# Patient Record
Sex: MALE | Race: ASIAN | NOT HISPANIC OR LATINO | URBAN - METROPOLITAN AREA
[De-identification: names, ages, dates, MRNs, and addresses within clinical notes are randomized per-mention and may not be internally consistent; named-entity substitution may affect disease eponyms.]

---

## 2021-04-22 ENCOUNTER — INPATIENT (INPATIENT)
Facility: HOSPITAL | Age: 61
LOS: 12 days | Discharge: HOME HEALTH | End: 2021-05-05
Attending: INTERNAL MEDICINE | Admitting: INTERNAL MEDICINE
Payer: COMMERCIAL

## 2021-04-22 VITALS
RESPIRATION RATE: 30 BRPM | TEMPERATURE: 98 F | OXYGEN SATURATION: 98 % | SYSTOLIC BLOOD PRESSURE: 180 MMHG | DIASTOLIC BLOOD PRESSURE: 74 MMHG | HEART RATE: 84 BPM

## 2021-04-22 DIAGNOSIS — R06.02 SHORTNESS OF BREATH: ICD-10-CM

## 2021-04-22 LAB
ALBUMIN SERPL ELPH-MCNC: 3.7 G/DL — SIGNIFICANT CHANGE UP (ref 3.3–5)
ALP SERPL-CCNC: 231 U/L — HIGH (ref 40–120)
ALT FLD-CCNC: 38 U/L — SIGNIFICANT CHANGE UP (ref 4–41)
ANION GAP SERPL CALC-SCNC: 14 MMOL/L — SIGNIFICANT CHANGE UP (ref 7–14)
APTT BLD: 29.8 SEC — SIGNIFICANT CHANGE UP (ref 27–36.3)
AST SERPL-CCNC: 39 U/L — SIGNIFICANT CHANGE UP (ref 4–40)
BASOPHILS # BLD AUTO: 0.11 K/UL — SIGNIFICANT CHANGE UP (ref 0–0.2)
BASOPHILS NFR BLD AUTO: 0.9 % — SIGNIFICANT CHANGE UP (ref 0–2)
BILIRUB SERPL-MCNC: 0.6 MG/DL — SIGNIFICANT CHANGE UP (ref 0.2–1.2)
BLOOD GAS VENOUS COMPREHENSIVE RESULT: SIGNIFICANT CHANGE UP
BUN SERPL-MCNC: 39 MG/DL — HIGH (ref 7–23)
CALCIUM SERPL-MCNC: 9.2 MG/DL — SIGNIFICANT CHANGE UP (ref 8.4–10.5)
CHLORIDE SERPL-SCNC: 100 MMOL/L — SIGNIFICANT CHANGE UP (ref 98–107)
CO2 SERPL-SCNC: 18 MMOL/L — LOW (ref 22–31)
CREAT SERPL-MCNC: 2.15 MG/DL — HIGH (ref 0.5–1.3)
EOSINOPHIL # BLD AUTO: 1.51 K/UL — HIGH (ref 0–0.5)
EOSINOPHIL NFR BLD AUTO: 13 % — HIGH (ref 0–6)
GLUCOSE SERPL-MCNC: 82 MG/DL — SIGNIFICANT CHANGE UP (ref 70–99)
HCT VFR BLD CALC: 31.5 % — LOW (ref 39–50)
HGB BLD-MCNC: 10.7 G/DL — LOW (ref 13–17)
IANC: 7.55 K/UL — SIGNIFICANT CHANGE UP (ref 1.5–8.5)
IMM GRANULOCYTES NFR BLD AUTO: 0.4 % — SIGNIFICANT CHANGE UP (ref 0–1.5)
INR BLD: 1.2 RATIO — HIGH (ref 0.88–1.16)
LYMPHOCYTES # BLD AUTO: 0.88 K/UL — LOW (ref 1–3.3)
LYMPHOCYTES # BLD AUTO: 7.6 % — LOW (ref 13–44)
MCHC RBC-ENTMCNC: 28.8 PG — SIGNIFICANT CHANGE UP (ref 27–34)
MCHC RBC-ENTMCNC: 34 GM/DL — SIGNIFICANT CHANGE UP (ref 32–36)
MCV RBC AUTO: 84.9 FL — SIGNIFICANT CHANGE UP (ref 80–100)
MONOCYTES # BLD AUTO: 1.48 K/UL — HIGH (ref 0–0.9)
MONOCYTES NFR BLD AUTO: 12.8 % — SIGNIFICANT CHANGE UP (ref 2–14)
NEUTROPHILS # BLD AUTO: 7.55 K/UL — HIGH (ref 1.8–7.4)
NEUTROPHILS NFR BLD AUTO: 65.3 % — SIGNIFICANT CHANGE UP (ref 43–77)
NRBC # BLD: 0 /100 WBCS — SIGNIFICANT CHANGE UP
NRBC # FLD: 0 K/UL — SIGNIFICANT CHANGE UP
NT-PROBNP SERPL-SCNC: 6360 PG/ML — HIGH
PLATELET # BLD AUTO: 330 K/UL — SIGNIFICANT CHANGE UP (ref 150–400)
POTASSIUM SERPL-MCNC: 3.8 MMOL/L — SIGNIFICANT CHANGE UP (ref 3.5–5.3)
POTASSIUM SERPL-SCNC: 3.8 MMOL/L — SIGNIFICANT CHANGE UP (ref 3.5–5.3)
PROT SERPL-MCNC: 7.5 G/DL — SIGNIFICANT CHANGE UP (ref 6–8.3)
PROTHROM AB SERPL-ACNC: 13.6 SEC — SIGNIFICANT CHANGE UP (ref 10.6–13.6)
RBC # BLD: 3.71 M/UL — LOW (ref 4.2–5.8)
RBC # FLD: 12.1 % — SIGNIFICANT CHANGE UP (ref 10.3–14.5)
SODIUM SERPL-SCNC: 132 MMOL/L — LOW (ref 135–145)
TROPONIN T, HIGH SENSITIVITY RESULT: 727 NG/L — CRITICAL HIGH
TROPONIN T, HIGH SENSITIVITY RESULT: 784 NG/L — CRITICAL HIGH
WBC # BLD: 11.58 K/UL — HIGH (ref 3.8–10.5)
WBC # FLD AUTO: 11.58 K/UL — HIGH (ref 3.8–10.5)

## 2021-04-22 PROCEDURE — 99285 EMERGENCY DEPT VISIT HI MDM: CPT | Mod: 25

## 2021-04-22 PROCEDURE — 71046 X-RAY EXAM CHEST 2 VIEWS: CPT | Mod: 26

## 2021-04-22 PROCEDURE — 93010 ELECTROCARDIOGRAM REPORT: CPT

## 2021-04-22 RX ORDER — HEPARIN SODIUM 5000 [USP'U]/ML
7000 INJECTION INTRAVENOUS; SUBCUTANEOUS ONCE
Refills: 0 | Status: COMPLETED | OUTPATIENT
Start: 2021-04-22 | End: 2021-04-22

## 2021-04-22 RX ORDER — HEPARIN SODIUM 5000 [USP'U]/ML
7000 INJECTION INTRAVENOUS; SUBCUTANEOUS EVERY 6 HOURS
Refills: 0 | Status: DISCONTINUED | OUTPATIENT
Start: 2021-04-22 | End: 2021-04-23

## 2021-04-22 RX ORDER — ALBUTEROL 90 UG/1
4 AEROSOL, METERED ORAL EVERY 6 HOURS
Refills: 0 | Status: DISCONTINUED | OUTPATIENT
Start: 2021-04-22 | End: 2021-04-22

## 2021-04-22 RX ORDER — FUROSEMIDE 40 MG
40 TABLET ORAL DAILY
Refills: 0 | Status: DISCONTINUED | OUTPATIENT
Start: 2021-04-22 | End: 2021-04-22

## 2021-04-22 RX ORDER — HEPARIN SODIUM 5000 [USP'U]/ML
3500 INJECTION INTRAVENOUS; SUBCUTANEOUS EVERY 6 HOURS
Refills: 0 | Status: DISCONTINUED | OUTPATIENT
Start: 2021-04-22 | End: 2021-04-23

## 2021-04-22 RX ORDER — HEPARIN SODIUM 5000 [USP'U]/ML
INJECTION INTRAVENOUS; SUBCUTANEOUS
Qty: 25000 | Refills: 0 | Status: DISCONTINUED | OUTPATIENT
Start: 2021-04-22 | End: 2021-04-23

## 2021-04-22 RX ORDER — FUROSEMIDE 40 MG
40 TABLET ORAL ONCE
Refills: 0 | Status: COMPLETED | OUTPATIENT
Start: 2021-04-22 | End: 2021-04-22

## 2021-04-22 RX ORDER — ASPIRIN/CALCIUM CARB/MAGNESIUM 324 MG
324 TABLET ORAL ONCE
Refills: 0 | Status: COMPLETED | OUTPATIENT
Start: 2021-04-22 | End: 2021-04-22

## 2021-04-22 RX ADMIN — HEPARIN SODIUM 7000 UNIT(S): 5000 INJECTION INTRAVENOUS; SUBCUTANEOUS at 21:58

## 2021-04-22 RX ADMIN — HEPARIN SODIUM 1600 UNIT(S)/HR: 5000 INJECTION INTRAVENOUS; SUBCUTANEOUS at 21:57

## 2021-04-22 RX ADMIN — Medication 324 MILLIGRAM(S): at 23:40

## 2021-04-22 RX ADMIN — ALBUTEROL 4 PUFF(S): 90 AEROSOL, METERED ORAL at 19:56

## 2021-04-22 RX ADMIN — Medication 40 MILLIGRAM(S): at 21:58

## 2021-04-22 NOTE — ED ADULT TRIAGE NOTE - CHIEF COMPLAINT QUOTE
c/o shortness of breath since last night. denies any chest pain. released from [facility] yesterday after right second toe amputation. appears tachypneic and using abdominal muscles. hx DM type 2

## 2021-04-22 NOTE — ED PROVIDER NOTE - NS ED ROS FT
CONSTITUTIONAL: No fevers, no chills  Eyes: No vision changes  Cardiovascular: No Chest pain  Respiratory: + SOB  Gastrointestinal: No n/v/d, no abd pain  Genitourinary: no dysuria, no hematuria  SKIN: no rashes.  NEURO: no headache, no weakness or numbness  PSYCHIATRIC: no known mental health issues.  Endocrine: No unexplained weight gain

## 2021-04-22 NOTE — ED ADULT NURSE NOTE - OBJECTIVE STATEMENT
59y/o male A&ox4, ambulatory received in rm2. pt c/o sob for past few days. released yesterday from [facility] for R 2nd toe amputation. denies cough, fevers at home, n/v/d, chest pain. mild increased wob noted. mild use of abdominal muscles noted, airway patent. pt able to complete full sentences. pt denies recent sick contact. 20g iv placed in LAC, labs drawn and sent. covid sent. pt placed on 3L O2 NC - satting 100%. nsr on monitor. md at bedside for eval. bed in lowest position, side rails up, call bell in hand, safety maintained. awaiting further orders. will continue to monitor.

## 2021-04-22 NOTE — CONSULT NOTE ADULT - SUBJECTIVE AND OBJECTIVE BOX
HPI:  60 year old male w/ HTN, IDDM2 and recent hospitalization for toe amputation and PAD s/p peripheral angiogram w/ angioplasty presents with acute onset of SOB x 1 day without any associated symptoms.  States SOB started Wednesday night and has progressively worsened/constant since.  Recent admission at [facility] about 2 weeks ago for toe numbness and necrotic right toe?, underwent peripheral angiogram w/ angioplasty and toe amputation came home Tuesday feeling well and wensday night acute onset of dyspnea. denies any chest pain, cough, fevers, dizziness, syncope, abd pain, back pain, N/V/D, recent sick contact, recent travel. In ED significant for b/l expiratory wheeze, chest x-ray w/ congestion, pBNP 6360 s/p Bipap and 40 ivp lasix. HsT elevated cardiology called for nstemi.  EKG NSR w/o ischemic changes  Labs: remarkable for mild leukocytosis 11.58, elevated Creatinine 2.15, hyponatremia 132, pBNP 6360, HsT  727->784  Chest x-ray w/ pulm congestion  No Known Allergies    MEDICATIONS:  heparin   Injectable 7000 Unit(s) IV Push every 6 hours PRN  heparin   Injectable 3500 Unit(s) IV Push every 6 hours PRN  heparin  Infusion.  Unit(s)/Hr IV Continuous <Continuous>    PAST MEDICAL & SURGICAL HISTORY: HTN, IDDM, PAD, toe necrosis s/p toe amputation and peripheral angiogram w/ angioplasty    SOCIAL HISTORY  Marital Status:   Occupation: un employed, airport worker in past  Lives with: family with and kids    SUBSTANCE USE  Tobacco Usage:  denies  Alcohol Usage: denies  Recreational drugs: denies    REVIEW OF SYSTEMS:  CONSTITUTIONAL: No fevers, No chills, No fatigue, No weight gain  RESPIRATORY: + shortness of breath, No cough, + wheezing, No hemoptysis  CARDIOVASCULAR: No chest pain. No palpitations, No pleuritic pain  GASTROINTESTINAL: No abdominal pain, No nausea, No vomiting, No hematemesis, No diarrhea No constipation. No melena  GENITOURINARY: No dysuria, No frequency, No incontinence, No hematuria  NEUROLOGICAL: No dizziness, No lightheadedness, No syncope, No LOC, No headache, No numbness or weakness  EXTREMITIES: No Edema, No joint pain, No joint swelling.  SKIN: No diaphoresis. No itching, No rashes, No pressure ulcers  All other review of systems is negative unless indicated above.    T(C): 36.6 (04-22-21 @ 16:58), Max: 36.6 (04-22-21 @ 16:58)  HR: 75 (04-22-21 @ 21:01) (72 - 84)  BP: 165/79 (04-22-21 @ 19:40) (165/79 - 180/74)  RR: 24 (04-22-21 @ 19:40) (24 - 30)  SpO2: 100% (04-22-21 @ 21:01) (98% - 100%)  Wt(kg): --  I&O's Summary      Physical Exam:  General: NAD  Cardiovascular: Normal S1 S2, No JVD, No murmurs, No edema  Respiratory: Lungs expiratory wheeze to auscultation	  Gastrointestinal:  Soft, Non-tender, + BS	  Skin: warm and dry, No rashes, No ecchymoses, No cyanosis	  Extremities:  No clubbing, cyanosis or edema, right toe amputation  Vascular: Peripheral pulses palpable 2+ bilaterally    CBC Full  -  ( 22 Apr 2021 19:41 )  WBC Count : 11.58 K/uL  Hemoglobin : 10.7 g/dL  Hematocrit : 31.5 %  Platelet Count - Automated : 330 K/uL  Mean Cell Volume : 84.9 fL  Mean Cell Hemoglobin : 28.8 pg  Mean Cell Hemoglobin Concentration : 34.0 gm/dL  Auto Neutrophil # : 7.55 K/uL  Auto Lymphocyte # : 0.88 K/uL  Auto Monocyte # : 1.48 K/uL  Auto Eosinophil # : 1.51 K/uL  Auto Basophil # : 0.11 K/uL  Auto Neutrophil % : 65.3 %  Auto Lymphocyte % : 7.6 %  Auto Monocyte % : 12.8 %  Auto Eosinophil % : 13.0 %  Auto Basophil % : 0.9 %    04-22    132<L>  |  100  |  39<H>  ----------------------------<  82  3.8   |  18<L>  |  2.15<H>    Ca    9.2      22 Apr 2021 19:41    TPro  7.5  /  Alb  3.7  /  TBili  0.6  /  DBili  x   /  AST  39  /  ALT  38  /  AlkPhos  231<H>  04-22    proBNP: Serum Pro-Brain Natriuretic Peptide: 6360 pg/mL (04-22 @ 19:41)

## 2021-04-22 NOTE — ED PROVIDER NOTE - PHYSICAL EXAMINATION
General: Tachypnnic   HEENT: pupils equal and reactive, normal external ears bilaterally   Cardiac: RRR, no MRG appreciated  Resp: lungs clear to auscultation bilaterally, symmetric chest wall rise  Abd: soft, nontender, nondistended,   : no CVA tenderness  Neuro: Moving all extremities  Skin:  normal color for race General: Tachypnnic   HEENT: pupils equal and reactive, normal external ears bilaterally   Cardiac: RRR, no MRG appreciated  Resp: lungs clear to auscultation bilaterally, symmetric chest wall rise  Abd: soft, nontender, nondistended,   : no CVA tenderness  Neuro: Moving all extremities  Skin:  normal color for race  lower ext: 1+ edema to BL LE

## 2021-04-22 NOTE — CONSULT NOTE ADULT - ASSESSMENT
60 year old male w/ HTN, IDDM2 and recent hospitalization for toe amputation and PAD s/p peripheral angiogram w/ angioplasty presents with acute onset of SOB x 1 day without any associated symptoms.  Exam w/ expiratory wheeze s/p albuterol, lasix and Bipap, denies chest pain.  EKG NSR w/o ischemic changes  Labs: remarkable for mild leukocytosis 11.58, elevated Creatinine 2.15, hyponatremia 132, pBNP 6360, HsT  727->784  Chest x-ray w/ pulm congestion    #SOB  Most likely from CHF, DDx PE  Elevated troponin most likely demand in setting of elevated creatinine and CHF  Continuous cardiac monitoring to monitor for arrhythmias  Continue heparin gtt for empiric coverage of nstemi and possible PE  Would r/o PE w/ V/Q scan, CTA on hold due to ACOSTA   S/p lasix 40mg ivp x 1. Assess for further diuresis, most likely will need. Trend BMP 2/2 diuresis and replete as needed. Re-check pro BNP in 48 hrs   Continue home meds, Aspirin 81 PO daily, Clopidogrel 75 PO daily, Lipitor 80 mg PO daily  CBC, CMP, coags, HbA1C, TSH, lipids for comorbidities, Trend cardiac enzymes until flat/downtrending  Low fat DASH diet  ECHO: TTE in am  Daily weight monitoring, Strict I/O, Low sodium DASH diet  Pt will likely be quickly titrated off BiPAP with sufficient dose of diuretics    Thank you, if any questions or clinical situation changes please call [employer] #[id number].  Attending Attestation to follow   60 year old male w/ HTN, IDDM2 and recent hospitalization for toe amputation and PAD s/p peripheral angiogram w/ angioplasty presents with acute onset of SOB x 1 day without any associated symptoms.  Exam w/ expiratory wheeze s/p albuterol, lasix and Bipap, denies chest pain.  EKG NSR w/o ischemic changes  Labs: remarkable for mild leukocytosis 11.58, elevated Creatinine 2.15, hyponatremia 132, pBNP 6360, HsT  727->784  Chest x-ray w/ pulm congestion    #SOB  Most likely from CHF, DDx PE  Elevated troponin most likely demand in setting of elevated creatinine and CHF  Continuous cardiac monitoring to monitor for arrhythmias  Continue heparin gtt for empiric coverage of nstemi and possible PE  Would r/o PE w/ V/Q scan, CTA on hold due to ACOSTA   S/p lasix 40mg ivp x 1. Assess for further diuresis, most likely will need. Trend BMP 2/2 diuresis and replete as needed. Re-check pro BNP in 48 hrs   Continue home meds  CBC, CMP, coags, HbA1C, TSH, lipids for comorbidities, Trend cardiac enzymes until flat/downtrending  Low fat DASH diet  ECHO: TTE in am  Daily weight monitoring, Strict I/O, Low sodium DASH diet  Pt will likely be quickly titrated off BiPAP with sufficient dose of diuretics    Thank you, if any questions or clinical situation changes please call [employer] #[id number].  Attending Attestation to follow   60 year old male w/ HTN, IDDM2 and recent hospitalization for toe amputation and PAD s/p peripheral angiogram w/ angioplasty presents with acute onset of SOB x 1 day without any associated symptoms.  Exam w/ expiratory wheeze s/p albuterol, lasix and Bipap, denies chest pain.  EKG NSR w/o ischemic changes  Labs: remarkable for mild leukocytosis 11.58, elevated Creatinine 2.15, hyponatremia 132, pBNP 6360, HsT  727->784  Chest x-ray w/ pulm congestion    #SOB  Most likely from CHF, DDx PE, COVID  Elevated troponin most likely demand in setting of elevated creatinine and CHF  Continuous cardiac monitoring to monitor for arrhythmias  Continue heparin gtt for empiric coverage of nstemi and possible PE  R/o COVID, pcr pending  Would r/o PE w/ V/Q scan, CTA on hold due to ACOSTA   S/p lasix 40mg ivp x 1. Assess for further diuresis, most likely will need. Trend BMP 2/2 diuresis and replete as needed. Re-check pro BNP in 48 hrs   Continue home meds  CBC, CMP, coags, HbA1C, TSH, lipids for comorbidities, Trend cardiac enzymes until flat/downtrending  Low fat DASH diet  ECHO: TTE in am  Daily weight monitoring, Strict I/O, Low sodium DASH diet  Pt will likely be quickly titrated off BiPAP with sufficient dose of diuretics    Thank you, if any questions or clinical situation changes please call "[employer]" #[phone].  Attending Attestation to follow   60 year old male w/ HTN, IDDM2 and recent hospitalization for toe amputation and PAD s/p peripheral angiogram w/ angioplasty presents with acute onset of SOB x 1 day without any associated symptoms.  Exam w/ expiratory wheeze and tachypneic in 30's s/p albuterol, lasix and currently on Bipap, denies chest pain.  EKG NSR w/o ischemic changes  Labs: remarkable for mild leukocytosis 11.58, elevated Creatinine 2.15, hyponatremia 132, pBNP 6360, HsT  727->784  Chest x-ray w/ pulm congestion    #SOB  Most likely from CHF, DDx PE, COVID  Elevated troponin most likely demand in setting of elevated creatinine and CHF  Continuous cardiac monitoring to monitor for arrhythmias  Continue heparin gtt for empiric coverage of nstemi and possible PE  R/o COVID, pcr pending  Would r/o PE w/ V/Q scan, CTA on hold due to ACOSTA   S/p lasix 40mg ivp x 1. Assess for further diuresis, most likely will need. Trend BMP 2/2 diuresis and replete as needed. Re-check pro BNP in 48 hrs   Continue home meds  CBC, CMP, coags, HbA1C, TSH, lipids for comorbidities, Trend cardiac enzymes until downtrending  Low fat DASH diet  ECHO: TTE in am  Ischemic eval  Daily weight monitoring, Strict I/O, Low sodium DASH diet  Pt will likely be quickly titrated off BiPAP with sufficient dose of diuretics    Interval addendum: Patient seen around 4am, SOB has improved able to talk in full sentences without being sob, respiratory rate 20 remains on Bipap sleeping comfortably would attempt to wean bipap in morning to nasal cannula, HsT 727->784->814, CKMB downtrending. ASA and plavix load continue heparin gtt. Currently asymptomatic w/o chest pain     Thank you, if any questions or clinical situation changes please call [employer] #[phone].  Attending Attestation to follow

## 2021-04-22 NOTE — ED PROVIDER NOTE - PROGRESS NOTE DETAILS
Vinnie PGY-3:  Trop and BNP elevated in context of ACOSTA, concern for PE vs cardiac etiology, pt with no CP at this time well appearing, no stemi on ekg, will start heparin drip and c/w plan to obtain CTA Vinnie PGY-3:  Cardiology states likely demand ischemia in context of Acute Kidney Injury, ok to admit to Tele on heparin and to give lasix, will give lasix. Hospitalist aleena states to give Aspirin, will give. Pt well appearing on bipap.

## 2021-04-22 NOTE — ED PROVIDER NOTE - OBJECTIVE STATEMENT
59yo M hx IDDM, HTN here w/ cc of SOB    States had amputation of toe 10 days ago, discharged yesterday. Unsure why he remained in hospital for so long after operation. States since last night has become increasingly SOB prompting him to come back to ED today. No cardiac hx. No hx of heart failure, no hx of clotting.

## 2021-04-22 NOTE — ED PROVIDER NOTE - CARE PLAN
Principal Discharge DX:	Shortness of breath   Principal Discharge DX:	Shortness of breath  Secondary Diagnosis:	Acute pulmonary edema

## 2021-04-22 NOTE — CONSULT NOTE ADULT - ATTENDING COMMENTS
hypoxic respiratory failure in the setting of CHF volume overload and CHF exacerbation.  Pt off Bilevel, SpO2 100% on RA without significant orthopnea  JVP significantly elevated; lungs mostly clear off bilevel with basilar crackles  recent neurological symptoms concerning for TIA/CVA, also with PVD and recent hospitalization with concern for PE  elevated SCr  elevated hstroponin in setting of elevated SCr, unclear if this is true ACS or demand-perfusion mismatch, however pt with significant cardiac risk factors. opt for medical optimization    -cont IV diuresis  -cont DAPT as he also has PVD  -start metoprolol tartrate 12.5mg BID  -cont hep for at least 48 hours, would pursue V/Q scan to eval for PE as this will aid with duration of AC

## 2021-04-22 NOTE — ED ADULT NURSE NOTE - CHIEF COMPLAINT QUOTE
c/o shortness of breath since last night. denies any chest pain. released from [facility] yesterday after right second toe amputation. appears tachypneic and using abdominal muscles. hx DM type 2

## 2021-04-22 NOTE — ED PROVIDER NOTE - ATTENDING CONTRIBUTION TO CARE
DR. BLOCH, ATTENDING MD-  I performed a face to face bedside interview with patient regarding history of present illness, review of symptoms and past medical history. I completed an independent physical exam.  I have discussed patient's plan of care with the resident.  patient examined, tachypneic, some wheezing noted, alert and oriented, heart sounds nml, lungs clear, abd soft nontender, pos edema,

## 2021-04-23 ENCOUNTER — TRANSCRIPTION ENCOUNTER (OUTPATIENT)
Age: 61
End: 2021-04-23

## 2021-04-23 DIAGNOSIS — Z29.9 ENCOUNTER FOR PROPHYLACTIC MEASURES, UNSPECIFIED: ICD-10-CM

## 2021-04-23 DIAGNOSIS — I73.9 PERIPHERAL VASCULAR DISEASE, UNSPECIFIED: ICD-10-CM

## 2021-04-23 DIAGNOSIS — R47.81 SLURRED SPEECH: ICD-10-CM

## 2021-04-23 DIAGNOSIS — I10 ESSENTIAL (PRIMARY) HYPERTENSION: ICD-10-CM

## 2021-04-23 DIAGNOSIS — Z79.899 OTHER LONG TERM (CURRENT) DRUG THERAPY: ICD-10-CM

## 2021-04-23 DIAGNOSIS — Z89.429 ACQUIRED ABSENCE OF OTHER TOE(S), UNSPECIFIED SIDE: Chronic | ICD-10-CM

## 2021-04-23 DIAGNOSIS — E11.9 TYPE 2 DIABETES MELLITUS WITHOUT COMPLICATIONS: ICD-10-CM

## 2021-04-23 DIAGNOSIS — Z98.62 PERIPHERAL VASCULAR ANGIOPLASTY STATUS: Chronic | ICD-10-CM

## 2021-04-23 DIAGNOSIS — I21.4 NON-ST ELEVATION (NSTEMI) MYOCARDIAL INFARCTION: ICD-10-CM

## 2021-04-23 DIAGNOSIS — D64.9 ANEMIA, UNSPECIFIED: ICD-10-CM

## 2021-04-23 DIAGNOSIS — N17.9 ACUTE KIDNEY FAILURE, UNSPECIFIED: ICD-10-CM

## 2021-04-23 DIAGNOSIS — I50.9 HEART FAILURE, UNSPECIFIED: ICD-10-CM

## 2021-04-23 LAB
A1C WITH ESTIMATED AVERAGE GLUCOSE RESULT: 8.3 % — HIGH (ref 4–5.6)
ALBUMIN SERPL ELPH-MCNC: 3.3 G/DL — SIGNIFICANT CHANGE UP (ref 3.3–5)
ALBUMIN SERPL ELPH-MCNC: 3.3 G/DL — SIGNIFICANT CHANGE UP (ref 3.3–5)
ALP SERPL-CCNC: 202 U/L — HIGH (ref 40–120)
ALP SERPL-CCNC: 218 U/L — HIGH (ref 40–120)
ALT FLD-CCNC: 35 U/L — SIGNIFICANT CHANGE UP (ref 4–41)
ALT FLD-CCNC: 38 U/L — SIGNIFICANT CHANGE UP (ref 4–41)
ANION GAP SERPL CALC-SCNC: 14 MMOL/L — SIGNIFICANT CHANGE UP (ref 7–14)
ANION GAP SERPL CALC-SCNC: 15 MMOL/L — HIGH (ref 7–14)
APPEARANCE UR: CLEAR — SIGNIFICANT CHANGE UP
APTT BLD: 88.7 SEC — HIGH (ref 27–36.3)
APTT BLD: 90.8 SEC — HIGH (ref 27–36.3)
APTT BLD: 92 SEC — HIGH (ref 27–36.3)
APTT BLD: 92.8 SEC — HIGH (ref 27–36.3)
AST SERPL-CCNC: 30 U/L — SIGNIFICANT CHANGE UP (ref 4–40)
AST SERPL-CCNC: 34 U/L — SIGNIFICANT CHANGE UP (ref 4–40)
BASE EXCESS BLDV CALC-SCNC: -4.4 MMOL/L — LOW (ref -3–2)
BILIRUB SERPL-MCNC: 0.4 MG/DL — SIGNIFICANT CHANGE UP (ref 0.2–1.2)
BILIRUB SERPL-MCNC: 0.4 MG/DL — SIGNIFICANT CHANGE UP (ref 0.2–1.2)
BILIRUB UR-MCNC: NEGATIVE — SIGNIFICANT CHANGE UP
BLOOD GAS VENOUS COMPREHENSIVE RESULT: SIGNIFICANT CHANGE UP
BUN SERPL-MCNC: 43 MG/DL — HIGH (ref 7–23)
BUN SERPL-MCNC: 45 MG/DL — HIGH (ref 7–23)
CALCIUM SERPL-MCNC: 8.7 MG/DL — SIGNIFICANT CHANGE UP (ref 8.4–10.5)
CALCIUM SERPL-MCNC: 9 MG/DL — SIGNIFICANT CHANGE UP (ref 8.4–10.5)
CHLORIDE SERPL-SCNC: 100 MMOL/L — SIGNIFICANT CHANGE UP (ref 98–107)
CHLORIDE SERPL-SCNC: 102 MMOL/L — SIGNIFICANT CHANGE UP (ref 98–107)
CHOLEST SERPL-MCNC: 186 MG/DL — SIGNIFICANT CHANGE UP
CK MB BLD-MCNC: 4 % — HIGH (ref 0–2.5)
CK MB BLD-MCNC: 4.6 % — HIGH (ref 0–2.5)
CK MB BLD-MCNC: 4.7 % — HIGH (ref 0–2.5)
CK MB CFR SERPL CALC: 5.8 NG/ML — SIGNIFICANT CHANGE UP
CK MB CFR SERPL CALC: 7.8 NG/ML — HIGH
CK MB CFR SERPL CALC: 7.9 NG/ML — HIGH
CK SERPL-CCNC: 123 U/L — SIGNIFICANT CHANGE UP (ref 30–200)
CK SERPL-CCNC: 173 U/L — SIGNIFICANT CHANGE UP (ref 30–200)
CK SERPL-CCNC: 196 U/L — SIGNIFICANT CHANGE UP (ref 30–200)
CO2 SERPL-SCNC: 17 MMOL/L — LOW (ref 22–31)
CO2 SERPL-SCNC: 18 MMOL/L — LOW (ref 22–31)
COLOR SPEC: SIGNIFICANT CHANGE UP
CREAT ?TM UR-MCNC: 54 MG/DL — SIGNIFICANT CHANGE UP
CREAT SERPL-MCNC: 2.42 MG/DL — HIGH (ref 0.5–1.3)
CREAT SERPL-MCNC: 2.66 MG/DL — HIGH (ref 0.5–1.3)
CRP SERPL-MCNC: 77.9 MG/L — HIGH
D DIMER BLD IA.RAPID-MCNC: 532 NG/ML DDU — HIGH
DIFF PNL FLD: ABNORMAL
ESTIMATED AVERAGE GLUCOSE: 192 MG/DL — HIGH (ref 68–114)
GLUCOSE BLDC GLUCOMTR-MCNC: 101 MG/DL — HIGH (ref 70–99)
GLUCOSE BLDC GLUCOMTR-MCNC: 127 MG/DL — HIGH (ref 70–99)
GLUCOSE BLDC GLUCOMTR-MCNC: 148 MG/DL — HIGH (ref 70–99)
GLUCOSE BLDC GLUCOMTR-MCNC: 175 MG/DL — HIGH (ref 70–99)
GLUCOSE BLDC GLUCOMTR-MCNC: 67 MG/DL — LOW (ref 70–99)
GLUCOSE BLDC GLUCOMTR-MCNC: 67 MG/DL — LOW (ref 70–99)
GLUCOSE BLDC GLUCOMTR-MCNC: 69 MG/DL — LOW (ref 70–99)
GLUCOSE BLDC GLUCOMTR-MCNC: 99 MG/DL — SIGNIFICANT CHANGE UP (ref 70–99)
GLUCOSE SERPL-MCNC: 182 MG/DL — HIGH (ref 70–99)
GLUCOSE SERPL-MCNC: 67 MG/DL — LOW (ref 70–99)
GLUCOSE UR QL: NEGATIVE — SIGNIFICANT CHANGE UP
HCO3 BLDV-SCNC: 20 MMOL/L — SIGNIFICANT CHANGE UP (ref 20–27)
HCT VFR BLD CALC: 30.1 % — LOW (ref 39–50)
HCT VFR BLD CALC: 30.4 % — LOW (ref 39–50)
HCT VFR BLD CALC: 30.6 % — LOW (ref 39–50)
HDLC SERPL-MCNC: 28 MG/DL — LOW
HGB BLD-MCNC: 10.2 G/DL — LOW (ref 13–17)
INR BLD: 1.25 RATIO — HIGH (ref 0.88–1.16)
KETONES UR-MCNC: NEGATIVE — SIGNIFICANT CHANGE UP
LEUKOCYTE ESTERASE UR-ACNC: NEGATIVE — SIGNIFICANT CHANGE UP
LIPID PNL WITH DIRECT LDL SERPL: 136 MG/DL — HIGH
MAGNESIUM SERPL-MCNC: 2.2 MG/DL — SIGNIFICANT CHANGE UP (ref 1.6–2.6)
MAGNESIUM SERPL-MCNC: 2.3 MG/DL — SIGNIFICANT CHANGE UP (ref 1.6–2.6)
MCHC RBC-ENTMCNC: 28.6 PG — SIGNIFICANT CHANGE UP (ref 27–34)
MCHC RBC-ENTMCNC: 28.8 PG — SIGNIFICANT CHANGE UP (ref 27–34)
MCHC RBC-ENTMCNC: 29.2 PG — SIGNIFICANT CHANGE UP (ref 27–34)
MCHC RBC-ENTMCNC: 33.3 GM/DL — SIGNIFICANT CHANGE UP (ref 32–36)
MCHC RBC-ENTMCNC: 33.6 GM/DL — SIGNIFICANT CHANGE UP (ref 32–36)
MCHC RBC-ENTMCNC: 33.9 GM/DL — SIGNIFICANT CHANGE UP (ref 32–36)
MCV RBC AUTO: 85 FL — SIGNIFICANT CHANGE UP (ref 80–100)
MCV RBC AUTO: 85.2 FL — SIGNIFICANT CHANGE UP (ref 80–100)
MCV RBC AUTO: 87.7 FL — SIGNIFICANT CHANGE UP (ref 80–100)
NITRITE UR-MCNC: NEGATIVE — SIGNIFICANT CHANGE UP
NON HDL CHOLESTEROL: 158 MG/DL — HIGH
NRBC # BLD: 0 /100 WBCS — SIGNIFICANT CHANGE UP
NRBC # FLD: 0 K/UL — SIGNIFICANT CHANGE UP
NT-PROBNP SERPL-SCNC: 5709 PG/ML — HIGH
PCO2 BLDV: 36 MMHG — LOW (ref 42–55)
PH BLDV: 7.36 — SIGNIFICANT CHANGE UP (ref 7.32–7.43)
PH UR: 6 — SIGNIFICANT CHANGE UP (ref 5–8)
PHOSPHATE SERPL-MCNC: 4.6 MG/DL — HIGH (ref 2.5–4.5)
PLATELET # BLD AUTO: 299 K/UL — SIGNIFICANT CHANGE UP (ref 150–400)
PLATELET # BLD AUTO: 312 K/UL — SIGNIFICANT CHANGE UP (ref 150–400)
PLATELET # BLD AUTO: 330 K/UL — SIGNIFICANT CHANGE UP (ref 150–400)
PO2 BLDV: 28 MMHG — LOW (ref 35–40)
POTASSIUM SERPL-MCNC: 3.5 MMOL/L — SIGNIFICANT CHANGE UP (ref 3.5–5.3)
POTASSIUM SERPL-MCNC: 4.5 MMOL/L — SIGNIFICANT CHANGE UP (ref 3.5–5.3)
POTASSIUM SERPL-SCNC: 3.5 MMOL/L — SIGNIFICANT CHANGE UP (ref 3.5–5.3)
POTASSIUM SERPL-SCNC: 4.5 MMOL/L — SIGNIFICANT CHANGE UP (ref 3.5–5.3)
PROT SERPL-MCNC: 6.7 G/DL — SIGNIFICANT CHANGE UP (ref 6–8.3)
PROT SERPL-MCNC: 6.8 G/DL — SIGNIFICANT CHANGE UP (ref 6–8.3)
PROT UR-MCNC: ABNORMAL
PROTHROM AB SERPL-ACNC: 14.2 SEC — HIGH (ref 10.6–13.6)
RBC # BLD: 3.49 M/UL — LOW (ref 4.2–5.8)
RBC # BLD: 3.54 M/UL — LOW (ref 4.2–5.8)
RBC # BLD: 3.57 M/UL — LOW (ref 4.2–5.8)
RBC # FLD: 12.1 % — SIGNIFICANT CHANGE UP (ref 10.3–14.5)
RBC # FLD: 12.2 % — SIGNIFICANT CHANGE UP (ref 10.3–14.5)
RBC # FLD: 12.3 % — SIGNIFICANT CHANGE UP (ref 10.3–14.5)
SAO2 % BLDV: 47.6 % — LOW (ref 60–85)
SARS-COV-2 RNA SPEC QL NAA+PROBE: SIGNIFICANT CHANGE UP
SODIUM SERPL-SCNC: 132 MMOL/L — LOW (ref 135–145)
SODIUM SERPL-SCNC: 134 MMOL/L — LOW (ref 135–145)
SODIUM UR-SCNC: 51 MMOL/L — SIGNIFICANT CHANGE UP
SP GR SPEC: 1.01 — SIGNIFICANT CHANGE UP (ref 1.01–1.02)
TRIGL SERPL-MCNC: 109 MG/DL — SIGNIFICANT CHANGE UP
TROPONIN T, HIGH SENSITIVITY RESULT: 1101 NG/L — CRITICAL HIGH
TROPONIN T, HIGH SENSITIVITY RESULT: 814 NG/L — CRITICAL HIGH
TROPONIN T, HIGH SENSITIVITY RESULT: 975 NG/L — CRITICAL HIGH
TSH SERPL-MCNC: 1.58 UIU/ML — SIGNIFICANT CHANGE UP (ref 0.27–4.2)
UROBILINOGEN FLD QL: SIGNIFICANT CHANGE UP
UUN UR-MCNC: 349.5 MG/DL — SIGNIFICANT CHANGE UP
WBC # BLD: 9.15 K/UL — SIGNIFICANT CHANGE UP (ref 3.8–10.5)
WBC # BLD: 9.83 K/UL — SIGNIFICANT CHANGE UP (ref 3.8–10.5)
WBC # BLD: 9.86 K/UL — SIGNIFICANT CHANGE UP (ref 3.8–10.5)
WBC # FLD AUTO: 9.15 K/UL — SIGNIFICANT CHANGE UP (ref 3.8–10.5)
WBC # FLD AUTO: 9.83 K/UL — SIGNIFICANT CHANGE UP (ref 3.8–10.5)
WBC # FLD AUTO: 9.86 K/UL — SIGNIFICANT CHANGE UP (ref 3.8–10.5)

## 2021-04-23 PROCEDURE — 12345: CPT | Mod: NC

## 2021-04-23 PROCEDURE — 78580 LUNG PERFUSION IMAGING: CPT | Mod: 26

## 2021-04-23 PROCEDURE — 99223 1ST HOSP IP/OBS HIGH 75: CPT

## 2021-04-23 PROCEDURE — 99255 IP/OBS CONSLTJ NEW/EST HI 80: CPT

## 2021-04-23 RX ORDER — DEXTROSE 50 % IN WATER 50 %
25 SYRINGE (ML) INTRAVENOUS ONCE
Refills: 0 | Status: DISCONTINUED | OUTPATIENT
Start: 2021-04-23 | End: 2021-04-30

## 2021-04-23 RX ORDER — GLUCAGON INJECTION, SOLUTION 0.5 MG/.1ML
1 INJECTION, SOLUTION SUBCUTANEOUS ONCE
Refills: 0 | Status: DISCONTINUED | OUTPATIENT
Start: 2021-04-23 | End: 2021-04-30

## 2021-04-23 RX ORDER — LOSARTAN POTASSIUM 100 MG/1
1 TABLET, FILM COATED ORAL
Qty: 0 | Refills: 0 | DISCHARGE

## 2021-04-23 RX ORDER — HEPARIN SODIUM 5000 [USP'U]/ML
3500 INJECTION INTRAVENOUS; SUBCUTANEOUS EVERY 6 HOURS
Refills: 0 | Status: DISCONTINUED | OUTPATIENT
Start: 2021-04-23 | End: 2021-04-26

## 2021-04-23 RX ORDER — INSULIN GLARGINE 100 [IU]/ML
50 INJECTION, SOLUTION SUBCUTANEOUS
Qty: 0 | Refills: 0 | DISCHARGE

## 2021-04-23 RX ORDER — CEFEPIME 1 G/1
2000 INJECTION, POWDER, FOR SOLUTION INTRAMUSCULAR; INTRAVENOUS ONCE
Refills: 0 | Status: COMPLETED | OUTPATIENT
Start: 2021-04-23 | End: 2021-04-23

## 2021-04-23 RX ORDER — HEPARIN SODIUM 5000 [USP'U]/ML
INJECTION INTRAVENOUS; SUBCUTANEOUS
Qty: 25000 | Refills: 0 | Status: DISCONTINUED | OUTPATIENT
Start: 2021-04-23 | End: 2021-04-26

## 2021-04-23 RX ORDER — INSULIN LISPRO 100/ML
VIAL (ML) SUBCUTANEOUS
Refills: 0 | Status: DISCONTINUED | OUTPATIENT
Start: 2021-04-23 | End: 2021-04-29

## 2021-04-23 RX ORDER — DEXTROSE 50 % IN WATER 50 %
15 SYRINGE (ML) INTRAVENOUS ONCE
Refills: 0 | Status: COMPLETED | OUTPATIENT
Start: 2021-04-23 | End: 2021-04-23

## 2021-04-23 RX ORDER — ATORVASTATIN CALCIUM 80 MG/1
40 TABLET, FILM COATED ORAL AT BEDTIME
Refills: 0 | Status: DISCONTINUED | OUTPATIENT
Start: 2021-04-23 | End: 2021-05-05

## 2021-04-23 RX ORDER — SODIUM CHLORIDE 9 MG/ML
1000 INJECTION, SOLUTION INTRAVENOUS
Refills: 0 | Status: DISCONTINUED | OUTPATIENT
Start: 2021-04-23 | End: 2021-04-30

## 2021-04-23 RX ORDER — CLOPIDOGREL BISULFATE 75 MG/1
300 TABLET, FILM COATED ORAL ONCE
Refills: 0 | Status: COMPLETED | OUTPATIENT
Start: 2021-04-23 | End: 2021-04-23

## 2021-04-23 RX ORDER — INSULIN LISPRO 100/ML
VIAL (ML) SUBCUTANEOUS AT BEDTIME
Refills: 0 | Status: DISCONTINUED | OUTPATIENT
Start: 2021-04-23 | End: 2021-04-29

## 2021-04-23 RX ORDER — INSULIN LISPRO 100/ML
VIAL (ML) SUBCUTANEOUS EVERY 6 HOURS
Refills: 0 | Status: DISCONTINUED | OUTPATIENT
Start: 2021-04-23 | End: 2021-04-23

## 2021-04-23 RX ORDER — DEXTROSE 50 % IN WATER 50 %
12.5 SYRINGE (ML) INTRAVENOUS ONCE
Refills: 0 | Status: DISCONTINUED | OUTPATIENT
Start: 2021-04-23 | End: 2021-04-30

## 2021-04-23 RX ORDER — HEPARIN SODIUM 5000 [USP'U]/ML
7000 INJECTION INTRAVENOUS; SUBCUTANEOUS EVERY 6 HOURS
Refills: 0 | Status: DISCONTINUED | OUTPATIENT
Start: 2021-04-23 | End: 2021-04-26

## 2021-04-23 RX ORDER — DEXTROSE 50 % IN WATER 50 %
15 SYRINGE (ML) INTRAVENOUS ONCE
Refills: 0 | Status: DISCONTINUED | OUTPATIENT
Start: 2021-04-23 | End: 2021-04-30

## 2021-04-23 RX ORDER — CEFEPIME 1 G/1
INJECTION, POWDER, FOR SOLUTION INTRAMUSCULAR; INTRAVENOUS
Refills: 0 | Status: DISCONTINUED | OUTPATIENT
Start: 2021-04-23 | End: 2021-04-23

## 2021-04-23 RX ORDER — CEFEPIME 1 G/1
1000 INJECTION, POWDER, FOR SOLUTION INTRAMUSCULAR; INTRAVENOUS EVERY 12 HOURS
Refills: 0 | Status: DISCONTINUED | OUTPATIENT
Start: 2021-04-24 | End: 2021-04-24

## 2021-04-23 RX ORDER — ASPIRIN/CALCIUM CARB/MAGNESIUM 324 MG
81 TABLET ORAL DAILY
Refills: 0 | Status: DISCONTINUED | OUTPATIENT
Start: 2021-04-23 | End: 2021-05-05

## 2021-04-23 RX ADMIN — HEPARIN SODIUM 1600 UNIT(S)/HR: 5000 INJECTION INTRAVENOUS; SUBCUTANEOUS at 18:31

## 2021-04-23 RX ADMIN — HEPARIN SODIUM 1600 UNIT(S)/HR: 5000 INJECTION INTRAVENOUS; SUBCUTANEOUS at 11:52

## 2021-04-23 RX ADMIN — CLOPIDOGREL BISULFATE 300 MILLIGRAM(S): 75 TABLET, FILM COATED ORAL at 07:57

## 2021-04-23 RX ADMIN — Medication 15 GRAM(S): at 06:45

## 2021-04-23 RX ADMIN — CEFEPIME 100 MILLIGRAM(S): 1 INJECTION, POWDER, FOR SOLUTION INTRAMUSCULAR; INTRAVENOUS at 13:27

## 2021-04-23 RX ADMIN — Medication 81 MILLIGRAM(S): at 11:53

## 2021-04-23 RX ADMIN — Medication 1: at 17:40

## 2021-04-23 NOTE — DISCHARGE NOTE PROVIDER - HOSPITAL COURSE
60-year-old male with HTN, IDDM2, PAD s/p RLE angioplasty, recent RLE 2nd digit distal amputation at [facility] [place], presenting with sudden onset slurred speech that started on Wednesday night, persisted until presentation. Patient denies any history of stroke, recent hypoglycemic events or similar symptoms in the past. Patient denies to me any LH/dizziness, syncope, HA/vision changes, URI symptoms, chest pain/palpitations, orthopnea, LE edema, coughing, wheezing, shortness of breath, nausea/vomiting/diarrhea, abdominal pain, no urinary symptoms. He denies any history of heart disease or HF. No recent sick contacts. Patient reports was due to f/u with podiatric sx today. On exam, patient noted to have RUE midline, reportedly taking antibiotics (unknown which) at home, Rx for 6 weeks.    Acute heart failure.    -stable off bipap, satting well on RA  -appreciate cardiology recs , cont asa/plavix, no plan for renal biopsy at this time  - now off heparin gtt   - TTE 4/26 reviewed LVEF 52% , Mild segmental LV systolic dysfunction.  Hypokinesis of the basal to mid inferior wall.   monitor on tele, trend CE  strict I/Os, daily weights  - off bumex   -c/w coreg 6.125 mg bid, hydralazine 50 mg tid for uncontrolled HTN, c/w isordil 20 mg tid.     NSTEMI (non-ST elevated myocardial infarction).   - s/p asa and plavix load  appreciate cards recs, thought to be secondary to demand in setting of acute HF  - as per PCP pt does not have stents  -c/w asa/plavix/statin/beta blocker.     ACOSTA (acute kidney injury).    Cr improving   appreciate renal recs - empiric steroids for AIN   started on prednisone 60 mg, now tapered to 50mg, taper 10 mg every 3 days per renal  gallium scan normal, no evidence of AIN, case d/w renal fellow, scan was done after prednisone started on 4/28 and renal fxn improving on steroid so can't exclude AIN, will taper prednisone as above  - s/p bumex   - changed cefepime to daptomycin   - baseline Cr last month was 1.7  -outpt f/u nephrology Dr. Zambrano on May 10 at 12 PM.     Slurred speech.    resolved  CT head negative.     Type 2 diabetes mellitus, with long-term current use of insulin.   -Monitoring FS - likely elevated due to steroids   -A1c 8.3%  - endocrine consult appreciated, insulin adjusted, increased lantus to 52u hs and admelog to 20u ac tid, will adjust as steroid is being tapered - will clarify discharge regimen   monitor FS.     Essential hypertension  Losartan on hold in light of ACOSTA  Recent Rx for Nifedipine, unclear pt is taking as he has been non complaint with other meds and medical treatments like CPAP, plavix  Increased hydralazine to 50 mg tid, procardia 60 mg qd, monitor BP.    PAD (peripheral artery disease).   c/w ASA, statin  Right second toe partial amputation 4/12 at [facility] [place], grew GBS alone  surgical site does not look infected but patient says he was on cefepime via PICC line for it.   ID f/u, changed to daptomycin, c/w dapto through 5/24 per ID.     Patient is medically cleared to be discharged on 5/5/2021 per hospitalist Dr. Yoo.     60-year-old male with HTN, IDDM2, PAD s/p RLE angioplasty, recent RLE 2nd digit distal amputation at [facility], presenting with sudden onset slurred speech that started on Wednesday night, persisted until presentation. Patient denies any history of stroke, recent hypoglycemic events or similar symptoms in the past. Patient denies to me any LH/dizziness, syncope, HA/vision changes, URI symptoms, chest pain/palpitations, orthopnea, LE edema, coughing, wheezing, shortness of breath, nausea/vomiting/diarrhea, abdominal pain, no urinary symptoms. He denies any history of heart disease or HF. No recent sick contacts. Patient reports was due to f/u with podiatric sx today. On exam, patient noted to have RUE midline, reportedly taking antibiotics (unknown which) at home, Rx for 6 weeks.    Acute heart failure.    -stable off bipap, satting well on RA  -appreciate cardiology recs , cont asa/plavix, no plan for renal biopsy at this time  - now off heparin gtt   - TTE 4/26 reviewed LVEF 52% , Mild segmental LV systolic dysfunction.  Hypokinesis of the basal to mid inferior wall.   monitor on tele, trend CE  strict I/Os, daily weights  - off bumex   -c/w coreg 6.125 mg bid, hydralazine 50 mg tid for uncontrolled HTN, c/w isordil 20 mg tid.     NSTEMI (non-ST elevated myocardial infarction).   - s/p asa and plavix load  appreciate cards recs, thought to be secondary to demand in setting of acute HF  - as per PCP pt does not have stents  -c/w asa/plavix/statin/beta blocker.     ACOSTA (acute kidney injury)  Cr improving   appreciate renal recs - empiric steroids for AIN   started on prednisone 60 mg, now tapered to 50mg, taper 10 mg every 3 days per renal  gallium scan normal, no evidence of AIN, case d/w renal fellow, scan was done after prednisone started on 4/28 and renal fxn improving on steroid so can't exclude AIN, will taper prednisone as above  - s/p bumex   - changed cefepime to daptomycin   - baseline Cr last month was 1.7  -outpt f/u nephrology Dr. Zambrano on May 10 at 12 PM.     Slurred speech.    resolved  CT head negative.     Type 2 diabetes mellitus, with long-term current use of insulin.   -Monitoring FS - likely elevated due to steroids   -A1c 8.3%  - endocrine consult appreciated, insulin adjusted, increased lantus to 52u hs and admelog to 20u ac tid, will adjust as steroid is being tapered - will clarify discharge regimen   monitor FS.     Essential hypertension  Losartan on hold in light of ACOSTA  Recent Rx for Nifedipine, unclear pt is taking as he has been non complaint with other meds and medical treatments like CPAP, plavix  Increased hydralazine to 50 mg tid, procardia 60 mg qd, monitor BP.    PAD (peripheral artery disease).   c/w ASA, statin  Right second toe partial amputation 4/12 at [facility], grew GBS alone  surgical site does not look infected but patient says he was on cefepime via PICC line for it.   ID f/u, changed to daptomycin, c/w dapto through 5/24 per ID.     Patient is medically cleared to be discharged on 5/5/2021 per hospitalist Dr. Yoo.

## 2021-04-23 NOTE — CONSULT NOTE ADULT - ASSESSMENT
60M with diabetes, admitted 4/22/21 for acute slurred speech, found to have NSTEMI with CHF.   On IV Cefepime via right arm PICC through 5/24 following right second toe partial amputation at [facility] earlier this month.   Cefepime is a commonly used antibiotic in this setting but I can't comment on its appropriateness for him without more data.   He's not sick and the foot doesn't look acutely infected.     Suggest  -reasonable to continue antibiotic plan from his prior provider - would dose Cefepime at 1GM IV q12h based on his estimated CrCl (no height recorded)   -resume care with his ID doctor from [facility] upon discharge     Will sign off, please call back if needed   Spoke with medicine     Manjit Drake MD   Infectious Disease   Pager [phone]   After 5PM and on weekends please page fellow on call or call [phone]

## 2021-04-23 NOTE — ED ADULT NURSE REASSESSMENT NOTE - NS ED NURSE REASSESS COMMENT FT1
Assumed care of pt at 0830.  Pt sleeping easily arousable in NAD, pt on BiPAP 10/5/40%, tolerating well.  FS rechecked 127, pt had episode of hypoglycemia earlier this morning.  VSS, awaiting ESSU 1 bed.

## 2021-04-23 NOTE — PROGRESS NOTE ADULT - PROBLEM SELECTOR PLAN 7
c/w ASA, statin    #recent amputation - consult podiatry in AM for post-op eval; confirm with Mimi/DRAKE in AM abx to continue c/w ASA, statin  recent amputation at [facility]; surgical site does not look infected but patient says he was on cefepime via PICC line for it.   ID consult appreciated. Will continue with cefipeme until 5/24  - but need records from [facility] to confirm abx.

## 2021-04-23 NOTE — H&P ADULT - HISTORY OF PRESENT ILLNESS
60-year-old male with HTN, IDDM2, PAD s/p RLE angioplasty, recent RLE 2nd digit distal amputation at [facility], presenting with sudden onset slurred speech that started on Wednesday night, persisted until presentation. Patient denies any history of stroke, recent hypoglycemic events or similar symptoms in the past. Patient denies to me any LH/dizziness, syncope, HA/vision changes, URI symptoms, chest pain/palpitations, orthopnea, LE edema, coughing, wheezing, shortness of breath, nausea/vomiting/diarrhea, abdominal pain, no urinary symptoms. He denies any history of heart disease or HF. No recent sick contacts. Patient reports was due to f/u with podiatric sx today. On exam, patient noted to have RUE midline, reportedly taking antibiotics (unknown which) at home, Rx for 6 weeks.    In the ED VS: 97.8  72-84  165-180/74-79  24-30  %RA, received aspirin 324mg PO x1, albuterol 90mcg 4puffs x1, furosemide 40mg IV x1, and was started on a heparin gtt

## 2021-04-23 NOTE — DISCHARGE NOTE PROVIDER - NSDCFUADDINST_GEN_ALL_CORE_FT
Podiatry Discharge Instructions:  Follow up: Please follow up with own podiatrist or with Dr. Duong within 1 week of discharge from the hospital, please call [phone] for appointment and discuss that you recently were seen in the hospital.  Wound Care: Please apply betadine and 4x4 gauze to right foot wound daily  Weight bearing: Please weight bear as tolerated in a surgical shoe.  Antibiotics: Please continue as instructed.

## 2021-04-23 NOTE — H&P ADULT - PROBLEM SELECTOR PLAN 1
appreciate cardiology recs  will check d-dimer and order VQ scan to r/o PE once more stable/off BiPAP  requiring BiPAP for tachypnea, SpO2 100%, new BiPAP, MICU attending aware, repeat VBG pending   check TSH, A1c, Lipid panel  c/w ASA, heparin gtt  TTE  monitor on tele, trend CE  strict I/Os, daily weights  lungs clear on exam, euvolemic, re-eval need for diuresis in AM appreciate cardiology recs  will check d-dimer and order VQ scan to r/o PE once more stable/off BiPAP  requiring BiPAP for tachypnea, SpO2 100%, new BiPAP, repeat VBG pending, spoke with MICU attending, can call pulm consult in AM if still requiring BiPAP  check TSH, A1c, Lipid panel  c/w ASA, heparin gtt  TTE  monitor on tele, trend CE  strict I/Os, daily weights  lungs clear on exam, euvolemic, re-eval need for diuresis in AM

## 2021-04-23 NOTE — H&P ADULT - NSHPREVIEWOFSYSTEMS_GEN_ALL_CORE
REVIEW OF SYSTEMS:    CONSTITUTIONAL: No weakness, fevers or chills; No increase in salt or water intake  EYES/ENT: No visual changes;  No dysphagia; No sore throat; No rhinorrhea; No sinus pain/pressure  NECK: No pain or stiffness  RESPIRATORY: No cough, wheezing, hemoptysis; No shortness of breath  CARDIOVASCULAR: No chest pain or palpitations; No lower extremity edema; No orthopnea   GASTROINTESTINAL: No abdominal or epigastric pain. No nausea, vomiting, or hematemesis; No diarrhea or constipation. No melena or hematochezia.  GENITOURINARY: No dysuria, frequency or hematuria  NEUROLOGICAL: No numbness or weakness  MSK: no pain in foot s/p amputation, has been ambulating with R foot boot and cane since the surgery, no falls  SKIN: No itching, burning, rashes, or lesions   All other review of systems is negative unless indicated above.

## 2021-04-23 NOTE — DISCHARGE NOTE PROVIDER - CARE PROVIDERS DIRECT ADDRESSES
[email or web address],DirectAddress_Unknown,DirectAddress_Unknown

## 2021-04-23 NOTE — PROGRESS NOTE ADULT - PROBLEM SELECTOR PLAN 2
s/p asa load in ED, on hep gtt  appreciate cards recs, thought to be secondary to demand in setting of acute HF  spoke with cards, clopidogrel Rx'ed per OMR 2/2021 90 day supply however patient reportedly not taking, load now per cards s/p asa and plavix load, on hep gtt  appreciate cards recs, thought to be secondary to demand in setting of acute HF  F/U cardiology recs

## 2021-04-23 NOTE — CHART NOTE - NSCHARTNOTEFT_GEN_A_CORE
Spoke to Christianne Friend RN @ [facility], patient was receiving home care services for antibiotic infusion through 5/24 Spoke to Christianne Friend, RN @ [facility], patient was receiving home care services for antibiotic infusion through 5/24    Attempted to contact infectious disease office, unable to speak w/anyone, message left for clinical coordinator

## 2021-04-23 NOTE — H&P ADULT - NSICDXPASTMEDICALHX_GEN_ALL_CORE_FT
PAST MEDICAL HISTORY:  Essential hypertension     PAD (peripheral artery disease)     Type 2 diabetes mellitus, with long-term current use of insulin

## 2021-04-23 NOTE — DISCHARGE NOTE PROVIDER - CARE PROVIDER_API CALL
Mg Duong (DPM)  Podiatric Medicine and Surgery  [address] [place] [address]  Phone: [phone]  Fax: [phone]  Follow Up Time: 1 week    Zachariah Zambrano)  Internal Medicine; Nephrology  [address] [place] [id number]  Phone: [phone]  Fax: [phone]  Scheduled Appointment: 05/10/2021 12:00 PM    Endocrine outpatient clinic,   Phone: [phone]  Fax: (   )    -  Follow Up Time:

## 2021-04-23 NOTE — PROGRESS NOTE ADULT - PROBLEM SELECTOR PLAN 1
appreciate cardiology recs  will check d-dimer and order VQ scan to r/o PE once more stable/off BiPAP  requiring BiPAP for tachypnea, SpO2 100%, new BiPAP, repeat VBG pending, spoke with MICU attending, can call pulm consult in AM if still requiring BiPAP  check TSH, A1c, Lipid panel  c/w ASA, heparin gtt  TTE  monitor on tele, trend CE  strict I/Os, daily weights  lungs clear on exam, euvolemic, re-eval need for diuresis in AM -stable off bipap  -appreciate cardiology recs  -check VQ scan to r/o PE  -c/w ASA, heparin gtt  -check TTE  monitor on tele, trend CE  strict I/Os, daily weights  lungs clear on exam, euvolemic; will hold off on lasix for now and reassess in a.m.

## 2021-04-23 NOTE — PROGRESS NOTE ADULT - PROBLEM SELECTOR PLAN 8
unknown baseline  confirm baseline with PMD in AM/prior w/u  monitor/trend on hep gtt, no further need for DVT ppx

## 2021-04-23 NOTE — H&P ADULT - NSHPLABSRESULTS_GEN_ALL_CORE
10.7   11.58 )-----------( 330      ( 2021 19:41 )             31.5         132<L>  |  100  |  39<H>  ----------------------------<  82  3.8   |  18<L>  |  2.15<H>    Ca    9.2      2021 19:41    TPro  7.5  /  Alb  3.7  /  TBili  0.6  /  DBili  x   /  AST  39  /  ALT  38  /  AlkPhos  231<H>      PT/INR - ( 2021 22:24 )   PT: 13.6 sec;   INR: 1.20 ratio    PTT - ( 2021 22:24 )  PTT:29.8 sec    19:41 - VBG - pH: 7.39  | pCO2: 31    | pO2: 46    | Lactate: 1.4      Troponin T, High Sensitivity Result: 814 ng/L (21 @ 00:25)  Troponin T, High Sensitivity Result: 784 ng/L (21 @ 21:22)  Troponin T, High Sensitivity Result: 727 ng/L (21 @ 19:41)    Creatine Kinase, Serum: 196 U/L (21 @ 00:25)  CKMB Mass Assay (21 @ 00:25)    CKMB Units: 7.8 ng/mL    CPK Mass Assay %: 4.0 %    CKMB Units: 8.7 ng/mL (21 @ 21:26)    Creatine Kinase, Serum: 246 U/L (21 @ 19:42)  CKMB Mass Assay (21 @ 19:42)    CKMB Units: 8.3 ng/mL    CPK Mass Assay %: 3.4 %    Serum Pro-Brain Natriuretic Peptide: 6360 pg/mL (21 @ 19:41)    Urinalysis Basic - ( 2021 00:12 )  Color: Light Yellow / Appearance: Clear / S.011 / pH: x  Gluc: x / Ketone: Negative  / Bili: Negative / Urobili: <2 mg/dL   Blood: x / Protein: 30 mg/dL / Nitrite: Negative   Leuk Esterase: Negative / RBC: 3 /HPF / WBC 1 /HPF   Sq Epi: x / Non Sq Epi: x / Bacteria: x    COVID-19 PCR: NotDetec (21 @ 19:43)    CXR personally reviewed - mild pulm vasc congestion, no focal consolidations    EKG personally reviewed - 77bpm NSR, QTc 445ms, unchanged on repeat

## 2021-04-23 NOTE — PROGRESS NOTE ADULT - PROBLEM SELECTOR PLAN 6
Losartan on hold in light of ACOSTA  confirm home meds in AM  Recent Rx for Nifedipine, confirm if still taking Losartan on hold in light of ACOSTA  Recent Rx for Nifedipine, confirm if still taking

## 2021-04-23 NOTE — DISCHARGE NOTE PROVIDER - NSDCCPCAREPLAN_GEN_ALL_CORE_FT
PRINCIPAL DISCHARGE DIAGNOSIS  Diagnosis: Shortness of breath  Assessment and Plan of Treatment: Your symptoms are secondary to heart failure. You are stable off of oxygen. You were seen by cardiology team, continue aspirin and plavix. You had an echocardiogram showing EF 52%. You were treated with IV lasix, IV bumex, oral bumex. You are continue coreg 6.125mg BID, hydralazine 50mg TID, isordil 20mg TID. Low salt diet, fluid restriction to 1500 ml daily, monitor your fluid intake and weight daily, exercise as tolerated 30 minutes daily, and follow up with your physician within 1 to 2 weeks.      SECONDARY DISCHARGE DIAGNOSES  Diagnosis: ACOSTA (acute kidney injury)  Assessment and Plan of Treatment: Your kidney function has improved. You were started on oral prednisone and will continue taper prednisone. You are to follow up with nephrology Dr. Zambrano on 5/10/2021 at 12pm.    Diagnosis: Essential hypertension  Assessment and Plan of Treatment: Low sodium and fat diet, continue anti-hypertensive medications, and follow up with primary care physician.    Diagnosis: PAD (peripheral artery disease)  Assessment and Plan of Treatment: You are to continue course of IV antibiotics through 5/24/2021 per infectious disease physician.    Diagnosis: Slurred speech  Assessment and Plan of Treatment: Your CT Head was normal. Your symptoms have improved. Continue monitoring upon discharge. If you experience any slurred speech, facial droop, etc, please return to the emergency room immediately.    Diagnosis: Type 2 diabetes mellitus, with long-term current use of insulin  Assessment and Plan of Treatment: Continue medications as prescribed.    Diagnosis: NSTEMI (non-ST elevated myocardial infarction)  Assessment and Plan of Treatment: Continue medications as prescribed.     PRINCIPAL DISCHARGE DIAGNOSIS  Diagnosis: Shortness of breath  Assessment and Plan of Treatment: Your symptoms are secondary to heart failure. You are stable off of oxygen. You were seen by cardiology team, continue aspirin and plavix. You had an echocardiogram showing EF 52%. You were treated with IV lasix, IV bumex, oral bumex. You are continue coreg 6.125mg BID, hydralazine 50mg TID, isordil 20mg TID. Low salt diet, fluid restriction to 1500 ml daily, monitor your fluid intake and weight daily, exercise as tolerated 30 minutes daily, and follow up with your physician within 1 to 2 weeks.      SECONDARY DISCHARGE DIAGNOSES  Diagnosis: ACOSTA (acute kidney injury)  Assessment and Plan of Treatment: Your kidney function has improved. You were started on oral prednisone and will continue taper prednisone. You are to follow up with nephrology Dr. Zambrano on 5/10/2021 at 12pm.    Diagnosis: Essential hypertension  Assessment and Plan of Treatment: Low sodium and fat diet, continue anti-hypertensive medications, and follow up with primary care physician.    Diagnosis: PAD (peripheral artery disease)  Assessment and Plan of Treatment: You are to continue course of IV antibiotics through 5/24/2021 per infectious disease physician.    Diagnosis: Slurred speech  Assessment and Plan of Treatment: Your CT Head was normal. Your symptoms have improved. Continue monitoring upon discharge. If you experience any slurred speech, facial droop, etc, please return to the emergency room immediately.    Diagnosis: Type 2 diabetes mellitus, with long-term current use of insulin  Assessment and Plan of Treatment: For Prednisone 40 mg (5/5, 5/6, 5/7): Recommend Lantus 55 units qHS, Humalog 26 units TID  For Prednisone 30 mg (5/8, 5/9, 5/10): Recommend Lantus 55 units qHS, Humalog 24 units TID  For Prednisone 20 mg (5/11, 5/12, 5/13): Recommend Lantus 50 units qHS, Humalog 20 units TID  For Prednisone 10 mg (5/14, 5/15, 5/16): Recommend Lantus 45 units qHS, Humalog 16 units TID  For OFF PREDNISONE (starting 5/17): Recommend Lantus 40 units qHS, Humalog 14 units TID    Diagnosis: NSTEMI (non-ST elevated myocardial infarction)  Assessment and Plan of Treatment: Continue medications as prescribed.

## 2021-04-23 NOTE — H&P ADULT - PROBLEM SELECTOR PLAN 5
NPO while on BiPAP, reports missing occasional basal insulin dosing, denies any history of hypoglycemic events. Glucose on BMP 82, await FS, will need to decrease basal insulin dosing while NPO, for now FS Q6H w/ SSI  CC/DASH diet once diet reinstated

## 2021-04-23 NOTE — H&P ADULT - NSHPPHYSICALEXAM_GEN_ALL_CORE
PHYSICAL EXAM:  GENERAL: NAD, well-developed, well-nourished  HEAD:  Atraumatic, Normocephalic  EYES: EOMI, PERRL, conjunctiva and sclera clear  NECK: Supple, No JVD  CHEST/LUNG: Clear to auscultation bilaterally; No wheezes, rales or rhonchi  HEART: Regular rate and rhythm; No murmurs, rubs, or gallops, (+)S1, S2  ABDOMEN: Soft, Nontender, Nondistended; Normal Bowel sounds  EXTREMITIES:  1+ PD Peripheral Pulses; 2+ radial pulses b/l, No clubbing, cyanosis, or edema; RUE midline C/D/I.  PSYCH: normal mood and affect  NEUROLOGY: AAOx3, non-focal, 5/5 strength x4 extremities; sensation grossly intact; FTN intact b/l; CN II-XII intact; no focal deficits   SKIN: No rashes; R 2nd toe distal amputation, sutures in place over black eschar, no surrounding erythema or drainage

## 2021-04-23 NOTE — CONSULT NOTE ADULT - ASSESSMENT
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

## 2021-04-23 NOTE — H&P ADULT - PROBLEM SELECTOR PLAN 3
unknown baseline Cr  monitor strict I/Os, urine Cr/Urea/Na  UA with 30mg/dL protein  bladder scan for PVR to r/o retention  renally dose meds, avoid nephrotoxins  trend Cr

## 2021-04-23 NOTE — CHART NOTE - NSCHARTNOTEFT_GEN_A_CORE
Troponin uptrending. EKG repeated showing NSR. Patient without any current complaints of chest pain, palpitations, HA, lightheaded, SOB, wheezing, or arm/neck/back pain.     Vital Signs Last 24 Hrs  T(C): 36.8 (24 Apr 2021 04:58), Max: 37.1 (23 Apr 2021 11:26)  T(F): 98.2 (24 Apr 2021 04:58), Max: 98.7 (23 Apr 2021 11:26)  HR: 83 (24 Apr 2021 05:42) (65 - 83)  BP: 166/66 (24 Apr 2021 04:58) (155/66 - 166/66)  BP(mean): --  RR: 17 (24 Apr 2021 04:58) (16 - 18)  SpO2: 96% (24 Apr 2021 05:42) (96% - 100%)

## 2021-04-23 NOTE — PROGRESS NOTE ADULT - SUBJECTIVE AND OBJECTIVE BOX
Patient is a 60y old  Male who presents with a chief complaint of slurred speech (2021 01:27)      SUBJECTIVE / OVERNIGHT EVENTS:    MEDICATIONS  (STANDING):  aspirin enteric coated 81 milliGRAM(s) Oral daily  atorvastatin 40 milliGRAM(s) Oral at bedtime  dextrose 40% Gel 15 Gram(s) Oral once  dextrose 5%. 1000 milliLiter(s) (50 mL/Hr) IV Continuous <Continuous>  dextrose 5%. 1000 milliLiter(s) (100 mL/Hr) IV Continuous <Continuous>  dextrose 50% Injectable 25 Gram(s) IV Push once  dextrose 50% Injectable 12.5 Gram(s) IV Push once  dextrose 50% Injectable 25 Gram(s) IV Push once  glucagon  Injectable 1 milliGRAM(s) IntraMuscular once  heparin  Infusion.  Unit(s)/Hr (16 mL/Hr) IV Continuous <Continuous>  insulin lispro (ADMELOG) corrective regimen sliding scale   SubCutaneous every 6 hours    MEDICATIONS  (PRN):  heparin   Injectable 7000 Unit(s) IV Push every 6 hours PRN For aPTT less than 40  heparin   Injectable 3500 Unit(s) IV Push every 6 hours PRN For aPTT between 40 - 57      Vital Signs Last 24 Hrs  T(C): 37.1 (2021 11:26), Max: 37.1 (2021 11:26)  T(F): 98.7 (2021 11:26), Max: 98.7 (2021 11:26)  HR: 81 (2021 11:26) (65 - 92)  BP: 157/75 (2021 11:26) (150/67 - 180/74)  BP(mean): --  RR: 18 (2021 11:26) (18 - 30)  SpO2: 99% (2021 11:26) (98% - 100%)  CAPILLARY BLOOD GLUCOSE      POCT Blood Glucose.: 127 mg/dL (2021 08:36)  POCT Blood Glucose.: 101 mg/dL (2021 08:09)  POCT Blood Glucose.: 67 mg/dL (2021 07:04)  POCT Blood Glucose.: 67 mg/dL (2021 06:18)  POCT Blood Glucose.: 69 mg/dL (2021 06:15)  POCT Blood Glucose.: 93 mg/dL (2021 17:05)    I&O's Summary      PHYSICAL EXAM:  GENERAL: NAD, well-developed  HEAD:  Atraumatic, Normocephalic  EYES: EOMI, PERRLA, conjunctiva and sclera clear  NECK: Supple, No JVD  CHEST/LUNG: Clear to auscultation bilaterally; No wheeze  HEART: Regular rate and rhythm; No murmurs, rubs, or gallops  ABDOMEN: Soft, Nontender, Nondistended; Bowel sounds present  EXTREMITIES:  2+ Peripheral Pulses, No clubbing, cyanosis, or edema  PSYCH: AAOx3  NEUROLOGY: non-focal  SKIN: No rashes or lesions    LABS:                        10.2   9.86  )-----------( 312      ( 2021 04:36 )             30.1     04-23    134<L>  |  102  |  43<H>  ----------------------------<  67<L>  3.5   |  18<L>  |  2.42<H>    Ca    9.0      2021 04:36  Phos  4.6     04-23  Mg     2.3     04-23    TPro  6.7  /  Alb  3.3  /  TBili  0.4  /  DBili  x   /  AST  30  /  ALT  35  /  AlkPhos  202<H>  04-23    PT/INR - ( 2021 04:36 )   PT: 14.2 sec;   INR: 1.25 ratio         PTT - ( 2021 10:36 )  PTT:90.8 sec  CARDIAC MARKERS ( 2021 04:36 )  x     / x     / 173 U/L / x     / 7.9 ng/mL  CARDIAC MARKERS ( 2021 00:25 )  x     / x     / 196 U/L / x     / 7.8 ng/mL  CARDIAC MARKERS ( 2021 21:26 )  x     / x     / x     / x     / 8.7 ng/mL  CARDIAC MARKERS ( 2021 19:42 )  x     / x     / 246 U/L / x     / 8.3 ng/mL      Urinalysis Basic - ( 2021 00:12 )    Color: Light Yellow / Appearance: Clear / S.011 / pH: x  Gluc: x / Ketone: Negative  / Bili: Negative / Urobili: <2 mg/dL   Blood: x / Protein: 30 mg/dL / Nitrite: Negative   Leuk Esterase: Negative / RBC: 3 /HPF / WBC 1 /HPF   Sq Epi: x / Non Sq Epi: x / Bacteria: x        RADIOLOGY & ADDITIONAL TESTS:    Imaging Personally Reviewed:    Consultant(s) Notes Reviewed:      Care Discussed with Consultants/Other Providers:   Patient is a 60y old  Male who presents with a chief complaint of slurred speech (2021 01:27)      SUBJECTIVE / OVERNIGHT EVENTS:  Patient has no new complaints. Denies cp, SOB, abdominal pain, N/V/D     MEDICATIONS  (STANDING):  aspirin enteric coated 81 milliGRAM(s) Oral daily  atorvastatin 40 milliGRAM(s) Oral at bedtime  dextrose 40% Gel 15 Gram(s) Oral once  dextrose 5%. 1000 milliLiter(s) (50 mL/Hr) IV Continuous <Continuous>  dextrose 5%. 1000 milliLiter(s) (100 mL/Hr) IV Continuous <Continuous>  dextrose 50% Injectable 25 Gram(s) IV Push once  dextrose 50% Injectable 12.5 Gram(s) IV Push once  dextrose 50% Injectable 25 Gram(s) IV Push once  glucagon  Injectable 1 milliGRAM(s) IntraMuscular once  heparin  Infusion.  Unit(s)/Hr (16 mL/Hr) IV Continuous <Continuous>  insulin lispro (ADMELOG) corrective regimen sliding scale   SubCutaneous every 6 hours    MEDICATIONS  (PRN):  heparin   Injectable 7000 Unit(s) IV Push every 6 hours PRN For aPTT less than 40  heparin   Injectable 3500 Unit(s) IV Push every 6 hours PRN For aPTT between 40 - 57      Vital Signs Last 24 Hrs  T(C): 37.1 (2021 11:26), Max: 37.1 (2021 11:26)  T(F): 98.7 (2021 11:26), Max: 98.7 (2021 11:26)  HR: 81 (2021 11:26) (65 - 92)  BP: 157/75 (2021 11:26) (150/67 - 180/74)  BP(mean): --  RR: 18 (2021 11:26) (18 - 30)  SpO2: 99% (2021 11:26) (98% - 100%)  CAPILLARY BLOOD GLUCOSE      POCT Blood Glucose.: 127 mg/dL (2021 08:36)  POCT Blood Glucose.: 101 mg/dL (2021 08:09)  POCT Blood Glucose.: 67 mg/dL (2021 07:04)  POCT Blood Glucose.: 67 mg/dL (2021 06:18)  POCT Blood Glucose.: 69 mg/dL (2021 06:15)  POCT Blood Glucose.: 93 mg/dL (2021 17:05)    I&O's Summary      PHYSICAL EXAM:   GENERAL: NAD, well-developed  HEAD:  Atraumatic, Normocephalic  EYES: EOMI, PERRLA, conjunctiva and sclera clear  NECK: Supple, No JVD  CHEST/LUNG: Clear to auscultation bilaterally; No wheeze  HEART: Regular rate and rhythm; No murmurs, rubs, or gallops  ABDOMEN: Soft, Nontender, Nondistended; Bowel sounds present  EXTREMITIES: 1+ PD Peripheral Pulses; 2+ radial pulses b/l, No clubbing, cyanosis, or edema; RUE midline C/D/I.  PSYCH: AAOx3  NEUROLOGY: non-focal  SKIN: No rashes; R 2nd toe distal amputation, sutures in place over black eschar, no surrounding erythema or drainage    LABS:                        10.2   9.86  )-----------( 312      ( 2021 04:36 )             30.1     04-23    134<L>  |  102  |  43<H>  ----------------------------<  67<L>  3.5   |  18<L>  |  2.42<H>    Ca    9.0      2021 04:36  Phos  4.6     04-23  Mg     2.3     04-23    TPro  6.7  /  Alb  3.3  /  TBili  0.4  /  DBili  x   /  AST  30  /  ALT  35  /  AlkPhos  202<H>  04-23    PT/INR - ( 2021 04:36 )   PT: 14.2 sec;   INR: 1.25 ratio         PTT - ( 2021 10:36 )  PTT:90.8 sec  CARDIAC MARKERS ( 2021 04:36 )  x     / x     / 173 U/L / x     / 7.9 ng/mL  CARDIAC MARKERS ( 2021 00:25 )  x     / x     / 196 U/L / x     / 7.8 ng/mL  CARDIAC MARKERS ( 2021 21:26 )  x     / x     / x     / x     / 8.7 ng/mL  CARDIAC MARKERS ( 2021 19:42 )  x     / x     / 246 U/L / x     / 8.3 ng/mL      Urinalysis Basic - ( 2021 00:12 )    Color: Light Yellow / Appearance: Clear / S.011 / pH: x  Gluc: x / Ketone: Negative  / Bili: Negative / Urobili: <2 mg/dL   Blood: x / Protein: 30 mg/dL / Nitrite: Negative   Leuk Esterase: Negative / RBC: 3 /HPF / WBC 1 /HPF   Sq Epi: x / Non Sq Epi: x / Bacteria: x        RADIOLOGY & ADDITIONAL TESTS:    Imaging Personally Reviewed:    Consultant(s) Notes Reviewed:      Care Discussed with Consultants/Other Providers:

## 2021-04-23 NOTE — PROGRESS NOTE ADULT - PROBLEM SELECTOR PLAN 5
NPO while on BiPAP, reports missing occasional basal insulin dosing, denies any history of hypoglycemic events. Glucose on BMP 82, await FS, will need to decrease basal insulin dosing while NPO, for now FS Q6H w/ SSI  CC/DASH diet once diet reinstated patient was hypoglycemic so give insulin sliding scale for now  Monitoring FS  Hold home lantus 50 Units today.

## 2021-04-23 NOTE — PROGRESS NOTE ADULT - PROBLEM SELECTOR PLAN 4
resolved   check [facility]  dysphagia screen prior to diet  aspiration precautions, S&S eval resolved ; may have been due to hypoglycemia  check [facility]  dysphagia screen prior to diet  aspiration precautions, S&S eval

## 2021-04-23 NOTE — ED ADULT NURSE REASSESSMENT NOTE - NS ED NURSE REASSESS COMMENT FT1
Break RN: Pt in bed, NAD> Denies CP, SOB. headache, dizziness. On BIPAP, sp02 100%. Bed in lowest position, call bell within reach, will report to primary RN.

## 2021-04-23 NOTE — CONSULT NOTE ADULT - SUBJECTIVE AND OBJECTIVE BOX
Podiatry pager #: [phone] ([place])/ [phone] ([facility])    Patient is a 60y old  Male who presents with a chief complaint of slurred speech (23 Apr 2021 11:29)      HPI:  60-year-old male with HTN, IDDM2, PAD s/p RLE angioplasty, recent RLE 2nd digit distal amputation at [facility], presenting with sudden onset slurred speech that started on Wednesday night, persisted until presentation. Patient denies any history of stroke, recent hypoglycemic events or similar symptoms in the past. Patient denies to me any LH/dizziness, syncope, HA/vision changes, URI symptoms, chest pain/palpitations, orthopnea, LE edema, coughing, wheezing, shortness of breath, nausea/vomiting/diarrhea, abdominal pain, no urinary symptoms. He denies any history of heart disease or HF. No recent sick contacts. Patient reports was due to f/u with podiatric sx today. On exam, patient noted to have RUE midline, reportedly taking antibiotics (unknown which) at home, Rx for 6 weeks.    In the ED VS: 97.8  72-84  165-180/74-79  24-30  %RA, received aspirin 324mg PO x1, albuterol 90mcg 4puffs x1, furosemide 40mg IV x1, and was started on a heparin gtt (23 Apr 2021 01:27)      PAST MEDICAL & SURGICAL HISTORY:  PAD (peripheral artery disease)    Essential hypertension    Type 2 diabetes mellitus, with long-term current use of insulin    History of amputation of toe    S/P angioplasty  RLE        MEDICATIONS  (STANDING):  aspirin enteric coated 81 milliGRAM(s) Oral daily  atorvastatin 40 milliGRAM(s) Oral at bedtime  cefepime   IVPB      dextrose 40% Gel 15 Gram(s) Oral once  dextrose 5%. 1000 milliLiter(s) (50 mL/Hr) IV Continuous <Continuous>  dextrose 5%. 1000 milliLiter(s) (100 mL/Hr) IV Continuous <Continuous>  dextrose 50% Injectable 25 Gram(s) IV Push once  dextrose 50% Injectable 12.5 Gram(s) IV Push once  dextrose 50% Injectable 25 Gram(s) IV Push once  glucagon  Injectable 1 milliGRAM(s) IntraMuscular once  heparin  Infusion.  Unit(s)/Hr (16 mL/Hr) IV Continuous <Continuous>  insulin lispro (ADMELOG) corrective regimen sliding scale   SubCutaneous every 6 hours    MEDICATIONS  (PRN):  heparin   Injectable 7000 Unit(s) IV Push every 6 hours PRN For aPTT less than 40  heparin   Injectable 3500 Unit(s) IV Push every 6 hours PRN For aPTT between 40 - 57      Allergies    No Known Allergies    Intolerances        VITALS:    Vital Signs Last 24 Hrs  T(C): 37.1 (23 Apr 2021 11:26), Max: 37.1 (23 Apr 2021 11:26)  T(F): 98.7 (23 Apr 2021 11:26), Max: 98.7 (23 Apr 2021 11:26)  HR: 81 (23 Apr 2021 11:26) (65 - 92)  BP: 157/75 (23 Apr 2021 11:26) (150/67 - 180/74)  BP(mean): --  RR: 18 (23 Apr 2021 11:26) (18 - 30)  SpO2: 99% (23 Apr 2021 11:26) (98% - 100%)    LABS:                          10.2   9.86  )-----------( 312      ( 23 Apr 2021 04:36 )             30.1       04-23    134<L>  |  102  |  43<H>  ----------------------------<  67<L>  3.5   |  18<L>  |  2.42<H>    Ca    9.0      23 Apr 2021 04:36  Phos  4.6     04-23  Mg     2.3     04-23    TPro  6.7  /  Alb  3.3  /  TBili  0.4  /  DBili  x   /  AST  30  /  ALT  35  /  AlkPhos  202<H>  04-23      CAPILLARY BLOOD GLUCOSE      POCT Blood Glucose.: 127 mg/dL (23 Apr 2021 08:36)  POCT Blood Glucose.: 101 mg/dL (23 Apr 2021 08:09)  POCT Blood Glucose.: 67 mg/dL (23 Apr 2021 07:04)  POCT Blood Glucose.: 67 mg/dL (23 Apr 2021 06:18)  POCT Blood Glucose.: 69 mg/dL (23 Apr 2021 06:15)  POCT Blood Glucose.: 93 mg/dL (22 Apr 2021 17:05)      PT/INR - ( 23 Apr 2021 04:36 )   PT: 14.2 sec;   INR: 1.25 ratio         PTT - ( 23 Apr 2021 10:36 )  PTT:90.8 sec    LOWER EXTREMITY PHYSICAL EXAM:    Vascular: DP/PT 1/4 B/L, CFT <3 seconds B/L, Temperature gradient warm to cool B/L  Neuro: Epicritic sensation diminshed to the level of ankle B/L  Musculoskeletal/Ortho: right foot partial 2nd toe amputation  Skin: right foot 2nd toe surgical site well coapted, with distal dry eschar, no dehiscence, no fluctuation, no drainage, no erythema, no signs of infection         RADIOLOGY & ADDITIONAL STUDIES:

## 2021-04-23 NOTE — H&P ADULT - PROBLEM SELECTOR PLAN 6
Losartan on hold in light of ACOSTA  confirm home meds in AM  Recent Rx for Nifedipine, confirm if still taking

## 2021-04-23 NOTE — H&P ADULT - PROBLEM SELECTOR PLAN 2
s/p asa load in ED, on hep gtt  appreciate cards recs, thought to be secondary to demand in setting of acute HF s/p asa load in ED, on hep gtt  appreciate cards recs, thought to be secondary to demand in setting of acute HF  spoke with cards, clopidogrel Rx'ed per OMR 2/2021 90 day supply however patient reportedly not taking, load now per cards

## 2021-04-23 NOTE — H&P ADULT - PROBLEM SELECTOR PLAN 7
c/w ASA, statin    #recent amputation - consult podiatry in AM for post-op eval; confirm with Mimi/DRAKE in AM abx to continue

## 2021-04-23 NOTE — DISCHARGE NOTE PROVIDER - NSDCMRMEDTOKEN_GEN_ALL_CORE_FT
Aspirin Enteric Coated 81 mg oral delayed release tablet: 1 tab(s) orally once a day  Cosopt 2.23%-0.68% ophthalmic solution: 1 drop(s) to each affected eye 2 times a day  insulin glargine 100 units/mL subcutaneous solution: 55 unit(s) subcutaneous once a day (at bedtime)  latanoprost 0.005% ophthalmic solution: 1 drop(s) to each eye once a day (in the evening)  Lipitor 40 mg oral tablet: 1 tab(s) orally once a day (at bedtime)  losartan 100 mg oral tablet: 1 tab(s) orally once a day  Melatonin 3 mg oral tablet: 1 tab(s) orally once a day (at bedtime)   Aspirin Enteric Coated 81 mg oral delayed release tablet: 1 tab(s) orally once a day  Cosopt 2.23%-0.68% ophthalmic solution: 1 drop(s) to each affected eye 2 times a day  Daptomycin: 350 milligram(s) intravenous via PICC every 48 hours   End date: 05/24/21  ICD-10: I96  HumaLOG KwikPen 100 units/mL injectable solution: 20 unit(s) injectable 3 times a day (before meals)   Please check insurance coverage and page [place] [phone], thanks  insulin glargine 100 units/mL subcutaneous solution: 55 unit(s) subcutaneous once a day (at bedtime)  latanoprost 0.005% ophthalmic solution: 1 drop(s) to each eye once a day (in the evening)  Lipitor 40 mg oral tablet: 1 tab(s) orally once a day (at bedtime)  losartan 100 mg oral tablet: 1 tab(s) orally once a day  Melatonin 3 mg oral tablet: 1 tab(s) orally once a day (at bedtime)  Weekly CPK: Results to be faxed to ID Dr. Drake at [phone]  End date: 05/24/21   Aspirin Enteric Coated 81 mg oral delayed release tablet: 1 tab(s) orally once a day  clopidogrel 75 mg oral tablet: 1 tab(s) orally once a day  Cosopt 2.23%-0.68% ophthalmic solution: 1 drop(s) to each affected eye 2 times a day  Daptomycin: 350 milligram(s) intravenous via PICC every 48 hours   End date: 05/24/21  ICD-10: I96  HumaLOG KwikPen 100 units/mL injectable solution: 20 unit(s) injectable 3 times a day (before meals)     hydrALAZINE 50 mg oral tablet: 1 tab(s) orally 3 times a day  insulin glargine 100 units/mL subcutaneous solution: 55 unit(s) subcutaneous once a day (at bedtime)  isosorbide dinitrate 20 mg oral tablet: 1 tab(s) orally 3 times a day  latanoprost 0.005% ophthalmic solution: 1 drop(s) to each eye once a day (in the evening)  Lipitor 40 mg oral tablet: 1 tab(s) orally once a day (at bedtime)  Melatonin 3 mg oral tablet: 1 tab(s) orally once a day (at bedtime)  metoprolol tartrate 25 mg oral tablet: 1 tab(s) orally 2 times a day  NIFEdipine 60 mg oral tablet, extended release: 1 tab(s) orally once a day  predniSONE 10 mg oral tablet: 1 tab(s) orally once a day   Please take on 5/14-5/16  predniSONE 10 mg oral tablet: 3 tab(s) orally once a day   Please take on 5/8-5/10  predniSONE 20 mg oral tablet: 1 tab(s) orally once a day   Please take on 5/11-5/13  predniSONE 20 mg oral tablet: 2 tab(s) orally once a day   Please take on 5/6 and 5/7  Weekly CPK: Results to be faxed to RADHA Drake at [phone]  End date: 05/24/21   Aspirin Enteric Coated 81 mg oral delayed release tablet: 1 tab(s) orally once a day  clopidogrel 75 mg oral tablet: 1 tab(s) orally once a day  Cosopt 2.23%-0.68% ophthalmic solution: 1 drop(s) to each affected eye 2 times a day  Daptomycin: 350 milligram(s) intravenous via PICC every 48 hours   End date: 05/24/21  ICD-10: I96  HumaLOG KwikPen 100 units/mL injectable solution: 20 unit(s) injectable 3 times a day (before meals)     hydrALAZINE 50 mg oral tablet: 1 tab(s) orally 3 times a day  insulin glargine 100 units/mL subcutaneous solution: 55 unit(s) subcutaneous once a day (at bedtime)  isosorbide dinitrate 20 mg oral tablet: 1 tab(s) orally 3 times a day  latanoprost 0.005% ophthalmic solution: 1 drop(s) to each eye once a day (in the evening)  Lipitor 40 mg oral tablet: 1 tab(s) orally once a day (at bedtime)  Melatonin 3 mg oral tablet: 1 tab(s) orally once a day (at bedtime)  metoprolol tartrate 25 mg oral tablet: 1 tab(s) orally 2 times a day  NIFEdipine 60 mg oral tablet, extended release: 1 tab(s) orally once a day  predniSONE 10 mg oral tablet: 1 tab(s) orally once a day   Weekly CPK: Results to be faxed to ID Dr. Drake at [phone]  End date: 05/24/21   Aspirin Enteric Coated 81 mg oral delayed release tablet: 1 tab(s) orally once a day  clopidogrel 75 mg oral tablet: 1 tab(s) orally once a day  Cosopt 2.23%-0.68% ophthalmic solution: 1 drop(s) to each affected eye 2 times a day  Daptomycin: 350 milligram(s) intravenous via PICC every 48 hours   End date: 05/24/21  ICD-10: I96  HumaLOG KwikPen 100 units/mL injectable solution: 20 unit(s) injectable 3 times a day (before meals)     hydrALAZINE 50 mg oral tablet: 1 tab(s) orally 3 times a day  insulin glargine 100 units/mL subcutaneous solution: 55 unit(s) subcutaneous once a day (at bedtime)  Insulin Pen Needles, 4mm: 1 application subcutaneously 4 times a day. ** Use with insulin pen **   isosorbide dinitrate 20 mg oral tablet: 1 tab(s) orally 3 times a day  latanoprost 0.005% ophthalmic solution: 1 drop(s) to each eye once a day (in the evening)  Lipitor 40 mg oral tablet: 1 tab(s) orally once a day (at bedtime)  Melatonin 3 mg oral tablet: 1 tab(s) orally once a day (at bedtime)  metoprolol tartrate 25 mg oral tablet: 1 tab(s) orally 2 times a day  NIFEdipine 60 mg oral tablet, extended release: 1 tab(s) orally once a day  predniSONE 10 mg oral tablet: 1 tab(s) orally once a day   Weekly CPK: Results to be faxed to RADHA Drake at [phone]  End date: 05/24/21

## 2021-04-23 NOTE — H&P ADULT - ASSESSMENT
60-year-old male with HTN, IDDM2, PAD s/p RLE angioplasty, recent RLE 2nd digit distal amputation at [facility], presenting with sudden onset slurred speech that started on Wednesday night, persisted until presentation, found to be in acute HF, with NSTEMI, concern for possible PE.

## 2021-04-23 NOTE — CONSULT NOTE ADULT - SUBJECTIVE AND OBJECTIVE BOX
HPI:  60M with diabetes underwent right angioplasty and second toe partial amputation at [facility] earlier this month, admitted 21 for acute slurred speech.   Found to have NSTEMI with CHF.   He was discharged with IV Cefepime to complete 6 weeks via right arm PICC line. He doesn't remember his infection doctor's name.   He feels fine. No pain to the foot. No fevers or chills. No diarrhea.   Eating lunch.     PAST MEDICAL & SURGICAL HISTORY:  PAD (peripheral artery disease)  Essential hypertension  Type 2 diabetes mellitus, with long-term current use of insulin  History of amputation of toe  S/P angioplasty RLE        Allergies    No Known Allergies    Intolerances        ANTIMICROBIALS:  cefepime   IVPB        OTHER MEDS:  aspirin enteric coated 81 milliGRAM(s) Oral daily  atorvastatin 40 milliGRAM(s) Oral at bedtime  dextrose 40% Gel 15 Gram(s) Oral once  dextrose 5%. 1000 milliLiter(s) IV Continuous <Continuous>  dextrose 5%. 1000 milliLiter(s) IV Continuous <Continuous>  dextrose 50% Injectable 25 Gram(s) IV Push once  dextrose 50% Injectable 12.5 Gram(s) IV Push once  dextrose 50% Injectable 25 Gram(s) IV Push once  glucagon  Injectable 1 milliGRAM(s) IntraMuscular once  heparin   Injectable 7000 Unit(s) IV Push every 6 hours PRN  heparin   Injectable 3500 Unit(s) IV Push every 6 hours PRN  heparin  Infusion.  Unit(s)/Hr IV Continuous <Continuous>  insulin lispro (ADMELOG) corrective regimen sliding scale   SubCutaneous every 6 hours      SOCIAL HISTORY: Never smoker     FAMILY HISTORY:  No pertinent family history in first degree relatives    ROS:  All other systems negative   Constitutional: no fever, no chills  Eye: no vision changes  ENT: no sore throat, no rhinorrhea  Cardiovascular: no chest pain, no palpitation  Respiratory: no SOB, no cough  GI:  no abd pain, no vomiting, no diarrhea  urinary: no dysuria, no hematuria, no flank pain  musculoskeletal: no joint pain, no joint swelling  skin: no rash  neurology: per HPI   psych: no anxiety    Physical Exam:  General: awake, alert, non toxic  Head: atraumatic, normocephalic  Eyes: normal sclera and conjunctiva  ENT: neck supple  Cardio: regular rate   Respiratory: nonlabored on room air  abd: nondistended   : no nixon  Musculoskeletal: s/p right second toe distal phalanx amputation, site with dry blackened eschar, nontender, no purulence   Skin: no rash  vascular: right arm PICC, no phlebitis  Neurologic: no focal deficits  psych: normal affect       Drug Dosing Weight    Weight (kg): 86.4 (2021 21:10)    Vital Signs Last 24 Hrs  T(F): 98.7 (21 @ 11:26), Max: 98.7 (21 @ 11:26)    Vital Signs Last 24 Hrs  HR: 81 (21 @ 11:26) (65 - 92)  BP: 157/75 (21 @ 11:26) (150/67 - 180/74)  RR: 18 (21 @ 11:26)  SpO2: 99% (21 @ 11:26) (98% - 100%)  Wt(kg): --                          10.2   9.86  )-----------( 312      ( 2021 04:36 )             30.1           134<L>  |  102  |  43<H>  ----------------------------<  67<L>  3.5   |  18<L>  |  2.42<H>    Ca    9.0      2021 04:36  Phos  4.6       Mg     2.3         TPro  6.7  /  Alb  3.3  /  TBili  0.4  /  DBili  x   /  AST  30  /  ALT  35  /  AlkPhos  202<H>        Urinalysis Basic - ( 2021 00:12 )    Color: Light Yellow / Appearance: Clear / S.011 / pH: x  Gluc: x / Ketone: Negative  / Bili: Negative / Urobili: <2 mg/dL   Blood: x / Protein: 30 mg/dL / Nitrite: Negative   Leuk Esterase: Negative / RBC: 3 /HPF / WBC 1 /HPF   Sq Epi: x / Non Sq Epi: x / Bacteria: x        MICROBIOLOGY:      RADIOLOGY:  Images below reviewed personally  Xray Chest 2 Views PA/Lat (21 @ 20:07)   Right upper extremity PICC tip is in the distal SVC.  The lungs are clear and there are no effusions.  The heart size is normal.  Degenerative changes of the spine.

## 2021-04-23 NOTE — DISCHARGE NOTE PROVIDER - PROVIDER TOKENS
PROVIDER:[TOKEN:[[id number]],FOLLOWUP:[1 week]],PROVIDER:[TOKEN:[[id number]],SCHEDULEDAPPT:[05/10/2021],SCHEDULEDAPPTTIME:[12:00 PM]],FREE:[LAST:[Endocrine outpatient clinic],PHONE:[phone]],FAX:[(   )    -]]

## 2021-04-23 NOTE — CHART NOTE - NSCHARTNOTEFT_GEN_A_CORE
Patient non-compliant w/CPAP @ bedtime states he thinks he has a deviated septum and had his nare packed at one time to assist w/tolerating CPAP.  Spoke to ENT nothing to be done inpatient to make CPAP more comfortable, recommend outpatient follow up.

## 2021-04-23 NOTE — PROGRESS NOTE ADULT - ASSESSMENT
60-year-old male with HTN, IDDM2, PAD s/p RLE angioplasty, recent RLE 2nd digit distal amputation at [facility], presenting with sudden onset slurred speech that started on Wednesday night, persisted until presentation, found to be in acute HF, with NSTEMI, concern for possible PE.

## 2021-04-24 LAB
ANION GAP SERPL CALC-SCNC: 16 MMOL/L — HIGH (ref 7–14)
APTT BLD: 90.9 SEC — HIGH (ref 27–36.3)
BUN SERPL-MCNC: 53 MG/DL — HIGH (ref 7–23)
CALCIUM SERPL-MCNC: 8.6 MG/DL — SIGNIFICANT CHANGE UP (ref 8.4–10.5)
CHLORIDE SERPL-SCNC: 100 MMOL/L — SIGNIFICANT CHANGE UP (ref 98–107)
CO2 SERPL-SCNC: 16 MMOL/L — LOW (ref 22–31)
COVID-19 SPIKE DOMAIN AB INTERP: POSITIVE
COVID-19 SPIKE DOMAIN ANTIBODY RESULT: 2.29 U/ML — HIGH
CREAT SERPL-MCNC: 2.98 MG/DL — HIGH (ref 0.5–1.3)
GLUCOSE BLDC GLUCOMTR-MCNC: 158 MG/DL — HIGH (ref 70–99)
GLUCOSE BLDC GLUCOMTR-MCNC: 165 MG/DL — HIGH (ref 70–99)
GLUCOSE BLDC GLUCOMTR-MCNC: 192 MG/DL — HIGH (ref 70–99)
GLUCOSE BLDC GLUCOMTR-MCNC: 213 MG/DL — HIGH (ref 70–99)
GLUCOSE SERPL-MCNC: 158 MG/DL — HIGH (ref 70–99)
HCT VFR BLD CALC: 28.8 % — LOW (ref 39–50)
HGB BLD-MCNC: 9.6 G/DL — LOW (ref 13–17)
MAGNESIUM SERPL-MCNC: 2.4 MG/DL — SIGNIFICANT CHANGE UP (ref 1.6–2.6)
MCHC RBC-ENTMCNC: 28.5 PG — SIGNIFICANT CHANGE UP (ref 27–34)
MCHC RBC-ENTMCNC: 33.3 GM/DL — SIGNIFICANT CHANGE UP (ref 32–36)
MCV RBC AUTO: 85.5 FL — SIGNIFICANT CHANGE UP (ref 80–100)
NRBC # BLD: 0 /100 WBCS — SIGNIFICANT CHANGE UP
NRBC # FLD: 0 K/UL — SIGNIFICANT CHANGE UP
NT-PROBNP SERPL-SCNC: 5714 PG/ML — HIGH
PHOSPHATE SERPL-MCNC: 4.5 MG/DL — SIGNIFICANT CHANGE UP (ref 2.5–4.5)
PLATELET # BLD AUTO: 341 K/UL — SIGNIFICANT CHANGE UP (ref 150–400)
POTASSIUM SERPL-MCNC: 4.2 MMOL/L — SIGNIFICANT CHANGE UP (ref 3.5–5.3)
POTASSIUM SERPL-SCNC: 4.2 MMOL/L — SIGNIFICANT CHANGE UP (ref 3.5–5.3)
RBC # BLD: 3.37 M/UL — LOW (ref 4.2–5.8)
RBC # FLD: 12.3 % — SIGNIFICANT CHANGE UP (ref 10.3–14.5)
SARS-COV-2 IGG+IGM SERPL QL IA: 2.29 U/ML — HIGH
SARS-COV-2 IGG+IGM SERPL QL IA: POSITIVE
SODIUM SERPL-SCNC: 132 MMOL/L — LOW (ref 135–145)
WBC # BLD: 9.58 K/UL — SIGNIFICANT CHANGE UP (ref 3.8–10.5)
WBC # FLD AUTO: 9.58 K/UL — SIGNIFICANT CHANGE UP (ref 3.8–10.5)

## 2021-04-24 PROCEDURE — 99233 SBSQ HOSP IP/OBS HIGH 50: CPT

## 2021-04-24 PROCEDURE — 70450 CT HEAD/BRAIN W/O DYE: CPT | Mod: 26

## 2021-04-24 RX ORDER — CEFEPIME 1 G/1
1000 INJECTION, POWDER, FOR SOLUTION INTRAMUSCULAR; INTRAVENOUS EVERY 12 HOURS
Refills: 0 | Status: DISCONTINUED | OUTPATIENT
Start: 2021-04-24 | End: 2021-04-25

## 2021-04-24 RX ADMIN — Medication 2: at 13:17

## 2021-04-24 RX ADMIN — HEPARIN SODIUM 1600 UNIT(S)/HR: 5000 INJECTION INTRAVENOUS; SUBCUTANEOUS at 01:14

## 2021-04-24 RX ADMIN — Medication 1: at 08:40

## 2021-04-24 RX ADMIN — ATORVASTATIN CALCIUM 40 MILLIGRAM(S): 80 TABLET, FILM COATED ORAL at 00:25

## 2021-04-24 RX ADMIN — CEFEPIME 100 MILLIGRAM(S): 1 INJECTION, POWDER, FOR SOLUTION INTRAMUSCULAR; INTRAVENOUS at 11:51

## 2021-04-24 RX ADMIN — ATORVASTATIN CALCIUM 40 MILLIGRAM(S): 80 TABLET, FILM COATED ORAL at 21:39

## 2021-04-24 RX ADMIN — Medication 1: at 17:29

## 2021-04-24 RX ADMIN — Medication 81 MILLIGRAM(S): at 11:49

## 2021-04-24 RX ADMIN — CEFEPIME 100 MILLIGRAM(S): 1 INJECTION, POWDER, FOR SOLUTION INTRAMUSCULAR; INTRAVENOUS at 00:25

## 2021-04-24 RX ADMIN — CEFEPIME 100 MILLIGRAM(S): 1 INJECTION, POWDER, FOR SOLUTION INTRAMUSCULAR; INTRAVENOUS at 17:28

## 2021-04-24 NOTE — PROGRESS NOTE ADULT - ASSESSMENT
60 year old male w/ HTN, IDDM2 and recent hospitalization for toe amputation and PAD s/p peripheral angiogram w/ angioplasty presents with acute onset of SOB x 1 day without any associated symptoms.    - clinically stable with resolved SOB and no chest pain  - s/p DAPT load, currently on aspirin 81mg and heparin gtt x 48 hours  - VQ scan with low probability for PE  - pending TTE, will follow-up once study performed  - uncontrolled BP in the setting of recent surgery, pain control per primary  - continue current medications, will continue to follow     Mao Jackson  Cardiology Fellow

## 2021-04-24 NOTE — PROGRESS NOTE ADULT - ASSESSMENT
60-year-old male with HTN, IDDM2, PAD s/p RLE angioplasty, recent RLE 2nd digit distal amputation at [facility], presenting with sudden onset slurred speech that started on Wednesday night, persisted until presentation, found to be in acute HF, with NSTEMI, concern for possible PE.

## 2021-04-24 NOTE — PROGRESS NOTE ADULT - PROBLEM SELECTOR PLAN 7
c/w ASA, statin  recent amputation at [facility]; surgical site does not look infected but patient says he was on cefepime via PICC line for it.   ID consult appreciated. Will continue with cefipeme until 5/24  - but need records from [facility] to confirm abx.

## 2021-04-24 NOTE — PROGRESS NOTE ADULT - PROBLEM SELECTOR PLAN 6
Losartan on hold in light of ACOSTA  Recent Rx for Nifedipine, unclear pt is taking as he has been non complaint with other meds and medical treatments like CPAP, plavix

## 2021-04-24 NOTE — PROGRESS NOTE ADULT - ASSESSMENT
61 yo m s/p right foot partial 2nd toe amp (DOS 4/10 at [facility])  -pt seen and evaluated  -Aferbile, no leukocytosis  -s/p right foot partial 2nd toe amputation, right foot 2nd toe surgical site well coapted, with distal dry eschar, no dehiscence, no fluctuation, no drainage, no erythema, no signs of infection   -applied betadine and DSD to right foot  -keep dressing clean, dry and intact   -pod stable for discharge, to follow up with own podiatrist after discharge  -seen with attending

## 2021-04-24 NOTE — PROGRESS NOTE ADULT - SUBJECTIVE AND OBJECTIVE BOX
Podiatry pager #: [phone] ([place])/ [phone] ([facility])    Patient is a 60y old  Male who presents with a chief complaint of slurred speech (2021 15:01)       INTERVAL HPI/OVERNIGHT EVENTS:  Patient seen and evaluated at bedside.  Pt is resting comfortable in NAD. Denies N/V/F/C.     Allergies    No Known Allergies    Intolerances        Vital Signs Last 24 Hrs  T(C): 36.8 (2021 04:58), Max: 37.1 (2021 11:26)  T(F): 98.2 (2021 04:58), Max: 98.7 (2021 11:26)  HR: 66 (2021 07:29) (65 - 83)  BP: 166/66 (2021 04:58) (155/66 - 166/66)  BP(mean): --  RR: 17 (2021 04:58) (16 - 18)  SpO2: 97% (2021 07:29) (96% - 100%)    LABS:                        9.6    9.58  )-----------( 341      ( 2021 07:49 )             28.8     04-23    132<L>  |  100  |  45<H>  ----------------------------<  182<H>  4.5   |  17<L>  |  2.66<H>    Ca    8.7      2021 17:58  Phos  4.6     04-23  Mg     2.2     04-23    TPro  6.8  /  Alb  3.3  /  TBili  0.4  /  DBili  x   /  AST  34  /  ALT  38  /  AlkPhos  218<H>  04-23    PT/INR - ( 2021 04:36 )   PT: 14.2 sec;   INR: 1.25 ratio         PTT - ( 2021 00:36 )  PTT:90.9 sec  Urinalysis Basic - ( 2021 00:12 )    Color: Light Yellow / Appearance: Clear / S.011 / pH: x  Gluc: x / Ketone: Negative  / Bili: Negative / Urobili: <2 mg/dL   Blood: x / Protein: 30 mg/dL / Nitrite: Negative   Leuk Esterase: Negative / RBC: 3 /HPF / WBC 1 /HPF   Sq Epi: x / Non Sq Epi: x / Bacteria: x      CAPILLARY BLOOD GLUCOSE      POCT Blood Glucose.: 148 mg/dL (2021 21:35)  POCT Blood Glucose.: 175 mg/dL (2021 17:25)  POCT Blood Glucose.: 99 mg/dL (2021 13:07)  POCT Blood Glucose.: 127 mg/dL (2021 08:36)      Lower Extremity Physical Exam:    Vascular: DP/PT 1/4 B/L, CFT <3 seconds B/L, Temperature gradient warm to cool B/L  Neuro: Epicritic sensation diminshed to the level of ankle B/L  Musculoskeletal/Ortho: right foot partial 2nd toe amputation  Skin: right foot 2nd toe surgical site well coapted, with distal dry eschar, no dehiscence, no fluctuation, no drainage, no erythema, no signs of infection     RADIOLOGY & ADDITIONAL TESTS:

## 2021-04-24 NOTE — PROGRESS NOTE ADULT - SUBJECTIVE AND OBJECTIVE BOX
Cardiology Progress Note    Interval: Pt resting comfortably in bed. Denied headache, dizziness, lightheadedness, chest pain, palpitations, and SOB.    Tele: Sinus rhythm rate 80's    Medications:  aspirin enteric coated 81 milliGRAM(s) Oral daily  atorvastatin 40 milliGRAM(s) Oral at bedtime  cefepime   IVPB 1000 milliGRAM(s) IV Intermittent every 12 hours  dextrose 40% Gel 15 Gram(s) Oral once  dextrose 5%. 1000 milliLiter(s) IV Continuous <Continuous>  dextrose 5%. 1000 milliLiter(s) IV Continuous <Continuous>  dextrose 50% Injectable 25 Gram(s) IV Push once  dextrose 50% Injectable 12.5 Gram(s) IV Push once  dextrose 50% Injectable 25 Gram(s) IV Push once  glucagon  Injectable 1 milliGRAM(s) IntraMuscular once  heparin   Injectable 7000 Unit(s) IV Push every 6 hours PRN  heparin   Injectable 3500 Unit(s) IV Push every 6 hours PRN  heparin  Infusion.  Unit(s)/Hr IV Continuous <Continuous>  insulin lispro (ADMELOG) corrective regimen sliding scale   SubCutaneous three times a day before meals  insulin lispro (ADMELOG) corrective regimen sliding scale   SubCutaneous at bedtime      Review of Systems:  Constitutional: [ ] Fever [ ] Chills [ ] Fatigue [ ] Weight change   HEENT: [ ] Blurred vision [ ] Eye Pain [ ] Headache [ ] Runny nose [ ] Sore Throat   Respiratory: [ ] Cough [ ] Wheezing [ ] Shortness of breath  Cardiovascular: [ ] Chest Pain [ ] Palpitations [ ] GUERRA [ ] PND [ ] Orthopnea  Gastrointestinal: [ ] Abdominal Pain [ ] Diarrhea [ ] Constipation [ ] Hemorrhoids [ ] Nausea [ ] Vomiting  Genitourinary: [ ] Nocturia [ ] Dysuria [ ] Incontinence  Extremities: [ ] Swelling [ ] Joint Pain  Neurologic: [ ] Focal deficit [ ] Paresthesias [ ] Syncope  Lymphatic: [ ] Swelling [ ] Lymphadenopathy   Skin: [ ] Rash [ ] Ecchymoses [ ] Wounds [ ] Lesions  Psychiatry: [ ] Depression [ ] Suicidal/Homicidal Ideation [ ] Anxiety [ ] Sleep Disturbances  [ ] 10 point review of systems is otherwise negative except as mentioned above            [ ]Unable to obtain    Vitals:  T(C): 36.8 (21 @ 04:58), Max: 37.1 (21 @ 11:26)  HR: 66 (21 @ 07:29) (66 - 83)  BP: 166/66 (21 @ 04:58) (155/75 - 166/66)  BP(mean): --  RR: 17 (21 @ 04:58) (16 - 18)  SpO2: 97% (21 @ 07:29) (96% - 99%)  Wt(kg): --  Daily Height in cm: 177.8 (2021 15:46)    Daily Weight in k.6 (2021 05:38)  I&O's Summary    2021 07:01  -  2021 07:00  --------------------------------------------------------  IN: 735 mL / OUT: 550 mL / NET: 185 mL        Physical Exam:  General: NAD  Cardiovascular: Normal S1 S2, No JVD, No murmurs, No edema  Respiratory: Lungs expiratory wheeze to auscultation	  Gastrointestinal:  Soft, Non-tender, + BS	  Skin: warm and dry, No rashes, No ecchymoses, No cyanosis	  Extremities:  No clubbing, cyanosis or edema, right toe amputation  Vascular: Peripheral pulses palpable 2+ bilaterally    Labs:                        9.6    9.58  )-----------( 341      ( 2021 07:49 )             28.8     04-24    132<L>  |  100  |  53<H>  ----------------------------<  158<H>  4.2   |  16<L>  |  2.98<H>    Ca    8.6      2021 07:49  Phos  4.5     04-24  Mg     2.4     -24    TPro  6.8  /  Alb  3.3  /  TBili  0.4  /  DBili  x   /  AST  34  /  ALT  38  /  AlkPhos  218<H>  04-23    PT/INR - ( 2021 04:36 )   PT: 14.2 sec;   INR: 1.25 ratio         PTT - ( 2021 00:36 )  PTT:90.9 sec  CARDIAC MARKERS ( 2021 17:58 )  x     / x     / 123 U/L / x     / 5.8 ng/mL  CARDIAC MARKERS ( 2021 04:36 )  x     / x     / 173 U/L / x     / 7.9 ng/mL  CARDIAC MARKERS ( 2021 00:25 )  x     / x     / 196 U/L / x     / 7.8 ng/mL  CARDIAC MARKERS ( 2021 21:26 )  x     / x     / x     / x     / 8.7 ng/mL  CARDIAC MARKERS ( 2021 19:42 )  x     / x     / 246 U/L / x     / 8.3 ng/mL      Serum Pro-Brain Natriuretic Peptide: 5714 pg/mL ( @ 07:49)  Serum Pro-Brain Natriuretic Peptide: 5709 pg/mL ( @ 04:42)  Serum Pro-Brain Natriuretic Peptide: 6360 pg/mL ( @ 19:41)          New results/imaging:

## 2021-04-24 NOTE — PROGRESS NOTE ADULT - PROBLEM SELECTOR PLAN 3
unknown baseline  Cr rising  spoke with pharmacist, states current dose still ok despite rising Cr   cont to monitor  pt denies issues with voiding  will check bladder scan

## 2021-04-24 NOTE — PROGRESS NOTE ADULT - PROBLEM SELECTOR PLAN 2
s/p asa and plavix load, on hep gtt  appreciate cards recs, thought to be secondary to demand in setting of acute HF

## 2021-04-24 NOTE — PROGRESS NOTE ADULT - SUBJECTIVE AND OBJECTIVE BOX
[facility] Division of Hospital Medicine  Radha Ireland MD  Pager [phone]    Patient is a 60y old  Male who presents with a chief complaint of slurred speech      SUBJECTIVE / OVERNIGHT EVENTS: speech fluent; asking why he has some wheezing; pt noted with rising Cr, pt denies knowledge of any kidney issues      MEDICATIONS  (STANDING):  aspirin enteric coated 81 milliGRAM(s) Oral daily  atorvastatin 40 milliGRAM(s) Oral at bedtime  cefepime   IVPB 1000 milliGRAM(s) IV Intermittent every 12 hours  dextrose 40% Gel 15 Gram(s) Oral once  dextrose 5%. 1000 milliLiter(s) (50 mL/Hr) IV Continuous <Continuous>  dextrose 5%. 1000 milliLiter(s) (100 mL/Hr) IV Continuous <Continuous>  dextrose 50% Injectable 25 Gram(s) IV Push once  dextrose 50% Injectable 12.5 Gram(s) IV Push once  dextrose 50% Injectable 25 Gram(s) IV Push once  glucagon  Injectable 1 milliGRAM(s) IntraMuscular once  heparin  Infusion.  Unit(s)/Hr (16 mL/Hr) IV Continuous <Continuous>  insulin lispro (ADMELOG) corrective regimen sliding scale   SubCutaneous three times a day before meals  insulin lispro (ADMELOG) corrective regimen sliding scale   SubCutaneous at bedtime    MEDICATIONS  (PRN):  heparin   Injectable 7000 Unit(s) IV Push every 6 hours PRN For aPTT less than 40  heparin   Injectable 3500 Unit(s) IV Push every 6 hours PRN For aPTT between 40 - 57      CAPILLARY BLOOD GLUCOSE  POCT Blood Glucose.: 165 mg/dL (2021 08:50)  POCT Blood Glucose.: 148 mg/dL (2021 21:35)  POCT Blood Glucose.: 175 mg/dL (2021 17:25)  POCT Blood Glucose.: 99 mg/dL (2021 13:07)        PHYSICAL EXAM:  Vital Signs Last 24 Hrs  T(F): 98.2 (2021 04:58), Max: 98.2 (2021 04:58)  HR: 79 (2021 11:54) (66 - 83)  BP: 166/66 (2021 04:58) (155/75 - 166/66)  RR: 17 (2021 04:58) (16 - 17)  SpO2: 97% (2021 11:54) (96% - 99%)    CONSTITUTIONAL: NAD  RESPIRATORY: Normal respiratory effort; + exp wheeze, mild  CARDIOVASCULAR: Regular rate and rhythm, No lower extremity edema;   ABDOMEN: Nontender to palpation, normoactive bowel sounds  MUSCULOSKELETAL:  no joint swelling or tenderness to palpation; R toe amp  PSYCH: A+O to person, place, and time; affect appropriate  NEUROLOGY: no gross sensory deficits       LABS:                        9.6    9.58  )-----------( 341      ( 2021 07:49 )             28.8     04-24    132<L>  |  100  |  53<H>  ----------------------------<  158<H>  4.2   |  16<L>  |  2.98<H>    Ca    8.6      2021 07:49  Phos  4.5     04-24  Mg     2.4     04-24    TPro  6.8  /  Alb  3.3  /  TBili  0.4  /  DBili  x   /  AST  34  /  ALT  38  /  AlkPhos  218<H>  04-23    PT/INR - ( 2021 04:36 )   PT: 14.2 sec;   INR: 1.25 ratio         PTT - ( 2021 00:36 )  PTT:90.9 sec  CARDIAC MARKERS ( 2021 17:58 )  x     / x     / 123 U/L / x     / 5.8 ng/mL  CARDIAC MARKERS ( 2021 04:36 )  x     / x     / 173 U/L / x     / 7.9 ng/mL  CARDIAC MARKERS ( 2021 00:25 )  x     / x     / 196 U/L / x     / 7.8 ng/mL  CARDIAC MARKERS ( 2021 21:26 )  x     / x     / x     / x     / 8.7 ng/mL  CARDIAC MARKERS ( 2021 19:42 )  x     / x     / 246 U/L / x     / 8.3 ng/mL      Urinalysis Basic - ( 2021 00:12 )    Color: Light Yellow / Appearance: Clear / S.011 / pH: x  Gluc: x / Ketone: Negative  / Bili: Negative / Urobili: <2 mg/dL   Blood: x / Protein: 30 mg/dL / Nitrite: Negative   Leuk Esterase: Negative / RBC: 3 /HPF / WBC 1 /HPF   Sq Epi: x / Non Sq Epi: x / Bacteria: x

## 2021-04-24 NOTE — PROGRESS NOTE ADULT - PROBLEM SELECTOR PLAN 1
-stable off bipap  -appreciate cardiology recs  VQ very low prob  -c/w ASA, heparin gtt   TTE P  monitor on tele, trend CE  strict I/Os, daily weights  lungs clear on exam, euvolemic; will hold off on further lasix

## 2021-04-25 LAB
ANION GAP SERPL CALC-SCNC: 14 MMOL/L — SIGNIFICANT CHANGE UP (ref 7–14)
APTT BLD: 130 SEC — CRITICAL HIGH (ref 27–36.3)
APTT BLD: 82.8 SEC — HIGH (ref 27–36.3)
APTT BLD: 99.2 SEC — HIGH (ref 27–36.3)
BUN SERPL-MCNC: 66 MG/DL — HIGH (ref 7–23)
CALCIUM SERPL-MCNC: 8.5 MG/DL — SIGNIFICANT CHANGE UP (ref 8.4–10.5)
CHLORIDE SERPL-SCNC: 98 MMOL/L — SIGNIFICANT CHANGE UP (ref 98–107)
CO2 SERPL-SCNC: 17 MMOL/L — LOW (ref 22–31)
CREAT ?TM UR-MCNC: 106 MG/DL — SIGNIFICANT CHANGE UP
CREAT SERPL-MCNC: 3.91 MG/DL — HIGH (ref 0.5–1.3)
GLUCOSE BLDC GLUCOMTR-MCNC: 186 MG/DL — HIGH (ref 70–99)
GLUCOSE BLDC GLUCOMTR-MCNC: 216 MG/DL — HIGH (ref 70–99)
GLUCOSE BLDC GLUCOMTR-MCNC: 226 MG/DL — HIGH (ref 70–99)
GLUCOSE BLDC GLUCOMTR-MCNC: 254 MG/DL — HIGH (ref 70–99)
GLUCOSE SERPL-MCNC: 198 MG/DL — HIGH (ref 70–99)
HCT VFR BLD CALC: 28.4 % — LOW (ref 39–50)
HGB BLD-MCNC: 9.6 G/DL — LOW (ref 13–17)
MAGNESIUM SERPL-MCNC: 2.4 MG/DL — SIGNIFICANT CHANGE UP (ref 1.6–2.6)
MCHC RBC-ENTMCNC: 28.7 PG — SIGNIFICANT CHANGE UP (ref 27–34)
MCHC RBC-ENTMCNC: 33.8 GM/DL — SIGNIFICANT CHANGE UP (ref 32–36)
MCV RBC AUTO: 84.8 FL — SIGNIFICANT CHANGE UP (ref 80–100)
NRBC # BLD: 0 /100 WBCS — SIGNIFICANT CHANGE UP
NRBC # FLD: 0 K/UL — SIGNIFICANT CHANGE UP
PHOSPHATE SERPL-MCNC: 4.2 MG/DL — SIGNIFICANT CHANGE UP (ref 2.5–4.5)
PLATELET # BLD AUTO: 348 K/UL — SIGNIFICANT CHANGE UP (ref 150–400)
POTASSIUM SERPL-MCNC: 4.2 MMOL/L — SIGNIFICANT CHANGE UP (ref 3.5–5.3)
POTASSIUM SERPL-SCNC: 4.2 MMOL/L — SIGNIFICANT CHANGE UP (ref 3.5–5.3)
PROT ?TM UR-MCNC: 74 MG/DL — SIGNIFICANT CHANGE UP
PROT/CREAT UR-RTO: 0.7 RATIO — HIGH (ref 0–0.2)
RBC # BLD: 3.35 M/UL — LOW (ref 4.2–5.8)
RBC # FLD: 12.2 % — SIGNIFICANT CHANGE UP (ref 10.3–14.5)
SODIUM SERPL-SCNC: 129 MMOL/L — LOW (ref 135–145)
TROPONIN T, HIGH SENSITIVITY RESULT: 1365 NG/L — CRITICAL HIGH
WBC # BLD: 10.3 K/UL — SIGNIFICANT CHANGE UP (ref 3.8–10.5)
WBC # FLD AUTO: 10.3 K/UL — SIGNIFICANT CHANGE UP (ref 3.8–10.5)

## 2021-04-25 PROCEDURE — 99233 SBSQ HOSP IP/OBS HIGH 50: CPT

## 2021-04-25 PROCEDURE — 76770 US EXAM ABDO BACK WALL COMP: CPT | Mod: 26

## 2021-04-25 RX ORDER — CEFEPIME 1 G/1
1000 INJECTION, POWDER, FOR SOLUTION INTRAMUSCULAR; INTRAVENOUS DAILY
Refills: 0 | Status: DISCONTINUED | OUTPATIENT
Start: 2021-04-25 | End: 2021-04-26

## 2021-04-25 RX ORDER — CHLORHEXIDINE GLUCONATE 213 G/1000ML
1 SOLUTION TOPICAL DAILY
Refills: 0 | Status: DISCONTINUED | OUTPATIENT
Start: 2021-04-25 | End: 2021-05-05

## 2021-04-25 RX ORDER — METOPROLOL TARTRATE 50 MG
25 TABLET ORAL
Refills: 0 | Status: DISCONTINUED | OUTPATIENT
Start: 2021-04-25 | End: 2021-05-05

## 2021-04-25 RX ADMIN — Medication 3: at 13:23

## 2021-04-25 RX ADMIN — Medication 81 MILLIGRAM(S): at 11:53

## 2021-04-25 RX ADMIN — HEPARIN SODIUM 1300 UNIT(S)/HR: 5000 INJECTION INTRAVENOUS; SUBCUTANEOUS at 23:55

## 2021-04-25 RX ADMIN — Medication 1: at 08:58

## 2021-04-25 RX ADMIN — CEFEPIME 100 MILLIGRAM(S): 1 INJECTION, POWDER, FOR SOLUTION INTRAMUSCULAR; INTRAVENOUS at 11:53

## 2021-04-25 RX ADMIN — HEPARIN SODIUM 1300 UNIT(S)/HR: 5000 INJECTION INTRAVENOUS; SUBCUTANEOUS at 10:01

## 2021-04-25 RX ADMIN — Medication 25 MILLIGRAM(S): at 17:03

## 2021-04-25 RX ADMIN — HEPARIN SODIUM 0 UNIT(S)/HR: 5000 INJECTION INTRAVENOUS; SUBCUTANEOUS at 08:58

## 2021-04-25 RX ADMIN — CEFEPIME 100 MILLIGRAM(S): 1 INJECTION, POWDER, FOR SOLUTION INTRAMUSCULAR; INTRAVENOUS at 05:14

## 2021-04-25 RX ADMIN — Medication 25 MILLIGRAM(S): at 07:58

## 2021-04-25 RX ADMIN — HEPARIN SODIUM 1300 UNIT(S)/HR: 5000 INJECTION INTRAVENOUS; SUBCUTANEOUS at 17:04

## 2021-04-25 RX ADMIN — CHLORHEXIDINE GLUCONATE 1 APPLICATION(S): 213 SOLUTION TOPICAL at 11:55

## 2021-04-25 RX ADMIN — Medication 2: at 18:06

## 2021-04-25 RX ADMIN — ATORVASTATIN CALCIUM 40 MILLIGRAM(S): 80 TABLET, FILM COATED ORAL at 21:35

## 2021-04-25 NOTE — PROVIDER CONTACT NOTE (CRITICAL VALUE NOTIFICATION) - BACKGROUND
Patient admitted with NSTEMI. Currently on a heparin drip as ordered. Troponins have been trending upwards
patient admitted for non-stemi, patient currently on heparin drip

## 2021-04-25 NOTE — PROGRESS NOTE ADULT - ASSESSMENT
60-year-old male with HTN, IDDM2, PAD s/p RLE angioplasty, recent RLE 2nd digit distal amputation at [facility], presenting with sudden onset slurred speech that started on Wednesday night, persisted until presentation, found to be in acute HF, with NSTEMI, concern for possible PE.

## 2021-04-25 NOTE — CHART NOTE - NSCHARTNOTEFT_GEN_A_CORE
ID brief Note    Pt is now having ACOSTA. Renal is considering AIN vs ATN and would recommend/ask ID if we can change Cefepime.    I think there are several dilemma in this case:  1. We don't know what is the specific indication for getting Cefepime from [facility]. I can't really comment on any alternatives without more information. I would recommend primary team to obtain more outside records to help us decide or adjust the original abx plan.    2. The evidence of ACOSTA is obvious, it's also noticed that pt was admitted with Eosinophilia of 1510 on 4/22. However, we don't have any Eo trend to see where he is at right now. UA only shows 1 WBC, I don't know if he also has eosinophils in urine to suggest more of an AIN. Almost every medication or abx can induce AIN or ATN, especially in this very complicated case, with NSTEMI/CHF getting lasix. I believe he also has many reasons to have ACOSTA.    What I would suggest:  - Agree with lowering the cefepime to 1g q24h for now. Would not change abx one over to another.  - Please obtain outside ID record for the reason to have cefepime  - Please trend CBC with Diff so we can see what is the Eo  - Please send Urine Eosinophil count  - Continue to work out for ACOSTA/AIN/ATN as per primary team and renal    I will let his ID attending Dr. Manjit Drake know about this case.    Paulie Jackson MD, PGY4   ID fellow  Pager: [phone]  After 5pm/weekends call [phone] ID brief Note    Pt is now having ACOSTA. Renal is considering AIN vs ATN and would recommend/ask ID if we can change Cefepime.    I think there are several dilemma in this case:  1. We don't know what is the specific indication for getting Cefepime from [facility]. I can't really comment on any alternatives without more information. I would recommend primary team to obtain more outside records to help us decide or adjust the original abx plan.    2. The evidence of ACOSTA is obvious, it's also noticed that pt was admitted with Eosinophilia of 1510 on 4/22. However, we don't have any Eo trend to see where he is at right now. UA only shows 1 WBC, I don't know if he also has eosinophils in urine to suggest more of an AIN. Almost every medication or abx can induce AIN or ATN, especially in this very complicated case, with NSTEMI/CHF getting lasix. I believe he also has many reasons to have ACOSTA.    What I would suggest:  - Agree with lowering the cefepime to 1g q24h for now. Would not change abx one over to another.  - Please obtain outside ID record for the reason to have cefepime  - Please trend CBC with Diff so we can see what is the Eo  - Please send Urine Eosinophil count  - Continue to work out for ACOSTA/AIN/ATN as per primary team and renal. Send urine sample to spin for sediments? to accurately diagnose AIN vs ATN  - Work up for eosinophilia as per primary team, consider to send Strongyloides ab and Toxocara Ab?    I will let his ID attending Dr. Manjit Drake know about this case.    Paulie Jackson MD, PGY4   ID fellow  Pager: [phone]  After 5pm/weekends call [phone]

## 2021-04-25 NOTE — PROGRESS NOTE ADULT - SUBJECTIVE AND OBJECTIVE BOX
[facility] Division of Hospital Medicine  Radha Ireland MD  Pager [phone]    Patient is a 60y old  Male who presents with a chief complaint of slurred speech      SUBJECTIVE / OVERNIGHT EVENTS: reports feeling better; denies SOB, reports compliance with bipap      MEDICATIONS  (STANDING):  aspirin enteric coated 81 milliGRAM(s) Oral daily  atorvastatin 40 milliGRAM(s) Oral at bedtime  cefepime   IVPB 1000 milliGRAM(s) IV Intermittent daily  chlorhexidine 2% Cloths 1 Application(s) Topical daily  dextrose 40% Gel 15 Gram(s) Oral once  dextrose 5%. 1000 milliLiter(s) (50 mL/Hr) IV Continuous <Continuous>  dextrose 5%. 1000 milliLiter(s) (100 mL/Hr) IV Continuous <Continuous>  dextrose 50% Injectable 25 Gram(s) IV Push once  dextrose 50% Injectable 12.5 Gram(s) IV Push once  dextrose 50% Injectable 25 Gram(s) IV Push once  glucagon  Injectable 1 milliGRAM(s) IntraMuscular once  heparin  Infusion.  Unit(s)/Hr (16 mL/Hr) IV Continuous <Continuous>  insulin lispro (ADMELOG) corrective regimen sliding scale   SubCutaneous three times a day before meals  insulin lispro (ADMELOG) corrective regimen sliding scale   SubCutaneous at bedtime  metoprolol tartrate 25 milliGRAM(s) Oral two times a day    MEDICATIONS  (PRN):  heparin   Injectable 7000 Unit(s) IV Push every 6 hours PRN For aPTT less than 40  heparin   Injectable 3500 Unit(s) IV Push every 6 hours PRN For aPTT between 40 - 57      CAPILLARY BLOOD GLUCOSE  POCT Blood Glucose.: 186 mg/dL (25 Apr 2021 08:35)  POCT Blood Glucose.: 192 mg/dL (24 Apr 2021 21:19)  POCT Blood Glucose.: 158 mg/dL (24 Apr 2021 17:22)  POCT Blood Glucose.: 213 mg/dL (24 Apr 2021 12:51)            PHYSICAL EXAM:  Vital Signs Last 24 Hrs  T(F): 98.4 (25 Apr 2021 04:24), Max: 98.6 (24 Apr 2021 13:12)  HR: 66 (25 Apr 2021 10:27) (62 - 80)  BP: 172/76 (25 Apr 2021 07:57) (161/71 - 172/76)  RR: 17 (25 Apr 2021 04:24) (16 - 18)  SpO2: 95% (25 Apr 2021 10:27) (95% - 100%)    CONSTITUTIONAL: NAD  RESPIRATORY: Normal respiratory effort; lungs are clear to auscultation bilaterally  CARDIOVASCULAR: Regular rate and rhythm; No lower extremity edema;   ABDOMEN: Nontender to palpation, normoactive bowel sounds  MUSCULOSKELETAL:  no joint swelling or tenderness to palpation; R foot with dressing c/d/i  PSYCH: affect appropriate  NEUROLOGY:  no gross sensory deficits       LABS:                        9.6    10.30 )-----------( 348      ( 25 Apr 2021 07:56 )             28.4     04-25    129<L>  |  98  |  66<H>  ----------------------------<  198<H>  4.2   |  17<L>  |  3.91<H>    Ca    8.5      25 Apr 2021 07:56  Phos  4.2     04-25  Mg     2.4     04-25    TPro  6.8  /  Alb  3.3  /  TBili  0.4  /  DBili  x   /  AST  34  /  ALT  38  /  AlkPhos  218<H>  04-23    PTT - ( 25 Apr 2021 07:56 )  PTT:130.0 sec  CARDIAC MARKERS ( 23 Apr 2021 17:58 )  x     / x     / 123 U/L / x     / 5.8 ng/mL

## 2021-04-25 NOTE — CONSULT NOTE ADULT - PROBLEM SELECTOR RECOMMENDATION 9
Pt with ACOSTA in the setting of NSTEMI/Acute CHF + recent IV antibiotic use. Exact duration of ACOSTA however unknown. Upon review of [facility] no previous records found. On Admission sCr 2.1mg/dl, today Cr peaked to 3.9mg/dl, he received 1 dose of lasix 40mg IV on 4/22, no IV contrast during this admission and he has been on IV cefepime daily since early this month. On admission CBC with elevated Eosinophils 1.5-. UA remarkable for protein 30mg/dl. Pt with worsening ACOSTA differential include AIN vs ATN. Please discuss with ID change cefepime to different antibiotic,  Please check kidney sonogram, check spot urine TP/CR. He seems euvolemic would hold IV diuresis. Will need to consider HD if renal failure continues to worsen. Monitor labs and urine output. Avoid NSAIDs, ACEI/ARBS, RCA and nephrotoxins. Please dose antibiotic and rest of medications as per eGFR     Discussed with Nephrologist on call Dr. Zambrano. Pt with ACOSTA in the setting of NSTEMI/Acute CHF + recent IV antibiotic use. Exact duration of ACOSTA however unknown. Upon review of [facility] no previous records found. On Admission sCr 2.1mg/dl, today Cr peaked to 3.9mg/dl, he received 1 dose of lasix 40mg IV on 4/22, no IV contrast during this admission and he has been on IV cefepime daily since early this month. On admission CBC with elevated Eosinophils 1.5-. UA remarkable for protein 30mg/dl. Pt with worsening ACOSTA differential include AIN vs ATN. Please discuss with ID change cefepime to different antibiotic, Please repeat CBC with differential and trend Eosinophils, check kidney sonogram, check spot urine TP/CR. He seems euvolemic would hold IV diuresis. Will need to consider HD if renal failure continues to worsen. Monitor labs and urine output. Avoid NSAIDs, ACEI/ARBS, RCA and nephrotoxins. Please dose antibiotic and rest of medications as per eGFR     Discussed with Nephrologist on call Dr. Zambrano.

## 2021-04-25 NOTE — PROGRESS NOTE ADULT - PROBLEM SELECTOR PLAN 7
c/w ASA, statin  recent amputation at [facility]; surgical site does not look infected but patient says he was on cefepime via PICC line for it.   ID consult appreciated. Will continue with cefipeme until 5/24  - but need records from [facility] to confirm abx.

## 2021-04-25 NOTE — PROGRESS NOTE ADULT - PROBLEM SELECTOR PLAN 3
Cr cont to rise  bladder scan with approx 150 cc  will have nephrology eval  cepepime to be renally dosed now

## 2021-04-25 NOTE — CONSULT NOTE ADULT - ATTENDING COMMENTS
Patient seen and evaluated this morning 4/26.  Patient complaining of dyspnea at that time.  Lung POCUS performed by me demonstrated diffuse bilateral B lines with smooth pleura in most rib interspaces bilaterally consistent with extravascular lung water.  ADditioanlly rash noted over torso and back.  Strongly suspect AIN.  Continue diuretics for now given symptoms and lung findings.  Case discussed with hospitalist Dr. Yoo.

## 2021-04-26 LAB
ANION GAP SERPL CALC-SCNC: 15 MMOL/L — HIGH (ref 7–14)
ANISOCYTOSIS BLD QL: SLIGHT — SIGNIFICANT CHANGE UP
APTT BLD: 84.4 SEC — HIGH (ref 27–36.3)
BASE EXCESS BLDV CALC-SCNC: -7.2 MMOL/L — LOW (ref -3–2)
BASOPHILS # BLD AUTO: 0 K/UL — SIGNIFICANT CHANGE UP (ref 0–0.2)
BASOPHILS NFR BLD AUTO: 0 % — SIGNIFICANT CHANGE UP (ref 0–2)
BUN SERPL-MCNC: 74 MG/DL — HIGH (ref 7–23)
CALCIUM SERPL-MCNC: 8.6 MG/DL — SIGNIFICANT CHANGE UP (ref 8.4–10.5)
CHLORIDE SERPL-SCNC: 97 MMOL/L — LOW (ref 98–107)
CO2 SERPL-SCNC: 17 MMOL/L — LOW (ref 22–31)
CREAT SERPL-MCNC: 4.41 MG/DL — HIGH (ref 0.5–1.3)
ELLIPTOCYTES BLD QL SMEAR: SIGNIFICANT CHANGE UP
EOSINOPHIL # BLD AUTO: 2.04 K/UL — HIGH (ref 0–0.5)
EOSINOPHIL NFR BLD AUTO: 17.3 % — HIGH (ref 0–6)
GIANT PLATELETS BLD QL SMEAR: PRESENT — SIGNIFICANT CHANGE UP
GLUCOSE BLDC GLUCOMTR-MCNC: 186 MG/DL — HIGH (ref 70–99)
GLUCOSE BLDC GLUCOMTR-MCNC: 196 MG/DL — HIGH (ref 70–99)
GLUCOSE BLDC GLUCOMTR-MCNC: 242 MG/DL — HIGH (ref 70–99)
GLUCOSE BLDC GLUCOMTR-MCNC: 259 MG/DL — HIGH (ref 70–99)
GLUCOSE SERPL-MCNC: 195 MG/DL — HIGH (ref 70–99)
HCO3 BLDV-SCNC: 18 MMOL/L — LOW (ref 20–27)
HCT VFR BLD CALC: 28.4 % — LOW (ref 39–50)
HGB BLD-MCNC: 9.7 G/DL — LOW (ref 13–17)
IANC: 7.36 K/UL — SIGNIFICANT CHANGE UP (ref 1.5–8.5)
LYMPHOCYTES # BLD AUTO: 0.86 K/UL — LOW (ref 1–3.3)
LYMPHOCYTES # BLD AUTO: 7.3 % — LOW (ref 13–44)
MAGNESIUM SERPL-MCNC: 2.4 MG/DL — SIGNIFICANT CHANGE UP (ref 1.6–2.6)
MANUAL SMEAR VERIFICATION: SIGNIFICANT CHANGE UP
MCHC RBC-ENTMCNC: 29 PG — SIGNIFICANT CHANGE UP (ref 27–34)
MCHC RBC-ENTMCNC: 34.2 GM/DL — SIGNIFICANT CHANGE UP (ref 32–36)
MCV RBC AUTO: 84.8 FL — SIGNIFICANT CHANGE UP (ref 80–100)
MONOCYTES # BLD AUTO: 0.86 K/UL — SIGNIFICANT CHANGE UP (ref 0–0.9)
MONOCYTES NFR BLD AUTO: 7.3 % — SIGNIFICANT CHANGE UP (ref 2–14)
MYELOCYTES NFR BLD: 0.9 % — HIGH (ref 0–0)
NEUTROPHILS # BLD AUTO: 7.82 K/UL — HIGH (ref 1.8–7.4)
NEUTROPHILS NFR BLD AUTO: 66.3 % — SIGNIFICANT CHANGE UP (ref 43–77)
OVALOCYTES BLD QL SMEAR: SIGNIFICANT CHANGE UP
PCO2 BLDV: 34 MMHG — LOW (ref 42–55)
PH BLDV: 7.33 — SIGNIFICANT CHANGE UP (ref 7.32–7.43)
PHOSPHATE SERPL-MCNC: 4.5 MG/DL — SIGNIFICANT CHANGE UP (ref 2.5–4.5)
PLAT MORPH BLD: NORMAL — SIGNIFICANT CHANGE UP
PLATELET # BLD AUTO: 339 K/UL — SIGNIFICANT CHANGE UP (ref 150–400)
PLATELET COUNT - ESTIMATE: NORMAL — SIGNIFICANT CHANGE UP
PO2 BLDV: 32 MMHG — LOW (ref 35–40)
POIKILOCYTOSIS BLD QL AUTO: SIGNIFICANT CHANGE UP
POLYCHROMASIA BLD QL SMEAR: SLIGHT — SIGNIFICANT CHANGE UP
POTASSIUM SERPL-MCNC: 4.3 MMOL/L — SIGNIFICANT CHANGE UP (ref 3.5–5.3)
POTASSIUM SERPL-SCNC: 4.3 MMOL/L — SIGNIFICANT CHANGE UP (ref 3.5–5.3)
RBC # BLD: 3.35 M/UL — LOW (ref 4.2–5.8)
RBC # FLD: 12 % — SIGNIFICANT CHANGE UP (ref 10.3–14.5)
RBC BLD AUTO: ABNORMAL
SAO2 % BLDV: 52.5 % — LOW (ref 60–85)
SODIUM SERPL-SCNC: 129 MMOL/L — LOW (ref 135–145)
TROPONIN T, HIGH SENSITIVITY RESULT: 1112 NG/L — CRITICAL HIGH
VARIANT LYMPHS # BLD: 0.9 % — SIGNIFICANT CHANGE UP (ref 0–6)
WBC # BLD: 11.8 K/UL — HIGH (ref 3.8–10.5)
WBC # FLD AUTO: 11.8 K/UL — HIGH (ref 3.8–10.5)

## 2021-04-26 PROCEDURE — 99232 SBSQ HOSP IP/OBS MODERATE 35: CPT

## 2021-04-26 PROCEDURE — 99233 SBSQ HOSP IP/OBS HIGH 50: CPT

## 2021-04-26 PROCEDURE — 93306 TTE W/DOPPLER COMPLETE: CPT | Mod: 26

## 2021-04-26 PROCEDURE — 99223 1ST HOSP IP/OBS HIGH 75: CPT

## 2021-04-26 RX ORDER — HYDRALAZINE HCL 50 MG
10 TABLET ORAL THREE TIMES A DAY
Refills: 0 | Status: DISCONTINUED | OUTPATIENT
Start: 2021-04-26 | End: 2021-04-27

## 2021-04-26 RX ORDER — HEPARIN SODIUM 5000 [USP'U]/ML
5000 INJECTION INTRAVENOUS; SUBCUTANEOUS EVERY 8 HOURS
Refills: 0 | Status: DISCONTINUED | OUTPATIENT
Start: 2021-04-26 | End: 2021-05-05

## 2021-04-26 RX ORDER — IPRATROPIUM/ALBUTEROL SULFATE 18-103MCG
3 AEROSOL WITH ADAPTER (GRAM) INHALATION ONCE
Refills: 0 | Status: COMPLETED | OUTPATIENT
Start: 2021-04-26 | End: 2021-04-26

## 2021-04-26 RX ORDER — CLOPIDOGREL BISULFATE 75 MG/1
75 TABLET, FILM COATED ORAL DAILY
Refills: 0 | Status: DISCONTINUED | OUTPATIENT
Start: 2021-04-26 | End: 2021-05-05

## 2021-04-26 RX ORDER — BUMETANIDE 0.25 MG/ML
2 INJECTION INTRAMUSCULAR; INTRAVENOUS DAILY
Refills: 0 | Status: DISCONTINUED | OUTPATIENT
Start: 2021-04-26 | End: 2021-04-29

## 2021-04-26 RX ORDER — ISOSORBIDE DINITRATE 5 MG/1
10 TABLET ORAL THREE TIMES A DAY
Refills: 0 | Status: DISCONTINUED | OUTPATIENT
Start: 2021-04-26 | End: 2021-04-28

## 2021-04-26 RX ADMIN — HEPARIN SODIUM 5000 UNIT(S): 5000 INJECTION INTRAVENOUS; SUBCUTANEOUS at 22:06

## 2021-04-26 RX ADMIN — CEFEPIME 100 MILLIGRAM(S): 1 INJECTION, POWDER, FOR SOLUTION INTRAMUSCULAR; INTRAVENOUS at 11:17

## 2021-04-26 RX ADMIN — ATORVASTATIN CALCIUM 40 MILLIGRAM(S): 80 TABLET, FILM COATED ORAL at 22:06

## 2021-04-26 RX ADMIN — CHLORHEXIDINE GLUCONATE 1 APPLICATION(S): 213 SOLUTION TOPICAL at 11:17

## 2021-04-26 RX ADMIN — Medication 1: at 13:13

## 2021-04-26 RX ADMIN — HEPARIN SODIUM 1300 UNIT(S)/HR: 5000 INJECTION INTRAVENOUS; SUBCUTANEOUS at 05:51

## 2021-04-26 RX ADMIN — BUMETANIDE 2 MILLIGRAM(S): 0.25 INJECTION INTRAMUSCULAR; INTRAVENOUS at 15:08

## 2021-04-26 RX ADMIN — Medication 3 MILLILITER(S): at 04:27

## 2021-04-26 RX ADMIN — Medication 1: at 10:22

## 2021-04-26 RX ADMIN — Medication 3: at 17:50

## 2021-04-26 RX ADMIN — Medication 25 MILLIGRAM(S): at 17:50

## 2021-04-26 RX ADMIN — Medication 10 MILLIGRAM(S): at 22:06

## 2021-04-26 RX ADMIN — Medication 81 MILLIGRAM(S): at 11:21

## 2021-04-26 RX ADMIN — Medication 25 MILLIGRAM(S): at 05:51

## 2021-04-26 RX ADMIN — ISOSORBIDE DINITRATE 10 MILLIGRAM(S): 5 TABLET ORAL at 18:41

## 2021-04-26 NOTE — PROVIDER CONTACT NOTE (OTHER) - BACKGROUND
patient was on bipap for 2 hours mask removed at 2am by patient patient was on cipap for 2 hours mask removed at 2am by patient

## 2021-04-26 NOTE — PROGRESS NOTE ADULT - SUBJECTIVE AND OBJECTIVE BOX
[facility] Division of Hospital Medicine  Maria Guadalupe Yoo MD  Pager (M-F, 8A-5P): [phone]  Other Times:  [phone]    Patient is a 60y old  Male who presents with a chief complaint of slurred speech (26 Apr 2021 14:05)    SUBJECTIVE / OVERNIGHT EVENTS:  Pt feels well.  denies complaints     MEDICATIONS  (STANDING):  aspirin enteric coated 81 milliGRAM(s) Oral daily  atorvastatin 40 milliGRAM(s) Oral at bedtime  buMETAnide Injectable 2 milliGRAM(s) IV Push daily  chlorhexidine 2% Cloths 1 Application(s) Topical daily  dextrose 40% Gel 15 Gram(s) Oral once  dextrose 5%. 1000 milliLiter(s) (50 mL/Hr) IV Continuous <Continuous>  dextrose 5%. 1000 milliLiter(s) (100 mL/Hr) IV Continuous <Continuous>  dextrose 50% Injectable 25 Gram(s) IV Push once  dextrose 50% Injectable 12.5 Gram(s) IV Push once  dextrose 50% Injectable 25 Gram(s) IV Push once  glucagon  Injectable 1 milliGRAM(s) IntraMuscular once  heparin  Infusion.  Unit(s)/Hr (16 mL/Hr) IV Continuous <Continuous>  insulin lispro (ADMELOG) corrective regimen sliding scale   SubCutaneous three times a day before meals  insulin lispro (ADMELOG) corrective regimen sliding scale   SubCutaneous at bedtime  metoprolol tartrate 25 milliGRAM(s) Oral two times a day    MEDICATIONS  (PRN):  heparin   Injectable 7000 Unit(s) IV Push every 6 hours PRN For aPTT less than 40  heparin   Injectable 3500 Unit(s) IV Push every 6 hours PRN For aPTT between 40 - 57      CAPILLARY BLOOD GLUCOSE      POCT Blood Glucose.: 186 mg/dL (26 Apr 2021 12:17)  POCT Blood Glucose.: 196 mg/dL (26 Apr 2021 10:09)  POCT Blood Glucose.: 216 mg/dL (25 Apr 2021 22:39)  POCT Blood Glucose.: 226 mg/dL (25 Apr 2021 17:20)    I&O's Summary    25 Apr 2021 07:01  -  26 Apr 2021 07:00  --------------------------------------------------------  IN: 240 mL / OUT: 1450 mL / NET: -1210 mL        PHYSICAL EXAM:  Vital Signs Last 24 Hrs  T(C): 36.4 (26 Apr 2021 13:22), Max: 37.1 (25 Apr 2021 21:32)  T(F): 97.6 (26 Apr 2021 13:22), Max: 98.8 (25 Apr 2021 21:32)  HR: 64 (26 Apr 2021 13:22) (61 - 79)  BP: 179/91 (26 Apr 2021 13:22) (168/67 - 179/91)  BP(mean): --  RR: 18 (26 Apr 2021 13:22) (16 - 18)  SpO2: 100% (26 Apr 2021 13:22) (94% - 100%)    CONSTITUTIONAL: NAD  RESPIRATORY: Normal respiratory effort; lungs are clear to auscultation bilaterally  CARDIOVASCULAR: Regular rate and rhythm; No lower extremity edema;   ABDOMEN: Nontender to palpation, normoactive bowel sounds  MUSCULOSKELETAL:  no joint swelling or tenderness to palpation; R foot with dressing c/d/i  PSYCH: affect appropriate  NEUROLOGY:  no gross sensory deficits       LABS:                        9.7    11.80 )-----------( 339      ( 26 Apr 2021 04:48 )             28.4     04-26    129<L>  |  97<L>  |  74<H>  ----------------------------<  195<H>  4.3   |  17<L>  |  4.41<H>    Ca    8.6      26 Apr 2021 04:48  Phos  4.5     04-26  Mg     2.4     04-26      PTT - ( 26 Apr 2021 04:48 )  PTT:84.4 sec      RADIOLOGY & ADDITIONAL TESTS:  Results Reviewed:   Imaging Personally Reviewed:  Electrocardiogram Personally Reviewed:    COORDINATION OF CARE:  Care Discussed with Consultants/Other Providers [Y/N]: Y  Prior or Outpatient Records Reviewed [Y/N]: Y

## 2021-04-26 NOTE — CHART NOTE - NSCHARTNOTEFT_GEN_A_CORE
60-year-old male with HTN, IDDM2, PAD s/p RLE angioplasty, recent RLE 2nd digit distal amputation at [facility], presenting with sudden onset slurred speech that started on Wednesday night, persisted until presentation, found to be in acute HF, with NSTEMI.  Patient presented w/ RUE PICC, reportedly on IV Cefepime through 5/24 following right second toe partial amputation at [facility] earlier this month.     Unable to obtain records from [facility].  [facility] ID: Dr. Alejandro Rinaldi (office) [phone], (cell) [phone]    Per conversation with Dr. Rinaldi today, patient's OR culture on 4/12 is reported as follows:   - Group B Strep, resistant to Clindamycin, Tetracycline and otherwise pan sensitive    Paged ID to discuss the above, awaiting callback.  Further recs to follow      Nevin Seals NP-BC  Department of Medicine  In House Pager #[phone] 60-year-old male with HTN, IDDM2, PAD s/p RLE angioplasty, recent RLE 2nd digit distal amputation at [facility], presenting with sudden onset slurred speech that started on Wednesday night, persisted until presentation, found to be in acute HF, with NSTEMI.  Patient presented w/ RUE PICC, reportedly on IV Cefepime through 5/24 following right second toe partial amputation at [facility] earlier this month.     Unable to obtain records from [facility].  [facility] ID: Dr. Alejandro Rinaldi (office) [phone], (cell) [phone]    Per conversation with Dr. Rinaldi today, patient's OR culture on 4/12 is reported as follows:   - Group B Strep, resistant to Clindamycin, Tetracycline and otherwise pan sensitive    Discussed w/ ID Dr. Drake, no changes to plan at this time. ID to discuss with Dr. Rinaldi as needed.    Per patient/family, patient's PCP is Dr. Jae Adame (office) [phone] who followed patient throughout hospital stay at [facility].  Call placed to PCP to obtain records from previous hospitalization, message left with answering service.  Per patient's daughter, Ryan, will also attempt to obtain records of hospitalization from PCP and will bring to hospital tomorrow.      Nevin Seals NP-BC  Department of Medicine  In House Pager #[phone]

## 2021-04-26 NOTE — PROGRESS NOTE ADULT - ASSESSMENT
59 yo m s/p right foot partial 2nd toe amp (DOS 4/10 at [facility])  -pt seen and evaluated  -Aferbile, no leukocytosis  -s/p right foot partial 2nd toe amputation, right foot 2nd toe surgical site well coapted, with distal dry eschar, no dehiscence, no fluctuation, no drainage, no erythema, no signs of infection   -applied betadine and DSD to right foot  -keep dressing clean, dry and intact   -follow up with own podiatrist after discharge

## 2021-04-26 NOTE — PROGRESS NOTE ADULT - SUBJECTIVE AND OBJECTIVE BOX
Patient is a 60y old  Male who presents with a chief complaint of slurred speech (26 Apr 2021 13:56)       INTERVAL HPI/OVERNIGHT EVENTS:  Patient seen and evaluated at bedside.  Pt is resting comfortable in NAD.     Allergies    No Known Allergies    Intolerances        Vital Signs Last 24 Hrs  T(C): 36.4 (26 Apr 2021 13:22), Max: 37.1 (25 Apr 2021 21:32)  T(F): 97.6 (26 Apr 2021 13:22), Max: 98.8 (25 Apr 2021 21:32)  HR: 64 (26 Apr 2021 13:22) (61 - 79)  BP: 179/91 (26 Apr 2021 13:22) (168/67 - 179/91)  BP(mean): --  RR: 18 (26 Apr 2021 13:22) (16 - 18)  SpO2: 100% (26 Apr 2021 13:22) (94% - 100%)    LABS:                        9.7    11.80 )-----------( 339      ( 26 Apr 2021 04:48 )             28.4     04-26    129<L>  |  97<L>  |  74<H>  ----------------------------<  195<H>  4.3   |  17<L>  |  4.41<H>    Ca    8.6      26 Apr 2021 04:48  Phos  4.5     04-26  Mg     2.4     04-26      PTT - ( 26 Apr 2021 04:48 )  PTT:84.4 sec    CAPILLARY BLOOD GLUCOSE      POCT Blood Glucose.: 186 mg/dL (26 Apr 2021 12:17)  POCT Blood Glucose.: 196 mg/dL (26 Apr 2021 10:09)  POCT Blood Glucose.: 216 mg/dL (25 Apr 2021 22:39)  POCT Blood Glucose.: 226 mg/dL (25 Apr 2021 17:20)      Lower Extremity Physical Exam:  Vascular: DP/PT 1/4 B/L, CFT <3 seconds B/L, Temperature gradient warm to cool B/L  Neuro: Epicritic sensation diminshed to the level of ankle B/L  Musculoskeletal/Ortho: right foot partial 2nd toe amputation  Skin: right foot 2nd toe surgical site well coapted, with distal dry eschar, no dehiscence, no fluctuation, no drainage, no erythema, no signs of infection

## 2021-04-26 NOTE — PROGRESS NOTE ADULT - PROBLEM SELECTOR PLAN 3
Cr cont to rise  bladder scan with approx 150 cc  renal eval   changed cefepime to levaquin - will obtain records from [facility]

## 2021-04-26 NOTE — PROGRESS NOTE ADULT - SUBJECTIVE AND OBJECTIVE BOX
Follow Up: Foot infection    Interval History/ROS: Feels fine. ACOSTA over the weekend. Nephrology concerned for AIN related to antibiotics. No fevers or chills. No pain to the foot. No diarrhea. No cough or chest pain.     Allergies  No Known Allergies        ANTIMICROBIALS:  levoFLOXacin IVPB 250 every 48 hours      OTHER MEDS:  aspirin enteric coated 81 milliGRAM(s) Oral daily  atorvastatin 40 milliGRAM(s) Oral at bedtime  chlorhexidine 2% Cloths 1 Application(s) Topical daily  dextrose 40% Gel 15 Gram(s) Oral once  dextrose 5%. 1000 milliLiter(s) IV Continuous <Continuous>  dextrose 5%. 1000 milliLiter(s) IV Continuous <Continuous>  dextrose 50% Injectable 25 Gram(s) IV Push once  dextrose 50% Injectable 12.5 Gram(s) IV Push once  dextrose 50% Injectable 25 Gram(s) IV Push once  glucagon  Injectable 1 milliGRAM(s) IntraMuscular once  heparin   Injectable 7000 Unit(s) IV Push every 6 hours PRN  heparin   Injectable 3500 Unit(s) IV Push every 6 hours PRN  heparin  Infusion.  Unit(s)/Hr IV Continuous <Continuous>  insulin lispro (ADMELOG) corrective regimen sliding scale   SubCutaneous three times a day before meals  insulin lispro (ADMELOG) corrective regimen sliding scale   SubCutaneous at bedtime  metoprolol tartrate 25 milliGRAM(s) Oral two times a day      Vital Signs Last 24 Hrs  T(C): 36.7 (26 Apr 2021 05:48), Max: 37.1 (25 Apr 2021 21:32)  T(F): 98 (26 Apr 2021 05:48), Max: 98.8 (25 Apr 2021 21:32)  HR: 70 (26 Apr 2021 10:35) (61 - 79)  BP: 178/75 (26 Apr 2021 05:48) (168/67 - 178/75)  BP(mean): --  RR: 16 (26 Apr 2021 05:48) (16 - 18)  SpO2: 100% (26 Apr 2021 10:35) (94% - 100%)    Physical Exam:  General: awake, alert, non toxic  Head: atraumatic, normocephalic  Eye: normal sclera and conjunctiva  Cardio: regular rate   Respiratory: nonlabored on nasal cannula   abd: soft, no tenderness  Musculoskeletal: s/p right second toe partial amputation, site intact with eschar, no local inflammation or tenderness   psych: normal affect                          9.7    11.80 )-----------( 339      ( 26 Apr 2021 04:48 )             28.4       04-26    129<L>  |  97<L>  |  74<H>  ----------------------------<  195<H>  4.3   |  17<L>  |  4.41<H>    Ca    8.6      26 Apr 2021 04:48  Phos  4.5     04-26  Mg     2.4     04-26            MICROBIOLOGY:    RADIOLOGY:  Images below reviewed personally  US Kidney and Bladder (04.25.21 @ 17:39)   Normal sonographic appearance of the kidneys.  No hydronephrosis.

## 2021-04-26 NOTE — CHART NOTE - NSCHARTNOTEFT_GEN_A_CORE
Notified by RN that patient complaining of shortness of breath. RN placed on NC 2L satting 98%. Patient seen at bedside by provider. Patient says that he feels breathless when trying to speak. Patient states that he wore BiPAP for 2 hours tonight and self removed it due to discomfort. Of note- patient's BiPAP order has been d/c for 3 days and he says he only wears it at night for 1-3 hours. Patient denies chest pain, cough, palpitations, HA, dizziness, N/V. On initial eval, patient seemed anxious and had increased work of breathing.     When provider went to recheck patient he states he is feeling better, and now appears less anxious and w/ decreased work  of breathing.    ICU Vital Signs Last 24 Hrs  T(C): 36.7 (26 Apr 2021 03:58), Max: 37.1 (25 Apr 2021 21:32)  T(F): 98.1 (26 Apr 2021 03:58), Max: 98.8 (25 Apr 2021 21:32)  HR: 68 (26 Apr 2021 03:58) (62 - 79)  BP: 168/67 (26 Apr 2021 03:58) (168/67 - 177/87)  BP(mean): --  ABP: --  ABP(mean): --  RR: 16 (26 Apr 2021 03:58) (16 - 18)  SpO2: 98% (26 Apr 2021 03:58) (94% - 100%)    General: NAD, anxious  Cards: S1/S2, no murmurs   Pulm: CTA bilaterally. No wheezes.   Abdomen: Soft, NTND. BS (+)   Neurology: AOx3 with no focal neurological deficits     Assessment/Plan- lungs CTA, satting well, SOB possibly 2/2 anxiety w/ BiPAP    NC 2L for comfort, continue on continuos pulse ox and tele  Duoneb x1  CXR  VBG now w/ am labs  Asses need for nocturnal BiPAP as patient has been using it     Will continue to monitor closely      Vivienne Dee PA-C  Department of Medicine  [facility]   In House Pager #[phone] Notified by RN that patient complaining of shortness of breath, satting 94% on RA. RN placed on NC 2L satting 98%. Patient seen at bedside by provider. Patient says that he feels breathless when trying to speak. Patient states that he wore CPAP for 2 hours tonight and self removed it due to discomfort. Patient denies chest pain, cough, palpitations, HA, dizziness, N/V. On initial eval, patient seemed anxious and had increased work of breathing.     When provider went to recheck patient he states he is feeling better, and now appears less anxious and w/ decreased work  of breathing. States he will retry CPAP.     ICU Vital Signs Last 24 Hrs  T(C): 36.7 (26 Apr 2021 03:58), Max: 37.1 (25 Apr 2021 21:32)  T(F): 98.1 (26 Apr 2021 03:58), Max: 98.8 (25 Apr 2021 21:32)  HR: 68 (26 Apr 2021 03:58) (62 - 79)  BP: 168/67 (26 Apr 2021 03:58) (168/67 - 177/87)  BP(mean): --  ABP: --  ABP(mean): --  RR: 16 (26 Apr 2021 03:58) (16 - 18)  SpO2: 98% (26 Apr 2021 03:58) (94% - 100%)    General: NAD, anxious  Cards: S1/S2, no murmurs   Pulm: CTA bilaterally. No wheezes.   Abdomen: Soft, NTND. BS (+)   Neurology: AOx3 with no focal neurological deficits     Assessment/Plan- lungs CTA, satting well, SOB possibly 2/2 anxiety w/ CPAP  NC 2L for comfort, continue on continuos pulse ox and tele  Duoneb x1, VBG now w/ am labs    Patient educated on importance of using CPAP. Patient states he is willing to try again. RT contacted to place patient back on CPAP. Will continue to monitor closely      Vivienne Dee PA-C  Department of Medicine  [facility]   In House Pager #[phone]

## 2021-04-26 NOTE — PROGRESS NOTE ADULT - SUBJECTIVE AND OBJECTIVE BOX
Juan Mccrary MD    Internal Medicine Resident (PGY-2)  Pager: [phone] (NS) / [phone] (PIA)      NOTE INCOMPLETE UNTIL SIGNED BY ATTENDING  ___________________________________________________________________________________________________      HOLLY FITZGERALD 60y Male    Overnight events/subjective: Patient was SOB overnight, was placed on CPAP (DC by patient due to discomfort), then supplemental O2 via 2L NC for comfort. Patient seen and examined at bedside. No complaints. Denies fever, chills, chest pain, shortness of breath, abdominal pain, nausea, vomiting, changes in bowel habits, or urinary symptoms.    Vital Signs Last 24 Hrs  T(C): 36.4 (26 Apr 2021 13:22), Max: 37.1 (25 Apr 2021 21:32)  T(F): 97.6 (26 Apr 2021 13:22), Max: 98.8 (25 Apr 2021 21:32)  HR: 64 (26 Apr 2021 13:22) (61 - 79)  BP: 179/91 (26 Apr 2021 13:22) (168/67 - 179/91)  BP(mean): --  RR: 18 (26 Apr 2021 13:22) (16 - 18)  SpO2: 100% (26 Apr 2021 13:22) (94% - 100%)    PHYSICAL EXAM:  GENERAL: no acute distress, sitting up in bed with 2L NC in place  HEENT - atraumatic, normocephalic, pupils equal and reactive to light; extraocular muscles intact  CV: regular rate and rhythym; normal S1/S2; nor murmurs, rubs, or gallops  PULM: clear to auscultation bilaterally; no rales, rhonchi, wheezing, speaking in full sentences  ABDOMEN: soft, nontender, nondistended; bowel sounds present  MSK: extremities atraumatic; ny cyanosis or clubbing  SKIN: warm, dry, no rashes or lesions  NEURO:  no focal deficits, sensory and motor grossly intact  PSYCH: alert and oriented x3; appropriate mood and affect    HOSPITAL MEDICATIONS:  MEDICATIONS  (STANDING):  aspirin enteric coated 81 milliGRAM(s) Oral daily  atorvastatin 40 milliGRAM(s) Oral at bedtime  buMETAnide Injectable 2 milliGRAM(s) IV Push daily  chlorhexidine 2% Cloths 1 Application(s) Topical daily  dextrose 40% Gel 15 Gram(s) Oral once  dextrose 5%. 1000 milliLiter(s) (50 mL/Hr) IV Continuous <Continuous>  dextrose 5%. 1000 milliLiter(s) (100 mL/Hr) IV Continuous <Continuous>  dextrose 50% Injectable 25 Gram(s) IV Push once  dextrose 50% Injectable 12.5 Gram(s) IV Push once  dextrose 50% Injectable 25 Gram(s) IV Push once  glucagon  Injectable 1 milliGRAM(s) IntraMuscular once  heparin  Infusion.  Unit(s)/Hr (16 mL/Hr) IV Continuous <Continuous>  insulin lispro (ADMELOG) corrective regimen sliding scale   SubCutaneous three times a day before meals  insulin lispro (ADMELOG) corrective regimen sliding scale   SubCutaneous at bedtime  metoprolol tartrate 25 milliGRAM(s) Oral two times a day    MEDICATIONS  (PRN):  heparin   Injectable 7000 Unit(s) IV Push every 6 hours PRN For aPTT less than 40  heparin   Injectable 3500 Unit(s) IV Push every 6 hours PRN For aPTT between 40 - 57      LABS:                        9.7    11.80 )-----------( 339      ( 26 Apr 2021 04:48 )             28.4     04-26    129<L>  |  97<L>  |  74<H>  ----------------------------<  195<H>  4.3   |  17<L>  |  4.41<H>    Ca    8.6      26 Apr 2021 04:48  Phos  4.5     04-26  Mg     2.4     04-26      PTT - ( 26 Apr 2021 04:48 )  PTT:84.4 sec       Juan Mccrary MD    Internal Medicine Resident (PGY-2)  Pager: [phone] (NS) / [phone] (PIA)        HOLLY FITZGERALD 60y Male    Overnight events/subjective: Patient was SOB overnight, was placed on CPAP (DC by patient due to discomfort), then supplemental O2 via 2L NC for comfort. Patient seen and examined at bedside. No complaints. Denies fever, chills, chest pain, shortness of breath, abdominal pain, nausea, vomiting, changes in bowel habits, or urinary symptoms.    Vital Signs Last 24 Hrs  T(C): 36.4 (26 Apr 2021 13:22), Max: 37.1 (25 Apr 2021 21:32)  T(F): 97.6 (26 Apr 2021 13:22), Max: 98.8 (25 Apr 2021 21:32)  HR: 64 (26 Apr 2021 13:22) (61 - 79)  BP: 179/91 (26 Apr 2021 13:22) (168/67 - 179/91)  BP(mean): --  RR: 18 (26 Apr 2021 13:22) (16 - 18)  SpO2: 100% (26 Apr 2021 13:22) (94% - 100%)    PHYSICAL EXAM:  GENERAL: no acute distress, sitting up in bed with 2L NC in place  HEENT - atraumatic, normocephalic, pupils equal and reactive to light; extraocular muscles intact  CV: regular rate and rhythym; normal S1/S2; nor murmurs, rubs, or gallops  PULM: clear to auscultation bilaterally; no rales, rhonchi, wheezing, speaking in full sentences  ABDOMEN: soft, nontender, nondistended; bowel sounds present  MSK: extremities atraumatic; ny cyanosis or clubbing  SKIN: warm, dry, no rashes or lesions  NEURO:  no focal deficits, sensory and motor grossly intact  PSYCH: alert and oriented x3; appropriate mood and affect    HOSPITAL MEDICATIONS:  MEDICATIONS  (STANDING):  aspirin enteric coated 81 milliGRAM(s) Oral daily  atorvastatin 40 milliGRAM(s) Oral at bedtime  buMETAnide Injectable 2 milliGRAM(s) IV Push daily  chlorhexidine 2% Cloths 1 Application(s) Topical daily  dextrose 40% Gel 15 Gram(s) Oral once  dextrose 5%. 1000 milliLiter(s) (50 mL/Hr) IV Continuous <Continuous>  dextrose 5%. 1000 milliLiter(s) (100 mL/Hr) IV Continuous <Continuous>  dextrose 50% Injectable 25 Gram(s) IV Push once  dextrose 50% Injectable 12.5 Gram(s) IV Push once  dextrose 50% Injectable 25 Gram(s) IV Push once  glucagon  Injectable 1 milliGRAM(s) IntraMuscular once  heparin  Infusion.  Unit(s)/Hr (16 mL/Hr) IV Continuous <Continuous>  insulin lispro (ADMELOG) corrective regimen sliding scale   SubCutaneous three times a day before meals  insulin lispro (ADMELOG) corrective regimen sliding scale   SubCutaneous at bedtime  metoprolol tartrate 25 milliGRAM(s) Oral two times a day    MEDICATIONS  (PRN):  heparin   Injectable 7000 Unit(s) IV Push every 6 hours PRN For aPTT less than 40  heparin   Injectable 3500 Unit(s) IV Push every 6 hours PRN For aPTT between 40 - 57      LABS:                        9.7    11.80 )-----------( 339      ( 26 Apr 2021 04:48 )             28.4     04-26    129<L>  |  97<L>  |  74<H>  ----------------------------<  195<H>  4.3   |  17<L>  |  4.41<H>    Ca    8.6      26 Apr 2021 04:48  Phos  4.5     04-26  Mg     2.4     04-26      PTT - ( 26 Apr 2021 04:48 )  PTT:84.4 sec

## 2021-04-26 NOTE — PROGRESS NOTE ADULT - ASSESSMENT
60-year-old male with HTN, IDDM2, PAD s/p RLE angioplasty, recent RLE 2nd digit distal amputation at [facility], presenting with sudden onset slurred speech that started on Wednesday night, persisted until presentation, found to be in acute HF, with NSTEMI

## 2021-04-26 NOTE — PROGRESS NOTE ADULT - ASSESSMENT
60 year old male w/ HTN, IDDM2 and recent hospitalization for toe amputation and PAD s/p peripheral angiogram w/ angioplasty presents with acute onset of SOB x 1 day without any associated symptoms, found to be in ADHF, NSTEMI.     #CHF  -Clinically euvolemic, SOB resolved, stable off BiPap (requiring 2L NC for comfort)  -Echo with mod-severe mitral regurg, mild LV systolic dysfunction 9EF 52%), hypokinesis of mid inferior wall  -No additional diuresis necessary today from cardiac standpoint  -C/w aspirin, metoprolol, holding home losartan    #NSTEMI  -Elevated troponin most likely demand in setting of elevated creatinine and CHF, now donwtrending  -Continuous cardiac monitoring to monitor for arrhythmias  -Continue heparin gtt for empiric coverage of NSTEMI  -ASA/Plavix loaded, c/w ASA daily  -Will consider ischemic eval    #HTN  -BP still uncontrolled  -Holding home losartan i/s/o ACOSTA  -C/w metoprolol 25mg BID       60 year old male w/ HTN, IDDM2 and recent hospitalization for toe amputation and PAD s/p peripheral angiogram w/ angioplasty presents with acute onset of SOB x 1 day without any associated symptoms, found to be in ADHF i/s/o mod-severe MR, now with worsening ACOSTA.    #CHF i/s/o valvular dysfunction  -Clinically euvolemic, SOB resolved, stable off BiPap (requiring 2L NC for comfort)  -Echo with mod-severe mitral regurg, mild LV systolic dysfunction (EF 52%), hypokinesis of mid inferior wall  -Euvolemic from cardiac standpoint, diuresis as per nephro  -C/w aspirin, metoprolol, holding home losartan    #Troponinemia  -Elevated troponin most likely demand in setting of elevated creatinine and CHF, now donwtrending  -Can discontinue heparin gtt  -C/w aspirin  -DAPT loaded but currently not on Plavix, would start plavix 75mg qd  -Continuous cardiac monitoring to monitor for arrhythmias  -Once stable from renal standpoint, can consider ischemic eval. This can be done on an outpatient basis  -Would clarify reason for cefepime and consider switching abx given worsening renal dysfunction    #HTN  -BP still uncontrolled, needs better control i/s/o MR  -Holding home losartan i/s/o ACOSTA  -C/w metoprolol 25mg BID  -Would start isordil 10mg TID

## 2021-04-26 NOTE — PROGRESS NOTE ADULT - ASSESSMENT
60M with diabetes, admitted 4/22/21 for acute slurred speech, found to have NSTEMI with CHF.   On IV Cefepime via right arm PICC through 5/24 following right second toe partial amputation at [facility] earlier this month.   I can't comment on its appropriateness of his antibiotics without more information and now there's concern for secondary AIN.   He's not sick and the foot doesn't look acutely infected.     Suggest  -reasonable to continue antibiotics, can try changing to Levaquin 250mg q48h starting tomorrow (Cefepime already running this morning)   -records from [facility]   -patient to resume care with his prior ID doctor upon discharge     Spoke with pritesh Drake MD   Infectious Disease   Pager [phone]   After 5PM and on weekends please page fellow on call or call [phone]

## 2021-04-26 NOTE — PROVIDER CONTACT NOTE (OTHER) - ACTION/TREATMENT ORDERED:
LOREN Palafox came to see patient, duoneb and ABG ordered LOREN Palafox came to see patient, Duoneb and ABG ordered

## 2021-04-26 NOTE — PROGRESS NOTE ADULT - PROBLEM SELECTOR PLAN 7
c/w ASA, statin  recent amputation at [facility]; surgical site does not look infected but patient says he was on cefepime via PICC line for it.   ID consult appreciated - changed to levaquin - will obtain records from [facility]

## 2021-04-27 LAB
ANION GAP SERPL CALC-SCNC: 19 MMOL/L — HIGH (ref 7–14)
APTT BLD: 31.4 SEC — SIGNIFICANT CHANGE UP (ref 27–36.3)
BASOPHILS # BLD AUTO: 0.1 K/UL — SIGNIFICANT CHANGE UP (ref 0–0.2)
BASOPHILS NFR BLD AUTO: 0.9 % — SIGNIFICANT CHANGE UP (ref 0–2)
BUN SERPL-MCNC: 80 MG/DL — HIGH (ref 7–23)
CALCIUM SERPL-MCNC: 8.4 MG/DL — SIGNIFICANT CHANGE UP (ref 8.4–10.5)
CHLORIDE SERPL-SCNC: 96 MMOL/L — LOW (ref 98–107)
CO2 SERPL-SCNC: 15 MMOL/L — LOW (ref 22–31)
CREAT SERPL-MCNC: 4.76 MG/DL — HIGH (ref 0.5–1.3)
EOSINOPHIL # BLD AUTO: 1.98 K/UL — HIGH (ref 0–0.5)
EOSINOPHIL NFR BLD AUTO: 17.9 % — HIGH (ref 0–6)
EOSINOPHIL NFR URNS MANUAL: POSITIVE
GLUCOSE BLDC GLUCOMTR-MCNC: 182 MG/DL — HIGH (ref 70–99)
GLUCOSE BLDC GLUCOMTR-MCNC: 199 MG/DL — HIGH (ref 70–99)
GLUCOSE BLDC GLUCOMTR-MCNC: 226 MG/DL — HIGH (ref 70–99)
GLUCOSE BLDC GLUCOMTR-MCNC: 262 MG/DL — HIGH (ref 70–99)
GLUCOSE SERPL-MCNC: 247 MG/DL — HIGH (ref 70–99)
HCT VFR BLD CALC: 25.9 % — LOW (ref 39–50)
HGB BLD-MCNC: 8.9 G/DL — LOW (ref 13–17)
IANC: 6.88 K/UL — SIGNIFICANT CHANGE UP (ref 1.5–8.5)
IMM GRANULOCYTES NFR BLD AUTO: 1.6 % — HIGH (ref 0–1.5)
LYMPHOCYTES # BLD AUTO: 0.86 K/UL — LOW (ref 1–3.3)
LYMPHOCYTES # BLD AUTO: 7.8 % — LOW (ref 13–44)
MAGNESIUM SERPL-MCNC: 2.3 MG/DL — SIGNIFICANT CHANGE UP (ref 1.6–2.6)
MCHC RBC-ENTMCNC: 28.4 PG — SIGNIFICANT CHANGE UP (ref 27–34)
MCHC RBC-ENTMCNC: 34.4 GM/DL — SIGNIFICANT CHANGE UP (ref 32–36)
MCV RBC AUTO: 82.7 FL — SIGNIFICANT CHANGE UP (ref 80–100)
MONOCYTES # BLD AUTO: 1.08 K/UL — HIGH (ref 0–0.9)
MONOCYTES NFR BLD AUTO: 9.7 % — SIGNIFICANT CHANGE UP (ref 2–14)
NEUTROPHILS # BLD AUTO: 6.88 K/UL — SIGNIFICANT CHANGE UP (ref 1.8–7.4)
NEUTROPHILS NFR BLD AUTO: 62.1 % — SIGNIFICANT CHANGE UP (ref 43–77)
NRBC # BLD: 0 /100 WBCS — SIGNIFICANT CHANGE UP
NRBC # FLD: 0 K/UL — SIGNIFICANT CHANGE UP
PHOSPHATE SERPL-MCNC: 5.4 MG/DL — HIGH (ref 2.5–4.5)
PLATELET # BLD AUTO: 311 K/UL — SIGNIFICANT CHANGE UP (ref 150–400)
POTASSIUM SERPL-MCNC: 4.2 MMOL/L — SIGNIFICANT CHANGE UP (ref 3.5–5.3)
POTASSIUM SERPL-SCNC: 4.2 MMOL/L — SIGNIFICANT CHANGE UP (ref 3.5–5.3)
RBC # BLD: 3.13 M/UL — LOW (ref 4.2–5.8)
RBC # FLD: 12.3 % — SIGNIFICANT CHANGE UP (ref 10.3–14.5)
SODIUM SERPL-SCNC: 130 MMOL/L — LOW (ref 135–145)
TROPONIN T, HIGH SENSITIVITY RESULT: 752 NG/L — CRITICAL HIGH
WBC # BLD: 11.08 K/UL — HIGH (ref 3.8–10.5)
WBC # FLD AUTO: 11.08 K/UL — HIGH (ref 3.8–10.5)

## 2021-04-27 PROCEDURE — 99233 SBSQ HOSP IP/OBS HIGH 50: CPT

## 2021-04-27 PROCEDURE — 99232 SBSQ HOSP IP/OBS MODERATE 35: CPT

## 2021-04-27 RX ORDER — HYDRALAZINE HCL 50 MG
25 TABLET ORAL THREE TIMES A DAY
Refills: 0 | Status: DISCONTINUED | OUTPATIENT
Start: 2021-04-27 | End: 2021-04-27

## 2021-04-27 RX ORDER — LANOLIN ALCOHOL/MO/W.PET/CERES
3 CREAM (GRAM) TOPICAL AT BEDTIME
Refills: 0 | Status: DISCONTINUED | OUTPATIENT
Start: 2021-04-27 | End: 2021-05-05

## 2021-04-27 RX ORDER — HYDRALAZINE HCL 50 MG
5 TABLET ORAL ONCE
Refills: 0 | Status: COMPLETED | OUTPATIENT
Start: 2021-04-27 | End: 2021-04-27

## 2021-04-27 RX ORDER — HYDRALAZINE HCL 50 MG
10 TABLET ORAL THREE TIMES A DAY
Refills: 0 | Status: DISCONTINUED | OUTPATIENT
Start: 2021-04-27 | End: 2021-04-28

## 2021-04-27 RX ADMIN — Medication 2: at 08:49

## 2021-04-27 RX ADMIN — Medication 10 MILLIGRAM(S): at 22:14

## 2021-04-27 RX ADMIN — ISOSORBIDE DINITRATE 10 MILLIGRAM(S): 5 TABLET ORAL at 13:07

## 2021-04-27 RX ADMIN — BUMETANIDE 2 MILLIGRAM(S): 0.25 INJECTION INTRAMUSCULAR; INTRAVENOUS at 05:24

## 2021-04-27 RX ADMIN — Medication 10 MILLIGRAM(S): at 13:07

## 2021-04-27 RX ADMIN — Medication 25 MILLIGRAM(S): at 05:26

## 2021-04-27 RX ADMIN — ISOSORBIDE DINITRATE 10 MILLIGRAM(S): 5 TABLET ORAL at 17:12

## 2021-04-27 RX ADMIN — HEPARIN SODIUM 5000 UNIT(S): 5000 INJECTION INTRAVENOUS; SUBCUTANEOUS at 13:07

## 2021-04-27 RX ADMIN — Medication 10 MILLIGRAM(S): at 05:35

## 2021-04-27 RX ADMIN — HEPARIN SODIUM 5000 UNIT(S): 5000 INJECTION INTRAVENOUS; SUBCUTANEOUS at 05:25

## 2021-04-27 RX ADMIN — Medication 3: at 13:05

## 2021-04-27 RX ADMIN — Medication 3 MILLIGRAM(S): at 22:14

## 2021-04-27 RX ADMIN — ISOSORBIDE DINITRATE 10 MILLIGRAM(S): 5 TABLET ORAL at 05:25

## 2021-04-27 RX ADMIN — HEPARIN SODIUM 5000 UNIT(S): 5000 INJECTION INTRAVENOUS; SUBCUTANEOUS at 22:14

## 2021-04-27 RX ADMIN — Medication 25 MILLIGRAM(S): at 17:14

## 2021-04-27 RX ADMIN — Medication 81 MILLIGRAM(S): at 13:07

## 2021-04-27 RX ADMIN — ATORVASTATIN CALCIUM 40 MILLIGRAM(S): 80 TABLET, FILM COATED ORAL at 22:14

## 2021-04-27 RX ADMIN — CHLORHEXIDINE GLUCONATE 1 APPLICATION(S): 213 SOLUTION TOPICAL at 13:08

## 2021-04-27 RX ADMIN — CLOPIDOGREL BISULFATE 75 MILLIGRAM(S): 75 TABLET, FILM COATED ORAL at 13:07

## 2021-04-27 RX ADMIN — Medication 1: at 17:12

## 2021-04-27 RX ADMIN — Medication 5 MILLIGRAM(S): at 08:50

## 2021-04-27 NOTE — PHYSICAL THERAPY INITIAL EVALUATION ADULT - PERTINENT HX OF CURRENT PROBLEM, REHAB EVAL
60 year old male w/ HTN, IDDM2 and recent hospitalization for toe amputation and PAD s/p peripheral angiogram w/ angioplasty presents with acute onset of SOB x 1 day without any associated symptoms.

## 2021-04-27 NOTE — PHYSICAL THERAPY INITIAL EVALUATION ADULT - DISCHARGE DISPOSITION, PT EVAL
anticipate Home w/ Home PT to address current functional limitations and impairments and to optimize safety within home environment

## 2021-04-27 NOTE — PROGRESS NOTE ADULT - SUBJECTIVE AND OBJECTIVE BOX
[facility] Division of Kidney Diseases & Hypertension  FOLLOW UP NOTE  --------------------------------------------------------------------------------    HPI:  60 year-old male with PMHx HTN, DM2, PAD s/p RLE angioplasty, recent RLE 2nd digit distal amputation at [facility], who presented to [facility] for sudden onset slurred speech. He was found with NSTEMI and acute Heart failure exacerbation. Patient reports that before admission he was on IV antibiotics, cefepime, to complete 6 weeks. He also reports previous hx of orthopnea and mild LE edema, which now has improved. Currently he denies Headache, dizziness, chest pain, palpitations, cough, wheezing, SOB, nausea, vomiting, diarrhea.  He denies previous hx of kidney disease, no recent NSAID use or herbal medications. On admission found with sCr: 2.1mg/dl, and worsened     On evaluation today patient reports that his breathing is better.  No longer itching, rash improving.      PAST HISTORY  --------------------------------------------------------------------------------  No significant changes to PMH, PSH, FHx, SHx, unless otherwise noted    ALLERGIES & MEDICATIONS  --------------------------------------------------------------------------------  Allergies    No Known Allergies    Intolerances      Standing Inpatient Medications  aspirin enteric coated 81 milliGRAM(s) Oral daily  atorvastatin 40 milliGRAM(s) Oral at bedtime  buMETAnide Injectable 2 milliGRAM(s) IV Push daily  chlorhexidine 2% Cloths 1 Application(s) Topical daily  clopidogrel Tablet 75 milliGRAM(s) Oral daily  dextrose 40% Gel 15 Gram(s) Oral once  dextrose 5%. 1000 milliLiter(s) IV Continuous <Continuous>  dextrose 5%. 1000 milliLiter(s) IV Continuous <Continuous>  dextrose 50% Injectable 25 Gram(s) IV Push once  dextrose 50% Injectable 12.5 Gram(s) IV Push once  dextrose 50% Injectable 25 Gram(s) IV Push once  glucagon  Injectable 1 milliGRAM(s) IntraMuscular once  heparin   Injectable 5000 Unit(s) SubCutaneous every 8 hours  hydrALAZINE 25 milliGRAM(s) Oral three times a day  insulin lispro (ADMELOG) corrective regimen sliding scale   SubCutaneous three times a day before meals  insulin lispro (ADMELOG) corrective regimen sliding scale   SubCutaneous at bedtime  isosorbide   dinitrate Tablet (ISORDIL) 10 milliGRAM(s) Oral three times a day  levoFLOXacin IVPB 250 milliGRAM(s) IV Intermittent every 48 hours  metoprolol tartrate 25 milliGRAM(s) Oral two times a day    PRN Inpatient Medications      REVIEW OF SYSTEMS  --------------------------------------------------------------------------------  Gen: no fever  Respiratory: improved dyspnea  CV: no cp  GI: no ab pain  : no urinary complaints  MSK: no pain    VITALS/PHYSICAL EXAM  --------------------------------------------------------------------------------  T(C): 36.7 (04-27-21 @ 05:37), Max: 36.8 (04-26-21 @ 22:00)  HR: 61 (04-27-21 @ 08:53) (57 - 70)  BP: 164/74 (04-27-21 @ 08:53) (164/74 - 185/78)  ABP: --  ABP(mean): --  RR: 16 (04-27-21 @ 08:53) (16 - 18)  SpO2: 99% (04-27-21 @ 08:53) (97% - 100%)  CVP(mm Hg): --        04-26-21 @ 07:01  -  04-27-21 @ 07:00  --------------------------------------------------------  IN: 200 mL / OUT: 700 mL / NET: -500 mL      Physical Exam:  	Gen: NAD, cooperative  	HEENT: MMM  	Pulm: CTA B/L  	CV: S1S2 + JVD  	Abd: Soft, +BS   	Ext: lower extremity edema  	Neuro: Awake, A&O x3  	Skin: rash over torso              : no suprapubic tenderness to palpation               Psych: normal affect and mood  	Vascular access: peripheral lines.     LABS/STUDIES  --------------------------------------------------------------------------------              8.9    11.08 >-----------<  311      [04-27-21 @ 08:16]              25.9     130  |  96  |  80  ----------------------------<  247      [04-27-21 @ 08:16]  4.2   |  15  |  4.76        Ca     8.4     [04-27-21 @ 08:16]      Mg     2.3     [04-27-21 @ 08:16]      Phos  5.4     [04-27-21 @ 08:16]        PTT: 31.4       [04-27-21 @ 08:16]      Creatinine Trend:  SCr 4.76 [04-27 @ 08:16]  SCr 4.41 [04-26 @ 04:48]  SCr 3.91 [04-25 @ 07:56]  SCr 2.98 [04-24 @ 07:49]  SCr 2.66 [04-23 @ 17:58]    Urinalysis - [04-23-21 @ 00:12]      Color Light Yellow / Appearance Clear / SG 1.011 / pH 6.0      Gluc Negative / Ketone Negative  / Bili Negative / Urobili <2 mg/dL       Blood Small / Protein 30 mg/dL / Leuk Est Negative / Nitrite Negative      RBC 3 / WBC 1 / Hyaline  / Gran  / Sq Epi  / Non Sq Epi  / Bacteria     Urine Creatinine 106      [04-25-21 @ 19:15]  Urine Protein 74      [04-25-21 @ 19:15]  Urine Sodium 51      [04-23-21 @ 05:44]  Urine Urea Nitrogen 349.5      [04-23-21 @ 05:44]    TSH 1.58      [04-23-21 @ 04:36]  Lipid: chol 186, , HDL 28, LDL --      [04-23-21 @ 04:36]

## 2021-04-27 NOTE — PROGRESS NOTE ADULT - PROBLEM SELECTOR PLAN 3
Cr cont to rise but starting to plateau   bladder scan with approx 150 cc  appreciate renal recs   changed cefepime to levaquin - will obtain records from [facility]

## 2021-04-27 NOTE — PROGRESS NOTE ADULT - ASSESSMENT
60-year-old male with HTN, IDDM2, PAD s/p RLE angioplasty, recent RLE 2nd digit distal amputation at [facility], presenting with sudden onset slurred speech that started on Wednesday night, persisted until presentation, found to be in acute HF, with NSTEMI

## 2021-04-27 NOTE — PROGRESS NOTE ADULT - SUBJECTIVE AND OBJECTIVE BOX
Follow Up: Foot infection    Interval History/ROS: Afebrile. No pain. Urinating well. No diarrhea.     Allergies  No Known Allergies        ANTIMICROBIALS:  levoFLOXacin IVPB 250 every 48 hours      OTHER MEDS:  aspirin enteric coated 81 milliGRAM(s) Oral daily  atorvastatin 40 milliGRAM(s) Oral at bedtime  buMETAnide Injectable 2 milliGRAM(s) IV Push daily  chlorhexidine 2% Cloths 1 Application(s) Topical daily  clopidogrel Tablet 75 milliGRAM(s) Oral daily  dextrose 40% Gel 15 Gram(s) Oral once  dextrose 5%. 1000 milliLiter(s) IV Continuous <Continuous>  dextrose 5%. 1000 milliLiter(s) IV Continuous <Continuous>  dextrose 50% Injectable 25 Gram(s) IV Push once  dextrose 50% Injectable 12.5 Gram(s) IV Push once  dextrose 50% Injectable 25 Gram(s) IV Push once  glucagon  Injectable 1 milliGRAM(s) IntraMuscular once  heparin   Injectable 5000 Unit(s) SubCutaneous every 8 hours  hydrALAZINE 10 milliGRAM(s) Oral three times a day  insulin lispro (ADMELOG) corrective regimen sliding scale   SubCutaneous three times a day before meals  insulin lispro (ADMELOG) corrective regimen sliding scale   SubCutaneous at bedtime  isosorbide   dinitrate Tablet (ISORDIL) 10 milliGRAM(s) Oral three times a day  metoprolol tartrate 25 milliGRAM(s) Oral two times a day      Vital Signs Last 24 Hrs  T(C): 36.7 (27 Apr 2021 11:18), Max: 36.8 (26 Apr 2021 22:00)  T(F): 98 (27 Apr 2021 11:18), Max: 98.3 (26 Apr 2021 22:00)  HR: 63 (27 Apr 2021 17:10) (56 - 68)  BP: 173/69 (27 Apr 2021 17:10) (164/74 - 185/78)  BP(mean): --  RR: 18 (27 Apr 2021 17:10) (16 - 18)  SpO2: 100% (27 Apr 2021 17:10) (98% - 100%)    Physical Exam:  General: awake, alert, non toxic  Eye: normal sclera and conjunctiva  Cardio: regular rate   Respiratory: nonlabored on room air  abd: nondistended   psych: normal affect                          8.9    11.08 )-----------( 311      ( 27 Apr 2021 08:16 )             25.9       04-27    130<L>  |  96<L>  |  80<H>  ----------------------------<  247<H>  4.2   |  15<L>  |  4.76<H>    Ca    8.4      27 Apr 2021 08:16  Phos  5.4     04-27  Mg     2.3     04-27            MICROBIOLOGY:    RADIOLOGY:  Images below reviewed personally   Follow Up: Foot infection    Interval History/ROS: Afebrile. No pain. Urinating well. No diarrhea.     Allergies  No Known Allergies        ANTIMICROBIALS:  levoFLOXacin IVPB 250 every 48 hours      OTHER MEDS:  aspirin enteric coated 81 milliGRAM(s) Oral daily  atorvastatin 40 milliGRAM(s) Oral at bedtime  buMETAnide Injectable 2 milliGRAM(s) IV Push daily  chlorhexidine 2% Cloths 1 Application(s) Topical daily  clopidogrel Tablet 75 milliGRAM(s) Oral daily  dextrose 40% Gel 15 Gram(s) Oral once  dextrose 5%. 1000 milliLiter(s) IV Continuous <Continuous>  dextrose 5%. 1000 milliLiter(s) IV Continuous <Continuous>  dextrose 50% Injectable 25 Gram(s) IV Push once  dextrose 50% Injectable 12.5 Gram(s) IV Push once  dextrose 50% Injectable 25 Gram(s) IV Push once  glucagon  Injectable 1 milliGRAM(s) IntraMuscular once  heparin   Injectable 5000 Unit(s) SubCutaneous every 8 hours  hydrALAZINE 10 milliGRAM(s) Oral three times a day  insulin lispro (ADMELOG) corrective regimen sliding scale   SubCutaneous three times a day before meals  insulin lispro (ADMELOG) corrective regimen sliding scale   SubCutaneous at bedtime  isosorbide   dinitrate Tablet (ISORDIL) 10 milliGRAM(s) Oral three times a day  metoprolol tartrate 25 milliGRAM(s) Oral two times a day      Vital Signs Last 24 Hrs  T(C): 36.7 (27 Apr 2021 11:18), Max: 36.8 (26 Apr 2021 22:00)  T(F): 98 (27 Apr 2021 11:18), Max: 98.3 (26 Apr 2021 22:00)  HR: 63 (27 Apr 2021 17:10) (56 - 68)  BP: 173/69 (27 Apr 2021 17:10) (164/74 - 185/78)  BP(mean): --  RR: 18 (27 Apr 2021 17:10) (16 - 18)  SpO2: 100% (27 Apr 2021 17:10) (98% - 100%)    Physical Exam:  General: awake, alert, non toxic  Eye: normal sclera and conjunctiva  Cardio: regular rate   Respiratory: nonlabored on room air  abd: nondistended   psych: normal affect  Skin: fine macular erythematous rash diffuse over torso, itchy                          8.9    11.08 )-----------( 311      ( 27 Apr 2021 08:16 )             25.9       04-27    130<L>  |  96<L>  |  80<H>  ----------------------------<  247<H>  4.2   |  15<L>  |  4.76<H>    Ca    8.4      27 Apr 2021 08:16  Phos  5.4     04-27  Mg     2.3     04-27            MICROBIOLOGY:    RADIOLOGY:  Images below reviewed personally

## 2021-04-27 NOTE — PROGRESS NOTE ADULT - SUBJECTIVE AND OBJECTIVE BOX
Juan Mccrary MD    Internal Medicine Resident (PGY-2)  Pager: [phone] (NS) / [phone] (PIA)    NOTE INCOMPLETE UNTIL SIGNED BY ATTENDING  ___________________________________________________________________________________________________      HOLLY FITZGERALD 60y Male    Overnight events/subjective: No acute overnight events. Patient seen and examined at bedside. Complaining of some mild SOB, satting well on 2L NC. Denies fever, chills, chest pain, abdominal pain, nausea, vomiting, changes in bowel habits, or urinary symptoms.    Vital Signs Last 24 Hrs  T(C): 36.7 (27 Apr 2021 05:37), Max: 36.8 (26 Apr 2021 22:00)  T(F): 98 (27 Apr 2021 05:37), Max: 98.3 (26 Apr 2021 22:00)  HR: 61 (27 Apr 2021 08:53) (57 - 70)  BP: 164/74 (27 Apr 2021 08:53) (164/74 - 185/78)  BP(mean): --  RR: 16 (27 Apr 2021 08:53) (16 - 18)  SpO2: 99% (27 Apr 2021 08:53) (97% - 100%)    PHYSICAL EXAM:  GENERAL: no acute distress, sitting up in bed with 2L NC in place  HEENT - atraumatic, normocephalic, pupils equal and reactive to light; extraocular muscles intact  CV: regular rate and rhythym; normal S1/S2; nor murmurs, rubs, or gallops  PULM: mild rales at bases b/l, no rhonchi, no wheezing, speaking in full sentences  ABDOMEN: soft, nontender, nondistended; bowel sounds present  MSK: extremities atraumatic; no cyanosis or clubbing  SKIN: warm, dry, no rashes or lesions  NEURO:  no focal deficits, sensory and motor grossly intact  PSYCH: alert and oriented x3; appropriate mood and affect    HOSPITAL MEDICATIONS:  MEDICATIONS  (STANDING):  aspirin enteric coated 81 milliGRAM(s) Oral daily  atorvastatin 40 milliGRAM(s) Oral at bedtime  buMETAnide Injectable 2 milliGRAM(s) IV Push daily  chlorhexidine 2% Cloths 1 Application(s) Topical daily  clopidogrel Tablet 75 milliGRAM(s) Oral daily  dextrose 40% Gel 15 Gram(s) Oral once  dextrose 5%. 1000 milliLiter(s) (50 mL/Hr) IV Continuous <Continuous>  dextrose 5%. 1000 milliLiter(s) (100 mL/Hr) IV Continuous <Continuous>  dextrose 50% Injectable 25 Gram(s) IV Push once  dextrose 50% Injectable 12.5 Gram(s) IV Push once  dextrose 50% Injectable 25 Gram(s) IV Push once  glucagon  Injectable 1 milliGRAM(s) IntraMuscular once  heparin   Injectable 5000 Unit(s) SubCutaneous every 8 hours  hydrALAZINE 25 milliGRAM(s) Oral three times a day  insulin lispro (ADMELOG) corrective regimen sliding scale   SubCutaneous three times a day before meals  insulin lispro (ADMELOG) corrective regimen sliding scale   SubCutaneous at bedtime  isosorbide   dinitrate Tablet (ISORDIL) 10 milliGRAM(s) Oral three times a day  levoFLOXacin IVPB 250 milliGRAM(s) IV Intermittent every 48 hours  metoprolol tartrate 25 milliGRAM(s) Oral two times a day    MEDICATIONS  (PRN):      LABS:                        8.9    11.08 )-----------( 311      ( 27 Apr 2021 08:16 )             25.9     04-27    130<L>  |  96<L>  |  80<H>  ----------------------------<  247<H>  4.2   |  15<L>  |  4.76<H>    Ca    8.4      27 Apr 2021 08:16  Phos  5.4     04-27  Mg     2.3     04-27      PTT - ( 27 Apr 2021 08:16 )  PTT:31.4 sec

## 2021-04-27 NOTE — PROGRESS NOTE ADULT - SUBJECTIVE AND OBJECTIVE BOX
[facility] Division of Hospital Medicine  Maria Guadalupe Yoo MD  Pager (M-F, 8A-5P): [phone]  Other Times:  [phone]    Patient is a 60y old  Male who presents with a chief complaint of slurred speech (27 Apr 2021 10:24)    SUBJECTIVE / OVERNIGHT EVENTS:  pt doing well today - denies complaints.     MEDICATIONS  (STANDING):  aspirin enteric coated 81 milliGRAM(s) Oral daily  atorvastatin 40 milliGRAM(s) Oral at bedtime  buMETAnide Injectable 2 milliGRAM(s) IV Push daily  chlorhexidine 2% Cloths 1 Application(s) Topical daily  clopidogrel Tablet 75 milliGRAM(s) Oral daily  dextrose 40% Gel 15 Gram(s) Oral once  dextrose 5%. 1000 milliLiter(s) (50 mL/Hr) IV Continuous <Continuous>  dextrose 5%. 1000 milliLiter(s) (100 mL/Hr) IV Continuous <Continuous>  dextrose 50% Injectable 25 Gram(s) IV Push once  dextrose 50% Injectable 12.5 Gram(s) IV Push once  dextrose 50% Injectable 25 Gram(s) IV Push once  glucagon  Injectable 1 milliGRAM(s) IntraMuscular once  heparin   Injectable 5000 Unit(s) SubCutaneous every 8 hours  hydrALAZINE 10 milliGRAM(s) Oral three times a day  insulin lispro (ADMELOG) corrective regimen sliding scale   SubCutaneous three times a day before meals  insulin lispro (ADMELOG) corrective regimen sliding scale   SubCutaneous at bedtime  isosorbide   dinitrate Tablet (ISORDIL) 10 milliGRAM(s) Oral three times a day  levoFLOXacin IVPB 250 milliGRAM(s) IV Intermittent every 48 hours  metoprolol tartrate 25 milliGRAM(s) Oral two times a day    MEDICATIONS  (PRN):      CAPILLARY BLOOD GLUCOSE      POCT Blood Glucose.: 262 mg/dL (27 Apr 2021 12:12)  POCT Blood Glucose.: 226 mg/dL (27 Apr 2021 08:15)  POCT Blood Glucose.: 242 mg/dL (26 Apr 2021 21:43)  POCT Blood Glucose.: 259 mg/dL (26 Apr 2021 17:35)    I&O's Summary    26 Apr 2021 07:01  -  27 Apr 2021 07:00  --------------------------------------------------------  IN: 200 mL / OUT: 700 mL / NET: -500 mL    27 Apr 2021 07:01  -  27 Apr 2021 13:29  --------------------------------------------------------  IN: 120 mL / OUT: 400 mL / NET: -280 mL        PHYSICAL EXAM:  Vital Signs Last 24 Hrs  T(C): 36.7 (27 Apr 2021 11:18), Max: 36.8 (26 Apr 2021 22:00)  T(F): 98 (27 Apr 2021 11:18), Max: 98.3 (26 Apr 2021 22:00)  HR: 64 (27 Apr 2021 13:04) (56 - 68)  BP: 176/83 (27 Apr 2021 13:04) (164/74 - 185/78)  BP(mean): --  RR: 18 (27 Apr 2021 13:04) (16 - 18)  SpO2: 98% (27 Apr 2021 13:04) (97% - 100%)    CONSTITUTIONAL: NAD  RESPIRATORY: Normal respiratory effort; lungs are clear to auscultation bilaterally  CARDIOVASCULAR: Regular rate and rhythm; No lower extremity edema;   ABDOMEN: Nontender to palpation, normoactive bowel sounds  MUSCULOSKELETAL:  no joint swelling or tenderness to palpation; R foot with dressing c/d/i  PSYCH: affect appropriate  NEUROLOGY:  no gross sensory deficits     LABS:                        8.9    11.08 )-----------( 311      ( 27 Apr 2021 08:16 )             25.9     04-27    130<L>  |  96<L>  |  80<H>  ----------------------------<  247<H>  4.2   |  15<L>  |  4.76<H>    Ca    8.4      27 Apr 2021 08:16  Phos  5.4     04-27  Mg     2.3     04-27      PTT - ( 27 Apr 2021 08:16 )  PTT:31.4 sec          RADIOLOGY & ADDITIONAL TESTS:  Results Reviewed:   Imaging Personally Reviewed:  Electrocardiogram Personally Reviewed:    COORDINATION OF CARE:  Care Discussed with Consultants/Other Providers [Y/N]: Y  Prior or Outpatient Records Reviewed [Y/N]: Y

## 2021-04-27 NOTE — PROGRESS NOTE ADULT - ASSESSMENT
60 year old male w/ HTN, IDDM2 and recent hospitalization for toe amputation (on IV abx) p/w acute onset of SOB x 1 day found to have elevated cardiac enzymes c/w NSTEMI vs demand ischemia i/s/o acute on chronic HFrEF 2/2 moderate-severe mitral valve regurgitation.    #Troponinemia  -Elevated troponin 2/2 NSTEMI vs demand ischemia in setting of elevated creatinine and acute on chronic HFrEF  -troponins now donwtrending, no need to trend  -S/p heparin gtt, now discontinued  -C/w aspirin 81mg qd  -C/w plavix 75mg qd  -Continuous cardiac monitoring to monitor for arrhythmias  -Will plan ischemic eval. This can be done on an outpatient basis once renal function optimized and infection is cleared    #Acute on chronic HFrEF i/s/o mod-severe MR  -Echo with mod-severe mitral regurg, mild LV systolic dysfunction (EF 52%), hypokinesis of mid inferior wall  -Volume status improving but still mildly volume oevrloaded  -C/w bumex 2mg IV qd  -BP control and afterload reduction: c/w aspirin, metoprolol, isordil, hydralazine    #HTN  -Still needs better BP control i/s/o MR  -Holding home losartan i/s/o ACOSTA  -C/w metoprolol 25mg BID  -Increase isordil  to 20mg TID  -Increase hydralazine to 20mg TID

## 2021-04-27 NOTE — PROGRESS NOTE ADULT - PROBLEM SELECTOR PLAN 1
-stable off bipap  -appreciate cardiology recs  VQ very low prob  -c/w ASA, heparin gtt   TTE P  monitor on tele, trend CE  strict I/Os, daily weights  - continue with bumex as per renal

## 2021-04-27 NOTE — PROGRESS NOTE ADULT - PROBLEM SELECTOR PLAN 7
c/w ASA, statin  recent amputation at [facility]; surgical site does not look infected but patient says he was on cefepime via PICC line for it.   ID consult appreciated - changed to levaquin - will obtain records from [facility]

## 2021-04-27 NOTE — PROGRESS NOTE ADULT - ASSESSMENT
60M with diabetes, admitted 4/22/21 for acute slurred speech, found to have NSTEMI with CHF.   Had been on Cefepime via right arm PICC through 5/24 following right second toe partial amputation at [facility].   I can't comment on its appropriateness of his antibiotics without more information. Reportedly grew GBS from the foot.   He's not sick and the foot doesn't look acutely infected.     Suggest  -reasonable to continue antibiotics, can change Cefepime to Levaquin 250mg q48h given concern for AIN   -records from [facility] (I left a message with the ID doctor there as well, awaiting call back)   -patient to resume care with his prior doctor upon discharge     Manjit Drake MD   Infectious Disease   Pager [phone]   After 5PM and on weekends please page fellow on call or call [phone]

## 2021-04-28 LAB
ANION GAP SERPL CALC-SCNC: 19 MMOL/L — HIGH (ref 7–14)
BUN SERPL-MCNC: 88 MG/DL — HIGH (ref 7–23)
CALCIUM SERPL-MCNC: 8.8 MG/DL — SIGNIFICANT CHANGE UP (ref 8.4–10.5)
CHLORIDE SERPL-SCNC: 99 MMOL/L — SIGNIFICANT CHANGE UP (ref 98–107)
CO2 SERPL-SCNC: 15 MMOL/L — LOW (ref 22–31)
CREAT SERPL-MCNC: 5.09 MG/DL — HIGH (ref 0.5–1.3)
GLUCOSE BLDC GLUCOMTR-MCNC: 176 MG/DL — HIGH (ref 70–99)
GLUCOSE BLDC GLUCOMTR-MCNC: 201 MG/DL — HIGH (ref 70–99)
GLUCOSE BLDC GLUCOMTR-MCNC: 244 MG/DL — HIGH (ref 70–99)
GLUCOSE BLDC GLUCOMTR-MCNC: 283 MG/DL — HIGH (ref 70–99)
GLUCOSE SERPL-MCNC: 168 MG/DL — HIGH (ref 70–99)
HCT VFR BLD CALC: 27.4 % — LOW (ref 39–50)
HGB BLD-MCNC: 9.2 G/DL — LOW (ref 13–17)
MAGNESIUM SERPL-MCNC: 2.3 MG/DL — SIGNIFICANT CHANGE UP (ref 1.6–2.6)
MCHC RBC-ENTMCNC: 28.1 PG — SIGNIFICANT CHANGE UP (ref 27–34)
MCHC RBC-ENTMCNC: 33.6 GM/DL — SIGNIFICANT CHANGE UP (ref 32–36)
MCV RBC AUTO: 83.8 FL — SIGNIFICANT CHANGE UP (ref 80–100)
NRBC # BLD: 0 /100 WBCS — SIGNIFICANT CHANGE UP
NRBC # FLD: 0 K/UL — SIGNIFICANT CHANGE UP
PHOSPHATE SERPL-MCNC: 5.8 MG/DL — HIGH (ref 2.5–4.5)
PLATELET # BLD AUTO: 340 K/UL — SIGNIFICANT CHANGE UP (ref 150–400)
POTASSIUM SERPL-MCNC: 4.2 MMOL/L — SIGNIFICANT CHANGE UP (ref 3.5–5.3)
POTASSIUM SERPL-SCNC: 4.2 MMOL/L — SIGNIFICANT CHANGE UP (ref 3.5–5.3)
RBC # BLD: 3.27 M/UL — LOW (ref 4.2–5.8)
RBC # FLD: 12.4 % — SIGNIFICANT CHANGE UP (ref 10.3–14.5)
SODIUM SERPL-SCNC: 133 MMOL/L — LOW (ref 135–145)
TROPONIN T, HIGH SENSITIVITY RESULT: 449 NG/L — CRITICAL HIGH
WBC # BLD: 12.57 K/UL — HIGH (ref 3.8–10.5)
WBC # FLD AUTO: 12.57 K/UL — HIGH (ref 3.8–10.5)

## 2021-04-28 PROCEDURE — 99233 SBSQ HOSP IP/OBS HIGH 50: CPT

## 2021-04-28 RX ORDER — ISOSORBIDE DINITRATE 5 MG/1
20 TABLET ORAL THREE TIMES A DAY
Refills: 0 | Status: DISCONTINUED | OUTPATIENT
Start: 2021-04-28 | End: 2021-05-05

## 2021-04-28 RX ORDER — HYDRALAZINE HCL 50 MG
25 TABLET ORAL THREE TIMES A DAY
Refills: 0 | Status: DISCONTINUED | OUTPATIENT
Start: 2021-04-28 | End: 2021-05-01

## 2021-04-28 RX ORDER — DAPTOMYCIN 500 MG/10ML
350 INJECTION, POWDER, LYOPHILIZED, FOR SOLUTION INTRAVENOUS
Refills: 0 | Status: DISCONTINUED | OUTPATIENT
Start: 2021-04-28 | End: 2021-05-05

## 2021-04-28 RX ADMIN — CLOPIDOGREL BISULFATE 75 MILLIGRAM(S): 75 TABLET, FILM COATED ORAL at 12:19

## 2021-04-28 RX ADMIN — Medication 2: at 13:16

## 2021-04-28 RX ADMIN — CHLORHEXIDINE GLUCONATE 1 APPLICATION(S): 213 SOLUTION TOPICAL at 12:19

## 2021-04-28 RX ADMIN — Medication 25 MILLIGRAM(S): at 06:08

## 2021-04-28 RX ADMIN — BUMETANIDE 2 MILLIGRAM(S): 0.25 INJECTION INTRAMUSCULAR; INTRAVENOUS at 06:08

## 2021-04-28 RX ADMIN — HEPARIN SODIUM 5000 UNIT(S): 5000 INJECTION INTRAVENOUS; SUBCUTANEOUS at 21:39

## 2021-04-28 RX ADMIN — ISOSORBIDE DINITRATE 10 MILLIGRAM(S): 5 TABLET ORAL at 06:09

## 2021-04-28 RX ADMIN — Medication 60 MILLIGRAM(S): at 16:55

## 2021-04-28 RX ADMIN — Medication 25 MILLIGRAM(S): at 21:39

## 2021-04-28 RX ADMIN — DAPTOMYCIN 114 MILLIGRAM(S): 500 INJECTION, POWDER, LYOPHILIZED, FOR SOLUTION INTRAVENOUS at 21:33

## 2021-04-28 RX ADMIN — Medication 3 MILLIGRAM(S): at 21:39

## 2021-04-28 RX ADMIN — Medication 10 MILLIGRAM(S): at 06:08

## 2021-04-28 RX ADMIN — ISOSORBIDE DINITRATE 20 MILLIGRAM(S): 5 TABLET ORAL at 13:19

## 2021-04-28 RX ADMIN — Medication 1: at 21:34

## 2021-04-28 RX ADMIN — Medication 2: at 17:55

## 2021-04-28 RX ADMIN — Medication 25 MILLIGRAM(S): at 13:19

## 2021-04-28 RX ADMIN — Medication 81 MILLIGRAM(S): at 12:19

## 2021-04-28 RX ADMIN — HEPARIN SODIUM 5000 UNIT(S): 5000 INJECTION INTRAVENOUS; SUBCUTANEOUS at 06:09

## 2021-04-28 RX ADMIN — HEPARIN SODIUM 5000 UNIT(S): 5000 INJECTION INTRAVENOUS; SUBCUTANEOUS at 13:18

## 2021-04-28 RX ADMIN — Medication 1: at 08:58

## 2021-04-28 RX ADMIN — ATORVASTATIN CALCIUM 40 MILLIGRAM(S): 80 TABLET, FILM COATED ORAL at 21:35

## 2021-04-28 RX ADMIN — Medication 25 MILLIGRAM(S): at 17:09

## 2021-04-28 NOTE — PROGRESS NOTE ADULT - PROBLEM SELECTOR PLAN 7
c/w ASA, statin  recent amputation at [facility]; surgical site does not look infected but patient says he was on cefepime via PICC line for it.   ID consult appreciated - changed to levaquin

## 2021-04-28 NOTE — PROGRESS NOTE ADULT - ASSESSMENT
60-year-old male with HTN, IDDM2, PAD s/p RLE angioplasty, recent RLE 2nd digit distal amputation at [facility], presenting with sudden onset slurred speech that started on Wednesday night, persisted until presentation, found to be in acute HF, with NSTEMI

## 2021-04-28 NOTE — PROGRESS NOTE ADULT - SUBJECTIVE AND OBJECTIVE BOX
[facility] Division of Kidney Diseases & Hypertension  FOLLOW UP NOTE  --------------------------------------------------------------------------------  HPI:  60 year-old male with PMHx HTN, DM2, PAD s/p RLE angioplasty, recent RLE 2nd digit distal amputation at [facility], who presented to [facility] for sudden onset slurred speech. He was found with NSTEMI and acute Heart failure exacerbation. Patient reports that before admission he was on IV antibiotics, cefepime, to complete 6 weeks. He also reports previous hx of orthopnea and mild LE edema, which now has improved.  On evaluation today his breathing is not improved compared to yesterday.      PAST HISTORY  --------------------------------------------------------------------------------  No significant changes to PMH, PSH, FHx, SHx, unless otherwise noted    ALLERGIES & MEDICATIONS  --------------------------------------------------------------------------------  Allergies    No Known Allergies    Intolerances      Standing Inpatient Medications  aspirin enteric coated 81 milliGRAM(s) Oral daily  atorvastatin 40 milliGRAM(s) Oral at bedtime  buMETAnide Injectable 2 milliGRAM(s) IV Push daily  chlorhexidine 2% Cloths 1 Application(s) Topical daily  clopidogrel Tablet 75 milliGRAM(s) Oral daily  dextrose 40% Gel 15 Gram(s) Oral once  dextrose 5%. 1000 milliLiter(s) IV Continuous <Continuous>  dextrose 5%. 1000 milliLiter(s) IV Continuous <Continuous>  dextrose 50% Injectable 25 Gram(s) IV Push once  dextrose 50% Injectable 12.5 Gram(s) IV Push once  dextrose 50% Injectable 25 Gram(s) IV Push once  glucagon  Injectable 1 milliGRAM(s) IntraMuscular once  heparin   Injectable 5000 Unit(s) SubCutaneous every 8 hours  hydrALAZINE 10 milliGRAM(s) Oral three times a day  insulin lispro (ADMELOG) corrective regimen sliding scale   SubCutaneous three times a day before meals  insulin lispro (ADMELOG) corrective regimen sliding scale   SubCutaneous at bedtime  isosorbide   dinitrate Tablet (ISORDIL) 10 milliGRAM(s) Oral three times a day  levoFLOXacin IVPB 250 milliGRAM(s) IV Intermittent every 48 hours  metoprolol tartrate 25 milliGRAM(s) Oral two times a day  predniSONE   Tablet 60 milliGRAM(s) Oral daily    PRN Inpatient Medications  melatonin 3 milliGRAM(s) Oral at bedtime PRN      REVIEW OF SYSTEMS  --------------------------------------------------------------------------------  Gen: no fever  Respiratory: sob  CV: no cp  GI: no ab pain  : no abdominal pain    VITALS/PHYSICAL EXAM  --------------------------------------------------------------------------------  T(C): 36.8 (04-28-21 @ 06:06), Max: 36.8 (04-28-21 @ 06:06)  HR: 60 (04-28-21 @ 07:56) (59 - 67)  BP: 169/82 (04-28-21 @ 06:06) (164/63 - 176/83)  ABP: --  ABP(mean): --  RR: 16 (04-28-21 @ 07:56) (16 - 18)  SpO2: 99% (04-28-21 @ 07:56) (98% - 100%)  CVP(mm Hg): --        04-27-21 @ 07:01  -  04-28-21 @ 07:00  --------------------------------------------------------  IN: 320 mL / OUT: 1900 mL / NET: -1580 mL    04-28-21 @ 07:01  -  04-28-21 @ 10:26  --------------------------------------------------------  IN: 0 mL / OUT: 400 mL / NET: -400 mL      Physical Exam:  	Gen: NAD, cooperative  	HEENT: MMM  	Pulm: CTA B/L  	CV: S1S2 + JVD  	Abd: Soft, +BS   	Ext: lower extremity edema  	Neuro: Awake, A&O x3  	Skin: rash over torso              : no suprapubic tenderness to palpation               Psych: normal affect and mood  	Vascular access: peripheral lines.     LABS/STUDIES  --------------------------------------------------------------------------------              9.2    12.57 >-----------<  340      [04-28-21 @ 07:08]              27.4     133  |  99  |  88  ----------------------------<  168      [04-28-21 @ 07:08]  4.2   |  15  |  5.09        Ca     8.8     [04-28-21 @ 07:08]      Mg     2.3     [04-28-21 @ 07:08]      Phos  5.8     [04-28-21 @ 07:08]        PTT: 31.4       [04-27-21 @ 08:16]      Creatinine Trend:  SCr 5.09 [04-28 @ 07:08]  SCr 4.76 [04-27 @ 08:16]  SCr 4.41 [04-26 @ 04:48]  SCr 3.91 [04-25 @ 07:56]  SCr 2.98 [04-24 @ 07:49]    Urinalysis - [04-23-21 @ 00:12]      Color Light Yellow / Appearance Clear / SG 1.011 / pH 6.0      Gluc Negative / Ketone Negative  / Bili Negative / Urobili <2 mg/dL       Blood Small / Protein 30 mg/dL / Leuk Est Negative / Nitrite Negative      RBC 3 / WBC 1 / Hyaline  / Gran  / Sq Epi  / Non Sq Epi  / Bacteria     Urine Creatinine 106      [04-25-21 @ 19:15]  Urine Protein 74      [04-25-21 @ 19:15]  Urine Sodium 51      [04-23-21 @ 05:44]  Urine Urea Nitrogen 349.5      [04-23-21 @ 05:44]    TSH 1.58      [04-23-21 @ 04:36]  Lipid: chol 186, , HDL 28, LDL --      [04-23-21 @ 04:36]

## 2021-04-28 NOTE — PROGRESS NOTE ADULT - PROBLEM SELECTOR PLAN 1
-stable off bipap  -appreciate cardiology recs - will discuss holding asa/plavix for potential renal biopsy in the setting of recent nstemi   -c/w ASA, heparin gtt   TTE reviewed   monitor on tele, trend CE  strict I/Os, daily weights  - continue with bumex as per renal

## 2021-04-28 NOTE — PROGRESS NOTE ADULT - SUBJECTIVE AND OBJECTIVE BOX
[facility] Division of Hospital Medicine  Maria Guadalupe Yoo MD  Pager (M-F, 8A-5P): [phone]  Other Times:  [phone]    Patient is a 60y old  Male who presents with a chief complaint of slurred speech (28 Apr 2021 10:29)    SUBJECTIVE / OVERNIGHT EVENTS:  denies complaints.      MEDICATIONS  (STANDING):  aspirin enteric coated 81 milliGRAM(s) Oral daily  atorvastatin 40 milliGRAM(s) Oral at bedtime  buMETAnide Injectable 2 milliGRAM(s) IV Push daily  chlorhexidine 2% Cloths 1 Application(s) Topical daily  clopidogrel Tablet 75 milliGRAM(s) Oral daily  dextrose 40% Gel 15 Gram(s) Oral once  dextrose 5%. 1000 milliLiter(s) (50 mL/Hr) IV Continuous <Continuous>  dextrose 5%. 1000 milliLiter(s) (100 mL/Hr) IV Continuous <Continuous>  dextrose 50% Injectable 25 Gram(s) IV Push once  dextrose 50% Injectable 12.5 Gram(s) IV Push once  dextrose 50% Injectable 25 Gram(s) IV Push once  glucagon  Injectable 1 milliGRAM(s) IntraMuscular once  heparin   Injectable 5000 Unit(s) SubCutaneous every 8 hours  hydrALAZINE 10 milliGRAM(s) Oral three times a day  insulin lispro (ADMELOG) corrective regimen sliding scale   SubCutaneous three times a day before meals  insulin lispro (ADMELOG) corrective regimen sliding scale   SubCutaneous at bedtime  isosorbide   dinitrate Tablet (ISORDIL) 10 milliGRAM(s) Oral three times a day  levoFLOXacin IVPB 250 milliGRAM(s) IV Intermittent every 48 hours  metoprolol tartrate 25 milliGRAM(s) Oral two times a day  predniSONE   Tablet 60 milliGRAM(s) Oral daily    MEDICATIONS  (PRN):  melatonin 3 milliGRAM(s) Oral at bedtime PRN Insomnia      CAPILLARY BLOOD GLUCOSE      POCT Blood Glucose.: 201 mg/dL (28 Apr 2021 12:28)  POCT Blood Glucose.: 176 mg/dL (28 Apr 2021 08:20)  POCT Blood Glucose.: 182 mg/dL (27 Apr 2021 21:38)  POCT Blood Glucose.: 199 mg/dL (27 Apr 2021 17:05)    I&O's Summary    27 Apr 2021 07:01  -  28 Apr 2021 07:00  --------------------------------------------------------  IN: 320 mL / OUT: 1900 mL / NET: -1580 mL    28 Apr 2021 07:01  -  28 Apr 2021 12:46  --------------------------------------------------------  IN: 360 mL / OUT: 800 mL / NET: -440 mL        PHYSICAL EXAM:  Vital Signs Last 24 Hrs  T(C): 36.7 (28 Apr 2021 11:01), Max: 36.8 (28 Apr 2021 06:06)  T(F): 98 (28 Apr 2021 11:01), Max: 98.2 (28 Apr 2021 06:06)  HR: 66 (28 Apr 2021 11:01) (59 - 67)  BP: 168/87 (28 Apr 2021 11:01) (164/63 - 176/83)  BP(mean): --  RR: 16 (28 Apr 2021 11:01) (16 - 18)  SpO2: 99% (28 Apr 2021 11:01) (98% - 100%)    CONSTITUTIONAL: NAD  RESPIRATORY: Normal respiratory effort; lungs are clear to auscultation bilaterally  CARDIOVASCULAR: Regular rate and rhythm; No lower extremity edema;   ABDOMEN: Nontender to palpation, normoactive bowel sounds  MUSCULOSKELETAL:  no joint swelling or tenderness to palpation; R foot with dressing c/d/i  PSYCH: affect appropriate  NEUROLOGY:  no gross sensory deficits     LABS:                        9.2    12.57 )-----------( 340      ( 28 Apr 2021 07:08 )             27.4     04-28    133<L>  |  99  |  88<H>  ----------------------------<  168<H>  4.2   |  15<L>  |  5.09<H>    Ca    8.8      28 Apr 2021 07:08  Phos  5.8     04-28  Mg     2.3     04-28      PTT - ( 27 Apr 2021 08:16 )  PTT:31.4 sec          RADIOLOGY & ADDITIONAL TESTS:  Results Reviewed:   Imaging Personally Reviewed:  Electrocardiogram Personally Reviewed:    COORDINATION OF CARE:  Care Discussed with Consultants/Other Providers [Y/N]: Y  Prior or Outpatient Records Reviewed [Y/N]: Y

## 2021-04-28 NOTE — PROGRESS NOTE ADULT - ASSESSMENT
60M with diabetes, admitted 4/22/21 for acute slurred speech, found to have NSTEMI with CHF.     Right second toe partial amputation 4/12 at [facility], discharged on antibiotics via right arm PICC through 5/24. Wound grew GBS alone. Indication appears to have been residual toe infection. Documented Vancomycin and Zosyn upon discharge but was on Cefepime coming here. Above per ID Dr Rinaldi there but patient was discharged without his involvement.   Here developed AIN attributed to Cefepime.   He's not sick and the foot doesn't look acutely infected but I'm concerned for viability.     Suggest  -for better gram positive coverage and to avoid further beta-lactam-induced AIN, change Levaquin to Daptomycin 350mg IV q48h   -I ordered a CPK for tomorrow   -patient should follow with his prior providers upon discharge     Spoke with medicine     Manjit Drake MD   Infectious Disease   Pager [phone]   After 5PM and on weekends please page fellow on call or call [phone] 60M with diabetes, admitted 4/22/21 for acute slurred speech, found to have NSTEMI with CHF.     Right second toe partial amputation 4/12 at [facility], discharged on antibiotics via right arm PICC through 5/24. Wound grew GBS alone. Indication appears to have been residual toe infection. Documented Vancomycin and Zosyn upon discharge but was on Cefepime coming here. Above per ID Dr Rinaldi there but patient was discharged without his involvement.   Here developed AIN attributed to Cefepime.   He's not sick and the foot doesn't look acutely infected but I'm concerned for viability.     Suggest  -for better gram positive coverage and to avoid further beta-lactam-induced AIN, change Levaquin to Daptomycin 350mg IV q48h   -I ordered a CPK for tomorrow   -patient should follow with his prior providers upon discharge   -no clear infectious contraindication to steroids for AIN    Spoke with medicine     Manjit Drake MD   Infectious Disease   Pager [phone]   After 5PM and on weekends please page fellow on call or call [phone]

## 2021-04-28 NOTE — PROGRESS NOTE ADULT - SUBJECTIVE AND OBJECTIVE BOX
Follow Up:  Foot infection    Interval History/ROS: Afebrile. No pain. No diarrhea. I told him about my concern for the health of his toe and the need for possibly more surgery.     Allergies  No Known Allergies      ANTIMICROBIALS:  DAPTOmycin IVPB 350 every 48 hours      OTHER MEDS:  aspirin enteric coated 81 milliGRAM(s) Oral daily  atorvastatin 40 milliGRAM(s) Oral at bedtime  buMETAnide Injectable 2 milliGRAM(s) IV Push daily  chlorhexidine 2% Cloths 1 Application(s) Topical daily  clopidogrel Tablet 75 milliGRAM(s) Oral daily  dextrose 40% Gel 15 Gram(s) Oral once  dextrose 5%. 1000 milliLiter(s) IV Continuous <Continuous>  dextrose 5%. 1000 milliLiter(s) IV Continuous <Continuous>  dextrose 50% Injectable 25 Gram(s) IV Push once  dextrose 50% Injectable 12.5 Gram(s) IV Push once  dextrose 50% Injectable 25 Gram(s) IV Push once  glucagon  Injectable 1 milliGRAM(s) IntraMuscular once  heparin   Injectable 5000 Unit(s) SubCutaneous every 8 hours  hydrALAZINE 25 milliGRAM(s) Oral three times a day  insulin lispro (ADMELOG) corrective regimen sliding scale   SubCutaneous three times a day before meals  insulin lispro (ADMELOG) corrective regimen sliding scale   SubCutaneous at bedtime  isosorbide   dinitrate Tablet (ISORDIL) 20 milliGRAM(s) Oral three times a day  melatonin 3 milliGRAM(s) Oral at bedtime PRN  metoprolol tartrate 25 milliGRAM(s) Oral two times a day  predniSONE   Tablet 60 milliGRAM(s) Oral daily      Vital Signs Last 24 Hrs  T(C): 36.7 (28 Apr 2021 11:01), Max: 36.8 (28 Apr 2021 06:06)  T(F): 98 (28 Apr 2021 11:01), Max: 98.2 (28 Apr 2021 06:06)  HR: 66 (28 Apr 2021 11:01) (59 - 67)  BP: 168/87 (28 Apr 2021 11:01) (164/63 - 173/69)  BP(mean): --  RR: 16 (28 Apr 2021 11:01) (16 - 18)  SpO2: 99% (28 Apr 2021 11:01) (98% - 100%)    Physical Exam:  General: sleeping, wakes up to alert, non toxic  Head: atraumatic, normocephalic  Eye: normal sclera and conjunctiva  Cardio: regular rate   Respiratory: nonlabored on room air  abd: soft, no tenderness  Musculoskeletal: right second toe partial amputation, black stump, no local inflammation   psych: normal affect                          9.2    12.57 )-----------( 340      ( 28 Apr 2021 07:08 )             27.4       04-28    133<L>  |  99  |  88<H>  ----------------------------<  168<H>  4.2   |  15<L>  |  5.09<H>    Ca    8.8      28 Apr 2021 07:08  Phos  5.8     04-28  Mg     2.3     04-28            MICROBIOLOGY:        RADIOLOGY:  Images below reviewed personally

## 2021-04-28 NOTE — PROGRESS NOTE ADULT - PROBLEM SELECTOR PLAN 1
ACOSTA in th setting of cefepime, rash and eosinophilia.  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  Recommend hold cefepime, continue diuretics with a goal of net negative fluid balance.  Patient on aspirin and would be a high risk of kidney biopsy.  Patient's creatinine continues to worsen and so in this case the risk/benefit favors a trial prednisone 60 mg po daily.  Will need to monitor glucose, volume status, blood pressure. ACOSTA in th setting of cefepime, rash and eosinophilia.  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  Recommend hold cefepime, continue diuretics with a goal of net negative fluid balance.  Patient on aspirin and would be a high risk of kidney biopsy.  Patient's creatinine continues to worsen and so in this case the risk/benefit favors a trial prednisone 60 mg po daily.  Will need to monitor glucose, volume status, blood pressure.  Recommend Gallium scan of kidneys.

## 2021-04-28 NOTE — PROGRESS NOTE ADULT - ASSESSMENT
60 year old male w/ HTN, IDDM2 and recent hospitalization for toe amputation (on IV abx) p/w acute onset of SOB x 1 day found to have elevated cardiac enzymes c/w NSTEMI vs demand ischemia i/s/o acute on chronic HFrEF 2/2 moderate-severe mitral valve regurgitation.    #Troponinemia  -Elevated troponin 2/2 NSTEMI vs demand ischemia in setting of elevated creatinine and acute on chronic HFrEF  -troponins now donwtrending, no need to trend  -S/p heparin gtt for 72h, now discontinued  -C/w aspirin 81mg qd  -C/w plavix 75mg qd  -Continuous cardiac monitoring to monitor for arrhythmias  -Will plan ischemic eval. This can be done on an outpatient basis once renal function optimized and infection is cleared    #Acute on chronic HFrEF i/s/o mod-severe MR  -Echo with mod-severe mitral regurg, mild LV systolic dysfunction (EF 52%), hypokinesis of mid inferior wall  -Volume status improving but still mildly volume oevrloaded  -C/w bumex with goal of net neg fluid balance; likely transition to PO tomorrow  -BP control and afterload reduction: c/w aspirin, metoprolol, isordil, hydralazine    #HTN  -Still needs better BP control i/s/o MR  -Holding home losartan i/s/o ACOSTA  -C/w metoprolol 25mg BID  -Increase isordil  to 20mg TID  -Increase hydralazine to 25mg TID

## 2021-04-28 NOTE — CHART NOTE - NSCHARTNOTEFT_GEN_A_CORE
Patient recommended for prednisone 60mg oral daily as per renal for AIN.   ID, Dr. Drake, called and OK with starting steroids.   Steroids ordered.

## 2021-04-28 NOTE — PROGRESS NOTE ADULT - SUBJECTIVE AND OBJECTIVE BOX
Juan Mccrary MD    Internal Medicine Resident (PGY-2)  Pager: [phone] (NS) / [phone] (PIA)    NOTE INCOMPLETE UNTIL SIGNED BY ATTENDING  ___________________________________________________________________________________________________      HOLLY FITZGERALD 60y Male    Overnight events/subjective: No acute overnight events. Patient seen and examined at bedside. No complaints. Denies fever, chills, chest pain, shortness of breath, abdominal pain, nausea, vomiting, changes in bowel habits, or urinary symptoms. Satting well on 2L NC    Vital Signs Last 24 Hrs  T(C): 36.8 (28 Apr 2021 06:06), Max: 36.8 (28 Apr 2021 06:06)  T(F): 98.2 (28 Apr 2021 06:06), Max: 98.2 (28 Apr 2021 06:06)  HR: 60 (28 Apr 2021 07:56) (59 - 67)  BP: 169/82 (28 Apr 2021 06:06) (164/63 - 176/83)  BP(mean): --  RR: 16 (28 Apr 2021 07:56) (16 - 18)  SpO2: 99% (28 Apr 2021 07:56) (98% - 100%)    PHYSICAL EXAM:  GENERAL: no acute distress, sitting up in bed with 2L NC in place  HEENT - atraumatic, normocephalic, pupils equal and reactive to light; extraocular muscles intact  CV: regular rate and rhythym; normal S1/S2; nor murmurs, rubs, or gallops  PULM: mild rales at bases b/l, no rhonchi, no wheezing, speaking in full sentences  ABDOMEN: soft, nontender, nondistended; bowel sounds present  MSK: extremities atraumatic; no cyanosis or clubbing  SKIN: warm, dry, no rashes or lesions  NEURO:  no focal deficits, sensory and motor grossly intact  PSYCH: alert and oriented x3; appropriate mood and affect    HOSPITAL MEDICATIONS:  MEDICATIONS  (STANDING):  aspirin enteric coated 81 milliGRAM(s) Oral daily  atorvastatin 40 milliGRAM(s) Oral at bedtime  buMETAnide Injectable 2 milliGRAM(s) IV Push daily  chlorhexidine 2% Cloths 1 Application(s) Topical daily  clopidogrel Tablet 75 milliGRAM(s) Oral daily  dextrose 40% Gel 15 Gram(s) Oral once  dextrose 5%. 1000 milliLiter(s) (50 mL/Hr) IV Continuous <Continuous>  dextrose 5%. 1000 milliLiter(s) (100 mL/Hr) IV Continuous <Continuous>  dextrose 50% Injectable 25 Gram(s) IV Push once  dextrose 50% Injectable 12.5 Gram(s) IV Push once  dextrose 50% Injectable 25 Gram(s) IV Push once  glucagon  Injectable 1 milliGRAM(s) IntraMuscular once  heparin   Injectable 5000 Unit(s) SubCutaneous every 8 hours  hydrALAZINE 10 milliGRAM(s) Oral three times a day  insulin lispro (ADMELOG) corrective regimen sliding scale   SubCutaneous three times a day before meals  insulin lispro (ADMELOG) corrective regimen sliding scale   SubCutaneous at bedtime  isosorbide   dinitrate Tablet (ISORDIL) 10 milliGRAM(s) Oral three times a day  levoFLOXacin IVPB 250 milliGRAM(s) IV Intermittent every 48 hours  metoprolol tartrate 25 milliGRAM(s) Oral two times a day  predniSONE   Tablet 60 milliGRAM(s) Oral daily    MEDICATIONS  (PRN):  melatonin 3 milliGRAM(s) Oral at bedtime PRN Insomnia      LABS:                        9.2    12.57 )-----------( 340      ( 28 Apr 2021 07:08 )             27.4     04-28    133<L>  |  99  |  88<H>  ----------------------------<  168<H>  4.2   |  15<L>  |  5.09<H>    Ca    8.8      28 Apr 2021 07:08  Phos  5.8     04-28  Mg     2.3     04-28      PTT - ( 27 Apr 2021 08:16 )  PTT:31.4 sec

## 2021-04-28 NOTE — PROGRESS NOTE ADULT - PROBLEM SELECTOR PLAN 3
Cr cont to rise but starting to plateau   bladder scan with approx 150 cc  appreciate renal recs - started steroids for AIN and will obtain gallium scan   changed cefepime to levaquin   - msg left for PCP to review records and baseline labs

## 2021-04-29 PROBLEM — I73.9 PERIPHERAL VASCULAR DISEASE, UNSPECIFIED: Chronic | Status: ACTIVE | Noted: 2021-04-23

## 2021-04-29 PROBLEM — I10 ESSENTIAL (PRIMARY) HYPERTENSION: Chronic | Status: ACTIVE | Noted: 2021-04-23

## 2021-04-29 PROBLEM — E11.9 TYPE 2 DIABETES MELLITUS WITHOUT COMPLICATIONS: Chronic | Status: ACTIVE | Noted: 2021-04-23

## 2021-04-29 LAB
ANION GAP SERPL CALC-SCNC: 19 MMOL/L — HIGH (ref 7–14)
BUN SERPL-MCNC: 98 MG/DL — HIGH (ref 7–23)
CALCIUM SERPL-MCNC: 8.8 MG/DL — SIGNIFICANT CHANGE UP (ref 8.4–10.5)
CHLORIDE SERPL-SCNC: 96 MMOL/L — LOW (ref 98–107)
CK SERPL-CCNC: 53 U/L — SIGNIFICANT CHANGE UP (ref 30–200)
CO2 SERPL-SCNC: 15 MMOL/L — LOW (ref 22–31)
CREAT SERPL-MCNC: 4.95 MG/DL — HIGH (ref 0.5–1.3)
GLUCOSE BLDC GLUCOMTR-MCNC: 402 MG/DL — HIGH (ref 70–99)
GLUCOSE BLDC GLUCOMTR-MCNC: 411 MG/DL — HIGH (ref 70–99)
GLUCOSE BLDC GLUCOMTR-MCNC: 425 MG/DL — HIGH (ref 70–99)
GLUCOSE BLDC GLUCOMTR-MCNC: 451 MG/DL — CRITICAL HIGH (ref 70–99)
GLUCOSE BLDC GLUCOMTR-MCNC: 463 MG/DL — CRITICAL HIGH (ref 70–99)
GLUCOSE BLDC GLUCOMTR-MCNC: 482 MG/DL — CRITICAL HIGH (ref 70–99)
GLUCOSE BLDC GLUCOMTR-MCNC: 514 MG/DL — CRITICAL HIGH (ref 70–99)
GLUCOSE BLDC GLUCOMTR-MCNC: 518 MG/DL — CRITICAL HIGH (ref 70–99)
GLUCOSE SERPL-MCNC: 387 MG/DL — HIGH (ref 70–99)
HCT VFR BLD CALC: 29.2 % — LOW (ref 39–50)
HGB BLD-MCNC: 9.7 G/DL — LOW (ref 13–17)
MAGNESIUM SERPL-MCNC: 2.3 MG/DL — SIGNIFICANT CHANGE UP (ref 1.6–2.6)
MCHC RBC-ENTMCNC: 28 PG — SIGNIFICANT CHANGE UP (ref 27–34)
MCHC RBC-ENTMCNC: 33.2 GM/DL — SIGNIFICANT CHANGE UP (ref 32–36)
MCV RBC AUTO: 84.4 FL — SIGNIFICANT CHANGE UP (ref 80–100)
NRBC # BLD: 0 /100 WBCS — SIGNIFICANT CHANGE UP
NRBC # FLD: 0 K/UL — SIGNIFICANT CHANGE UP
PHOSPHATE SERPL-MCNC: 5.7 MG/DL — HIGH (ref 2.5–4.5)
PLATELET # BLD AUTO: 361 K/UL — SIGNIFICANT CHANGE UP (ref 150–400)
POTASSIUM SERPL-MCNC: 5 MMOL/L — SIGNIFICANT CHANGE UP (ref 3.5–5.3)
POTASSIUM SERPL-SCNC: 5 MMOL/L — SIGNIFICANT CHANGE UP (ref 3.5–5.3)
RBC # BLD: 3.46 M/UL — LOW (ref 4.2–5.8)
RBC # FLD: 12.2 % — SIGNIFICANT CHANGE UP (ref 10.3–14.5)
SODIUM SERPL-SCNC: 130 MMOL/L — LOW (ref 135–145)
TROPONIN T, HIGH SENSITIVITY RESULT: 220 NG/L — CRITICAL HIGH
WBC # BLD: 11.33 K/UL — HIGH (ref 3.8–10.5)
WBC # FLD AUTO: 11.33 K/UL — HIGH (ref 3.8–10.5)

## 2021-04-29 PROCEDURE — 99232 SBSQ HOSP IP/OBS MODERATE 35: CPT

## 2021-04-29 PROCEDURE — 99233 SBSQ HOSP IP/OBS HIGH 50: CPT

## 2021-04-29 RX ORDER — INSULIN LISPRO 100/ML
VIAL (ML) SUBCUTANEOUS
Refills: 0 | Status: DISCONTINUED | OUTPATIENT
Start: 2021-04-29 | End: 2021-04-30

## 2021-04-29 RX ORDER — INSULIN LISPRO 100/ML
VIAL (ML) SUBCUTANEOUS AT BEDTIME
Refills: 0 | Status: DISCONTINUED | OUTPATIENT
Start: 2021-04-29 | End: 2021-04-30

## 2021-04-29 RX ORDER — BUMETANIDE 0.25 MG/ML
2 INJECTION INTRAMUSCULAR; INTRAVENOUS DAILY
Refills: 0 | Status: DISCONTINUED | OUTPATIENT
Start: 2021-04-30 | End: 2021-05-03

## 2021-04-29 RX ORDER — INSULIN LISPRO 100/ML
6 VIAL (ML) SUBCUTANEOUS ONCE
Refills: 0 | Status: COMPLETED | OUTPATIENT
Start: 2021-04-29 | End: 2021-04-29

## 2021-04-29 RX ORDER — INSULIN GLARGINE 100 [IU]/ML
15 INJECTION, SOLUTION SUBCUTANEOUS AT BEDTIME
Refills: 0 | Status: DISCONTINUED | OUTPATIENT
Start: 2021-04-29 | End: 2021-04-30

## 2021-04-29 RX ORDER — INSULIN GLARGINE 100 [IU]/ML
5 INJECTION, SOLUTION SUBCUTANEOUS AT BEDTIME
Refills: 0 | Status: DISCONTINUED | OUTPATIENT
Start: 2021-04-29 | End: 2021-04-29

## 2021-04-29 RX ADMIN — Medication 25 MILLIGRAM(S): at 21:28

## 2021-04-29 RX ADMIN — Medication 25 MILLIGRAM(S): at 17:37

## 2021-04-29 RX ADMIN — INSULIN GLARGINE 15 UNIT(S): 100 INJECTION, SOLUTION SUBCUTANEOUS at 21:29

## 2021-04-29 RX ADMIN — HEPARIN SODIUM 5000 UNIT(S): 5000 INJECTION INTRAVENOUS; SUBCUTANEOUS at 13:16

## 2021-04-29 RX ADMIN — Medication 25 MILLIGRAM(S): at 13:16

## 2021-04-29 RX ADMIN — CLOPIDOGREL BISULFATE 75 MILLIGRAM(S): 75 TABLET, FILM COATED ORAL at 12:12

## 2021-04-29 RX ADMIN — ATORVASTATIN CALCIUM 40 MILLIGRAM(S): 80 TABLET, FILM COATED ORAL at 21:28

## 2021-04-29 RX ADMIN — HEPARIN SODIUM 5000 UNIT(S): 5000 INJECTION INTRAVENOUS; SUBCUTANEOUS at 05:45

## 2021-04-29 RX ADMIN — ISOSORBIDE DINITRATE 20 MILLIGRAM(S): 5 TABLET ORAL at 05:45

## 2021-04-29 RX ADMIN — Medication 8: at 21:28

## 2021-04-29 RX ADMIN — Medication 6: at 12:19

## 2021-04-29 RX ADMIN — Medication 3 MILLIGRAM(S): at 21:33

## 2021-04-29 RX ADMIN — Medication 6 UNIT(S): at 13:13

## 2021-04-29 RX ADMIN — CHLORHEXIDINE GLUCONATE 1 APPLICATION(S): 213 SOLUTION TOPICAL at 12:14

## 2021-04-29 RX ADMIN — Medication 60 MILLIGRAM(S): at 05:46

## 2021-04-29 RX ADMIN — Medication 81 MILLIGRAM(S): at 12:12

## 2021-04-29 RX ADMIN — Medication 6: at 08:39

## 2021-04-29 RX ADMIN — Medication 12: at 17:37

## 2021-04-29 RX ADMIN — Medication 25 MILLIGRAM(S): at 05:46

## 2021-04-29 RX ADMIN — Medication 25 MILLIGRAM(S): at 05:45

## 2021-04-29 RX ADMIN — BUMETANIDE 2 MILLIGRAM(S): 0.25 INJECTION INTRAMUSCULAR; INTRAVENOUS at 05:46

## 2021-04-29 RX ADMIN — ISOSORBIDE DINITRATE 20 MILLIGRAM(S): 5 TABLET ORAL at 12:12

## 2021-04-29 RX ADMIN — ISOSORBIDE DINITRATE 20 MILLIGRAM(S): 5 TABLET ORAL at 17:37

## 2021-04-29 RX ADMIN — HEPARIN SODIUM 5000 UNIT(S): 5000 INJECTION INTRAVENOUS; SUBCUTANEOUS at 21:28

## 2021-04-29 NOTE — DIETITIAN INITIAL EVALUATION ADULT. - ADD RECOMMEND
1. Encourage & assist Pt with meals; Monitor PO diet tolerance; Honor food preferences;        2. Monitor labs, hydration status;         3. Will recommend to follow up with appropriate RDN upon discharge for the purposes of long-term nutrition evaluation and diet education;

## 2021-04-29 NOTE — PROGRESS NOTE ADULT - ASSESSMENT
60-year-old male with HTN, IDDM2, PAD s/p RLE angioplasty, recent RLE 2nd digit distal amputation at [facility], presenting with sudden onset slurred speech that started on Wednesday night, persisted until presentation, found to be in acute HF, with NSTEMI

## 2021-04-29 NOTE — PROGRESS NOTE ADULT - PROBLEM SELECTOR PLAN 3
Cr improving   bladder scan with approx 150 cc  appreciate renal recs - started steroids for AIN and will obtain gallium scan   changed cefepime to levaquin   - baseline Cr last month was 1.7

## 2021-04-29 NOTE — PROGRESS NOTE ADULT - SUBJECTIVE AND OBJECTIVE BOX
Juan Mccrary MD    Internal Medicine Resident (PGY-2)  Pager: [phone] (NS) / [phone] (PIA)    NOTE INCOMPLETE UNTIL SIGNED BY ATTENDING  ___________________________________________________________________________________________________      HOLLY FITZGERALD 60y Male    Overnight events/subjective: No acute overnight events. Patient seen and examined at bedside. No complaints. Denies fever, chills, chest pain, shortness of breath, abdominal pain, nausea, vomiting, changes in bowel habits, or urinary symptoms. Off supplemental O2.    Vital Signs Last 24 Hrs  T(C): 36.4 (29 Apr 2021 05:44), Max: 36.7 (28 Apr 2021 11:01)  T(F): 97.5 (29 Apr 2021 05:44), Max: 98 (28 Apr 2021 11:01)  HR: 67 (29 Apr 2021 06:03) (60 - 71)  BP: 164/70 (29 Apr 2021 06:03) (163/70 - 181/77)  BP(mean): --  RR: 18 (29 Apr 2021 05:44) (16 - 18)  SpO2: 100% (29 Apr 2021 05:44) (97% - 100%)    PHYSICAL EXAM:  GENERAL: no acute distress, sitting up in bed, on RA  HEENT - atraumatic, normocephalic, pupils equal and reactive to light; extraocular muscles intact  CV: regular rate and rhythym; normal S1/S2; nor murmurs, rubs, or gallops  PULM: mild rales at bases b/l, no rhonchi, no wheezing, speaking in full sentences  ABDOMEN: soft, nontender, nondistended; bowel sounds present  MSK: extremities atraumatic; no cyanosis or clubbing  SKIN: warm, dry, no rashes or lesions  NEURO:  no focal deficits, sensory and motor grossly intact  PSYCH: alert and oriented x3; appropriate mood and affect    HOSPITAL MEDICATIONS:  MEDICATIONS  (STANDING):  aspirin enteric coated 81 milliGRAM(s) Oral daily  atorvastatin 40 milliGRAM(s) Oral at bedtime  buMETAnide Injectable 2 milliGRAM(s) IV Push daily  chlorhexidine 2% Cloths 1 Application(s) Topical daily  clopidogrel Tablet 75 milliGRAM(s) Oral daily  DAPTOmycin IVPB 350 milliGRAM(s) IV Intermittent every 48 hours  dextrose 40% Gel 15 Gram(s) Oral once  dextrose 5%. 1000 milliLiter(s) (50 mL/Hr) IV Continuous <Continuous>  dextrose 5%. 1000 milliLiter(s) (100 mL/Hr) IV Continuous <Continuous>  dextrose 50% Injectable 25 Gram(s) IV Push once  dextrose 50% Injectable 12.5 Gram(s) IV Push once  dextrose 50% Injectable 25 Gram(s) IV Push once  glucagon  Injectable 1 milliGRAM(s) IntraMuscular once  heparin   Injectable 5000 Unit(s) SubCutaneous every 8 hours  hydrALAZINE 25 milliGRAM(s) Oral three times a day  insulin lispro (ADMELOG) corrective regimen sliding scale   SubCutaneous three times a day before meals  insulin lispro (ADMELOG) corrective regimen sliding scale   SubCutaneous at bedtime  isosorbide   dinitrate Tablet (ISORDIL) 20 milliGRAM(s) Oral three times a day  metoprolol tartrate 25 milliGRAM(s) Oral two times a day  predniSONE   Tablet 60 milliGRAM(s) Oral daily    MEDICATIONS  (PRN):  melatonin 3 milliGRAM(s) Oral at bedtime PRN Insomnia      LABS:                        9.7    11.33 )-----------( 361      ( 29 Apr 2021 06:56 )             29.2     04-29    130<L>  |  96<L>  |  98<H>  ----------------------------<  387<H>  5.0   |  15<L>  |  4.95<H>    Ca    8.8      29 Apr 2021 06:56  Phos  5.7     04-29  Mg     2.3     04-29

## 2021-04-29 NOTE — PROGRESS NOTE ADULT - PROBLEM SELECTOR PLAN 2
s/p asa and plavix load, on hep gtt  appreciate cards recs, thought to be secondary to demand in setting of acute HF    - as per PCP pt does not have stents

## 2021-04-29 NOTE — PROGRESS NOTE ADULT - SUBJECTIVE AND OBJECTIVE BOX
Follow Up:  Toe infection    Interval History/ROS: Afebrile. No pain at all to the toe. No diarrhea. He's optimistic that his toe will recover without needing more surgery. He is grateful for his care here and gave me a fist bump. Creatinine improving.     Allergies  No Known Allergies        ANTIMICROBIALS:  DAPTOmycin IVPB 350 every 48 hours      OTHER MEDS:  aspirin enteric coated 81 milliGRAM(s) Oral daily  atorvastatin 40 milliGRAM(s) Oral at bedtime  chlorhexidine 2% Cloths 1 Application(s) Topical daily  clopidogrel Tablet 75 milliGRAM(s) Oral daily  dextrose 40% Gel 15 Gram(s) Oral once  dextrose 5%. 1000 milliLiter(s) IV Continuous <Continuous>  dextrose 5%. 1000 milliLiter(s) IV Continuous <Continuous>  dextrose 50% Injectable 25 Gram(s) IV Push once  dextrose 50% Injectable 12.5 Gram(s) IV Push once  dextrose 50% Injectable 25 Gram(s) IV Push once  glucagon  Injectable 1 milliGRAM(s) IntraMuscular once  heparin   Injectable 5000 Unit(s) SubCutaneous every 8 hours  hydrALAZINE 25 milliGRAM(s) Oral three times a day  insulin lispro (ADMELOG) corrective regimen sliding scale   SubCutaneous three times a day before meals  insulin lispro (ADMELOG) corrective regimen sliding scale   SubCutaneous at bedtime  isosorbide   dinitrate Tablet (ISORDIL) 20 milliGRAM(s) Oral three times a day  melatonin 3 milliGRAM(s) Oral at bedtime PRN  metoprolol tartrate 25 milliGRAM(s) Oral two times a day  predniSONE   Tablet 60 milliGRAM(s) Oral daily      Vital Signs Last 24 Hrs  T(C): 36.2 (29 Apr 2021 12:00), Max: 36.6 (28 Apr 2021 21:35)  T(F): 97.2 (29 Apr 2021 12:00), Max: 97.8 (28 Apr 2021 21:35)  HR: 66 (29 Apr 2021 12:00) (60 - 71)  BP: 159/62 (29 Apr 2021 12:00) (159/62 - 181/77)  BP(mean): --  RR: 18 (29 Apr 2021 12:00) (16 - 18)  SpO2: 100% (29 Apr 2021 12:00) (97% - 100%)    Physical Exam:  General: awake, alert, non toxic  Head: atraumatic, normocephalic  Eye: normal sclera and conjunctiva  Cardio: regular rate   Respiratory: nonlabored on room air  abd: soft, no tenderness  Musculoskeletal: right second toe looks like dry gangrene to me, nontender, no local inflammation   Neurologic: no focal deficit  psych: normal affect                          9.7    11.33 )-----------( 361      ( 29 Apr 2021 06:56 )             29.2       04-29    130<L>  |  96<L>  |  98<H>  ----------------------------<  387<H>  5.0   |  15<L>  |  4.95<H>    Ca    8.8      29 Apr 2021 06:56  Phos  5.7     04-29  Mg     2.3     04-29            MICROBIOLOGY:        RADIOLOGY:  Images below reviewed personally

## 2021-04-29 NOTE — PROVIDER CONTACT NOTE (CRITICAL VALUE NOTIFICATION) - ASSESSMENT
SOB, DM2, htn, PAD, Nstemi
Patient in no acute stress at this time. Denies chest Pain or shortness of Breath at this time
no signs and symptoms of bleeding at this time

## 2021-04-29 NOTE — DIETITIAN INITIAL EVALUATION ADULT. - PERTINENT LABORATORY DATA
(4/29) WBC 11.33 H, H/H 9.7/29.2 L, Na 130 L, Cl 96 L, phosphorus 5.7 H, BUN 98 H, Creat 4.95 H, Glu 387 H;     (4/23) Albumin 3.3,  H, HbA1c 8.3% H

## 2021-04-29 NOTE — PROGRESS NOTE ADULT - PROBLEM SELECTOR PLAN 1
-stable off bipap  -appreciate cardiology recs - will discuss holding asa/plavix for potential renal biopsy in the setting of recent nstemi   -c/w ASA, heparin gtt   TTE reviewed   monitor on tele, trend CE  strict I/Os, daily weights  - continue with bumex as per renal -stable off bipap  -appreciate cardiology recs - will discuss holding asa/plavix for potential renal biopsy in the setting of recent nstemi   -c/w ASA, heparin gtt   TTE reviewed   monitor on tele, trend CE  strict I/Os, daily weights  - continue with bumex as per renal - transition to po

## 2021-04-29 NOTE — PROGRESS NOTE ADULT - ASSESSMENT
60 year old male w/ HTN, IDDM2 and recent hospitalization for toe amputation (on IV abx) p/w acute onset of SOB x 1 day found to have elevated cardiac enzymes c/w NSTEMI vs demand ischemia i/s/o acute on chronic HFrEF 2/2 moderate-severe mitral valve regurgitation.    #Troponinemia  -Elevated troponin 2/2 NSTEMI vs demand ischemia in setting of elevated creatinine and acute on chronic HFrEF  -troponins now donwtrending, no need to trend  -S/p heparin gtt for 72h, now discontinued  -C/w aspirin 81mg qd  -C/w plavix 75mg qd  -Continuous cardiac monitoring to monitor for arrhythmias  -Plan for ischemic eval. This can be done on an outpatient basis once renal function optimized and infection is cleared    #Acute on chronic HFrEF i/s/o mod-severe MR  -Echo with mod-severe mitral regurg, mild LV systolic dysfunction (EF 52%), hypokinesis of mid inferior wall  -Volume status improving but still mildly volume oevrloaded  -Transition to PO Bumex  -BP control and afterload reduction: c/w aspirin, metoprolol, isordil, hydralazine    #HTN  -Still needs better BP control i/s/o MR  -Holding home losartan i/s/o ACOSTA  -C/w metoprolol 25mg BID  -Increase isordil  to 30mg TID  -Increase hydralazine to 50mg TID 60 year old male w/ HTN, IDDM2 and recent hospitalization for toe amputation (on IV abx) p/w acute onset of SOB x 1 day found to have elevated cardiac enzymes c/w NSTEMI vs demand ischemia i/s/o acute on chronic HFrEF 2/2 moderate-severe mitral valve regurgitation.    #Troponinemia  -Elevated troponin 2/2 NSTEMI vs demand ischemia in setting of elevated creatinine and acute on chronic HFrEF  -troponins now donwtrending, no need to trend  -S/p heparin gtt for 72h, now discontinued  -C/w aspirin 81mg qd  -C/w plavix 75mg qd  -Continuous cardiac monitoring to monitor for arrhythmias  -Plan for ischemic eval. This can be done on an outpatient basis once renal function optimized and infection is cleared    #Acute on chronic HFrEF i/s/o mod-severe MR  -Echo with mod-severe mitral regurg, mild LV systolic dysfunction (EF 52%), hypokinesis of mid inferior wall  -Volume status improving but still mildly volume oevrloaded  -Transition to PO Bumex  -BP control and afterload reduction: c/w aspirin, metoprolol, isordil, hydralazine    #HTN  -Still needs better BP control i/s/o MR  -Holding home losartan i/s/o ACOSTA  -C/w metoprolol 25mg BID  -C/w isordil  to 20mg TID  -C/w hydralazine to 20mg TID  -Start norvasc 5mg qd

## 2021-04-29 NOTE — PROGRESS NOTE ADULT - PROBLEM SELECTOR PLAN 7
c/w ASA, statin  recent amputation at [facility]; surgical site does not look infected but patient says he was on cefepime via PICC line for it.   ID consult appreciated - changed to levaquin

## 2021-04-29 NOTE — PROGRESS NOTE ADULT - ASSESSMENT
60M with diabetes, admitted 4/22 for acute slurred speech, found to have NSTEMI with CHF.     Right second toe partial amputation 4/12 at [facility], grew GBS alone.   Discharged on antibiotics via right arm PICC through 5/24 without ID input (ID Dr Rinaldi).   Indication maybe for residual toe infection although no clean margin pathology or cultures taken.   Documented Vancomycin and Zosyn but was on Cefepime coming here. Unclear who was managing them.   Here developed AIN attributed to Cefepime - improving on Prednisone.   He's not sick and the foot doesn't look acutely infected but I'm concerned for viability, looks like dry gangrene.     Suggest  -can we find out who is podiatrist was?   -Daptomycin 350mg IV q48h with weekly CPK   -antibiotics tentatively through 5/24 as planned   -no clear infectious contraindication to steroids for AIN   -might need PCP prevention depending on steroid course     Spoke with medicine and renal     Manjit Drake MD   Infectious Disease   Pager [phone]   After 5PM and on weekends please page fellow on call or call [phone]

## 2021-04-29 NOTE — PROGRESS NOTE ADULT - SUBJECTIVE AND OBJECTIVE BOX
[facility] Division of Hospital Medicine  Maria Guadalupe Yoo MD  Pager (M-F, 8A-5P): [phone]  Other Times:  [phone]    Patient is a 60y old  Male who presents with a chief complaint of slurred speech (29 Apr 2021 12:15)    SUBJECTIVE / OVERNIGHT EVENTS:  feels better.      MEDICATIONS  (STANDING):  aspirin enteric coated 81 milliGRAM(s) Oral daily  atorvastatin 40 milliGRAM(s) Oral at bedtime  chlorhexidine 2% Cloths 1 Application(s) Topical daily  clopidogrel Tablet 75 milliGRAM(s) Oral daily  DAPTOmycin IVPB 350 milliGRAM(s) IV Intermittent every 48 hours  dextrose 40% Gel 15 Gram(s) Oral once  dextrose 5%. 1000 milliLiter(s) (50 mL/Hr) IV Continuous <Continuous>  dextrose 5%. 1000 milliLiter(s) (100 mL/Hr) IV Continuous <Continuous>  dextrose 50% Injectable 25 Gram(s) IV Push once  dextrose 50% Injectable 12.5 Gram(s) IV Push once  dextrose 50% Injectable 25 Gram(s) IV Push once  glucagon  Injectable 1 milliGRAM(s) IntraMuscular once  heparin   Injectable 5000 Unit(s) SubCutaneous every 8 hours  hydrALAZINE 25 milliGRAM(s) Oral three times a day  insulin glargine Injectable (LANTUS) 5 Unit(s) SubCutaneous at bedtime  insulin lispro (ADMELOG) corrective regimen sliding scale   SubCutaneous three times a day before meals  insulin lispro (ADMELOG) corrective regimen sliding scale   SubCutaneous at bedtime  isosorbide   dinitrate Tablet (ISORDIL) 20 milliGRAM(s) Oral three times a day  metoprolol tartrate 25 milliGRAM(s) Oral two times a day  predniSONE   Tablet 60 milliGRAM(s) Oral daily    MEDICATIONS  (PRN):  melatonin 3 milliGRAM(s) Oral at bedtime PRN Insomnia      CAPILLARY BLOOD GLUCOSE      POCT Blood Glucose.: 463 mg/dL (29 Apr 2021 12:16)  POCT Blood Glucose.: 514 mg/dL (29 Apr 2021 12:15)  POCT Blood Glucose.: 425 mg/dL (29 Apr 2021 08:31)  POCT Blood Glucose.: 402 mg/dL (29 Apr 2021 08:29)  POCT Blood Glucose.: 283 mg/dL (28 Apr 2021 21:24)  POCT Blood Glucose.: 244 mg/dL (28 Apr 2021 17:21)    I&O's Summary    28 Apr 2021 07:01  -  29 Apr 2021 07:00  --------------------------------------------------------  IN: 1300 mL / OUT: 2175 mL / NET: -875 mL    29 Apr 2021 07:01  -  29 Apr 2021 15:13  --------------------------------------------------------  IN: 290 mL / OUT: 700 mL / NET: -410 mL        PHYSICAL EXAM:  Vital Signs Last 24 Hrs  T(C): 36.4 (29 Apr 2021 13:14), Max: 36.6 (28 Apr 2021 21:35)  T(F): 97.5 (29 Apr 2021 13:14), Max: 97.8 (28 Apr 2021 21:35)  HR: 62 (29 Apr 2021 13:14) (60 - 71)  BP: 167/77 (29 Apr 2021 13:14) (159/62 - 181/77)  BP(mean): --  RR: 18 (29 Apr 2021 13:14) (16 - 18)  SpO2: 100% (29 Apr 2021 13:14) (97% - 100%)    CONSTITUTIONAL: NAD  RESPIRATORY: Normal respiratory effort; lungs are clear to auscultation bilaterally  CARDIOVASCULAR: Regular rate and rhythm; No lower extremity edema;   ABDOMEN: Nontender to palpation, normoactive bowel sounds  MUSCULOSKELETAL:  no joint swelling or tenderness to palpation; R foot with dressing c/d/i  PSYCH: affect appropriate  NEUROLOGY:  no gross sensory deficits     LABS:                        9.7    11.33 )-----------( 361      ( 29 Apr 2021 06:56 )             29.2     04-29    130<L>  |  96<L>  |  98<H>  ----------------------------<  387<H>  5.0   |  15<L>  |  4.95<H>    Ca    8.8      29 Apr 2021 06:56  Phos  5.7     04-29  Mg     2.3     04-29        CARDIAC MARKERS ( 29 Apr 2021 06:56 )  x     / x     / 53 U/L / x     / x              RADIOLOGY & ADDITIONAL TESTS:  Results Reviewed:   Imaging Personally Reviewed:  Electrocardiogram Personally Reviewed:    COORDINATION OF CARE:  Care Discussed with Consultants/Other Providers [Y/N]: Y  Prior or Outpatient Records Reviewed [Y/N]: Y

## 2021-04-29 NOTE — DIETITIAN INITIAL EVALUATION ADULT. - NUTRITON FOCUSED PHYSICAL EXAM
Pt arrives by EMS from a [facility] for witnessed seizure, 5mg versed en route.  BGL read high   History DM type 1 no...

## 2021-04-29 NOTE — PROGRESS NOTE ADULT - PROBLEM SELECTOR PLAN 1
ACOSTA in th setting of cefepime, rash and eosinophilia.  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  Recommend hold cefepime, recommend change bumex to PO.  Patient on aspirin and would be a high risk of kidney biopsy so recommend Gallium scan to evaluate AIN.  Patient's creatinine continues to be elevated but slight improved today.  Risk benefit favors empiric steroids and he seems to be tolerating well.  For now the plan will be for prednisone 60 daily for one week and then will determine further directions based on clinical course.  I counseled the patient on the risks of steroids including hyperglycemia, hypertension.  Case discussed with Dr. Yoo

## 2021-04-29 NOTE — PROGRESS NOTE ADULT - SUBJECTIVE AND OBJECTIVE BOX
[facility] Division of Kidney Diseases & Hypertension  FOLLOW UP NOTE  --------------------------------------------------------------------------------  HPI:  60 year-old male with PMHx HTN, DM2, PAD s/p RLE angioplasty, recent RLE 2nd digit distal amputation at [facility], who presented to [facility] for sudden onset slurred speech. He was found with NSTEMI and acute Heart failure exacerbation. Patient reports that before admission he was on IV antibiotics, cefepime, to complete 6 weeks. He also reports previous hx of orthopnea and mild LE edema, which now has improved.  On evaluation today his breathing is improved compared to yesterday and rash improving and no longer itching.    PAST HISTORY  --------------------------------------------------------------------------------  No significant changes to PMH, PSH, FHx, SHx, unless otherwise noted    ALLERGIES & MEDICATIONS  --------------------------------------------------------------------------------  Allergies    No Known Allergies    Intolerances      Standing Inpatient Medications  aspirin enteric coated 81 milliGRAM(s) Oral daily  atorvastatin 40 milliGRAM(s) Oral at bedtime  buMETAnide Injectable 2 milliGRAM(s) IV Push daily  chlorhexidine 2% Cloths 1 Application(s) Topical daily  clopidogrel Tablet 75 milliGRAM(s) Oral daily  DAPTOmycin IVPB 350 milliGRAM(s) IV Intermittent every 48 hours  dextrose 40% Gel 15 Gram(s) Oral once  dextrose 5%. 1000 milliLiter(s) IV Continuous <Continuous>  dextrose 5%. 1000 milliLiter(s) IV Continuous <Continuous>  dextrose 50% Injectable 25 Gram(s) IV Push once  dextrose 50% Injectable 12.5 Gram(s) IV Push once  dextrose 50% Injectable 25 Gram(s) IV Push once  glucagon  Injectable 1 milliGRAM(s) IntraMuscular once  heparin   Injectable 5000 Unit(s) SubCutaneous every 8 hours  hydrALAZINE 25 milliGRAM(s) Oral three times a day  insulin lispro (ADMELOG) corrective regimen sliding scale   SubCutaneous three times a day before meals  insulin lispro (ADMELOG) corrective regimen sliding scale   SubCutaneous at bedtime  isosorbide   dinitrate Tablet (ISORDIL) 20 milliGRAM(s) Oral three times a day  metoprolol tartrate 25 milliGRAM(s) Oral two times a day  predniSONE   Tablet 60 milliGRAM(s) Oral daily    PRN Inpatient Medications  melatonin 3 milliGRAM(s) Oral at bedtime PRN      REVIEW OF SYSTEMS  --------------------------------------------------------------------------------  Gen: no fever  Respiratory: not sob today  CV: no chest pain  GI: no abdominal pain  : reports frequent urination  MSK: no pain    VITALS/PHYSICAL EXAM  --------------------------------------------------------------------------------  T(C): 36.4 (04-29-21 @ 05:44), Max: 36.7 (04-28-21 @ 11:01)  HR: 67 (04-29-21 @ 06:03) (60 - 71)  BP: 164/70 (04-29-21 @ 06:03) (163/70 - 181/77)  ABP: --  ABP(mean): --  RR: 18 (04-29-21 @ 05:44) (16 - 18)  SpO2: 100% (04-29-21 @ 05:44) (97% - 100%)  CVP(mm Hg): --        04-28-21 @ 07:01  -  04-29-21 @ 07:00  --------------------------------------------------------  IN: 1300 mL / OUT: 2175 mL / NET: -875 mL    04-29-21 @ 07:01  -  04-29-21 @ 10:30  --------------------------------------------------------  IN: 240 mL / OUT: 400 mL / NET: -160 mL      Physical Exam:  	Gen: NAD, cooperative  	HEENT: MMM  	Pulm: CTA B/L  	CV: S1S2  	Abd: Soft, +BS   	Ext: lower extremity edema  	Neuro: Awake, A&O x3  	Skin: rash over torso nearly completely resolved              : no suprapubic tenderness to palpation               Psych: normal affect and mood  	Vascular access: peripheral lines.     LABS/STUDIES  --------------------------------------------------------------------------------              9.7    11.33 >-----------<  361      [04-29-21 @ 06:56]              29.2     130  |  96  |  98  ----------------------------<  387      [04-29-21 @ 06:56]  5.0   |  15  |  4.95        Ca     8.8     [04-29-21 @ 06:56]      Mg     2.3     [04-29-21 @ 06:56]      Phos  5.7     [04-29-21 @ 06:56]          CK 53      [04-29-21 @ 06:56]    Creatinine Trend:  SCr 4.95 [04-29 @ 06:56]  SCr 5.09 [04-28 @ 07:08]  SCr 4.76 [04-27 @ 08:16]  SCr 4.41 [04-26 @ 04:48]  SCr 3.91 [04-25 @ 07:56]    Urinalysis - [04-23-21 @ 00:12]      Color Light Yellow / Appearance Clear / SG 1.011 / pH 6.0      Gluc Negative / Ketone Negative  / Bili Negative / Urobili <2 mg/dL       Blood Small / Protein 30 mg/dL / Leuk Est Negative / Nitrite Negative      RBC 3 / WBC 1 / Hyaline  / Gran  / Sq Epi  / Non Sq Epi  / Bacteria     Urine Creatinine 106      [04-25-21 @ 19:15]  Urine Protein 74      [04-25-21 @ 19:15]  Urine Sodium 51      [04-23-21 @ 05:44]  Urine Urea Nitrogen 349.5      [04-23-21 @ 05:44]    TSH 1.58      [04-23-21 @ 04:36]  Lipid: chol 186, , HDL 28, LDL --      [04-23-21 @ 04:36]

## 2021-04-29 NOTE — DIETITIAN INITIAL EVALUATION ADULT. - OTHER INFO
Pt 61 yo male with PMHx of HTN, IDDM2, PAD s/p RLE angioplasty, recent RLE 2nd digit distal amputation at [facility], found to be in acute HF, with NSTEMI - per chart review.     At time of visit, Pt appears alert, oriented. Per Pt his appetite good; no chewing or swallowing difficulty; no report of nausea, vomiting or diarrhea @ this time. Per Pt his height: ~70" & his weight: ~186#; no weight loss or weight changes PTA. Of note Pt's HbA1c level 8.3% (4/23). At home Pt avoids regular sugar or honey reported. Pt does not drink fruit juices, but at times Pt drinks diet soda reported. Consistent Carbohydrate diet with salt restriction discussed with Pt including better food choices, foods to avoid. RDN answered concerns related to diet. Written materials on diet also provided; Pt verbalized understanding. RDN remains available, Pt made aware.

## 2021-04-30 LAB
ANION GAP SERPL CALC-SCNC: 17 MMOL/L — HIGH (ref 7–14)
BUN SERPL-MCNC: 102 MG/DL — HIGH (ref 7–23)
CALCIUM SERPL-MCNC: 8.7 MG/DL — SIGNIFICANT CHANGE UP (ref 8.4–10.5)
CHLORIDE SERPL-SCNC: 96 MMOL/L — LOW (ref 98–107)
CO2 SERPL-SCNC: 18 MMOL/L — LOW (ref 22–31)
CREAT SERPL-MCNC: 4.28 MG/DL — HIGH (ref 0.5–1.3)
GLUCOSE BLDC GLUCOMTR-MCNC: 375 MG/DL — HIGH (ref 70–99)
GLUCOSE BLDC GLUCOMTR-MCNC: 376 MG/DL — HIGH (ref 70–99)
GLUCOSE BLDC GLUCOMTR-MCNC: 440 MG/DL — HIGH (ref 70–99)
GLUCOSE BLDC GLUCOMTR-MCNC: 441 MG/DL — HIGH (ref 70–99)
GLUCOSE BLDC GLUCOMTR-MCNC: 457 MG/DL — CRITICAL HIGH (ref 70–99)
GLUCOSE BLDC GLUCOMTR-MCNC: 478 MG/DL — CRITICAL HIGH (ref 70–99)
GLUCOSE BLDC GLUCOMTR-MCNC: 490 MG/DL — CRITICAL HIGH (ref 70–99)
GLUCOSE SERPL-MCNC: 395 MG/DL — HIGH (ref 70–99)
HCT VFR BLD CALC: 26.6 % — LOW (ref 39–50)
HGB BLD-MCNC: 9 G/DL — LOW (ref 13–17)
MAGNESIUM SERPL-MCNC: 2 MG/DL — SIGNIFICANT CHANGE UP (ref 1.6–2.6)
MCHC RBC-ENTMCNC: 28.6 PG — SIGNIFICANT CHANGE UP (ref 27–34)
MCHC RBC-ENTMCNC: 33.8 GM/DL — SIGNIFICANT CHANGE UP (ref 32–36)
MCV RBC AUTO: 84.4 FL — SIGNIFICANT CHANGE UP (ref 80–100)
NRBC # BLD: 0 /100 WBCS — SIGNIFICANT CHANGE UP
NRBC # FLD: 0 K/UL — SIGNIFICANT CHANGE UP
PHOSPHATE SERPL-MCNC: 4.8 MG/DL — HIGH (ref 2.5–4.5)
PLATELET # BLD AUTO: 377 K/UL — SIGNIFICANT CHANGE UP (ref 150–400)
POTASSIUM SERPL-MCNC: 4.5 MMOL/L — SIGNIFICANT CHANGE UP (ref 3.5–5.3)
POTASSIUM SERPL-SCNC: 4.5 MMOL/L — SIGNIFICANT CHANGE UP (ref 3.5–5.3)
RBC # BLD: 3.15 M/UL — LOW (ref 4.2–5.8)
RBC # FLD: 12.4 % — SIGNIFICANT CHANGE UP (ref 10.3–14.5)
SODIUM SERPL-SCNC: 131 MMOL/L — LOW (ref 135–145)
STRONGYLOIDES AB SER-ACNC: NEGATIVE — SIGNIFICANT CHANGE UP
WBC # BLD: 19.93 K/UL — HIGH (ref 3.8–10.5)
WBC # FLD AUTO: 19.93 K/UL — HIGH (ref 3.8–10.5)

## 2021-04-30 PROCEDURE — 99233 SBSQ HOSP IP/OBS HIGH 50: CPT

## 2021-04-30 RX ORDER — DEXTROSE 50 % IN WATER 50 %
25 SYRINGE (ML) INTRAVENOUS ONCE
Refills: 0 | Status: DISCONTINUED | OUTPATIENT
Start: 2021-04-30 | End: 2021-05-05

## 2021-04-30 RX ORDER — INSULIN LISPRO 100/ML
VIAL (ML) SUBCUTANEOUS AT BEDTIME
Refills: 0 | Status: DISCONTINUED | OUTPATIENT
Start: 2021-04-30 | End: 2021-05-01

## 2021-04-30 RX ORDER — INSULIN LISPRO 100/ML
6 VIAL (ML) SUBCUTANEOUS
Refills: 0 | Status: DISCONTINUED | OUTPATIENT
Start: 2021-04-30 | End: 2021-05-01

## 2021-04-30 RX ORDER — DEXTROSE 50 % IN WATER 50 %
12.5 SYRINGE (ML) INTRAVENOUS ONCE
Refills: 0 | Status: DISCONTINUED | OUTPATIENT
Start: 2021-04-30 | End: 2021-05-05

## 2021-04-30 RX ORDER — DEXTROSE 50 % IN WATER 50 %
15 SYRINGE (ML) INTRAVENOUS ONCE
Refills: 0 | Status: DISCONTINUED | OUTPATIENT
Start: 2021-04-30 | End: 2021-05-05

## 2021-04-30 RX ORDER — GLUCAGON INJECTION, SOLUTION 0.5 MG/.1ML
1 INJECTION, SOLUTION SUBCUTANEOUS ONCE
Refills: 0 | Status: DISCONTINUED | OUTPATIENT
Start: 2021-04-30 | End: 2021-05-05

## 2021-04-30 RX ORDER — INSULIN LISPRO 100/ML
VIAL (ML) SUBCUTANEOUS
Refills: 0 | Status: DISCONTINUED | OUTPATIENT
Start: 2021-04-30 | End: 2021-05-01

## 2021-04-30 RX ORDER — INSULIN GLARGINE 100 [IU]/ML
35 INJECTION, SOLUTION SUBCUTANEOUS AT BEDTIME
Refills: 0 | Status: DISCONTINUED | OUTPATIENT
Start: 2021-04-30 | End: 2021-05-01

## 2021-04-30 RX ORDER — SODIUM CHLORIDE 9 MG/ML
1000 INJECTION, SOLUTION INTRAVENOUS
Refills: 0 | Status: DISCONTINUED | OUTPATIENT
Start: 2021-04-30 | End: 2021-05-05

## 2021-04-30 RX ADMIN — Medication 25 MILLIGRAM(S): at 04:54

## 2021-04-30 RX ADMIN — CHLORHEXIDINE GLUCONATE 1 APPLICATION(S): 213 SOLUTION TOPICAL at 14:49

## 2021-04-30 RX ADMIN — ISOSORBIDE DINITRATE 20 MILLIGRAM(S): 5 TABLET ORAL at 04:54

## 2021-04-30 RX ADMIN — Medication 25 MILLIGRAM(S): at 21:52

## 2021-04-30 RX ADMIN — Medication 5: at 12:09

## 2021-04-30 RX ADMIN — Medication 6 UNIT(S): at 12:09

## 2021-04-30 RX ADMIN — ATORVASTATIN CALCIUM 40 MILLIGRAM(S): 80 TABLET, FILM COATED ORAL at 21:52

## 2021-04-30 RX ADMIN — ISOSORBIDE DINITRATE 20 MILLIGRAM(S): 5 TABLET ORAL at 12:08

## 2021-04-30 RX ADMIN — Medication 4: at 21:51

## 2021-04-30 RX ADMIN — Medication 10: at 08:43

## 2021-04-30 RX ADMIN — Medication 25 MILLIGRAM(S): at 16:24

## 2021-04-30 RX ADMIN — HEPARIN SODIUM 5000 UNIT(S): 5000 INJECTION INTRAVENOUS; SUBCUTANEOUS at 04:54

## 2021-04-30 RX ADMIN — ISOSORBIDE DINITRATE 20 MILLIGRAM(S): 5 TABLET ORAL at 16:24

## 2021-04-30 RX ADMIN — Medication 3 MILLIGRAM(S): at 04:54

## 2021-04-30 RX ADMIN — INSULIN GLARGINE 35 UNIT(S): 100 INJECTION, SOLUTION SUBCUTANEOUS at 21:52

## 2021-04-30 RX ADMIN — BUMETANIDE 2 MILLIGRAM(S): 0.25 INJECTION INTRAMUSCULAR; INTRAVENOUS at 04:54

## 2021-04-30 RX ADMIN — CLOPIDOGREL BISULFATE 75 MILLIGRAM(S): 75 TABLET, FILM COATED ORAL at 12:08

## 2021-04-30 RX ADMIN — Medication 60 MILLIGRAM(S): at 04:55

## 2021-04-30 RX ADMIN — DAPTOMYCIN 114 MILLIGRAM(S): 500 INJECTION, POWDER, LYOPHILIZED, FOR SOLUTION INTRAVENOUS at 21:52

## 2021-04-30 RX ADMIN — Medication 81 MILLIGRAM(S): at 12:09

## 2021-04-30 RX ADMIN — Medication 6 UNIT(S): at 17:32

## 2021-04-30 RX ADMIN — Medication 6: at 17:32

## 2021-04-30 RX ADMIN — Medication 25 MILLIGRAM(S): at 17:33

## 2021-04-30 NOTE — PROGRESS NOTE ADULT - SUBJECTIVE AND OBJECTIVE BOX
Patient is a 60y old  Male who presents with a chief complaint of slurred speech (30 Apr 2021 10:42)       INTERVAL HPI/OVERNIGHT EVENTS:  Patient seen and evaluated at bedside.  Pt is resting comfortable in NAD. Denies N/V/F/C    Allergies    No Known Allergies    Intolerances        Vital Signs Last 24 Hrs  T(C): 36.6 (30 Apr 2021 11:29), Max: 36.6 (30 Apr 2021 04:53)  T(F): 97.9 (30 Apr 2021 11:29), Max: 97.9 (30 Apr 2021 04:53)  HR: 64 (30 Apr 2021 17:33) (57 - 74)  BP: 151/61 (30 Apr 2021 17:33) (151/61 - 161/72)  BP(mean): --  RR: 18 (30 Apr 2021 11:29) (18 - 18)  SpO2: 100% (30 Apr 2021 11:29) (97% - 100%)    LABS:                        9.0    19.93 )-----------( 377      ( 30 Apr 2021 07:56 )             26.6     04-30    131<L>  |  96<L>  |  102<H>  ----------------------------<  395<H>  4.5   |  18<L>  |  4.28<H>    Ca    8.7      30 Apr 2021 07:56  Phos  4.8     04-30  Mg     2.0     04-30          CAPILLARY BLOOD GLUCOSE      POCT Blood Glucose.: 440 mg/dL (30 Apr 2021 17:18)  POCT Blood Glucose.: 441 mg/dL (30 Apr 2021 17:15)  POCT Blood Glucose.: 376 mg/dL (30 Apr 2021 12:05)  POCT Blood Glucose.: 457 mg/dL (30 Apr 2021 12:03)  POCT Blood Glucose.: 375 mg/dL (30 Apr 2021 08:25)  POCT Blood Glucose.: 482 mg/dL (29 Apr 2021 21:11)  POCT Blood Glucose.: 518 mg/dL (29 Apr 2021 21:09)      Lower Extremity Physical Exam:  Vascular: DP/PT 1/4 B/L, CFT <3 seconds B/L, Temperature gradient warm to cool B/L  Neuro: Epicritic sensation diminshed to the level of ankle B/L  Musculoskeletal/Ortho: right foot partial 2nd toe amputation  Skin: right foot 2nd toe surgical site well coapted, with distal dry eschar, no dehiscence, no fluctuation, no drainage, no erythema, no signs of infection     Assessment and Plan:

## 2021-04-30 NOTE — PROGRESS NOTE ADULT - PROBLEM SELECTOR PLAN 1
ACOSTA in th setting of cefepime, rash and eosinophilia.  Most likely AIN with some contribution from congestive renal failure (B lines on POCUS + JVD but now improved).  Recommend hold cefepime, recommend continue bumex po.  Patient on aspirin and would be a high risk of kidney biopsy so recommend Gallium scan to evaluate AIN.  I explained to the patient that ideally we would need a kidney biopsy to treat AIN however given the high clinical suspicion and the high risk of biopsy we opted to treat empirically.  Recommend steroid taper over two weeks decreasing by 10 mg of prednisone every 3 days.  Patient has follow up appointment with me on May 10 at 12 PM.  patient is aware.

## 2021-04-30 NOTE — PROGRESS NOTE ADULT - ASSESSMENT
60-year-old male with HTN, IDDM2, PAD s/p RLE angioplasty, recent RLE 2nd digit distal amputation at [facility], presenting with sudden onset slurred speech that started on Wednesday night, persisted until presentation, found to be in acute HF, with NSTEMI and yoselyn.

## 2021-04-30 NOTE — PROGRESS NOTE ADULT - PROBLEM SELECTOR PLAN 2
s/p asa and plavix load, on hep gtt  appreciate cards recs, thought to be secondary to demand in setting of acute HF  - as per PCP pt does not have stents s/p asa and plavix load  appreciate cards recs, thought to be secondary to demand in setting of acute HF  - as per PCP pt does not have stents

## 2021-04-30 NOTE — CHART NOTE - NSCHARTNOTEFT_GEN_A_CORE
Nutrition Consult X HbA1c 8.3%; Pt requesting consult and advice    Source: Patient [X]      other [X] Medical Chart   Diet rx: Consistent Carbohydrate Renal w/Evening Snack:   DASH/TLC {Sodium & Cholesterol Restricted} (DASH) (04-23-21 @ 16:18) [Active]    Pt 59 yo male with Diabetes Mellitus, Heart Failure - per chart review.   At time of visit Pt, appears alert, oriented. Per Pt his appetite good; no chewing/swallowing difficulty; no nausea, vomiting or diarrhea @ this time   Of note Pt's HbA1c level 8.3% (4/23). Consistent Carbohydrate diet was discussed with Pt on initial visit (on 4/29). Heart Healthy diet was discussed as well; Pt verbalized understanding. No therapeutic diets related concerns voiced @ present. Will recommend to follow up with appropriate RDN upon discharge for the purposes of long-term nutrition evaluation and diet education. RDN remains available, Pt made aware.     Pt's height: 70" (4/23)           IBW: 166#+/-10%            Pt's weight: 186.2 # (4/29)   Pertinent Medications: Aspirin, Plavix, Heparin, Bumex, Lipitor, Insulin (Lispro),Insulin (Glargine)   Pertinent Labs:  04-30 Na131 mmol/L<L> Glu 395 mg/dL<H> K+ 4.5 mmol/L Cr  4.28 mg/dL<H>  mg/dL<H> 04-30 Phos 4.8 mg/dL<H> 04-23 Alb 3.3 g/dL 04-23 Chol 186 mg/dL LDL --    HDL 28 mg/dL<L> Trig 109 mg/dL  Skin: per flow sheet Pt with edema 1+: l legl +dryness/ecchymosis, surgical incision     Estimated Needs: [X] no change since previous assessment  Previous Nutrition Diagnosis: [X} Altered Nutrition Related Lab Values  Nutrition Diagnosis is [X] ongoing      Nutrition Interventions/Recommendations:   1. PO diet rx: Consistent Carbohydrate (no snacks); Renal Replacement (no prot restr, no conc K, no conc phos, low sodium);   2. Encourage & assist Pt with meals; Monitor PO diet tolerance; Honor food preferences;   3. Monitor labs, daily weights, hydration status;  4.  Will recommend to follow up with appropriate RDN upon discharge for the purposes of long-term nutrition evaluation and diet education;

## 2021-04-30 NOTE — PROGRESS NOTE ADULT - PROBLEM SELECTOR PLAN 3
Cr improving   appreciate renal recs - empiric steroids for AIN and will obtain gallium scan (planned for 5/1)  changed cefepime to levaquin   - baseline Cr last month was 1.7 Cr improving   appreciate renal recs - empiric steroids for AIN and will obtain gallium scan (planned for 5/1) - plan for taper as per renal   changed cefepime to levaquin   - baseline Cr last month was 1.7

## 2021-04-30 NOTE — PROGRESS NOTE ADULT - SUBJECTIVE AND OBJECTIVE BOX
[facility] Division of Hospital Medicine  Maria Guadalupe Yoo MD  Pager (M-F, 8A-5P): [phone]  Other Times:  [phone]    Patient is a 60y old  Male who presents with a chief complaint of slurred speech (29 Apr 2021 15:12)    SUBJECTIVE / OVERNIGHT EVENTS:  denies complaints.  rash is improved.    MEDICATIONS  (STANDING):  aspirin enteric coated 81 milliGRAM(s) Oral daily  atorvastatin 40 milliGRAM(s) Oral at bedtime  buMETAnide 2 milliGRAM(s) Oral daily  chlorhexidine 2% Cloths 1 Application(s) Topical daily  clopidogrel Tablet 75 milliGRAM(s) Oral daily  DAPTOmycin IVPB 350 milliGRAM(s) IV Intermittent every 48 hours  dextrose 40% Gel 15 Gram(s) Oral once  dextrose 5%. 1000 milliLiter(s) (50 mL/Hr) IV Continuous <Continuous>  dextrose 5%. 1000 milliLiter(s) (100 mL/Hr) IV Continuous <Continuous>  dextrose 50% Injectable 25 Gram(s) IV Push once  dextrose 50% Injectable 12.5 Gram(s) IV Push once  dextrose 50% Injectable 25 Gram(s) IV Push once  glucagon  Injectable 1 milliGRAM(s) IntraMuscular once  heparin   Injectable 5000 Unit(s) SubCutaneous every 8 hours  hydrALAZINE 25 milliGRAM(s) Oral three times a day  insulin glargine Injectable (LANTUS) 15 Unit(s) SubCutaneous at bedtime  insulin lispro (ADMELOG) corrective regimen sliding scale   SubCutaneous three times a day before meals  insulin lispro (ADMELOG) corrective regimen sliding scale   SubCutaneous at bedtime  isosorbide   dinitrate Tablet (ISORDIL) 20 milliGRAM(s) Oral three times a day  metoprolol tartrate 25 milliGRAM(s) Oral two times a day  predniSONE   Tablet 60 milliGRAM(s) Oral daily    MEDICATIONS  (PRN):  melatonin 3 milliGRAM(s) Oral at bedtime PRN Insomnia      CAPILLARY BLOOD GLUCOSE      POCT Blood Glucose.: 375 mg/dL (30 Apr 2021 08:25)  POCT Blood Glucose.: 482 mg/dL (29 Apr 2021 21:11)  POCT Blood Glucose.: 518 mg/dL (29 Apr 2021 21:09)  POCT Blood Glucose.: 411 mg/dL (29 Apr 2021 17:11)  POCT Blood Glucose.: 451 mg/dL (29 Apr 2021 17:10)  POCT Blood Glucose.: 463 mg/dL (29 Apr 2021 12:16)  POCT Blood Glucose.: 514 mg/dL (29 Apr 2021 12:15)    I&O's Summary    29 Apr 2021 07:01  -  30 Apr 2021 07:00  --------------------------------------------------------  IN: 730 mL / OUT: 1900 mL / NET: -1170 mL        PHYSICAL EXAM:  Vital Signs Last 24 Hrs  T(C): 36.6 (30 Apr 2021 04:53), Max: 36.6 (30 Apr 2021 04:53)  T(F): 97.9 (30 Apr 2021 04:53), Max: 97.9 (30 Apr 2021 04:53)  HR: 65 (30 Apr 2021 04:53) (57 - 74)  BP: 158/71 (30 Apr 2021 04:53) (149/61 - 167/77)  BP(mean): --  RR: 18 (30 Apr 2021 04:53) (17 - 18)  SpO2: 98% (30 Apr 2021 04:53) (97% - 100%)    CONSTITUTIONAL: NAD, well-developed, well-groomed  EYES: EOMI; conjunctiva and sclera clear  ENMT: Moist oral mucosa  RESPIRATORY: Normal respiratory effort; lungs are clear to auscultation bilaterally  CARDIOVASCULAR: Regular rate and rhythm, normal S1 and S2, no murmur; No lower extremity edema  ABDOMEN: Nontender to palpation, normoactive bowel sounds, no rebound/guarding  MUSCULOSKELETAL:   no clubbing or cyanosis of digits; no joint swelling or tenderness to palpation  PSYCH: A+O to person, place, and time; affect appropriate  NEUROLOGY: CN 2-12 are intact and symmetric; no gross sensory deficits   SKIN: R foot with dressing c/d/i      LABS:                        9.0    19.93 )-----------( 377      ( 30 Apr 2021 07:56 )             26.6     04-30    131<L>  |  96<L>  |  102<H>  ----------------------------<  395<H>  4.5   |  18<L>  |  4.28<H>    Ca    8.7      30 Apr 2021 07:56  Phos  4.8     04-30  Mg     2.0     04-30        CARDIAC MARKERS ( 29 Apr 2021 06:56 )  x     / x     / 53 U/L / x     / x              RADIOLOGY & ADDITIONAL TESTS:  Results Reviewed:   Imaging Personally Reviewed:  Electrocardiogram Personally Reviewed:    COORDINATION OF CARE:  Care Discussed with Consultants/Other Providers [Y/N]: Y  Prior or Outpatient Records Reviewed [Y/N]: Y

## 2021-04-30 NOTE — PROGRESS NOTE ADULT - ASSESSMENT
59 yo m s/p right foot partial 2nd toe amp (DOS 4/10 at [facility])  -pt seen and evaluated  -s/p right foot partial 2nd toe amputation, right foot 2nd toe surgical site well coapted, with distal dry eschar and ischemic changes  -new dressing applied, keep dressing clean, dry and intact   -case discussed with ID, rec appreciated   -follow up with own podiatrist after discharge

## 2021-04-30 NOTE — PROGRESS NOTE ADULT - SUBJECTIVE AND OBJECTIVE BOX
[facility] Division of Kidney Diseases & Hypertension  FOLLOW UP NOTE  --------------------------------------------------------------------------------  HPI:  60 year-old male with PMHx HTN, DM2, PAD s/p RLE angioplasty, recent RLE 2nd digit distal amputation at [facility], who presented to [facility] for sudden onset slurred speech. He was found with NSTEMI and acute Heart failure exacerbation. Patient reports that before admission he was on IV antibiotics, cefepime, to complete 6 weeks. He also reports previous hx of orthopnea and mild LE edema, which now has improved.      On evaluation today his breathing is improved compared to yesterday and rash improving and no longer itching.        PAST HISTORY  --------------------------------------------------------------------------------  No significant changes to PMH, PSH, FHx, SHx, unless otherwise noted    ALLERGIES & MEDICATIONS  --------------------------------------------------------------------------------  Allergies    No Known Allergies    Intolerances      Standing Inpatient Medications  aspirin enteric coated 81 milliGRAM(s) Oral daily  atorvastatin 40 milliGRAM(s) Oral at bedtime  buMETAnide 2 milliGRAM(s) Oral daily  chlorhexidine 2% Cloths 1 Application(s) Topical daily  clopidogrel Tablet 75 milliGRAM(s) Oral daily  DAPTOmycin IVPB 350 milliGRAM(s) IV Intermittent every 48 hours  dextrose 40% Gel 15 Gram(s) Oral once  dextrose 5%. 1000 milliLiter(s) IV Continuous <Continuous>  dextrose 5%. 1000 milliLiter(s) IV Continuous <Continuous>  dextrose 50% Injectable 25 Gram(s) IV Push once  dextrose 50% Injectable 12.5 Gram(s) IV Push once  dextrose 50% Injectable 25 Gram(s) IV Push once  glucagon  Injectable 1 milliGRAM(s) IntraMuscular once  heparin   Injectable 5000 Unit(s) SubCutaneous every 8 hours  hydrALAZINE 25 milliGRAM(s) Oral three times a day  insulin glargine Injectable (LANTUS) 35 Unit(s) SubCutaneous at bedtime  insulin lispro (ADMELOG) corrective regimen sliding scale   SubCutaneous three times a day before meals  insulin lispro (ADMELOG) corrective regimen sliding scale   SubCutaneous at bedtime  insulin lispro Injectable (ADMELOG) 6 Unit(s) SubCutaneous three times a day before meals  isosorbide   dinitrate Tablet (ISORDIL) 20 milliGRAM(s) Oral three times a day  metoprolol tartrate 25 milliGRAM(s) Oral two times a day  predniSONE   Tablet 60 milliGRAM(s) Oral daily    PRN Inpatient Medications  melatonin 3 milliGRAM(s) Oral at bedtime PRN      REVIEW OF SYSTEMS  --------------------------------------------------------------------------------  Gen: no fever  Respiratory: no sob  CV: no cp  GI: no ab pain  : no urinary complaints  MSK: no pain    VITALS/PHYSICAL EXAM  --------------------------------------------------------------------------------  T(C): 36.6 (04-30-21 @ 04:53), Max: 36.6 (04-30-21 @ 04:53)  HR: 65 (04-30-21 @ 04:53) (57 - 74)  BP: 158/71 (04-30-21 @ 04:53) (149/61 - 167/77)  ABP: --  ABP(mean): --  RR: 18 (04-30-21 @ 04:53) (17 - 18)  SpO2: 98% (04-30-21 @ 04:53) (97% - 100%)  CVP(mm Hg): --        04-29-21 @ 07:01  -  04-30-21 @ 07:00  --------------------------------------------------------  IN: 730 mL / OUT: 1900 mL / NET: -1170 mL      Physical Exam:  	Gen: NAD, cooperative  	HEENT: MMM  	Pulm: CTA B/L  	CV: S1S2  	Abd: Soft, +BS   	Ext: lower extremity edema  	Neuro: Awake, A&O x3  	Skin: rash over torso now resolved              : no suprapubic tenderness to palpation               Psych: normal affect and mood  	Vascular access: peripheral lines.     LABS/STUDIES  --------------------------------------------------------------------------------              9.0    19.93 >-----------<  377      [04-30-21 @ 07:56]              26.6     131  |  96  |  102  ----------------------------<  395      [04-30-21 @ 07:56]  4.5   |  18  |  4.28        Ca     8.7     [04-30-21 @ 07:56]      Mg     2.0     [04-30-21 @ 07:56]      Phos  4.8     [04-30-21 @ 07:56]          CK 53      [04-29-21 @ 06:56]    Creatinine Trend:  SCr 4.28 [04-30 @ 07:56]  SCr 4.95 [04-29 @ 06:56]  SCr 5.09 [04-28 @ 07:08]  SCr 4.76 [04-27 @ 08:16]  SCr 4.41 [04-26 @ 04:48]      Urine Creatinine 106      [04-25-21 @ 19:15]  Urine Protein 74      [04-25-21 @ 19:15]

## 2021-04-30 NOTE — CHART NOTE - NSCHARTNOTESELECT_GEN_ALL_CORE
ACP NP/Event Note
ACP NP/Event Note
Event Note
Event Note
Outpt ID Call/Event Note
Event Note
Follow-up/Nutrition Services
Infectious Diseases/Event Note

## 2021-04-30 NOTE — PROGRESS NOTE ADULT - PROBLEM SELECTOR PLAN 7
c/w ASA, statin  recent amputation at [facility]; surgical site does not look infected but patient says he was on cefepime via PICC line for it.   ID consult appreciated - changed to levaquin

## 2021-04-30 NOTE — PROGRESS NOTE ADULT - PROBLEM SELECTOR PLAN 1
-stable off bipap  -appreciate cardiology recs - will discuss holding asa/plavix for potential renal biopsy in the setting of recent nstemi   -c/w ASA, heparin gtt for now    TTE reviewed   monitor on tele, trend CE  strict I/Os, daily weights  - continue with oral bumex as per renal -stable off bipap  -appreciate cardiology recs - will discuss holding asa/plavix for potential renal biopsy in the setting of recent nstemi   -c/w ASA, plavix; now off heparin gtt    TTE reviewed   monitor on tele, trend CE  strict I/Os, daily weights  - continue with oral bumex as per renal

## 2021-05-01 DIAGNOSIS — E78.5 HYPERLIPIDEMIA, UNSPECIFIED: ICD-10-CM

## 2021-05-01 DIAGNOSIS — E11.49 TYPE 2 DIABETES MELLITUS WITH OTHER DIABETIC NEUROLOGICAL COMPLICATION: ICD-10-CM

## 2021-05-01 LAB
ANION GAP SERPL CALC-SCNC: 17 MMOL/L — HIGH (ref 7–14)
BUN SERPL-MCNC: 101 MG/DL — HIGH (ref 7–23)
CALCIUM SERPL-MCNC: 9 MG/DL — SIGNIFICANT CHANGE UP (ref 8.4–10.5)
CHLORIDE SERPL-SCNC: 95 MMOL/L — LOW (ref 98–107)
CO2 SERPL-SCNC: 20 MMOL/L — LOW (ref 22–31)
CREAT SERPL-MCNC: 3.54 MG/DL — HIGH (ref 0.5–1.3)
GLUCOSE BLDC GLUCOMTR-MCNC: 266 MG/DL — HIGH (ref 70–99)
GLUCOSE BLDC GLUCOMTR-MCNC: 313 MG/DL — HIGH (ref 70–99)
GLUCOSE BLDC GLUCOMTR-MCNC: 382 MG/DL — HIGH (ref 70–99)
GLUCOSE BLDC GLUCOMTR-MCNC: 385 MG/DL — HIGH (ref 70–99)
GLUCOSE BLDC GLUCOMTR-MCNC: 409 MG/DL — HIGH (ref 70–99)
GLUCOSE BLDC GLUCOMTR-MCNC: 428 MG/DL — HIGH (ref 70–99)
GLUCOSE SERPL-MCNC: 402 MG/DL — HIGH (ref 70–99)
HCT VFR BLD CALC: 30.2 % — LOW (ref 39–50)
HGB BLD-MCNC: 10.3 G/DL — LOW (ref 13–17)
MAGNESIUM SERPL-MCNC: 2 MG/DL — SIGNIFICANT CHANGE UP (ref 1.6–2.6)
MCHC RBC-ENTMCNC: 28 PG — SIGNIFICANT CHANGE UP (ref 27–34)
MCHC RBC-ENTMCNC: 34.1 GM/DL — SIGNIFICANT CHANGE UP (ref 32–36)
MCV RBC AUTO: 82.1 FL — SIGNIFICANT CHANGE UP (ref 80–100)
NRBC # BLD: 0 /100 WBCS — SIGNIFICANT CHANGE UP
NRBC # FLD: 0 K/UL — SIGNIFICANT CHANGE UP
PHOSPHATE SERPL-MCNC: 4.9 MG/DL — HIGH (ref 2.5–4.5)
PLATELET # BLD AUTO: 438 K/UL — HIGH (ref 150–400)
POTASSIUM SERPL-MCNC: 4.2 MMOL/L — SIGNIFICANT CHANGE UP (ref 3.5–5.3)
POTASSIUM SERPL-SCNC: 4.2 MMOL/L — SIGNIFICANT CHANGE UP (ref 3.5–5.3)
RBC # BLD: 3.68 M/UL — LOW (ref 4.2–5.8)
RBC # FLD: 12.2 % — SIGNIFICANT CHANGE UP (ref 10.3–14.5)
SODIUM SERPL-SCNC: 132 MMOL/L — LOW (ref 135–145)
WBC # BLD: 19.99 K/UL — HIGH (ref 3.8–10.5)
WBC # FLD AUTO: 19.99 K/UL — HIGH (ref 3.8–10.5)

## 2021-05-01 PROCEDURE — 99254 IP/OBS CNSLTJ NEW/EST MOD 60: CPT

## 2021-05-01 PROCEDURE — 78803 RP LOCLZJ TUM SPECT 1 AREA: CPT | Mod: 26

## 2021-05-01 PROCEDURE — 99233 SBSQ HOSP IP/OBS HIGH 50: CPT

## 2021-05-01 RX ORDER — INSULIN LISPRO 100/ML
VIAL (ML) SUBCUTANEOUS
Refills: 0 | Status: DISCONTINUED | OUTPATIENT
Start: 2021-05-01 | End: 2021-05-05

## 2021-05-01 RX ORDER — INSULIN LISPRO 100/ML
VIAL (ML) SUBCUTANEOUS AT BEDTIME
Refills: 0 | Status: DISCONTINUED | OUTPATIENT
Start: 2021-05-01 | End: 2021-05-05

## 2021-05-01 RX ORDER — INSULIN HUMAN 100 [IU]/ML
INJECTION, SOLUTION SUBCUTANEOUS EVERY 6 HOURS
Refills: 0 | Status: DISCONTINUED | OUTPATIENT
Start: 2021-05-01 | End: 2021-05-01

## 2021-05-01 RX ORDER — INSULIN GLARGINE 100 [IU]/ML
40 INJECTION, SOLUTION SUBCUTANEOUS AT BEDTIME
Refills: 0 | Status: DISCONTINUED | OUTPATIENT
Start: 2021-05-01 | End: 2021-05-02

## 2021-05-01 RX ORDER — HYDRALAZINE HCL 50 MG
50 TABLET ORAL THREE TIMES A DAY
Refills: 0 | Status: DISCONTINUED | OUTPATIENT
Start: 2021-05-01 | End: 2021-05-05

## 2021-05-01 RX ORDER — INSULIN LISPRO 100/ML
10 VIAL (ML) SUBCUTANEOUS
Refills: 0 | Status: DISCONTINUED | OUTPATIENT
Start: 2021-05-01 | End: 2021-05-02

## 2021-05-01 RX ADMIN — Medication 25 MILLIGRAM(S): at 05:26

## 2021-05-01 RX ADMIN — Medication 3 MILLIGRAM(S): at 21:27

## 2021-05-01 RX ADMIN — Medication 10 UNIT(S): at 12:56

## 2021-05-01 RX ADMIN — Medication 60 MILLIGRAM(S): at 05:26

## 2021-05-01 RX ADMIN — HEPARIN SODIUM 5000 UNIT(S): 5000 INJECTION INTRAVENOUS; SUBCUTANEOUS at 13:00

## 2021-05-01 RX ADMIN — Medication 25 MILLIGRAM(S): at 17:39

## 2021-05-01 RX ADMIN — BUMETANIDE 2 MILLIGRAM(S): 0.25 INJECTION INTRAMUSCULAR; INTRAVENOUS at 05:26

## 2021-05-01 RX ADMIN — HEPARIN SODIUM 5000 UNIT(S): 5000 INJECTION INTRAVENOUS; SUBCUTANEOUS at 21:27

## 2021-05-01 RX ADMIN — Medication 6 UNIT(S): at 08:42

## 2021-05-01 RX ADMIN — ISOSORBIDE DINITRATE 20 MILLIGRAM(S): 5 TABLET ORAL at 17:39

## 2021-05-01 RX ADMIN — ATORVASTATIN CALCIUM 40 MILLIGRAM(S): 80 TABLET, FILM COATED ORAL at 21:27

## 2021-05-01 RX ADMIN — HEPARIN SODIUM 5000 UNIT(S): 5000 INJECTION INTRAVENOUS; SUBCUTANEOUS at 05:26

## 2021-05-01 RX ADMIN — ISOSORBIDE DINITRATE 20 MILLIGRAM(S): 5 TABLET ORAL at 05:26

## 2021-05-01 RX ADMIN — Medication 10 UNIT(S): at 17:39

## 2021-05-01 RX ADMIN — CHLORHEXIDINE GLUCONATE 1 APPLICATION(S): 213 SOLUTION TOPICAL at 12:55

## 2021-05-01 RX ADMIN — Medication 50 MILLIGRAM(S): at 13:01

## 2021-05-01 RX ADMIN — Medication 81 MILLIGRAM(S): at 12:55

## 2021-05-01 RX ADMIN — Medication 50 MILLIGRAM(S): at 21:27

## 2021-05-01 RX ADMIN — CLOPIDOGREL BISULFATE 75 MILLIGRAM(S): 75 TABLET, FILM COATED ORAL at 12:55

## 2021-05-01 RX ADMIN — Medication 8: at 17:39

## 2021-05-01 RX ADMIN — Medication 12: at 12:56

## 2021-05-01 RX ADMIN — ISOSORBIDE DINITRATE 20 MILLIGRAM(S): 5 TABLET ORAL at 12:54

## 2021-05-01 RX ADMIN — Medication 2: at 21:52

## 2021-05-01 RX ADMIN — INSULIN GLARGINE 40 UNIT(S): 100 INJECTION, SOLUTION SUBCUTANEOUS at 21:52

## 2021-05-01 RX ADMIN — Medication 5: at 08:41

## 2021-05-01 NOTE — PROVIDER CONTACT NOTE (OTHER) - ACTION/TREATMENT ORDERED:
ACP Nevin Seals notified. Per order, notify ACP for blood glucose over 400. Insulin to be administered per MAR.

## 2021-05-01 NOTE — PROGRESS NOTE ADULT - PROBLEM SELECTOR PLAN 7
c/w ASA, statin  Right second toe partial amputation 4/12 at [facility], grew GBS alone  surgical site does not look infected but patient says he was on cefepime via PICC line for it.   ID f/u, changed to daptomycin

## 2021-05-01 NOTE — CONSULT NOTE ADULT - ASSESSMENT
HOLLY FITZGERALD is a 60-year-old male with HTN, IDDM2, PAD s/p RLE angioplasty, recent RLE 2nd digit distal amputation at [facility], presenting with sudden onset slurred speech that started on Wednesday night, persisted until presentation, found to be in acute HF, with NSTEMI and ACOSTA, endocrine consulted for uncontrolled Type 2 DM.  Patient is on prednisone 60mg daily with plan to decrease prednisone 10mg every 3 days.      ENDOCRINE FOLLOW UP  CALL PATIENT ACCESS SERVICES  [phone]  For all [facility] Endocrine Practices phone numbers and locations throughout [facility], and [place].      If patient wishes to follow up with [facility] Endocrinology at [place]    [facility]  [address], [address], [place] [address]  [phone]   Please call to schedule appointment with CDE/Nutritionist and MD appointment next available

## 2021-05-01 NOTE — PROGRESS NOTE ADULT - PROBLEM SELECTOR PLAN 2
s/p asa and plavix load  appreciate cards recs, thought to be secondary to demand in setting of acute HF  - as per PCP pt does not have stents

## 2021-05-01 NOTE — CONSULT NOTE ADULT - PROBLEM SELECTOR RECOMMENDATION 9
-Discussed with patient the importance of Carb consistent diet and exercise as tolerated.    -Recommend nutritional consultation  -Recommend DM education through RN staff (see physical to RN order)  -Discussed glycemic goal of a1c <7% to prevent microvascular complications of diabetes mellitus and reduce the risk of macrovascular complications.   -Recommend annual podiatry and ophthalmology follow up.   -Glucose goal of 80-180mg/dl while in patient.   -Recommend Lantus 35 to 40 units at bedtime  -Recommend Admelog 6 --> 10 tid with meals  -Recommend LDSS (2:50 above 150mg/dl) and night time low does sliding scale for hyperglycemia corrections    -orders placed by me. -Discussed with patient the importance of Carb consistent diet and exercise as tolerated.    -Recommend nutritional consultation  -Recommend DM education through RN staff (see physical to RN order)  -Discussed glycemic goal of a1c <7% to prevent microvascular complications of diabetes mellitus and reduce the risk of macrovascular complications.   -Recommend annual podiatry and ophthalmology follow up.   -Glucose goal of 80-180mg/dl while in patient.   -Recommend Lantus 35 to 40 units at bedtime  -Recommend Admelog 6 --> 10 tid with meals  -Recommend LDSS (2:50 above 150mg/dl) and night time low does sliding scale for hyperglycemia corrections    -orders placed by me.  DC planning: Basal bolus. -Discussed with patient the importance of Carb consistent diet and exercise as tolerated.    -Recommend nutritional consultation  -Recommend DM education through RN staff (see physical to RN order)  -Discussed glycemic goal of a1c <7% to prevent microvascular complications of diabetes mellitus and reduce the risk of macrovascular complications.   -Recommend annual podiatry and ophthalmology follow up.   -Glucose goal of 80-180mg/dl while in patient.   -Recommend Lantus 35 to 40 units at bedtime  -Recommend Admelog 6 --> 10 tid with meals  -Recommend MDSS (2:50 above 150mg/dl) and night time low does sliding scale for hyperglycemia corrections    -orders placed by me.  DC planning: Basal bolus.

## 2021-05-01 NOTE — CONSULT NOTE ADULT - SUBJECTIVE AND OBJECTIVE BOX
HPI:  60-year-old male with HTN, IDDM2, PAD s/p RLE angioplasty, recent RLE 2nd digit distal amputation at [facility], presenting with sudden onset slurred speech that started on Wednesday night, persisted until presentation. Patient denies any history of stroke, recent hypoglycemic events or similar symptoms in the past. Patient denies to me any LH/dizziness, syncope, HA/vision changes, URI symptoms, chest pain/palpitations, orthopnea, LE edema, coughing, wheezing, shortness of breath, nausea/vomiting/diarrhea, abdominal pain, no urinary symptoms. He denies any history of heart disease or HF. No recent sick contacts. Patient reports was due to f/u with podiatric sx today. On exam, patient noted to have RUE midline, reportedly taking antibiotics (unknown which) at home, Rx for 6 weeks.    In the ED VS: 97.8  72-84  165-180/74-79  24-30  %RA, received aspirin 324mg PO x1, albuterol 90mcg 4puffs x1, furosemide 40mg IV x1, and was started on a heparin gtt (23 Apr 2021 01:27)      PAST MEDICAL & SURGICAL HISTORY:  PAD (peripheral artery disease)    Essential hypertension    Type 2 diabetes mellitus, with long-term current use of insulin    History of amputation of toe    S/P angioplasty  RLE        FAMILY HISTORY:  No pertinent family history in first degree relatives      Social History:    Outpatient Medications:  Home Medications:  Aspirin Enteric Coated 81 mg oral delayed release tablet: 1 tab(s) orally once a day (23 Apr 2021 12:30)  Cosopt 2.23%-0.68% ophthalmic solution: 1 drop(s) to each affected eye 2 times a day (23 Apr 2021 12:30)  insulin glargine 100 units/mL subcutaneous solution: 55 unit(s) subcutaneous once a day (at bedtime) (23 Apr 2021 12:30)  latanoprost 0.005% ophthalmic solution: 1 drop(s) to each eye once a day (in the evening) (23 Apr 2021 12:30)  Lipitor 40 mg oral tablet: 1 tab(s) orally once a day (at bedtime) (23 Apr 2021 12:30)  losartan 100 mg oral tablet: 1 tab(s) orally once a day (23 Apr 2021 12:30)  Melatonin 3 mg oral tablet: 1 tab(s) orally once a day (at bedtime) (23 Apr 2021 12:30)        MEDICATIONS  (STANDING):  aspirin enteric coated 81 milliGRAM(s) Oral daily  atorvastatin 40 milliGRAM(s) Oral at bedtime  buMETAnide 2 milliGRAM(s) Oral daily  chlorhexidine 2% Cloths 1 Application(s) Topical daily  clopidogrel Tablet 75 milliGRAM(s) Oral daily  DAPTOmycin IVPB 350 milliGRAM(s) IV Intermittent every 48 hours  dextrose 40% Gel 15 Gram(s) Oral once  dextrose 5%. 1000 milliLiter(s) (50 mL/Hr) IV Continuous <Continuous>  dextrose 5%. 1000 milliLiter(s) (100 mL/Hr) IV Continuous <Continuous>  dextrose 50% Injectable 25 Gram(s) IV Push once  dextrose 50% Injectable 12.5 Gram(s) IV Push once  dextrose 50% Injectable 25 Gram(s) IV Push once  glucagon  Injectable 1 milliGRAM(s) IntraMuscular once  heparin   Injectable 5000 Unit(s) SubCutaneous every 8 hours  hydrALAZINE 50 milliGRAM(s) Oral three times a day  insulin glargine Injectable (LANTUS) 35 Unit(s) SubCutaneous at bedtime  insulin lispro (ADMELOG) corrective regimen sliding scale   SubCutaneous three times a day before meals  insulin lispro (ADMELOG) corrective regimen sliding scale   SubCutaneous at bedtime  insulin lispro Injectable (ADMELOG) 6 Unit(s) SubCutaneous three times a day before meals  isosorbide   dinitrate Tablet (ISORDIL) 20 milliGRAM(s) Oral three times a day  metoprolol tartrate 25 milliGRAM(s) Oral two times a day  predniSONE   Tablet 60 milliGRAM(s) Oral daily    MEDICATIONS  (PRN):  melatonin 3 milliGRAM(s) Oral at bedtime PRN Insomnia      Allergies    No Known Allergies    Intolerances      Review of Systems:  Constitutional: No fever  Eyes: No blurry vision  Neuro: No tremors  HEENT: No pain  Cardiovascular: No chest pain, palpitations  Respiratory: No SOB, no cough  GI: No nausea, vomiting, abdominal pain  : No dysuria  Skin: no rash  Psych: no depression  Endocrine: no polyuria, polydipsia  Hem/lymph: no swelling  Osteoporosis: no fractures    ALL OTHER SYSTEMS REVIEWED AND NEGATIVE    UNABLE TO OBTAIN    PHYSICAL EXAM:  -----------------------------  VITALS: T(C): 36.6 (05-01-21 @ 05:24)  T(F): 97.9 (05-01-21 @ 05:24), Max: 97.9 (04-30-21 @ 11:29)  HR: 55 (05-01-21 @ 07:58) (55 - 66)  BP: 170/67 (05-01-21 @ 05:24) (151/61 - 173/71)  RR:  (18 - 18)  SpO2:  (97% - 100%)  Wt(kg): --  GENERAL: NAD, well-groomed, well-developed  EYES: No proptosis, no lid lag, anicteric  HEENT:  Atraumatic, Normocephalic, moist mucous membranes  THYROID: Normal size, no palpable nodules  RESPIRATORY: Clear to auscultation bilaterally; No rales, rhonchi, wheezing, or rubs  CARDIOVASCULAR: Regular rate and rhythm; No murmurs; no peripheral edema  GI: Soft, nontender, non distended, normal bowel sounds  SKIN: Dry, intact, No rashes or lesions  MUSCULOSKELETAL: Full range of motion, normal strength  NEURO: sensation intact, extraocular movements intact, no tremor, normal reflexes  PSYCH: Alert and oriented x 3, normal affect, normal mood  CUSHING'S SIGNS: no striae    POCT Blood Glucose.: 382 mg/dL (05-01-21 @ 08:34)  POCT Blood Glucose.: 385 mg/dL (05-01-21 @ 08:32)  POCT Blood Glucose.: 428 mg/dL (05-01-21 @ 05:34)  POCT Blood Glucose.: 478 mg/dL (04-30-21 @ 21:12)  POCT Blood Glucose.: 490 mg/dL (04-30-21 @ 21:10)  POCT Blood Glucose.: 440 mg/dL (04-30-21 @ 17:18)  POCT Blood Glucose.: 441 mg/dL (04-30-21 @ 17:15)  POCT Blood Glucose.: 376 mg/dL (04-30-21 @ 12:05)  POCT Blood Glucose.: 457 mg/dL (04-30-21 @ 12:03)  POCT Blood Glucose.: 375 mg/dL (04-30-21 @ 08:25)  POCT Blood Glucose.: 482 mg/dL (04-29-21 @ 21:11)  POCT Blood Glucose.: 518 mg/dL (04-29-21 @ 21:09)  POCT Blood Glucose.: 411 mg/dL (04-29-21 @ 17:11)  POCT Blood Glucose.: 451 mg/dL (04-29-21 @ 17:10)  POCT Blood Glucose.: 463 mg/dL (04-29-21 @ 12:16)  POCT Blood Glucose.: 514 mg/dL (04-29-21 @ 12:15)  POCT Blood Glucose.: 425 mg/dL (04-29-21 @ 08:31)  POCT Blood Glucose.: 402 mg/dL (04-29-21 @ 08:29)  POCT Blood Glucose.: 283 mg/dL (04-28-21 @ 21:24)  POCT Blood Glucose.: 244 mg/dL (04-28-21 @ 17:21)  POCT Blood Glucose.: 201 mg/dL (04-28-21 @ 12:28)                            10.3   19.99 )-----------( 438      ( 01 May 2021 08:03 )             30.2       05-01    132<L>  |  95<L>  |  101<H>  ----------------------------<  402<H>  4.2   |  20<L>  |  3.54<H>    EGFR if : 20<L>  EGFR if non : 17<L>    Ca    9.0      05-01  Mg     2.0     05-01  Phos  4.9     05-01        Thyroid Function Tests:  04-23 @ 04:36 TSH 1.58 FreeT4 -- T3 -- Anti TPO -- Anti Thyroglobulin Ab -- TSI --      A1C with Estimated Average Glucose Result: 8.3 % (04-23-21 @ 04:36)      04-23 Chol 186 Direct LDL -- LDL calculated 136<H> HDL 28<L> Trig 109    Radiology:   ------------------------               HPI:  60-year-old male with HTN, IDDM2, PAD s/p RLE angioplasty, recent RLE 2nd digit distal amputation at [facility], presenting with sudden onset slurred speech that started on Wednesday night, persisted until presentation. Patient denies any history of stroke, recent hypoglycemic events or similar symptoms in the past. Patient denies to me any LH/dizziness, syncope, HA/vision changes, URI symptoms, chest pain/palpitations, orthopnea, LE edema, coughing, wheezing, shortness of breath, nausea/vomiting/diarrhea, abdominal pain, no urinary symptoms. He denies any history of heart disease or HF. No recent sick contacts. Patient reports was due to f/u with podiatric sx today. On exam, patient noted to have RUE midline, reportedly taking antibiotics (unknown which) at home, Rx for 6 weeks.    In the ED VS: 97.8  72-84  165-180/74-79  24-30  %RA, received aspirin 324mg PO x1, albuterol 90mcg 4puffs x1, furosemide 40mg IV x1, and was started on a heparin gtt (23 Apr 2021 01:27)    Endocrine history:  Patient diagnosed with Type 2 Dm for 20 plus years, does not see endocirnologist.  FU with PM Dr. Jae Adame.  States that he has no known diabetic retinopathy, has neuropathy, s/p RLE 2nd digital amputation, has CKD with EGFR 20 min/ml/1.73m2.  Denies any history of CAD or CVA, has history of PAD.    States that he tries to adhere to DM diet at home.  [place] home with wife and children.   Checks glucose daily in the morning only and states that usually controlled.   At home only takes Lantus 50 units to 60 units at bedtime. No meal time insulin, no oral  medications.        PAST MEDICAL & SURGICAL HISTORY:  PAD (peripheral artery disease)  Essential hypertension  Type 2 diabetes mellitus, with long-term current use of insulin  History of amputation of toe  S/P angioplasty  RLE    FAMILY HISTORY:  Father - Heart disease     Social History:  No smoking  No alcohol  No drugs.     Outpatient Medications:  Home Medications:  Aspirin Enteric Coated 81 mg oral delayed release tablet: 1 tab(s) orally once a day (23 Apr 2021 12:30)  Cosopt 2.23%-0.68% ophthalmic solution: 1 drop(s) to each affected eye 2 times a day (23 Apr 2021 12:30)  insulin glargine 100 units/mL subcutaneous solution: 55 unit(s) subcutaneous once a day (at bedtime) (23 Apr 2021 12:30)  latanoprost 0.005% ophthalmic solution: 1 drop(s) to each eye once a day (in the evening) (23 Apr 2021 12:30)  Lipitor 40 mg oral tablet: 1 tab(s) orally once a day (at bedtime) (23 Apr 2021 12:30)  losartan 100 mg oral tablet: 1 tab(s) orally once a day (23 Apr 2021 12:30)  Melatonin 3 mg oral tablet: 1 tab(s) orally once a day (at bedtime) (23 Apr 2021 12:30)        MEDICATIONS  (STANDING):  aspirin enteric coated 81 milliGRAM(s) Oral daily  atorvastatin 40 milliGRAM(s) Oral at bedtime  buMETAnide 2 milliGRAM(s) Oral daily  chlorhexidine 2% Cloths 1 Application(s) Topical daily  clopidogrel Tablet 75 milliGRAM(s) Oral daily  DAPTOmycin IVPB 350 milliGRAM(s) IV Intermittent every 48 hours  dextrose 40% Gel 15 Gram(s) Oral once  dextrose 5%. 1000 milliLiter(s) (50 mL/Hr) IV Continuous <Continuous>  dextrose 5%. 1000 milliLiter(s) (100 mL/Hr) IV Continuous <Continuous>  dextrose 50% Injectable 25 Gram(s) IV Push once  dextrose 50% Injectable 12.5 Gram(s) IV Push once  dextrose 50% Injectable 25 Gram(s) IV Push once  glucagon  Injectable 1 milliGRAM(s) IntraMuscular once  heparin   Injectable 5000 Unit(s) SubCutaneous every 8 hours  hydrALAZINE 50 milliGRAM(s) Oral three times a day  insulin glargine Injectable (LANTUS) 35 Unit(s) SubCutaneous at bedtime  insulin lispro (ADMELOG) corrective regimen sliding scale   SubCutaneous three times a day before meals  insulin lispro (ADMELOG) corrective regimen sliding scale   SubCutaneous at bedtime  insulin lispro Injectable (ADMELOG) 6 Unit(s) SubCutaneous three times a day before meals  isosorbide   dinitrate Tablet (ISORDIL) 20 milliGRAM(s) Oral three times a day  metoprolol tartrate 25 milliGRAM(s) Oral two times a day  predniSONE   Tablet 60 milliGRAM(s) Oral daily    MEDICATIONS  (PRN):  melatonin 3 milliGRAM(s) Oral at bedtime PRN Insomnia      Allergies    No Known Allergies    Intolerances      Review of Systems:  Constitutional: No fever  Eyes: No blurry vision  Neuro: No tremors  HEENT: No pain  Cardiovascular: No chest pain, palpitations  Respiratory: No SOB, no cough  GI: No nausea, vomiting, abdominal pain  : No dysuria  Skin: no rash  Psych: no depression  Endocrine: no polyuria, polydipsia  Hem/lymph: no swelling  Osteoporosis: no fractures    ALL OTHER SYSTEMS REVIEWED AND NEGATIVE    UNABLE TO OBTAIN    PHYSICAL EXAM:  -----------------------------  VITALS: T(C): 36.6 (05-01-21 @ 05:24)  T(F): 97.9 (05-01-21 @ 05:24), Max: 97.9 (04-30-21 @ 11:29)  HR: 55 (05-01-21 @ 07:58) (55 - 66)  BP: 170/67 (05-01-21 @ 05:24) (151/61 - 173/71)  RR:  (18 - 18)  SpO2:  (97% - 100%)  Wt(kg): --  GENERAL: NAD, well-groomed, well-developed  EYES: No proptosis, no lid lag, anicteric  HEENT:  Atraumatic, Normocephalic, moist mucous membranes  THYROID: Normal size, no palpable nodules  RESPIRATORY: Clear to auscultation bilaterally; No rales, rhonchi, wheezing, or rubs  CARDIOVASCULAR: Regular rate and rhythm; No murmurs; no peripheral edema  GI: Soft, nontender, non distended, normal bowel sounds  SKIN: Dry, intact, No rashes or lesions  MUSCULOSKELETAL: Full range of motion, normal strength, right 2nd toe amputation in bandages.   NEURO: sensation intact, extraocular movements intact, no tremor, normal reflexes  PSYCH: Alert and oriented x 3, normal affect, normal mood  CUSHING'S SIGNS: no striae    POCT Blood Glucose.: 382 mg/dL (05-01-21 @ 08:34)  POCT Blood Glucose.: 385 mg/dL (05-01-21 @ 08:32)  POCT Blood Glucose.: 428 mg/dL (05-01-21 @ 05:34)  POCT Blood Glucose.: 478 mg/dL (04-30-21 @ 21:12)  POCT Blood Glucose.: 490 mg/dL (04-30-21 @ 21:10)  POCT Blood Glucose.: 440 mg/dL (04-30-21 @ 17:18)  POCT Blood Glucose.: 441 mg/dL (04-30-21 @ 17:15)  POCT Blood Glucose.: 376 mg/dL (04-30-21 @ 12:05)  POCT Blood Glucose.: 457 mg/dL (04-30-21 @ 12:03)  POCT Blood Glucose.: 375 mg/dL (04-30-21 @ 08:25)  POCT Blood Glucose.: 482 mg/dL (04-29-21 @ 21:11)  POCT Blood Glucose.: 518 mg/dL (04-29-21 @ 21:09)  POCT Blood Glucose.: 411 mg/dL (04-29-21 @ 17:11)  POCT Blood Glucose.: 451 mg/dL (04-29-21 @ 17:10)  POCT Blood Glucose.: 463 mg/dL (04-29-21 @ 12:16)  POCT Blood Glucose.: 514 mg/dL (04-29-21 @ 12:15)  POCT Blood Glucose.: 425 mg/dL (04-29-21 @ 08:31)  POCT Blood Glucose.: 402 mg/dL (04-29-21 @ 08:29)  POCT Blood Glucose.: 283 mg/dL (04-28-21 @ 21:24)  POCT Blood Glucose.: 244 mg/dL (04-28-21 @ 17:21)  POCT Blood Glucose.: 201 mg/dL (04-28-21 @ 12:28)                            10.3   19.99 )-----------( 438      ( 01 May 2021 08:03 )             30.2       05-01    132<L>  |  95<L>  |  101<H>  ----------------------------<  402<H>  4.2   |  20<L>  |  3.54<H>    EGFR if : 20<L>  EGFR if non : 17<L>    Ca    9.0      05-01  Mg     2.0     05-01  Phos  4.9     05-01        Thyroid Function Tests:  04-23 @ 04:36 TSH 1.58 FreeT4 -- T3 -- Anti TPO -- Anti Thyroglobulin Ab -- TSI --      A1C with Estimated Average Glucose Result: 8.3 % (04-23-21 @ 04:36)      04-23 Chol 186 Direct LDL -- LDL calculated 136<H> HDL 28<L> Trig 109    Radiology:   ------------------------

## 2021-05-01 NOTE — PROGRESS NOTE ADULT - SUBJECTIVE AND OBJECTIVE BOX
Dr. Tracee Verdugo  Pager [phone]    PROGRESS NOTE:     Patient is a 60y old  Male who presents with a chief complaint of slurred speech (01 May 2021 11:08)      SUBJECTIVE / OVERNIGHT EVENTS: pt breathing better, no chest pain  ADDITIONAL REVIEW OF SYSTEMS: afebrile     MEDICATIONS  (STANDING):  aspirin enteric coated 81 milliGRAM(s) Oral daily  atorvastatin 40 milliGRAM(s) Oral at bedtime  buMETAnide 2 milliGRAM(s) Oral daily  chlorhexidine 2% Cloths 1 Application(s) Topical daily  clopidogrel Tablet 75 milliGRAM(s) Oral daily  DAPTOmycin IVPB 350 milliGRAM(s) IV Intermittent every 48 hours  dextrose 40% Gel 15 Gram(s) Oral once  dextrose 5%. 1000 milliLiter(s) (50 mL/Hr) IV Continuous <Continuous>  dextrose 5%. 1000 milliLiter(s) (100 mL/Hr) IV Continuous <Continuous>  dextrose 50% Injectable 25 Gram(s) IV Push once  dextrose 50% Injectable 12.5 Gram(s) IV Push once  dextrose 50% Injectable 25 Gram(s) IV Push once  glucagon  Injectable 1 milliGRAM(s) IntraMuscular once  heparin   Injectable 5000 Unit(s) SubCutaneous every 8 hours  hydrALAZINE 50 milliGRAM(s) Oral three times a day  insulin glargine Injectable (LANTUS) 40 Unit(s) SubCutaneous at bedtime  insulin lispro (ADMELOG) corrective regimen sliding scale   SubCutaneous three times a day before meals  insulin lispro Injectable (ADMELOG) 10 Unit(s) SubCutaneous three times a day before meals  insulin regular  human corrective regimen sliding scale   SubCutaneous every 6 hours  isosorbide   dinitrate Tablet (ISORDIL) 20 milliGRAM(s) Oral three times a day  metoprolol tartrate 25 milliGRAM(s) Oral two times a day    MEDICATIONS  (PRN):  melatonin 3 milliGRAM(s) Oral at bedtime PRN Insomnia      CAPILLARY BLOOD GLUCOSE      POCT Blood Glucose.: 409 mg/dL (01 May 2021 11:58)  POCT Blood Glucose.: 382 mg/dL (01 May 2021 08:34)  POCT Blood Glucose.: 385 mg/dL (01 May 2021 08:32)  POCT Blood Glucose.: 428 mg/dL (01 May 2021 05:34)  POCT Blood Glucose.: 478 mg/dL (30 Apr 2021 21:12)  POCT Blood Glucose.: 490 mg/dL (30 Apr 2021 21:10)  POCT Blood Glucose.: 440 mg/dL (30 Apr 2021 17:18)  POCT Blood Glucose.: 441 mg/dL (30 Apr 2021 17:15)    I&O's Summary    30 Apr 2021 07:01  -  01 May 2021 07:00  --------------------------------------------------------  IN: 100 mL / OUT: 2400 mL / NET: -2300 mL    01 May 2021 07:01  -  01 May 2021 13:53  --------------------------------------------------------  IN: 200 mL / OUT: 1200 mL / NET: -1000 mL        PHYSICAL EXAM:  Vital Signs Last 24 Hrs  T(C): 36.3 (01 May 2021 11:55), Max: 36.6 (01 May 2021 05:24)  T(F): 97.3 (01 May 2021 11:55), Max: 97.9 (01 May 2021 05:24)  HR: 60 (01 May 2021 11:55) (55 - 66)  BP: 170/73 (01 May 2021 11:55) (151/61 - 173/71)  BP(mean): --  RR: 17 (01 May 2021 11:55) (17 - 18)  SpO2: 99% (01 May 2021 11:55) (97% - 99%)  CONSTITUTIONAL: NAD, well-developed, well-groomed  EYES: EOMI; conjunctiva and sclera clear  ENMT: Moist oral mucosa  RESPIRATORY: Normal respiratory effort; lungs are clear to auscultation bilaterally  CARDIOVASCULAR: Regular rate and rhythm, normal S1 and S2, no murmur; No lower extremity edema  ABDOMEN: Nontender to palpation, normoactive bowel sounds, no rebound/guarding  MUSCULOSKELETAL:   no clubbing or cyanosis of digits; no joint swelling or tenderness to palpation  PSYCH: A+O to person, place, and time; affect appropriate  NEUROLOGY: CN 2-12 are intact and symmetric; no gross sensory deficits   SKIN: R foot with dressing c/d/i    LABS:                        10.3   19.99 )-----------( 438      ( 01 May 2021 08:03 )             30.2     05-01    132<L>  |  95<L>  |  101<H>  ----------------------------<  402<H>  4.2   |  20<L>  |  3.54<H>    Ca    9.0      01 May 2021 08:03  Phos  4.9     05-01  Mg     2.0     05-01      RADIOLOGY & ADDITIONAL TESTS:  Results Reviewed:   Imaging Personally Reviewed:  < from: NM SPECT Inflammaory Loc Single Area Single Day, Disc (05.01.21 @ 11:20) >  FINDINGS:  There is physiologic tracer distribution in the visualized structures. There is no abnormal renal accumulation to suggest the presence of interstitial nephritis.    IMPRESSION: Normal gallium scan.    No scan evidence to suggest the presence of interstitial nephritis.      Electrocardiogram Personally Reviewed:    COORDINATION OF CARE:  Care Discussed with Consultants/Other Providers [Y/N]: nephrology fellow, taper prednisone 10mg q3 days, gallium scan negative for AIN  Prior or Outpatient Records Reviewed [Y/N]:

## 2021-05-01 NOTE — CONSULT NOTE ADULT - PROBLEM SELECTOR RECOMMENDATION 2
-BP goal 130/80  -also found to be in acute CHF on Bumetanide 2mg daily   -Nephrology team also following, continue with current regimen including hydralazine, metoprolol and isosorbide dinitrate 20mg daily.

## 2021-05-01 NOTE — CONSULT NOTE ADULT - PROBLEM SELECTOR PROBLEM 1
ACOSTA (acute kidney injury)
Type 2 diabetes mellitus with other neurologic complication, with long-term current use of insulin

## 2021-05-01 NOTE — PROGRESS NOTE ADULT - ASSESSMENT
60-year-old male with HTN, IDDM2, PAD s/p RLE angioplasty, recent RLE 2nd digit distal amputation at [facility], presenting with sudden onset slurred speech that started on Wednesday night, persisted until presentation, found to be in acute HF, with NSTEMI and yoselyn.

## 2021-05-01 NOTE — PROGRESS NOTE ADULT - PROBLEM SELECTOR PLAN 5
Monitoring FS - likely elevated due to steroids   - endocrine consult appreciated, insulin adjusted, increased lantus to 40u hs and admelog to 10u actid, will adjust as steroid is being tapered   monitor FS

## 2021-05-01 NOTE — PROGRESS NOTE ADULT - PROBLEM SELECTOR PLAN 1
-stable off bipap, satting well on RA  -appreciate cardiology recs , cont asa/plavix, no plan for renal biopsy at this time  - now off heparin gtt   - TTE 4/26 reviewed LVEF 52% , Mild segmental LV systolic  dysfunction.  Hypokinesis of the basal to mid inferior wall.   monitor on tele, trend CE  strict I/Os, daily weights  - continue with oral bumex 2 mg qd as per renal -stable off bipap, satting well on RA  -appreciate cardiology recs , cont asa/plavix, no plan for renal biopsy at this time  - now off heparin gtt   - TTE 4/26 reviewed LVEF 52% , Mild segmental LV systolic  dysfunction.  Hypokinesis of the basal to mid inferior wall.   monitor on tele, trend CE  strict I/Os, daily weights  - continue with oral bumex 2 mg qd as per renal  -c/w coreg 6.125 mg bid, increase hydralazine to 50 mg tid for uncontrolled HTN

## 2021-05-01 NOTE — PROGRESS NOTE ADULT - PROBLEM SELECTOR PLAN 6
Losartan on hold in light of ACOSTA  Recent Rx for Nifedipine, unclear pt is taking as he has been non complaint with other meds and medical treatments like CPAP, plavix Losartan on hold in light of ACOSTA  Recent Rx for Nifedipine, unclear pt is taking as he has been non complaint with other meds and medical treatments like CPAP, plavix  Increase hydralazine to 50 mg tid

## 2021-05-01 NOTE — PROGRESS NOTE ADULT - PROBLEM SELECTOR PLAN 3
Cr improving , creat down to 3.54  appreciate renal recs - empiric steroids for AIN   on prednisone 60 mg qd, taper 10 mg every 3 days per renal  gallium scan normal, no evidence of AIN, case d/w renal fellow, scan was done after prednisone started on 4/28 and renal fxn improving on steroid so can't exclude AIN, will taper prednisone as above  changed cefepime to daptomycin   - baseline Cr last month was 1.7

## 2021-05-02 LAB
ANION GAP SERPL CALC-SCNC: 16 MMOL/L — HIGH (ref 7–14)
BUN SERPL-MCNC: 93 MG/DL — HIGH (ref 7–23)
CALCIUM SERPL-MCNC: 9.4 MG/DL — SIGNIFICANT CHANGE UP (ref 8.4–10.5)
CHLORIDE SERPL-SCNC: 98 MMOL/L — SIGNIFICANT CHANGE UP (ref 98–107)
CO2 SERPL-SCNC: 23 MMOL/L — SIGNIFICANT CHANGE UP (ref 22–31)
CREAT SERPL-MCNC: 2.87 MG/DL — HIGH (ref 0.5–1.3)
GLUCOSE BLDC GLUCOMTR-MCNC: 230 MG/DL — HIGH (ref 70–99)
GLUCOSE BLDC GLUCOMTR-MCNC: 325 MG/DL — HIGH (ref 70–99)
GLUCOSE BLDC GLUCOMTR-MCNC: 419 MG/DL — HIGH (ref 70–99)
GLUCOSE BLDC GLUCOMTR-MCNC: 432 MG/DL — HIGH (ref 70–99)
GLUCOSE BLDC GLUCOMTR-MCNC: 432 MG/DL — HIGH (ref 70–99)
GLUCOSE BLDC GLUCOMTR-MCNC: 468 MG/DL — CRITICAL HIGH (ref 70–99)
GLUCOSE BLDC GLUCOMTR-MCNC: 484 MG/DL — CRITICAL HIGH (ref 70–99)
GLUCOSE SERPL-MCNC: 251 MG/DL — HIGH (ref 70–99)
HCT VFR BLD CALC: 32.3 % — LOW (ref 39–50)
HGB BLD-MCNC: 11 G/DL — LOW (ref 13–17)
MAGNESIUM SERPL-MCNC: 2 MG/DL — SIGNIFICANT CHANGE UP (ref 1.6–2.6)
MCHC RBC-ENTMCNC: 28.4 PG — SIGNIFICANT CHANGE UP (ref 27–34)
MCHC RBC-ENTMCNC: 34.1 GM/DL — SIGNIFICANT CHANGE UP (ref 32–36)
MCV RBC AUTO: 83.5 FL — SIGNIFICANT CHANGE UP (ref 80–100)
NRBC # BLD: 0 /100 WBCS — SIGNIFICANT CHANGE UP
NRBC # FLD: 0 K/UL — SIGNIFICANT CHANGE UP
PHOSPHATE SERPL-MCNC: 4.6 MG/DL — HIGH (ref 2.5–4.5)
PLATELET # BLD AUTO: 442 K/UL — HIGH (ref 150–400)
POTASSIUM SERPL-MCNC: 4.1 MMOL/L — SIGNIFICANT CHANGE UP (ref 3.5–5.3)
POTASSIUM SERPL-SCNC: 4.1 MMOL/L — SIGNIFICANT CHANGE UP (ref 3.5–5.3)
RBC # BLD: 3.87 M/UL — LOW (ref 4.2–5.8)
RBC # FLD: 12.3 % — SIGNIFICANT CHANGE UP (ref 10.3–14.5)
SODIUM SERPL-SCNC: 137 MMOL/L — SIGNIFICANT CHANGE UP (ref 135–145)
WBC # BLD: 17.84 K/UL — HIGH (ref 3.8–10.5)
WBC # FLD AUTO: 17.84 K/UL — HIGH (ref 3.8–10.5)

## 2021-05-02 PROCEDURE — 99232 SBSQ HOSP IP/OBS MODERATE 35: CPT | Mod: GC

## 2021-05-02 PROCEDURE — 99232 SBSQ HOSP IP/OBS MODERATE 35: CPT

## 2021-05-02 PROCEDURE — 99233 SBSQ HOSP IP/OBS HIGH 50: CPT

## 2021-05-02 RX ORDER — NIFEDIPINE 30 MG
60 TABLET, EXTENDED RELEASE 24 HR ORAL DAILY
Refills: 0 | Status: DISCONTINUED | OUTPATIENT
Start: 2021-05-02 | End: 2021-05-05

## 2021-05-02 RX ORDER — INSULIN LISPRO 100/ML
16 VIAL (ML) SUBCUTANEOUS
Refills: 0 | Status: DISCONTINUED | OUTPATIENT
Start: 2021-05-02 | End: 2021-05-03

## 2021-05-02 RX ORDER — INSULIN GLARGINE 100 [IU]/ML
45 INJECTION, SOLUTION SUBCUTANEOUS AT BEDTIME
Refills: 0 | Status: DISCONTINUED | OUTPATIENT
Start: 2021-05-02 | End: 2021-05-03

## 2021-05-02 RX ADMIN — Medication 10 UNIT(S): at 12:56

## 2021-05-02 RX ADMIN — CHLORHEXIDINE GLUCONATE 1 APPLICATION(S): 213 SOLUTION TOPICAL at 11:34

## 2021-05-02 RX ADMIN — ISOSORBIDE DINITRATE 20 MILLIGRAM(S): 5 TABLET ORAL at 11:34

## 2021-05-02 RX ADMIN — Medication 12: at 18:11

## 2021-05-02 RX ADMIN — Medication 50 MILLIGRAM(S): at 22:25

## 2021-05-02 RX ADMIN — BUMETANIDE 2 MILLIGRAM(S): 0.25 INJECTION INTRAMUSCULAR; INTRAVENOUS at 04:58

## 2021-05-02 RX ADMIN — Medication 25 MILLIGRAM(S): at 18:12

## 2021-05-02 RX ADMIN — ATORVASTATIN CALCIUM 40 MILLIGRAM(S): 80 TABLET, FILM COATED ORAL at 22:25

## 2021-05-02 RX ADMIN — Medication 81 MILLIGRAM(S): at 11:34

## 2021-05-02 RX ADMIN — Medication 3 MILLIGRAM(S): at 22:25

## 2021-05-02 RX ADMIN — ISOSORBIDE DINITRATE 20 MILLIGRAM(S): 5 TABLET ORAL at 04:59

## 2021-05-02 RX ADMIN — DAPTOMYCIN 114 MILLIGRAM(S): 500 INJECTION, POWDER, LYOPHILIZED, FOR SOLUTION INTRAVENOUS at 22:51

## 2021-05-02 RX ADMIN — ISOSORBIDE DINITRATE 20 MILLIGRAM(S): 5 TABLET ORAL at 18:12

## 2021-05-02 RX ADMIN — Medication 10 UNIT(S): at 09:09

## 2021-05-02 RX ADMIN — HEPARIN SODIUM 5000 UNIT(S): 5000 INJECTION INTRAVENOUS; SUBCUTANEOUS at 13:01

## 2021-05-02 RX ADMIN — CLOPIDOGREL BISULFATE 75 MILLIGRAM(S): 75 TABLET, FILM COATED ORAL at 11:34

## 2021-05-02 RX ADMIN — Medication 16 UNIT(S): at 18:11

## 2021-05-02 RX ADMIN — Medication 50 MILLIGRAM(S): at 04:58

## 2021-05-02 RX ADMIN — Medication 4: at 09:10

## 2021-05-02 RX ADMIN — Medication 25 MILLIGRAM(S): at 04:58

## 2021-05-02 RX ADMIN — Medication 50 MILLIGRAM(S): at 13:01

## 2021-05-02 RX ADMIN — Medication 12: at 12:56

## 2021-05-02 RX ADMIN — Medication 4: at 22:26

## 2021-05-02 RX ADMIN — INSULIN GLARGINE 45 UNIT(S): 100 INJECTION, SOLUTION SUBCUTANEOUS at 22:26

## 2021-05-02 RX ADMIN — Medication 60 MILLIGRAM(S): at 11:34

## 2021-05-02 NOTE — PROGRESS NOTE ADULT - PROBLEM SELECTOR PLAN 5
Monitoring FS - likely elevated due to steroids   -A1c 8.3%  - endocrine consult appreciated, insulin adjusted, increased lantus to 40u hs and admelog to 10u actid, will adjust as steroid is being tapered   monitor FS

## 2021-05-02 NOTE — PROVIDER CONTACT NOTE (OTHER) - RECOMMENDATIONS
LOREN Huitron made aware, patient received admelog coverage per MAR during lunch
ACP Arnel paged,
Cover with Ademolog per insulin scale
AM meds administered as per order. Will recheck BP in an hour.
Provider to RN ok to go off monitor to floor
LOREN Huitron made aware, insulin coverage was readjusted earlier due to high blood glucose
ACP Yoli Elana made aware
Patient due for admelog insulin

## 2021-05-02 NOTE — PROVIDER CONTACT NOTE (OTHER) - BACKGROUND
Patient admitted 4/22 for sob. Patient on prednisone taper. Blood glucose 484, 468 repeated per order.

## 2021-05-02 NOTE — PROGRESS NOTE ADULT - PROBLEM SELECTOR PLAN 7
c/w ASA, statin  Right second toe partial amputation 4/12 at [facility], grew GBS alone  surgical site does not look infected but patient says he was on cefepime via PICC line for it.   ID f/u, changed to daptomycin, F/U ID re: abx duration and need for PICC c/w ASA, statin  Right second toe partial amputation 4/12 at [facility], grew GBS alone  surgical site does not look infected but patient says he was on cefepime via PICC line for it.   ID f/u, changed to daptomycin, c/w dapto through 5/24 per ID, will need PICC line pre-discharge

## 2021-05-02 NOTE — PROGRESS NOTE ADULT - SUBJECTIVE AND OBJECTIVE BOX
Chief Complaint: DM 2 with hyperglycemia, exacerbated by steroids     History: Patient seen at bedside. Patient noted to be eating lunch and drinking apple juice at time of visit -  mg/dl  Provided counseling regarding consistent carbohydrate diet and avoidance of juice, patient states he enjoys juice but will try to switch to water  Received Prednisone 50 mg this AM  He is agreeable to basal/bolus regimen for discharge home - was previously taking basal insulin pen only  Denies n/v, no other complaints    MEDICATIONS  (STANDING):  aspirin enteric coated 81 milliGRAM(s) Oral daily  atorvastatin 40 milliGRAM(s) Oral at bedtime  buMETAnide 2 milliGRAM(s) Oral daily  chlorhexidine 2% Cloths 1 Application(s) Topical daily  clopidogrel Tablet 75 milliGRAM(s) Oral daily  DAPTOmycin IVPB 350 milliGRAM(s) IV Intermittent every 48 hours  dextrose 40% Gel 15 Gram(s) Oral once  dextrose 5%. 1000 milliLiter(s) (50 mL/Hr) IV Continuous <Continuous>  dextrose 5%. 1000 milliLiter(s) (100 mL/Hr) IV Continuous <Continuous>  dextrose 50% Injectable 25 Gram(s) IV Push once  dextrose 50% Injectable 25 Gram(s) IV Push once  dextrose 50% Injectable 12.5 Gram(s) IV Push once  glucagon  Injectable 1 milliGRAM(s) IntraMuscular once  heparin   Injectable 5000 Unit(s) SubCutaneous every 8 hours  hydrALAZINE 50 milliGRAM(s) Oral three times a day  insulin glargine Injectable (LANTUS) 40 Unit(s) SubCutaneous at bedtime  insulin lispro (ADMELOG) corrective regimen sliding scale   SubCutaneous three times a day before meals  insulin lispro (ADMELOG) corrective regimen sliding scale   SubCutaneous at bedtime  insulin lispro Injectable (ADMELOG) 10 Unit(s) SubCutaneous three times a day before meals  isosorbide   dinitrate Tablet (ISORDIL) 20 milliGRAM(s) Oral three times a day  metoprolol tartrate 25 milliGRAM(s) Oral two times a day  NIFEdipine XL 60 milliGRAM(s) Oral daily  predniSONE   Tablet 50 milliGRAM(s) Oral daily    MEDICATIONS  (PRN):  melatonin 3 milliGRAM(s) Oral at bedtime PRN Insomnia    No Known Allergies    Review of Systems:  Cardiovascular: No chest pain  Respiratory: No SOB  GI: No nausea, vomiting  Endocrine: no hypoglycemia     PHYSICAL EXAM:  VITALS: T(C): 36.7 (05-02-21 @ 10:57)  T(F): 98.1 (05-02-21 @ 10:57), Max: 98.1 (05-02-21 @ 10:57)  HR: 74 (05-02-21 @ 10:57) (55 - 74)  BP: 141/63 (05-02-21 @ 10:57) (141/63 - 180/77)  RR:  (17 - 18)  SpO2:  (96% - 100%)  Wt(kg): --  GENERAL: NAD  EYES: No proptosis, no lid lag, anicteric  HEENT:  Atraumatic, Normocephalic, moist mucous membranes  RESPIRATORY: unlabored respirations   PSYCH: Alert and oriented x 3    CAPILLARY BLOOD GLUCOSE    POCT Blood Glucose.: 468 mg/dL (02 May 2021 12:01)  POCT Blood Glucose.: 484 mg/dL (02 May 2021 11:59)  POCT Blood Glucose.: 230 mg/dL (02 May 2021 08:40)  POCT Blood Glucose.: 266 mg/dL (01 May 2021 21:45)  POCT Blood Glucose.: 313 mg/dL (01 May 2021 17:23)      05-02    137  |  98  |  93<H>  ----------------------------<  251<H>  4.1   |  23  |  2.87<H>    EGFR if : 26<L>  EGFR if non : 22<L>    Ca    9.4      05-02  Mg     2.0     05-02  Phos  4.6     05-02        Thyroid Function Tests:  04-23 @ 04:36 TSH 1.58 FreeT4 -- T3 -- Anti TPO -- Anti Thyroglobulin Ab -- TSI --      A1C with Estimated Average Glucose Result: 8.3 % (04-23-21 @ 04:36)    Diet, Consistent Carbohydrate Renal w/Evening Snack:   DASH/TLC Sodium & Cholesterol Restricted (DASH) (04-23-21 @ 16:18)

## 2021-05-02 NOTE — PROGRESS NOTE ADULT - PROBLEM SELECTOR PLAN 1
-stable off bipap, satting well on RA  -appreciate cardiology recs , cont asa/plavix, no plan for renal biopsy at this time  - now off heparin gtt   - TTE 4/26 reviewed LVEF 52% , Mild segmental LV systolic  dysfunction.  Hypokinesis of the basal to mid inferior wall.   monitor on tele, trend CE  strict I/Os, daily weights  - continue with oral bumex 2 mg qd as per renal  -c/w coreg 6.125 mg bid, increased hydralazine to 50 mg tid for uncontrolled HTN, c/w isordil 20 mg tid

## 2021-05-02 NOTE — PROGRESS NOTE ADULT - ASSESSMENT
HOLLY FITZGERALD is a 60-year-old male with HTN, IDDM2, PAD s/p RLE angioplasty, recent RLE 2nd digit distal amputation at [facility], presenting with sudden onset slurred speech that started on Wednesday night, persisted until presentation, found to be in acute HF, with NSTEMI and ACOSTA, endocrine consulted for uncontrolled Type 2 DM.  Patient is on prednisone 60mg daily with plan to decrease prednisone 10mg every 3 days.      1. Type 2 diabetes mellitus with other neurologic complication, with long-term current use of insulin  Patient with HbA1c 8.3%, on basal insulin only at home (no pre-meal insulin, no orals). Goal A1c less than 7%  Here with hyperglycemia exacerbated by steroids, on Prednisone taper    While inpatient:  Inpatient BG target 100-180 mg/dl  Severe hyperglycemia this afternoon, exacerbated by steroids and drinking apple juice   Would recommend rechecking BG in 2 hours (after last Admelog dose given) to ensure hyperglycemia is resolving with correction  Recommend to increase Admelog to 16 units SQ TID before meals (Hold if NPO/not eating meal)  Increase Lantus to 45 units SQ qHS   Continue Admelog MODERATE dose correctional scale before meals, moderate dose at bedtime  Check BG before meals and bedtime  Consistent carbohydrate diet  Consider nutritional consultation for dietary education  Recommend DM education through RN staff (see physical to RN order)    Discharge Plan:  Will plan to resume basal insulin pen (Lantus) and ADD rapid acting insulin pen before meals, dose TBD   Please check which rapid acting insulin pen is covered by insurance. Options are Humalog, Novolog, Admelog, Apidra, Fiasp  Discussed with patient the importance of Carb consistent diet and exercise as tolerated.    Discussed glycemic goal of a1c <7% to prevent microvascular complications of diabetes mellitus and reduce the risk of macrovascular complications.   Recommend followup with PCP, endocrinology, podiatry and ophthalmology as outpatient  Patient states he prefers to followup with his PCP for DM  Encouraged to call for endocrine followup or obtain referral from PCP - please provide the following information on the discharge plan   CALL PATIENT ACCESS SERVICES  [phone]  For all [facility] Endocrine Practices phone numbers and locations throughout [facility], and [place].      2. Essential hypertension  BP goal 130/80  Found to be in acute CHF on Bumetanide 2mg daily   Also on hydralazine, metoprolol and isosorbide dinitrate, medication management per primary team/cardiology    3. Hyperlipidemia LDL goal <70  Continue with statin therapy, consider up-titration, last LDL above goal    Bisi Anderson  Nurse Practitioner  Division of Endocrinology & Diabetes  In house pager #[phone]/long range pager #[phone]    If before 9AM or after 6PM, or on weekends/holidays, please call endocrine answering service for assistance ([phone]).  For nonurgent matters email [email or web address] for assistance.

## 2021-05-02 NOTE — PROGRESS NOTE ADULT - SUBJECTIVE AND OBJECTIVE BOX
[facility] DIVISION OF KIDNEY DISEASES AND HYPERTENSION -- FOLLOW UP NOTE  --------------------------------------------------------------------------------  HPI: 60 year-old male with HTN, DM2, PAD s/p RLE angioplasty, recent RLE 2nd digit distal amputation at [facility], was admitted to [facility] on 4/22/21 for NSTEMI and acute CHF. Nephrology team consulted for renal failure. Patient reported that before admission he was on IV Cefepime and complained of new rash. [facility] Scr on admission was 2.15. Scr progressively increased to 5.09 on 4/28/21. Pt. started on Prednisone for suspected AIN. Scr has improved to 2.87 today.    Pt. evaluated at bedside, offers no complaints. Reports feeling good. Underwent gallium scan yesterday which was negative for AIN however pt. had all ready received 4 days of treatment prior.    PAST HISTORY  --------------------------------------------------------------------------------  No significant changes to PMH, PSH, FHx, SHx, unless otherwise noted    ALLERGIES & MEDICATIONS  --------------------------------------------------------------------------------  Allergies    No Known Allergies    Intolerances    Standing Inpatient Medications  aspirin enteric coated 81 milliGRAM(s) Oral daily  atorvastatin 40 milliGRAM(s) Oral at bedtime  buMETAnide 2 milliGRAM(s) Oral daily  chlorhexidine 2% Cloths 1 Application(s) Topical daily  clopidogrel Tablet 75 milliGRAM(s) Oral daily  DAPTOmycin IVPB 350 milliGRAM(s) IV Intermittent every 48 hours  glucagon  Injectable 1 milliGRAM(s) IntraMuscular once  heparin   Injectable 5000 Unit(s) SubCutaneous every 8 hours  hydrALAZINE 50 milliGRAM(s) Oral three times a day  insulin glargine Injectable (LANTUS) 40 Unit(s) SubCutaneous at bedtime  insulin lispro (ADMELOG) corrective regimen sliding scale   SubCutaneous at bedtime  insulin lispro (ADMELOG) corrective regimen sliding scale   SubCutaneous three times a day before meals  insulin lispro Injectable (ADMELOG) 10 Unit(s) SubCutaneous three times a day before meals  isosorbide   dinitrate Tablet (ISORDIL) 20 milliGRAM(s) Oral three times a day  metoprolol tartrate 25 milliGRAM(s) Oral two times a day  predniSONE   Tablet 50 milliGRAM(s) Oral daily    REVIEW OF SYSTEMS  --------------------------------------------------------------------------------  Gen: no lethargy  Respiratory: No dyspnea  CV: No chest pain  GI: No abdominal pain  MSK: no LE edema  Neuro: No dizziness  Heme: No bleeding    All other systems were reviewed and are negative, except as noted.    VITALS/PHYSICAL EXAM  --------------------------------------------------------------------------------  T(C): 36.6 (05-02-21 @ 07:12), Max: 36.7 (05-01-21 @ 21:25)  HR: 55 (05-02-21 @ 07:12) (55 - 64)  BP: 151/64 (05-02-21 @ 07:12) (151/64 - 180/77)  RR: 18 (05-02-21 @ 07:12) (17 - 18)  SpO2: 99% (05-02-21 @ 07:12) (98% - 100%)  Wt(kg): --    05-01-21 @ 07:01  -  05-02-21 @ 07:00  --------------------------------------------------------  IN: 680 mL / OUT: 2300 mL / NET: -1620 mL    05-02-21 @ 07:01  -  05-02-21 @ 09:28  --------------------------------------------------------  IN: 0 mL / OUT: 450 mL / NET: -450 mL    Physical Exam:  	Gen: NAD  	HEENT: MMM  	Pulm: CTA B/L  	CV: S1S2  	Abd: Soft, +BS   	Ext: no LE edema  	Neuro: Awake, A&O x3  	Skin: no rash              : no suprapubic tenderness to palpation     LABS/STUDIES  --------------------------------------------------------------------------------              11.0   17.84 >-----------<  442      [05-02-21 @ 06:31]              32.3     137  |  98  |  93  ----------------------------<  251      [05-02-21 @ 06:31]  4.1   |  23  |  2.87        Ca     9.4     [05-02-21 @ 06:31]      Mg     2.0     [05-02-21 @ 06:31]      Phos  4.6     [05-02-21 @ 06:31]    Creatinine Trend:  SCr 2.87 [05-02 @ 06:31]  SCr 3.54 [05-01 @ 08:03]  SCr 4.28 [04-30 @ 07:56]  SCr 4.95 [04-29 @ 06:56]  SCr 5.09 [04-28 @ 07:08]

## 2021-05-02 NOTE — PROGRESS NOTE ADULT - PROBLEM SELECTOR PLAN 1
Pt. with non oliguric ACOSTA in the setting of cefepime, rash and eosinophilia. No prior labs available for review on [facility]. Scr on arrival was 2.15 which progressively increased to 5.09 on 4/28/21. Cefepime discontinued and pt. started on Prednisone on 4/28/21 for suspected AIN. Scr has improved to 2.87 today. Pt. now on steroid taper (decreasing Prednisone by 10 mg every 3 days). Monitor labs and urine output. Dose medications as per eGFR. Strict glycemic control. Avoid potential nephrotoxins.    Patient has follow up appointment with Dr. Zambrano when discharge ready (May 10 at 12 PM).  If any questions, please feel free to contact me  Fredis Gallego   Nephrology Fellow  [phone]  (After 5 pm or on weekends use Amion for fellow on call)

## 2021-05-02 NOTE — PROGRESS NOTE ADULT - SUBJECTIVE AND OBJECTIVE BOX
Dr. Tracee Verdugo  Pager [phone]    PROGRESS NOTE:     Patient is a 60y old  Male who presents with a chief complaint of slurred speech (02 May 2021 09:28)      SUBJECTIVE / OVERNIGHT EVENTS: pt feels better, no chest pain/sob  ADDITIONAL REVIEW OF SYSTEMS: afebrile     MEDICATIONS  (STANDING):  aspirin enteric coated 81 milliGRAM(s) Oral daily  atorvastatin 40 milliGRAM(s) Oral at bedtime  buMETAnide 2 milliGRAM(s) Oral daily  chlorhexidine 2% Cloths 1 Application(s) Topical daily  clopidogrel Tablet 75 milliGRAM(s) Oral daily  DAPTOmycin IVPB 350 milliGRAM(s) IV Intermittent every 48 hours  dextrose 40% Gel 15 Gram(s) Oral once  dextrose 5%. 1000 milliLiter(s) (50 mL/Hr) IV Continuous <Continuous>  dextrose 5%. 1000 milliLiter(s) (100 mL/Hr) IV Continuous <Continuous>  dextrose 50% Injectable 25 Gram(s) IV Push once  dextrose 50% Injectable 12.5 Gram(s) IV Push once  dextrose 50% Injectable 25 Gram(s) IV Push once  glucagon  Injectable 1 milliGRAM(s) IntraMuscular once  heparin   Injectable 5000 Unit(s) SubCutaneous every 8 hours  hydrALAZINE 50 milliGRAM(s) Oral three times a day  insulin glargine Injectable (LANTUS) 40 Unit(s) SubCutaneous at bedtime  insulin lispro (ADMELOG) corrective regimen sliding scale   SubCutaneous at bedtime  insulin lispro (ADMELOG) corrective regimen sliding scale   SubCutaneous three times a day before meals  insulin lispro Injectable (ADMELOG) 10 Unit(s) SubCutaneous three times a day before meals  isosorbide   dinitrate Tablet (ISORDIL) 20 milliGRAM(s) Oral three times a day  metoprolol tartrate 25 milliGRAM(s) Oral two times a day  predniSONE   Tablet 50 milliGRAM(s) Oral daily    MEDICATIONS  (PRN):  melatonin 3 milliGRAM(s) Oral at bedtime PRN Insomnia      CAPILLARY BLOOD GLUCOSE      POCT Blood Glucose.: 230 mg/dL (02 May 2021 08:40)  POCT Blood Glucose.: 266 mg/dL (01 May 2021 21:45)  POCT Blood Glucose.: 313 mg/dL (01 May 2021 17:23)  POCT Blood Glucose.: 409 mg/dL (01 May 2021 11:58)    I&O's Summary    01 May 2021 07:01  -  02 May 2021 07:00  --------------------------------------------------------  IN: 680 mL / OUT: 2300 mL / NET: -1620 mL    02 May 2021 07:01  -  02 May 2021 10:47  --------------------------------------------------------  IN: 0 mL / OUT: 450 mL / NET: -450 mL        PHYSICAL EXAM:  Vital Signs Last 24 Hrs  T(C): 36.6 (02 May 2021 07:12), Max: 36.7 (01 May 2021 21:25)  T(F): 97.8 (02 May 2021 07:12), Max: 98 (01 May 2021 21:25)  HR: 55 (02 May 2021 07:12) (55 - 64)  BP: 151/64 (02 May 2021 07:12) (151/64 - 180/77)  BP(mean): --  RR: 18 (02 May 2021 07:12) (17 - 18)  SpO2: 99% (02 May 2021 07:12) (98% - 100%)  CONSTITUTIONAL: NAD, well-developed, well-groomed  EYES: EOMI; conjunctiva and sclera clear  ENMT: Moist oral mucosa  RESPIRATORY: Normal respiratory effort; lungs are clear to auscultation bilaterally  CARDIOVASCULAR: Regular rate and rhythm, normal S1 and S2, no murmur; No lower extremity edema  ABDOMEN: Nontender to palpation, normoactive bowel sounds, no rebound/guarding  MUSCULOSKELETAL:   no clubbing or cyanosis of digits; no joint swelling or tenderness to palpation  PSYCH: A+O to person, place, and time; affect appropriate  NEUROLOGY: CN 2-12 are intact and symmetric; no gross sensory deficits   SKIN: R foot with dressing c/d/i      LABS:                        11.0   17.84 )-----------( 442      ( 02 May 2021 06:31 )             32.3     05-02    137  |  98  |  93<H>  ----------------------------<  251<H>  4.1   |  23  |  2.87<H>    Ca    9.4      02 May 2021 06:31  Phos  4.6     05-02  Mg     2.0     05-02      RADIOLOGY & ADDITIONAL TESTS:  Results Reviewed:   Imaging Personally Reviewed:  Electrocardiogram Personally Reviewed:    COORDINATION OF CARE:  Care Discussed with Consultants/Other Providers [Y/N]:  Prior or Outpatient Records Reviewed [Y/N]:

## 2021-05-02 NOTE — PROGRESS NOTE ADULT - ASSESSMENT
60-year-old male with HTN, IDDM2, PAD s/p RLE angioplasty, recent RLE 2nd digit distal amputation at [facility], presenting with sudden onset slurred speech that started on Wednesday night, persisted until presentation, found to be in acute HF, with NSTEMI and yoselyn, likely AIN improving on prednisone

## 2021-05-02 NOTE — PROGRESS NOTE ADULT - PROBLEM SELECTOR PLAN 2
s/p asa and plavix load  appreciate cards recs, thought to be secondary to demand in setting of acute HF  - as per PCP pt does not have stents  -c/w asa/plavix/statin/beta blocker

## 2021-05-02 NOTE — PROGRESS NOTE ADULT - PROBLEM SELECTOR PLAN 6
Losartan on hold in light of ACOSTA  Recent Rx for Nifedipine, unclear pt is taking as he has been non complaint with other meds and medical treatments like CPAP, plavix  Increased hydralazine to 50 mg tid, add procardia 60 mg qd, monitor BP

## 2021-05-02 NOTE — PROGRESS NOTE ADULT - PROBLEM SELECTOR PLAN 3
Cr improving , creat down from peak 5.0 to 2.8 today  appreciate renal recs - empiric steroids for AIN   started on prednisone 60 mg, now tapered to 50mg, taper 10 mg every 3 days per renal  gallium scan normal, no evidence of AIN, case d/w renal fellow, scan was done after prednisone started on 4/28 and renal fxn improving on steroid so can't exclude AIN, will taper prednisone as above  changed cefepime to daptomycin   - baseline Cr last month was 1.7 Cr improving , creat down from peak 5.0 to 2.8 today  appreciate renal recs - empiric steroids for AIN   started on prednisone 60 mg, now tapered to 50mg, taper 10 mg every 3 days per renal  gallium scan normal, no evidence of AIN, case d/w renal fellow, scan was done after prednisone started on 4/28 and renal fxn improving on steroid so can't exclude AIN, will taper prednisone as above  changed cefepime to daptomycin   - baseline Cr last month was 1.7  -outpt f/u nephrology Dr. Zambrano on May 10 at 12 PM

## 2021-05-03 LAB
ANION GAP SERPL CALC-SCNC: 16 MMOL/L — HIGH (ref 7–14)
BUN SERPL-MCNC: 99 MG/DL — HIGH (ref 7–23)
CALCIUM SERPL-MCNC: 9 MG/DL — SIGNIFICANT CHANGE UP (ref 8.4–10.5)
CHLORIDE SERPL-SCNC: 97 MMOL/L — LOW (ref 98–107)
CO2 SERPL-SCNC: 22 MMOL/L — SIGNIFICANT CHANGE UP (ref 22–31)
CREAT SERPL-MCNC: 3.15 MG/DL — HIGH (ref 0.5–1.3)
GLUCOSE BLDC GLUCOMTR-MCNC: 254 MG/DL — HIGH (ref 70–99)
GLUCOSE BLDC GLUCOMTR-MCNC: 255 MG/DL — HIGH (ref 70–99)
GLUCOSE BLDC GLUCOMTR-MCNC: 332 MG/DL — HIGH (ref 70–99)
GLUCOSE BLDC GLUCOMTR-MCNC: 354 MG/DL — HIGH (ref 70–99)
GLUCOSE SERPL-MCNC: 237 MG/DL — HIGH (ref 70–99)
HCT VFR BLD CALC: 32.4 % — LOW (ref 39–50)
HGB BLD-MCNC: 11 G/DL — LOW (ref 13–17)
MAGNESIUM SERPL-MCNC: 1.6 MG/DL — SIGNIFICANT CHANGE UP (ref 1.6–2.6)
MCHC RBC-ENTMCNC: 28.1 PG — SIGNIFICANT CHANGE UP (ref 27–34)
MCHC RBC-ENTMCNC: 34 GM/DL — SIGNIFICANT CHANGE UP (ref 32–36)
MCV RBC AUTO: 82.9 FL — SIGNIFICANT CHANGE UP (ref 80–100)
NRBC # BLD: 0 /100 WBCS — SIGNIFICANT CHANGE UP
NRBC # FLD: 0 K/UL — SIGNIFICANT CHANGE UP
PHOSPHATE SERPL-MCNC: 4.4 MG/DL — SIGNIFICANT CHANGE UP (ref 2.5–4.5)
PLATELET # BLD AUTO: 468 K/UL — HIGH (ref 150–400)
POTASSIUM SERPL-MCNC: 4.1 MMOL/L — SIGNIFICANT CHANGE UP (ref 3.5–5.3)
POTASSIUM SERPL-SCNC: 4.1 MMOL/L — SIGNIFICANT CHANGE UP (ref 3.5–5.3)
RBC # BLD: 3.91 M/UL — LOW (ref 4.2–5.8)
RBC # FLD: 12.3 % — SIGNIFICANT CHANGE UP (ref 10.3–14.5)
SODIUM SERPL-SCNC: 135 MMOL/L — SIGNIFICANT CHANGE UP (ref 135–145)
WBC # BLD: 19.22 K/UL — HIGH (ref 3.8–10.5)
WBC # FLD AUTO: 19.22 K/UL — HIGH (ref 3.8–10.5)

## 2021-05-03 PROCEDURE — 99233 SBSQ HOSP IP/OBS HIGH 50: CPT

## 2021-05-03 PROCEDURE — 99232 SBSQ HOSP IP/OBS MODERATE 35: CPT

## 2021-05-03 PROCEDURE — 99232 SBSQ HOSP IP/OBS MODERATE 35: CPT | Mod: GC

## 2021-05-03 RX ORDER — INSULIN GLARGINE 100 [IU]/ML
52 INJECTION, SOLUTION SUBCUTANEOUS AT BEDTIME
Refills: 0 | Status: DISCONTINUED | OUTPATIENT
Start: 2021-05-03 | End: 2021-05-04

## 2021-05-03 RX ORDER — INSULIN LISPRO 100/ML
20 VIAL (ML) SUBCUTANEOUS
Refills: 0 | Status: DISCONTINUED | OUTPATIENT
Start: 2021-05-03 | End: 2021-05-04

## 2021-05-03 RX ADMIN — Medication 50 MILLIGRAM(S): at 14:13

## 2021-05-03 RX ADMIN — Medication 6: at 09:12

## 2021-05-03 RX ADMIN — Medication 25 MILLIGRAM(S): at 05:31

## 2021-05-03 RX ADMIN — ISOSORBIDE DINITRATE 20 MILLIGRAM(S): 5 TABLET ORAL at 05:31

## 2021-05-03 RX ADMIN — Medication 81 MILLIGRAM(S): at 12:59

## 2021-05-03 RX ADMIN — HEPARIN SODIUM 5000 UNIT(S): 5000 INJECTION INTRAVENOUS; SUBCUTANEOUS at 14:13

## 2021-05-03 RX ADMIN — CLOPIDOGREL BISULFATE 75 MILLIGRAM(S): 75 TABLET, FILM COATED ORAL at 12:59

## 2021-05-03 RX ADMIN — ISOSORBIDE DINITRATE 20 MILLIGRAM(S): 5 TABLET ORAL at 14:37

## 2021-05-03 RX ADMIN — Medication 60 MILLIGRAM(S): at 05:31

## 2021-05-03 RX ADMIN — Medication 50 MILLIGRAM(S): at 21:44

## 2021-05-03 RX ADMIN — ATORVASTATIN CALCIUM 40 MILLIGRAM(S): 80 TABLET, FILM COATED ORAL at 21:44

## 2021-05-03 RX ADMIN — Medication 16 UNIT(S): at 09:11

## 2021-05-03 RX ADMIN — HEPARIN SODIUM 5000 UNIT(S): 5000 INJECTION INTRAVENOUS; SUBCUTANEOUS at 21:44

## 2021-05-03 RX ADMIN — ISOSORBIDE DINITRATE 20 MILLIGRAM(S): 5 TABLET ORAL at 21:44

## 2021-05-03 RX ADMIN — BUMETANIDE 2 MILLIGRAM(S): 0.25 INJECTION INTRAMUSCULAR; INTRAVENOUS at 05:31

## 2021-05-03 RX ADMIN — Medication 20 UNIT(S): at 17:50

## 2021-05-03 RX ADMIN — Medication 50 MILLIGRAM(S): at 05:31

## 2021-05-03 RX ADMIN — Medication 3 MILLIGRAM(S): at 21:44

## 2021-05-03 RX ADMIN — HEPARIN SODIUM 5000 UNIT(S): 5000 INJECTION INTRAVENOUS; SUBCUTANEOUS at 05:31

## 2021-05-03 RX ADMIN — Medication 16 UNIT(S): at 12:58

## 2021-05-03 RX ADMIN — CHLORHEXIDINE GLUCONATE 1 APPLICATION(S): 213 SOLUTION TOPICAL at 12:59

## 2021-05-03 RX ADMIN — Medication 2: at 21:44

## 2021-05-03 RX ADMIN — INSULIN GLARGINE 52 UNIT(S): 100 INJECTION, SOLUTION SUBCUTANEOUS at 21:45

## 2021-05-03 RX ADMIN — Medication 10: at 17:49

## 2021-05-03 RX ADMIN — Medication 25 MILLIGRAM(S): at 17:49

## 2021-05-03 RX ADMIN — Medication 8: at 12:57

## 2021-05-03 NOTE — PROGRESS NOTE ADULT - ASSESSMENT
HOLLY FITZGERALD is a 60-year-old male with HTN, IDDM2, PAD s/p RLE angioplasty, recent RLE 2nd digit distal amputation at [facility], presenting with sudden onset slurred speech that started on Wednesday night, persisted until presentation, found to be in acute HF, with NSTEMI and ACOSTA, endocrine consulted for uncontrolled Type 2 DM.  Patient is on prednisone with steroid exacerbated hyperglycemia.    1. Type 2 diabetes mellitus with other neurologic complication, with long-term current use of insulin  Patient with HbA1c 8.3%, on basal insulin only at home (no pre-meal insulin, no orals). Goal A1c less than 7%  Here with hyperglycemia exacerbated by steroids, on Prednisone taper    While inpatient:  Inpatient BG target 100-180 mg/dl, remains above goal  currently on prednisone 50mg daily  Recommend to increase Admelog to 20 units SQ TID before meals (Hold if NPO/not eating meal)  Increase Lantus to 52 units SQ qHS   Continue Admelog MODERATE dose correctional scale before meals, moderate dose at bedtime  Check BG before meals and bedtime  Consistent carbohydrate diet  Consider nutritional consultation for dietary education  Recommend DM education through RN staff     Discharge Plan:  Will plan to resume basal insulin pen (Lantus) and ADD rapid acting insulin pen before meals, dose TBD   Will need less insulin as prednisone dose is lowered. Clarify steroid plan for dc.  Please check which rapid acting insulin pen is covered by insurance. Options are Humalog, Novolog, Admelog, Apidra, Fiasp  Patient prefers to follow with PCP.  If interested can follow up with [facility] endocrinology [phone].    2. Essential hypertension  BP goal 130/80  Found to be in acute CHF on Bumetanide 2mg daily   Also on hydralazine, metoprolol and isosorbide dinitrate, medication management per primary team/cardiology    3. Hyperlipidemia LDL goal <70  Continue with statin therapy, consider up-titration, last LDL above goal    Emerson Rivers MD  Division of Endocrinology  Pager: [phone]    If after 6PM or before 9AM, or on weekends/holidays, please call endocrine answering service for assistance ([phone]).  For nonurgent matters email [email or web address] for assistance.

## 2021-05-03 NOTE — PROGRESS NOTE ADULT - PROBLEM SELECTOR PLAN 1
-stable off bipap, satting well on RA  -appreciate cardiology recs , cont asa/plavix, no plan for renal biopsy at this time  - now off heparin gtt   - TTE 4/26 reviewed LVEF 52% , Mild segmental LV systolic  dysfunction.  Hypokinesis of the basal to mid inferior wall.   monitor on tele, trend CE  strict I/Os, daily weights  - oral bumex 2 mg as per renal - would discuss with renal about d/c   -c/w coreg 6.125 mg bid, increased hydralazine to 50 mg tid for uncontrolled HTN, c/w isordil 20 mg tid

## 2021-05-03 NOTE — PROVIDER CONTACT NOTE (OTHER) - ASSESSMENT
/49; pt asymptomatic.
Asymptomatic
Patient a&Ox4, denies any complaints
Patient a&Ox4, no complaints noted
bp 185/78
patient stated he feels very out of breath stating 94% on room air
Patient remains a&Ox4, denies any complaints at this time
asymptomatic
No events on tele, vital signs stable
Patient asymptomatic, resting comfortably in bed. BP meds due now
Patient a&Ox4, denies any complaints at this time
Patient a&Ox4, denies any complaints at this time

## 2021-05-03 NOTE — PROGRESS NOTE ADULT - ASSESSMENT
61 yo m s/p right foot partial 2nd toe amp (DOS 4/10 at [facility])  -pt seen and evaluated  -s/p right foot partial 2nd toe amputation, right foot 2nd toe surgical site well coapted, with distal dry eschar and ischemic changes  -new dressing applied, keep dressing clean, dry and intact   -case discussed with ID, rec appreciated   -follow up with own podiatrist after discharge

## 2021-05-03 NOTE — PROGRESS NOTE ADULT - PROBLEM SELECTOR PLAN 5
Monitoring FS - likely elevated due to steroids   -A1c 8.3%  - endocrine consult appreciated, insulin adjusted, increased lantus to 40u hs and admelog to 10u ac tid, will adjust as steroid is being tapered   monitor FS

## 2021-05-03 NOTE — PROGRESS NOTE ADULT - ASSESSMENT
60-year-old male with HTN, IDDM2, PAD s/p RLE angioplasty, recent RLE 2nd digit distal amputation at [facility], presenting with sudden onset slurred speech that started on Wednesday night, persisted until presentation, found to be in acute HF, with NSTEMI and yoselyn, likely AIN improving on prednisone

## 2021-05-03 NOTE — PROGRESS NOTE ADULT - SUBJECTIVE AND OBJECTIVE BOX
Chief Complaint: DM2 on prednisone    History: tolerating po  no hypoglycemia events or symptoms    MEDICATIONS  (STANDING):  aspirin enteric coated 81 milliGRAM(s) Oral daily  atorvastatin 40 milliGRAM(s) Oral at bedtime  chlorhexidine 2% Cloths 1 Application(s) Topical daily  clopidogrel Tablet 75 milliGRAM(s) Oral daily  DAPTOmycin IVPB 350 milliGRAM(s) IV Intermittent every 48 hours  dextrose 40% Gel 15 Gram(s) Oral once  dextrose 5%. 1000 milliLiter(s) (50 mL/Hr) IV Continuous <Continuous>  dextrose 5%. 1000 milliLiter(s) (100 mL/Hr) IV Continuous <Continuous>  dextrose 50% Injectable 25 Gram(s) IV Push once  dextrose 50% Injectable 12.5 Gram(s) IV Push once  dextrose 50% Injectable 25 Gram(s) IV Push once  glucagon  Injectable 1 milliGRAM(s) IntraMuscular once  heparin   Injectable 5000 Unit(s) SubCutaneous every 8 hours  hydrALAZINE 50 milliGRAM(s) Oral three times a day  insulin glargine Injectable (LANTUS) 45 Unit(s) SubCutaneous at bedtime  insulin lispro (ADMELOG) corrective regimen sliding scale   SubCutaneous three times a day before meals  insulin lispro (ADMELOG) corrective regimen sliding scale   SubCutaneous at bedtime  insulin lispro Injectable (ADMELOG) 16 Unit(s) SubCutaneous three times a day before meals  isosorbide   dinitrate Tablet (ISORDIL) 20 milliGRAM(s) Oral three times a day  metoprolol tartrate 25 milliGRAM(s) Oral two times a day  NIFEdipine XL 60 milliGRAM(s) Oral daily  predniSONE   Tablet 50 milliGRAM(s) Oral daily    MEDICATIONS  (PRN):  melatonin 3 milliGRAM(s) Oral at bedtime PRN Insomnia      Allergies    No Known Allergies    Intolerances      Review of Systems:    ALL OTHER SYSTEMS REVIEWED AND NEGATIVE    PHYSICAL EXAM:  VITALS: T(C): 36.5 (05-03-21 @ 14:11)  T(F): 97.7 (05-03-21 @ 14:11), Max: 98.2 (05-02-21 @ 18:10)  HR: 78 (05-03-21 @ 15:33) (59 - 84)  BP: 148/67 (05-03-21 @ 14:11) (135/49 - 158/77)  RR:  (17 - 18)  SpO2:  (97% - 100%)  Wt(kg): --  GENERAL: NAD, well-groomed, well-developed  EYES: No proptosis, no lid lag, anicteric  HEENT:  Atraumatic, Normocephalic, moist mucous membranes  RESPIRATORY: nonlabored respirations  PSYCH: Alert and oriented x 3, normal affect, normal mood    CAPILLARY BLOOD GLUCOSE      POCT Blood Glucose.: 332 mg/dL (03 May 2021 12:21)  POCT Blood Glucose.: 254 mg/dL (03 May 2021 08:31)  POCT Blood Glucose.: 325 mg/dL (02 May 2021 22:19)  POCT Blood Glucose.: 432 mg/dL (02 May 2021 17:17)  POCT Blood Glucose.: 419 mg/dL (02 May 2021 17:15)      05-03    135  |  97<L>  |  99<H>  ----------------------------<  237<H>  4.1   |  22  |  3.15<H>    EGFR if : 23<L>  EGFR if non : 20<L>    Ca    9.0      05-03  Mg     1.6     05-03  Phos  4.4     05-03        A1C with Estimated Average Glucose Result: 8.3 % (04-23-21 @ 04:36)      Thyroid Function Tests:  04-23 @ 04:36 TSH 1.58 FreeT4 -- T3 -- Anti TPO -- Anti Thyroglobulin Ab -- TSI --

## 2021-05-03 NOTE — PROVIDER CONTACT NOTE (OTHER) - REASON
Patient OK to go off tele to floor?
Blood glucose 419
Blood glucose 484
hyperglycemia
High blood glucose
bp above parameters
Blood glucose 409
Hypertensive
Low Diastolic BP
patient complaining of shortness of breath
High blood glucose

## 2021-05-03 NOTE — PROVIDER CONTACT NOTE (OTHER) - SITUATION
patient complaining of shortness of breath
Patient blood glucose 409
Low Diastolic BP
Pt. BG was 428, asymptomatic, on PO steroids
Patient with blood glucose 484. Repeat 468
routine vitals
/77
FS >400
Patient with blood glucose 419, repeat 432
Patient with high blood glucose 385, repeat 382.
Patient requiring off tele order to go up to floor [address]
Patient with high blood glucose 432

## 2021-05-03 NOTE — PROVIDER CONTACT NOTE (OTHER) - NAME OF MD/NP/PA/DO NOTIFIED:
LOREN Fitch
Deandra Casarez
JAY MARIO
LOREN Seals
LOREN Seals
ACP Bharat Elana
ACP Yoli Elana
Karena Fink ACP
[facility] Vivienne Rodriguez [id number]
ACP Chitra
ACP Chitra Carol
ACP Yoli Elana

## 2021-05-03 NOTE — PROGRESS NOTE ADULT - PROBLEM SELECTOR PLAN 1
Pt. with non oliguric ACOSTA in the setting of cefepime, rash and eosinophilia. No prior labs available for review on [facility]. Scr on arrival was 2.15 which progressively increased to 5.09 on 4/28/21. Cefepime discontinued and pt. started on Prednisone on 4/28/21 for suspected AIN. Scr has improved to 3.15 today. Pt. on steroid taper (decreasing Prednisone by 10 mg every 3 days). Monitor labs and urine output. Dose medications as per eGFR. Strict glycemic control. Avoid potential nephrotoxins.    Patient has follow up appointment with Dr. Zambrano when discharge ready (May 10 at 12 PM).  If any questions, please feel free to contact me  Fredis Gallego   Nephrology Fellow  [phone]  (After 5 pm or on weekends use Amion for fellow on call) Pt. with non-oliguric ACOSTA in the setting of cefepime, rash and eosinophilia. No prior labs available for review on [facility]. Scr on arrival was 2.15 which progressively increased to 5.09 on 4/28/21. Cefepime discontinued and pt. started on Prednisone on 4/28/21 for suspected AIN. Scr has improved to 3.15 today. Pt. on steroid taper (decreasing Prednisone by 10 mg every 3 days). Monitor labs and urine output. Dose medications as per eGFR. Strict glycemic control. Avoid potential nephrotoxins.    Patient has follow up appointment with Dr. Zambrano when discharge ready (May 10 at 12 PM).  If any questions, please feel free to contact me  Fredis Gallego   Nephrology Fellow  [phone]  (After 5 pm or on weekends use Amion for fellow on call)

## 2021-05-03 NOTE — PROGRESS NOTE ADULT - PROBLEM SELECTOR PLAN 3
Cr improving , creat down from peak , but up from yesterday   appreciate renal recs - empiric steroids for AIN   started on prednisone 60 mg, now tapered to 50mg, taper 10 mg every 3 days per renal  gallium scan normal, no evidence of AIN, case d/w renal fellow, scan was done after prednisone started on 4/28 and renal fxn improving on steroid so can't exclude AIN, will taper prednisone as above  - will discuss stopping bumex  changed cefepime to daptomycin   - baseline Cr last month was 1.7  -outpt f/u nephrology Dr. Zambrano on May 10 at 12 PM

## 2021-05-03 NOTE — PROVIDER CONTACT NOTE (OTHER) - DATE AND TIME:
01-May-2021 12:03
02-May-2021 05:05
01-May-2021 05:00
23-Apr-2021 13:54
26-Apr-2021 17:50
02-May-2021 12:06
02-May-2021 15:25
02-May-2021 17:46
03-May-2021 11:56
01-May-2021 08:35
26-Apr-2021 04:00
29-Apr-2021 14:47

## 2021-05-03 NOTE — PROGRESS NOTE ADULT - SUBJECTIVE AND OBJECTIVE BOX
[facility] DIVISION OF KIDNEY DISEASES AND HYPERTENSION -- FOLLOW UP NOTE  --------------------------------------------------------------------------------  HPI: 60 year-old male with HTN, DM2, PAD s/p RLE angioplasty, recent RLE 2nd digit distal amputation at [facility], was admitted to [facility] on 4/22/21 for NSTEMI and acute CHF. Nephrology team consulted for renal failure. Patient reported that before admission he was on IV Cefepime and complained of new rash. [facility]. Scr on admission was 2.15. Scr progressively increased to 5.09 on 4/28/21. Pt. started on Prednisone for suspected AIN. Pt. went for gallium scan on 5/1/21 which did not show evidence of AIN. Scr has improved to 2.87 yesterday (5/2). Today, Scr is 3.15.    Pt. evaluated at bedside, offers no complaints.     PAST HISTORY  --------------------------------------------------------------------------------  No significant changes to PMH, PSH, FHx, SHx, unless otherwise noted    ALLERGIES & MEDICATIONS  --------------------------------------------------------------------------------  Allergies    No Known Allergies    Intolerances    Standing Inpatient Medications  aspirin enteric coated 81 milliGRAM(s) Oral daily  atorvastatin 40 milliGRAM(s) Oral at bedtime  buMETAnide 2 milliGRAM(s) Oral daily  chlorhexidine 2% Cloths 1 Application(s) Topical daily  clopidogrel Tablet 75 milliGRAM(s) Oral daily  DAPTOmycin IVPB 350 milliGRAM(s) IV Intermittent every 48 hours  glucagon  Injectable 1 milliGRAM(s) IntraMuscular once  heparin   Injectable 5000 Unit(s) SubCutaneous every 8 hours  hydrALAZINE 50 milliGRAM(s) Oral three times a day  insulin glargine Injectable (LANTUS) 45 Unit(s) SubCutaneous at bedtime  insulin lispro (ADMELOG) corrective regimen sliding scale   SubCutaneous at bedtime  insulin lispro (ADMELOG) corrective regimen sliding scale   SubCutaneous three times a day before meals  insulin lispro Injectable (ADMELOG) 16 Unit(s) SubCutaneous three times a day before meals  isosorbide   dinitrate Tablet (ISORDIL) 20 milliGRAM(s) Oral three times a day  metoprolol tartrate 25 milliGRAM(s) Oral two times a day  NIFEdipine XL 60 milliGRAM(s) Oral daily  predniSONE   Tablet 50 milliGRAM(s) Oral daily    REVIEW OF SYSTEMS  --------------------------------------------------------------------------------  Gen: no lethargy  Respiratory: No dyspnea  CV: No chest pain  GI: No abdominal pain  MSK: no LE edema  Neuro: No dizziness  Heme: No bleeding    All other systems were reviewed and are negative, except as noted.    VITALS/PHYSICAL EXAM  --------------------------------------------------------------------------------  T(C): 36.6 (05-03-21 @ 11:12), Max: 36.8 (05-02-21 @ 18:10)  HR: 77 (05-03-21 @ 11:12) (59 - 82)  BP: 135/49 (05-03-21 @ 11:12) (135/49 - 158/77)  RR: 18 (05-03-21 @ 11:12) (17 - 18)  SpO2: 100% (05-03-21 @ 11:12) (97% - 100%)  Wt(kg): --    05-02-21 @ 07:01  -  05-03-21 @ 07:00  --------------------------------------------------------  IN: 240 mL / OUT: 950 mL / NET: -710 mL    05-03-21 @ 07:01  -  05-03-21 @ 12:18  --------------------------------------------------------  IN: 275 mL / OUT: 325 mL / NET: -50 mL    Physical Exam:  	Gen: NAD  	HEENT: MMM  	Pulm: CTA B/L  	CV: S1S2  	Abd: Soft, +BS   	Ext: no LE edema  	Neuro: Awake, A&O x3  	Skin: no rash    LABS/STUDIES  --------------------------------------------------------------------------------              11.0   19.22 >-----------<  468      [05-03-21 @ 07:48]              32.4     135  |  97  |  99  ----------------------------<  237      [05-03-21 @ 07:48]  4.1   |  22  |  3.15        Ca     9.0     [05-03-21 @ 07:48]      Mg     1.6     [05-03-21 @ 07:48]      Phos  4.4     [05-03-21 @ 07:48]    Creatinine Trend:  SCr 3.15 [05-03 @ 07:48]  SCr 2.87 [05-02 @ 06:31]  SCr 3.54 [05-01 @ 08:03]  SCr 4.28 [04-30 @ 07:56]  SCr 4.95 [04-29 @ 06:56]

## 2021-05-03 NOTE — PROGRESS NOTE ADULT - SUBJECTIVE AND OBJECTIVE BOX
Patient is a 60y old  Male who presents with a chief complaint of slurred speech (03 May 2021 12:18)       INTERVAL HPI/OVERNIGHT EVENTS:  Patient seen and evaluated at bedside.  Pt is resting comfortable in NAD. Denies N/V/F/C.    Allergies    No Known Allergies    Intolerances        Vital Signs Last 24 Hrs  T(C): 36.6 (03 May 2021 11:12), Max: 36.8 (02 May 2021 18:10)  T(F): 97.9 (03 May 2021 11:12), Max: 98.2 (02 May 2021 18:10)  HR: 77 (03 May 2021 11:12) (59 - 82)  BP: 135/49 (03 May 2021 11:12) (135/49 - 158/77)  BP(mean): --  RR: 18 (03 May 2021 11:12) (17 - 18)  SpO2: 100% (03 May 2021 11:12) (97% - 100%)    LABS:                        11.0   19.22 )-----------( 468      ( 03 May 2021 07:48 )             32.4     05-03    135  |  97<L>  |  99<H>  ----------------------------<  237<H>  4.1   |  22  |  3.15<H>    Ca    9.0      03 May 2021 07:48  Phos  4.4     05-03  Mg     1.6     05-03          CAPILLARY BLOOD GLUCOSE      POCT Blood Glucose.: 332 mg/dL (03 May 2021 12:21)  POCT Blood Glucose.: 254 mg/dL (03 May 2021 08:31)  POCT Blood Glucose.: 325 mg/dL (02 May 2021 22:19)  POCT Blood Glucose.: 432 mg/dL (02 May 2021 17:17)  POCT Blood Glucose.: 419 mg/dL (02 May 2021 17:15)  POCT Blood Glucose.: 432 mg/dL (02 May 2021 15:18)      Lower Extremity Physical Exam:  Vascular: DP/PT 1/4 B/L, CFT <3 seconds B/L, Temperature gradient warm to cool B/L  Neuro: Epicritic sensation diminshed to the level of ankle B/L  Musculoskeletal/Ortho: right foot partial 2nd toe amputation  Skin: right foot 2nd toe surgical site well coapted, with distal dry eschar, no dehiscence, no fluctuation, no drainage, no erythema, no signs of infection

## 2021-05-03 NOTE — PROGRESS NOTE ADULT - SUBJECTIVE AND OBJECTIVE BOX
[facility] Division of Hospital Medicine  Maria Guadalupe Yoo MD  Pager (M-F, 8A-5P): [phone]  Other Times:  [phone]    Patient is a 60y old  Male who presents with a chief complaint of slurred speech (03 May 2021 13:55)    SUBJECTIVE / OVERNIGHT EVENTS:  pt denies complaints.      MEDICATIONS  (STANDING):  aspirin enteric coated 81 milliGRAM(s) Oral daily  atorvastatin 40 milliGRAM(s) Oral at bedtime  buMETAnide 2 milliGRAM(s) Oral daily  chlorhexidine 2% Cloths 1 Application(s) Topical daily  clopidogrel Tablet 75 milliGRAM(s) Oral daily  DAPTOmycin IVPB 350 milliGRAM(s) IV Intermittent every 48 hours  dextrose 40% Gel 15 Gram(s) Oral once  dextrose 5%. 1000 milliLiter(s) (50 mL/Hr) IV Continuous <Continuous>  dextrose 5%. 1000 milliLiter(s) (100 mL/Hr) IV Continuous <Continuous>  dextrose 50% Injectable 25 Gram(s) IV Push once  dextrose 50% Injectable 12.5 Gram(s) IV Push once  dextrose 50% Injectable 25 Gram(s) IV Push once  glucagon  Injectable 1 milliGRAM(s) IntraMuscular once  heparin   Injectable 5000 Unit(s) SubCutaneous every 8 hours  hydrALAZINE 50 milliGRAM(s) Oral three times a day  insulin glargine Injectable (LANTUS) 45 Unit(s) SubCutaneous at bedtime  insulin lispro (ADMELOG) corrective regimen sliding scale   SubCutaneous three times a day before meals  insulin lispro (ADMELOG) corrective regimen sliding scale   SubCutaneous at bedtime  insulin lispro Injectable (ADMELOG) 16 Unit(s) SubCutaneous three times a day before meals  isosorbide   dinitrate Tablet (ISORDIL) 20 milliGRAM(s) Oral three times a day  metoprolol tartrate 25 milliGRAM(s) Oral two times a day  NIFEdipine XL 60 milliGRAM(s) Oral daily  predniSONE   Tablet 50 milliGRAM(s) Oral daily    MEDICATIONS  (PRN):  melatonin 3 milliGRAM(s) Oral at bedtime PRN Insomnia      CAPILLARY BLOOD GLUCOSE      POCT Blood Glucose.: 332 mg/dL (03 May 2021 12:21)  POCT Blood Glucose.: 254 mg/dL (03 May 2021 08:31)  POCT Blood Glucose.: 325 mg/dL (02 May 2021 22:19)  POCT Blood Glucose.: 432 mg/dL (02 May 2021 17:17)  POCT Blood Glucose.: 419 mg/dL (02 May 2021 17:15)  POCT Blood Glucose.: 432 mg/dL (02 May 2021 15:18)    I&O's Summary    02 May 2021 07:01  -  03 May 2021 07:00  --------------------------------------------------------  IN: 240 mL / OUT: 950 mL / NET: -710 mL    03 May 2021 07:01  -  03 May 2021 14:53  --------------------------------------------------------  IN: 275 mL / OUT: 775 mL / NET: -500 mL        PHYSICAL EXAM:  Vital Signs Last 24 Hrs  T(C): 36.5 (03 May 2021 14:11), Max: 36.8 (02 May 2021 18:10)  T(F): 97.7 (03 May 2021 14:11), Max: 98.2 (02 May 2021 18:10)  HR: 84 (03 May 2021 14:11) (59 - 84)  BP: 148/67 (03 May 2021 14:11) (135/49 - 158/77)  BP(mean): --  RR: 18 (03 May 2021 14:11) (17 - 18)  SpO2: 100% (03 May 2021 14:11) (97% - 100%)    CONSTITUTIONAL: NAD  RESPIRATORY: Normal respiratory effort; lungs are clear to auscultation bilaterally  CARDIOVASCULAR: Regular rate and rhythm; No lower extremity edema;   ABDOMEN: Nontender to palpation, normoactive bowel sounds  MUSCULOSKELETAL:  no joint swelling or tenderness to palpation; R foot with dressing c/d/i  PSYCH: affect appropriate  NEUROLOGY:  no gross sensory deficits     LABS:                        11.0   19.22 )-----------( 468      ( 03 May 2021 07:48 )             32.4     05-03    135  |  97<L>  |  99<H>  ----------------------------<  237<H>  4.1   |  22  |  3.15<H>    Ca    9.0      03 May 2021 07:48  Phos  4.4     05-03  Mg     1.6     05-03        RADIOLOGY & ADDITIONAL TESTS:  Results Reviewed:   Imaging Personally Reviewed:  Electrocardiogram Personally Reviewed:    COORDINATION OF CARE:  Care Discussed with Consultants/Other Providers [Y/N]: Y  Prior or Outpatient Records Reviewed [Y/N]: Y

## 2021-05-03 NOTE — PROGRESS NOTE ADULT - PROBLEM SELECTOR PLAN 7
c/w ASA, statin  Right second toe partial amputation 4/12 at [facility], grew GBS alone  surgical site does not look infected but patient says he was on cefepime via PICC line for it.   ID f/u, changed to daptomycin, c/w dapto through 5/24 per ID

## 2021-05-04 LAB
ANION GAP SERPL CALC-SCNC: 15 MMOL/L — HIGH (ref 7–14)
BASOPHILS # BLD AUTO: 0.04 K/UL — SIGNIFICANT CHANGE UP (ref 0–0.2)
BASOPHILS NFR BLD AUTO: 0.2 % — SIGNIFICANT CHANGE UP (ref 0–2)
BUN SERPL-MCNC: 94 MG/DL — HIGH (ref 7–23)
CALCIUM SERPL-MCNC: 9 MG/DL — SIGNIFICANT CHANGE UP (ref 8.4–10.5)
CHLORIDE SERPL-SCNC: 98 MMOL/L — SIGNIFICANT CHANGE UP (ref 98–107)
CO2 SERPL-SCNC: 25 MMOL/L — SIGNIFICANT CHANGE UP (ref 22–31)
CREAT SERPL-MCNC: 2.62 MG/DL — HIGH (ref 0.5–1.3)
EOSINOPHIL # BLD AUTO: 0.15 K/UL — SIGNIFICANT CHANGE UP (ref 0–0.5)
EOSINOPHIL NFR BLD AUTO: 0.9 % — SIGNIFICANT CHANGE UP (ref 0–6)
GLUCOSE BLDC GLUCOMTR-MCNC: 158 MG/DL — HIGH (ref 70–99)
GLUCOSE BLDC GLUCOMTR-MCNC: 211 MG/DL — HIGH (ref 70–99)
GLUCOSE BLDC GLUCOMTR-MCNC: 218 MG/DL — HIGH (ref 70–99)
GLUCOSE BLDC GLUCOMTR-MCNC: 322 MG/DL — HIGH (ref 70–99)
GLUCOSE SERPL-MCNC: 233 MG/DL — HIGH (ref 70–99)
HCT VFR BLD CALC: 31.2 % — LOW (ref 39–50)
HGB BLD-MCNC: 10.5 G/DL — LOW (ref 13–17)
IANC: 12.23 K/UL — HIGH (ref 1.5–8.5)
IMM GRANULOCYTES NFR BLD AUTO: 2.3 % — HIGH (ref 0–1.5)
LYMPHOCYTES # BLD AUTO: 16 % — SIGNIFICANT CHANGE UP (ref 13–44)
LYMPHOCYTES # BLD AUTO: 2.68 K/UL — SIGNIFICANT CHANGE UP (ref 1–3.3)
MAGNESIUM SERPL-MCNC: 1.6 MG/DL — SIGNIFICANT CHANGE UP (ref 1.6–2.6)
MCHC RBC-ENTMCNC: 28.5 PG — SIGNIFICANT CHANGE UP (ref 27–34)
MCHC RBC-ENTMCNC: 33.7 GM/DL — SIGNIFICANT CHANGE UP (ref 32–36)
MCV RBC AUTO: 84.6 FL — SIGNIFICANT CHANGE UP (ref 80–100)
MONOCYTES # BLD AUTO: 1.21 K/UL — HIGH (ref 0–0.9)
MONOCYTES NFR BLD AUTO: 7.2 % — SIGNIFICANT CHANGE UP (ref 2–14)
NEUTROPHILS # BLD AUTO: 12.23 K/UL — HIGH (ref 1.8–7.4)
NEUTROPHILS NFR BLD AUTO: 73.4 % — SIGNIFICANT CHANGE UP (ref 43–77)
NRBC # BLD: 0 /100 WBCS — SIGNIFICANT CHANGE UP
NRBC # FLD: 0 K/UL — SIGNIFICANT CHANGE UP
PHOSPHATE SERPL-MCNC: 4.1 MG/DL — SIGNIFICANT CHANGE UP (ref 2.5–4.5)
PLATELET # BLD AUTO: 390 K/UL — SIGNIFICANT CHANGE UP (ref 150–400)
POTASSIUM SERPL-MCNC: 3.9 MMOL/L — SIGNIFICANT CHANGE UP (ref 3.5–5.3)
POTASSIUM SERPL-SCNC: 3.9 MMOL/L — SIGNIFICANT CHANGE UP (ref 3.5–5.3)
RBC # BLD: 3.69 M/UL — LOW (ref 4.2–5.8)
RBC # FLD: 12.3 % — SIGNIFICANT CHANGE UP (ref 10.3–14.5)
SODIUM SERPL-SCNC: 138 MMOL/L — SIGNIFICANT CHANGE UP (ref 135–145)
WBC # BLD: 16.7 K/UL — HIGH (ref 3.8–10.5)
WBC # FLD AUTO: 16.7 K/UL — HIGH (ref 3.8–10.5)

## 2021-05-04 PROCEDURE — 99232 SBSQ HOSP IP/OBS MODERATE 35: CPT | Mod: GC

## 2021-05-04 PROCEDURE — 99232 SBSQ HOSP IP/OBS MODERATE 35: CPT

## 2021-05-04 PROCEDURE — 99233 SBSQ HOSP IP/OBS HIGH 50: CPT

## 2021-05-04 RX ORDER — INSULIN LISPRO 100/ML
25 VIAL (ML) SUBCUTANEOUS
Refills: 0 | Status: DISCONTINUED | OUTPATIENT
Start: 2021-05-05 | End: 2021-05-05

## 2021-05-04 RX ORDER — INSULIN LISPRO 100/ML
28 VIAL (ML) SUBCUTANEOUS
Refills: 0 | Status: DISCONTINUED | OUTPATIENT
Start: 2021-05-05 | End: 2021-05-05

## 2021-05-04 RX ORDER — INSULIN LISPRO 100/ML
20 VIAL (ML) SUBCUTANEOUS
Qty: 5 | Refills: 0
Start: 2021-05-04

## 2021-05-04 RX ORDER — DAPTOMYCIN 500 MG/10ML
350 INJECTION, POWDER, LYOPHILIZED, FOR SOLUTION INTRAVENOUS
Qty: 3500 | Refills: 0
Start: 2021-05-04 | End: 2021-05-23

## 2021-05-04 RX ORDER — INSULIN LISPRO 100/ML
25 VIAL (ML) SUBCUTANEOUS
Refills: 0 | Status: DISCONTINUED | OUTPATIENT
Start: 2021-05-04 | End: 2021-05-05

## 2021-05-04 RX ORDER — INSULIN GLARGINE 100 [IU]/ML
55 INJECTION, SOLUTION SUBCUTANEOUS AT BEDTIME
Refills: 0 | Status: DISCONTINUED | OUTPATIENT
Start: 2021-05-04 | End: 2021-05-05

## 2021-05-04 RX ADMIN — Medication 50 MILLIGRAM(S): at 05:26

## 2021-05-04 RX ADMIN — Medication 60 MILLIGRAM(S): at 05:26

## 2021-05-04 RX ADMIN — Medication 4: at 17:25

## 2021-05-04 RX ADMIN — CHLORHEXIDINE GLUCONATE 1 APPLICATION(S): 213 SOLUTION TOPICAL at 12:02

## 2021-05-04 RX ADMIN — INSULIN GLARGINE 55 UNIT(S): 100 INJECTION, SOLUTION SUBCUTANEOUS at 22:11

## 2021-05-04 RX ADMIN — HEPARIN SODIUM 5000 UNIT(S): 5000 INJECTION INTRAVENOUS; SUBCUTANEOUS at 13:37

## 2021-05-04 RX ADMIN — ISOSORBIDE DINITRATE 20 MILLIGRAM(S): 5 TABLET ORAL at 12:03

## 2021-05-04 RX ADMIN — Medication 4: at 08:39

## 2021-05-04 RX ADMIN — Medication 20 UNIT(S): at 08:40

## 2021-05-04 RX ADMIN — Medication 3 MILLIGRAM(S): at 22:14

## 2021-05-04 RX ADMIN — Medication 8: at 12:08

## 2021-05-04 RX ADMIN — Medication 25 MILLIGRAM(S): at 17:25

## 2021-05-04 RX ADMIN — CLOPIDOGREL BISULFATE 75 MILLIGRAM(S): 75 TABLET, FILM COATED ORAL at 12:03

## 2021-05-04 RX ADMIN — Medication 50 MILLIGRAM(S): at 13:35

## 2021-05-04 RX ADMIN — ISOSORBIDE DINITRATE 20 MILLIGRAM(S): 5 TABLET ORAL at 05:26

## 2021-05-04 RX ADMIN — DAPTOMYCIN 114 MILLIGRAM(S): 500 INJECTION, POWDER, LYOPHILIZED, FOR SOLUTION INTRAVENOUS at 22:08

## 2021-05-04 RX ADMIN — Medication 20 UNIT(S): at 12:09

## 2021-05-04 RX ADMIN — HEPARIN SODIUM 5000 UNIT(S): 5000 INJECTION INTRAVENOUS; SUBCUTANEOUS at 22:20

## 2021-05-04 RX ADMIN — Medication 25 UNIT(S): at 17:26

## 2021-05-04 RX ADMIN — Medication 81 MILLIGRAM(S): at 12:02

## 2021-05-04 RX ADMIN — ATORVASTATIN CALCIUM 40 MILLIGRAM(S): 80 TABLET, FILM COATED ORAL at 22:08

## 2021-05-04 RX ADMIN — Medication 25 MILLIGRAM(S): at 05:26

## 2021-05-04 RX ADMIN — ISOSORBIDE DINITRATE 20 MILLIGRAM(S): 5 TABLET ORAL at 17:24

## 2021-05-04 RX ADMIN — HEPARIN SODIUM 5000 UNIT(S): 5000 INJECTION INTRAVENOUS; SUBCUTANEOUS at 05:26

## 2021-05-04 RX ADMIN — Medication 50 MILLIGRAM(S): at 22:10

## 2021-05-04 NOTE — PROGRESS NOTE ADULT - PROBLEM SELECTOR PLAN 6
Losartan on hold in light of ACOSTA  Recent Rx for Nifedipine, unclear pt is taking as he has been non complaint with other meds and medical treatments like CPAP, plavix  Increased hydralazine to 50 mg tid, procardia 60 mg qd, monitor BP

## 2021-05-04 NOTE — PROGRESS NOTE ADULT - PROBLEM SELECTOR PLAN 3
Cr improving   appreciate renal recs - empiric steroids for AIN   started on prednisone 60 mg, now tapered to 50mg, taper 10 mg every 3 days per renal  gallium scan normal, no evidence of AIN, case d/w renal fellow, scan was done after prednisone started on 4/28 and renal fxn improving on steroid so can't exclude AIN, will taper prednisone as above  - s/p bumex   changed cefepime to daptomycin   - baseline Cr last month was 1.7  -outpt f/u nephrology Dr. Zambrano on May 10 at 12 PM

## 2021-05-04 NOTE — PROGRESS NOTE ADULT - PROBLEM SELECTOR PLAN 1
-stable off bipap, satting well on RA  -appreciate cardiology recs , cont asa/plavix, no plan for renal biopsy at this time  - now off heparin gtt   - TTE 4/26 reviewed LVEF 52% , Mild segmental LV systolic dysfunction.  Hypokinesis of the basal to mid inferior wall.   monitor on tele, trend CE  strict I/Os, daily weights  - off bumex   -c/w coreg 6.125 mg bid, hydralazine 50 mg tid for uncontrolled HTN, c/w isordil 20 mg tid

## 2021-05-04 NOTE — PROGRESS NOTE ADULT - SUBJECTIVE AND OBJECTIVE BOX
[facility] DIVISION OF KIDNEY DISEASES AND HYPERTENSION -- FOLLOW UP NOTE  --------------------------------------------------------------------------------  HPI: 60 year-old male with HTN, DM2, PAD s/p RLE angioplasty, recent RLE 2nd digit distal amputation at [facility], was admitted to [facility] on 4/22/21 for NSTEMI and acute CHF. Nephrology team consulted for renal failure. Patient reported that before admission he was on IV Cefepime and complained of new rash. [facility]. Scr on admission was 2.15. Scr progressively increased to 5.09 on 4/28/21. Pt. started on Prednisone for suspected AIN. Pt. went for gallium scan on 5/1/21 which did not show evidence of AIN. Scr has improved to 2.87 on 5/2. Yesterday, Scr increased to 3.15. Bumex was discontinued. Today, Scr is improved to 2.62.    Pt. evaluated at bedside, offers no complaints.     PAST HISTORY  --------------------------------------------------------------------------------  No significant changes to PMH, PSH, FHx, SHx, unless otherwise noted    ALLERGIES & MEDICATIONS  --------------------------------------------------------------------------------  Allergies    No Known Allergies    Intolerances    Standing Inpatient Medications  aspirin enteric coated 81 milliGRAM(s) Oral daily  atorvastatin 40 milliGRAM(s) Oral at bedtime  chlorhexidine 2% Cloths 1 Application(s) Topical daily  clopidogrel Tablet 75 milliGRAM(s) Oral daily  DAPTOmycin IVPB 350 milliGRAM(s) IV Intermittent every 48 hours  glucagon  Injectable 1 milliGRAM(s) IntraMuscular once  heparin   Injectable 5000 Unit(s) SubCutaneous every 8 hours  hydrALAZINE 50 milliGRAM(s) Oral three times a day  insulin glargine Injectable (LANTUS) 52 Unit(s) SubCutaneous at bedtime  insulin lispro (ADMELOG) corrective regimen sliding scale   SubCutaneous at bedtime  insulin lispro (ADMELOG) corrective regimen sliding scale   SubCutaneous three times a day before meals  insulin lispro Injectable (ADMELOG) 20 Unit(s) SubCutaneous three times a day before meals  isosorbide   dinitrate Tablet (ISORDIL) 20 milliGRAM(s) Oral three times a day  metoprolol tartrate 25 milliGRAM(s) Oral two times a day  NIFEdipine XL 60 milliGRAM(s) Oral daily    REVIEW OF SYSTEMS  --------------------------------------------------------------------------------  Gen: no lethargy  Respiratory: No dyspnea  CV: No chest pain  GI: No abdominal pain  MSK: no LE edema  Neuro: No dizziness  Heme: No bleeding    All other systems were reviewed and are negative, except as noted.    VITALS/PHYSICAL EXAM  --------------------------------------------------------------------------------  T(C): 36.6 (05-04-21 @ 10:37), Max: 36.8 (05-03-21 @ 17:41)  HR: 67 (05-04-21 @ 10:45) (65 - 87)  BP: 141/72 (05-04-21 @ 10:37) (141/72 - 151/71)  RR: 18 (05-04-21 @ 10:37) (18 - 18)  SpO2: 98% (05-04-21 @ 10:45) (97% - 100%)  Wt(kg): --    05-03-21 @ 07:01  -  05-04-21 @ 07:00  --------------------------------------------------------  IN: 975 mL / OUT: 2075 mL / NET: -1100 mL    05-04-21 @ 07:01  -  05-04-21 @ 11:49  --------------------------------------------------------  IN: 570 mL / OUT: 700 mL / NET: -130 mL    Physical Exam:  	Gen: NAD  	HEENT: MMM  	Pulm: good air entry B/L  	CV: S1S2  	Abd: Soft, +BS   	Ext: no LE edema  	Neuro: Awake, A&O x3  	Skin: no rash    LABS/STUDIES  --------------------------------------------------------------------------------              10.5   16.70 >-----------<  390      [05-04-21 @ 07:57]              31.2     138  |  98  |  94  ----------------------------<  233      [05-04-21 @ 07:57]  3.9   |  25  |  2.62        Ca     9.0     [05-04-21 @ 07:57]      Mg     1.6     [05-04-21 @ 07:57]      Phos  4.1     [05-04-21 @ 07:57]    Creatinine Trend:  SCr 2.62 [05-04 @ 07:57]  SCr 3.15 [05-03 @ 07:48]  SCr 2.87 [05-02 @ 06:31]  SCr 3.54 [05-01 @ 08:03]  SCr 4.28 [04-30 @ 07:56]

## 2021-05-04 NOTE — PROGRESS NOTE ADULT - SUBJECTIVE AND OBJECTIVE BOX
[facility] Division of Hospital Medicine  Maria Guadalupe Yoo MD  Pager (M-F, 8A-5P): [phone]  Other Times:  [phone]    Patient is a 60y old  Male who presents with a chief complaint of slurred speech (03 May 2021 16:09)    SUBJECTIVE / OVERNIGHT EVENTS:  Pt denies complaints.      MEDICATIONS  (STANDING):  aspirin enteric coated 81 milliGRAM(s) Oral daily  atorvastatin 40 milliGRAM(s) Oral at bedtime  chlorhexidine 2% Cloths 1 Application(s) Topical daily  clopidogrel Tablet 75 milliGRAM(s) Oral daily  DAPTOmycin IVPB 350 milliGRAM(s) IV Intermittent every 48 hours  dextrose 40% Gel 15 Gram(s) Oral once  dextrose 5%. 1000 milliLiter(s) (50 mL/Hr) IV Continuous <Continuous>  dextrose 5%. 1000 milliLiter(s) (100 mL/Hr) IV Continuous <Continuous>  dextrose 50% Injectable 25 Gram(s) IV Push once  dextrose 50% Injectable 12.5 Gram(s) IV Push once  dextrose 50% Injectable 25 Gram(s) IV Push once  glucagon  Injectable 1 milliGRAM(s) IntraMuscular once  heparin   Injectable 5000 Unit(s) SubCutaneous every 8 hours  hydrALAZINE 50 milliGRAM(s) Oral three times a day  insulin glargine Injectable (LANTUS) 52 Unit(s) SubCutaneous at bedtime  insulin lispro (ADMELOG) corrective regimen sliding scale   SubCutaneous at bedtime  insulin lispro (ADMELOG) corrective regimen sliding scale   SubCutaneous three times a day before meals  insulin lispro Injectable (ADMELOG) 20 Unit(s) SubCutaneous three times a day before meals  isosorbide   dinitrate Tablet (ISORDIL) 20 milliGRAM(s) Oral three times a day  metoprolol tartrate 25 milliGRAM(s) Oral two times a day  NIFEdipine XL 60 milliGRAM(s) Oral daily    MEDICATIONS  (PRN):  melatonin 3 milliGRAM(s) Oral at bedtime PRN Insomnia      CAPILLARY BLOOD GLUCOSE      POCT Blood Glucose.: 218 mg/dL (04 May 2021 08:35)  POCT Blood Glucose.: 255 mg/dL (03 May 2021 21:14)  POCT Blood Glucose.: 354 mg/dL (03 May 2021 17:25)  POCT Blood Glucose.: 332 mg/dL (03 May 2021 12:21)    I&O's Summary    03 May 2021 07:01  -  04 May 2021 07:00  --------------------------------------------------------  IN: 975 mL / OUT: 2075 mL / NET: -1100 mL    04 May 2021 07:01  -  04 May 2021 11:16  --------------------------------------------------------  IN: 570 mL / OUT: 700 mL / NET: -130 mL        PHYSICAL EXAM:  Vital Signs Last 24 Hrs  T(C): 36.6 (04 May 2021 10:37), Max: 36.8 (03 May 2021 17:41)  T(F): 97.9 (04 May 2021 10:37), Max: 98.2 (03 May 2021 17:41)  HR: 67 (04 May 2021 10:45) (65 - 87)  BP: 141/72 (04 May 2021 10:37) (141/72 - 151/71)  BP(mean): --  RR: 18 (04 May 2021 10:37) (18 - 18)  SpO2: 98% (04 May 2021 10:45) (97% - 100%)    CONSTITUTIONAL: NAD  RESPIRATORY: Normal respiratory effort; lungs are clear to auscultation bilaterally  CARDIOVASCULAR: Regular rate and rhythm; No lower extremity edema;   ABDOMEN: Nontender to palpation, normoactive bowel sounds  MUSCULOSKELETAL:  no joint swelling or tenderness to palpation; R foot with dressing c/d/i  PSYCH: affect appropriate  NEUROLOGY:  no gross sensory deficits     LABS:                        10.5   16.70 )-----------( 390      ( 04 May 2021 07:57 )             31.2     05-04    138  |  98  |  94<H>  ----------------------------<  233<H>  3.9   |  25  |  2.62<H>    Ca    9.0      04 May 2021 07:57  Phos  4.1     05-04  Mg     1.6     05-04        RADIOLOGY & ADDITIONAL TESTS:  Results Reviewed:   Imaging Personally Reviewed:  Electrocardiogram Personally Reviewed:    COORDINATION OF CARE:  Care Discussed with Consultants/Other Providers [Y/N]: Y  Prior or Outpatient Records Reviewed [Y/N]: Y

## 2021-05-04 NOTE — PROGRESS NOTE ADULT - PROBLEM SELECTOR PLAN 5
Monitoring FS - likely elevated due to steroids   -A1c 8.3%  - endocrine consult appreciated, insulin adjusted, increased lantus to 52u hs and admelog to 20u ac tid, will adjust as steroid is being tapered - will clarify discharge regimen   monitor FS

## 2021-05-04 NOTE — PROGRESS NOTE ADULT - ASSESSMENT
HOLLY FITZGERALD is a 60-year-old male with HTN, IDDM2, PAD s/p RLE angioplasty, recent RLE 2nd digit distal amputation at [facility], presenting with sudden onset slurred speech that started on Wednesday night, persisted until presentation, found to be in acute HF, with NSTEMI and ACOSTA, endocrine consulted for uncontrolled Type 2 DM.  Patient is on prednisone with steroid exacerbated hyperglycemia.    1. Type 2 diabetes mellitus with other neurologic complication, with long-term current use of insulin  Patient with HbA1c 8.3%, on basal insulin only at home (no pre-meal insulin, no orals). Goal A1c less than 7%  Here with hyperglycemia exacerbated by steroids, on Prednisone taper    While inpatient:  Inpatient BG target 100-180 mg/dl, remains above goal  Currently on Prednisone 50mg daily, being tapered to 40 mg daily 5/5  Increase Lantus slightly to 55 units SQ qHS   Prednisone AM dosing appears to be causing consistent hyperglycemia around lunchtime - therefore:  Recommend to increase Admelog to 28 units before breakfast, increase Admelog to 25 units before lunch and dinner (Hold if NPO/not eating meal)  Continue Admelog MODERATE dose correctional scale before meals, moderate dose at bedtime  Check BG before meals and bedtime  Consistent carbohydrate diet  Consider nutritional consultation for dietary education  Recommend DM education through RN staff     Discharge Plan:  Will plan to resume basal insulin pen (Lantus) and ADD rapid acting insulin pen before meals, dose TBD   Will need less insulin as prednisone dose is lowered. Clarify steroid plan for dc.  Please check which rapid acting insulin pen is covered by insurance. Options are Humalog, Novolog, Admelog, Apidra, Fiasp  Patient prefers to follow with PCP.  If interested can follow up with [facility] endocrinology [phone].    2. Essential hypertension  BP goal 130/80  Found to be in acute CHF on Bumetanide 2mg daily   Also on hydralazine, metoprolol and isosorbide dinitrate, medication management per primary team/cardiology    3. Hyperlipidemia LDL goal <70  Continue with statin therapy, consider up-titration, last LDL above goal    Bisi Anderson  Nurse Practitioner  Division of Endocrinology & Diabetes  In house pager #[phone]/long range pager #[phone]    If before 9AM or after 6PM, or on weekends/holidays, please call endocrine answering service for assistance ([phone]).  For nonurgent matters email [email or web address] for assistance.

## 2021-05-04 NOTE — PROGRESS NOTE ADULT - PROBLEM SELECTOR PLAN 7
c/w ASA, statin  Right second toe partial amputation 4/12 at [facility], grew GBS alone  surgical site does not look infected but patient says he was on cefepime via PICC line for it.   ID f/u, changed to daptomycin, c/w dapto through 5/24 per ID

## 2021-05-04 NOTE — PROGRESS NOTE ADULT - SUBJECTIVE AND OBJECTIVE BOX
Chief Complaint: DM 2 with hyperglycemia exacerbated by steroids     History: Patient seen at bedside. Reports he is eating meals, denies n/v, denies s/s of hypoglycemia  BG remains above target with most recent 322 mg/dl  Patient received Prednisone 50 mg this AM, plan to taper to Prednisone 40 mg tomorrow    MEDICATIONS  (STANDING):  aspirin enteric coated 81 milliGRAM(s) Oral daily  atorvastatin 40 milliGRAM(s) Oral at bedtime  chlorhexidine 2% Cloths 1 Application(s) Topical daily  clopidogrel Tablet 75 milliGRAM(s) Oral daily  DAPTOmycin IVPB 350 milliGRAM(s) IV Intermittent every 48 hours  dextrose 40% Gel 15 Gram(s) Oral once  dextrose 5%. 1000 milliLiter(s) (50 mL/Hr) IV Continuous <Continuous>  dextrose 5%. 1000 milliLiter(s) (100 mL/Hr) IV Continuous <Continuous>  dextrose 50% Injectable 25 Gram(s) IV Push once  dextrose 50% Injectable 12.5 Gram(s) IV Push once  dextrose 50% Injectable 25 Gram(s) IV Push once  glucagon  Injectable 1 milliGRAM(s) IntraMuscular once  heparin   Injectable 5000 Unit(s) SubCutaneous every 8 hours  hydrALAZINE 50 milliGRAM(s) Oral three times a day  insulin glargine Injectable (LANTUS) 52 Unit(s) SubCutaneous at bedtime  insulin lispro (ADMELOG) corrective regimen sliding scale   SubCutaneous three times a day before meals  insulin lispro (ADMELOG) corrective regimen sliding scale   SubCutaneous at bedtime  insulin lispro Injectable (ADMELOG) 20 Unit(s) SubCutaneous three times a day before meals  isosorbide   dinitrate Tablet (ISORDIL) 20 milliGRAM(s) Oral three times a day  metoprolol tartrate 25 milliGRAM(s) Oral two times a day  NIFEdipine XL 60 milliGRAM(s) Oral daily    MEDICATIONS  (PRN):  melatonin 3 milliGRAM(s) Oral at bedtime PRN Insomnia    No Known Allergies    Review of Systems:  HEENT: No pain  Cardiovascular: No chest pain  Respiratory: No SOB  GI: No nausea, vomiting    PHYSICAL EXAM:  VITALS: T(C): 36.6 (05-04-21 @ 10:37)  T(F): 97.9 (05-04-21 @ 10:37), Max: 98.2 (05-03-21 @ 17:41)  HR: 67 (05-04-21 @ 10:45) (65 - 87)  BP: 141/72 (05-04-21 @ 10:37) (141/72 - 151/71)  RR:  (18 - 18)  SpO2:  (97% - 98%)  Wt(kg): --  GENERAL: NAD  EYES: No proptosis, no lid lag, anicteric  HEENT:  Atraumatic, Normocephalic, moist mucous membranes  RESPIRATORY: unlabored respirations   PSYCH: Alert and oriented x 3    CAPILLARY BLOOD GLUCOSE    POCT Blood Glucose.: 322 mg/dL (04 May 2021 12:06)  POCT Blood Glucose.: 218 mg/dL (04 May 2021 08:35)  POCT Blood Glucose.: 255 mg/dL (03 May 2021 21:14)  POCT Blood Glucose.: 354 mg/dL (03 May 2021 17:25)      05-04    138  |  98  |  94<H>  ----------------------------<  233<H>  3.9   |  25  |  2.62<H>    EGFR if : 29<L>  EGFR if non : 25<L>    Ca    9.0      05-04  Mg     1.6     05-04  Phos  4.1     05-04        Thyroid Function Tests:  04-23 @ 04:36 TSH 1.58 FreeT4 -- T3 -- Anti TPO -- Anti Thyroglobulin Ab -- TSI --      A1C with Estimated Average Glucose Result: 8.3 % (04-23-21 @ 04:36)    Diet, Consistent Carbohydrate Renal w/Evening Snack:   DASH/TLC Sodium & Cholesterol Restricted (DASH) (04-23-21 @ 16:18)

## 2021-05-04 NOTE — PROGRESS NOTE ADULT - ATTENDING COMMENTS
Subjective: He states that the pain is essentially resolved but he has some occasional stinging with bowel movements. He has had minimal residual bleeding. He notes some discomfort from a posterior tag. He tells me this is drained in the past pink fluid. Past medical history and ROS were reviewed and unchanged. Exam deferred due to telehealth visit    Assessment / Plan    Anal fissure, improving  Continue nifedipine and follow-up in the office for exam in May  He may have a fistula through the posterior anal tag  He may require the OR for subcutaneous fistulotomy    Verbal consent obtained. The patient understands the charges may be billed for this visit if care is given outside of the global.  Location of patient - home  Location of physician - office  Total time - A total of 15 minutes was spent with the patient, with >50% of time spent on counseling and coordination of care. Telehealth service conducted using Doxy. me      The diagnoses and plan were discussed with patient. All questions answered. Plan of care agreed to by all concerned.
Patient seen and examined independently. Agree with Dr. Mccrary's assessment and plan as above    60 y.o M w/ DM2, HTN, PAD s/p recent toe amputation (RUE PICC on antibiotics through 05/24/2021 per ID notes), now with ACOSTA, admitted with SOB, found to have elevated cardiac enzymes concerning for NSTEMI vs demand ischemia in the setting of acute on chronic HFrEF, treated with ASA/Plavix load/Heparin GTT, subsequent TTE 04/26/2021 with mild segmental LV dysfunction, moderate-severe MR in the setting of poorly controlled HTN.     1. NSTEMI - Suspect Type II NSTEMI in the setting of acute on chronic HFrEF, ACOSTA with Cr 4.41. However patient with hx of PAD, and likely has underlying CAD, now with segmental WMA of unclear duration. Therefore it's unclear if this represents at Type I vs Type II NSTEMI.   -He was treated empirically for Type I NSTEMI with Plavix load, ASA, Heparin GTT for >72 hours.  -Cont ASA 81 mg po QD  -Cont Plavix 75 mg po QD  -S/p course of heparin GTT, now off  -Cont Atorvastatin 80 mg po QHS  -Will plan for ischemic workup with LHC given segmental WMA, moderate to severe MR on TTE. It's possible that this MR may be improved by reduction in afterload given poorly controlled HTN, and possibly by revascularization if ischemic MR.   -Given ACOSTA and risk of dialysis, will need to optimize renal status before proceeding with LHC.  -At this point, LHC may be done as an outpatient once his renal function has been optimized, and he has completed his course of antibiotics given his recent amputation. Would not pursue LHC while the patient is being treated for possible infection.     2. Acute on Chronic HFrEF, moderate-severe MR -   -Overall volume status appears much improved. JVD Still elevated this AM.   -Cont Metoprolol 25 mg po BID  -Cont Hydralazine to 25 mg po TID  -Increase Isordil 20 mg po TID  -Cont Bumex to 2 mg IV QD.     3. HTN - BP elevated  -Increase Isordil, Hydralazine as above for afterload reduction in an attempt to improve mitral regurgitation.     Thank you for this interesting consult. [facility] cardiology team will continue to follow along with you.  -Call [phone] with questions/concerns.      Pascual Gupta MD  Cardiology Attending  [facility],  [facility]   Cell: [phone] .
Patient seen and examined independently. Agree with Dr. Mccrary's assessment and plan as above    60 y.o M w/ DM2, HTN, PAD s/p recent toe amputation (RUE PICC on antibiotics through 05/24/2021 per ID notes), now with ACOSTA, admitted with SOB, found to have elevated cardiac enzymes concerning for NSTEMI vs demand ischemia in the setting of acute on chronic HFrEF, treated with ASA/Plavix load/Heparin GTT, subsequent TTE 04/26/2021 with mild segmental LV dysfunction, moderate-severe MR in the setting of poorly controlled HTN.     1. NSTEMI - Suspect Type II NSTEMI in the setting of acute on chronic HFrEF, ACOSTA with Cr 4.41. However patient with hx of PAD, and likely has underlying CAD, now with segmental WMA of unclear duration. Therefore it's unclear if this represents at Type I vs Type II NSTEMI.   -He was treated empirically for Type I NSTEMI with Plavix load, ASA, Heparin GTT for >72 hours.  -Cont ASA 81 mg po QD  -Cont Plavix 75 mg po QD  -S/p course of heparin GTT, now off  -Cont Atorvastatin 80 mg po QHS  -Will plan for ischemic workup with LHC given segmental WMA, moderate to severe MR on TTE. It's possible that this MR may be improved by reduction in afterload given poorly controlled HTN, and possibly by revascularization if ischemic MR.   -Given ACOSTA and risk of dialysis, will need to optimize renal status before proceeding with LHC.  -At this point, LHC may be done as an outpatient once his renal function has been optimized, and he has completed his course of antibiotics given his recent amputation. Would not pursue LHC while the patient is being treated for possible infection.     2. Acute on Chronic HFrEF, moderate-severe MR -   -Overall volume status appears somewhat improved, but still with dyspnea  -Cont Metoprolol 25 mg po BID  -Increase Hydralazine to 25 mg po TID  -Increase Isordil 20 mg po TID  -Cont Bumex to 2 mg IV QD as per nephrology.     3. HTN - BP remains elevated  -Increase Isordil, Hydralazine as above for afterload reduction in an attempt to improve mitral regurgitation.     Thank you for this interesting consult. [facility] cardiology team will continue to follow along with you.  -Call [phone] with questions/concerns.
Patient seen and examined independently. Agree with Dr. Mccrary's assessment and plan as above    60 y.o M w/ DM2, HTN, PAD s/p recent toe amputation (RUE PICC on antibiotics through 05/24/2021 per ID notes), now with ACOSTA, admitted with SOB, found to have elevated cardiac enzymes concerning for NSTEMI vs demand ischemia in the setting of acute on chronic HFrEF, treated with ASA/Plavix load/Heparin GTT, subsequent TTE 04/26/2021 with mild segmental LV dysfunction, moderate-severe MR in the setting of poorly controlled HTN.     1. NSTEMI - Suspect Type II NSTEMI in the setting of acute on chronic HFrEF, ACOSTA with Cr 4.41. However patient with hx of PAD, and likely has underlying CAD, now with segmental WMA of unclear duration. Therefore it's unclear if this represents at Type I vs Type II NSTEMI.   -He was treated empirically for Type I NSTEMI with Plavix load, ASA, Heparin GTT for >72 hours.  -Cont ASA 81 mg po QD  -Cont Plavix 75 mg po QD  -S/p course of heparin GTT, now off  -Cont Atorvastatin 80 mg po QHS  -Will plan for ischemic workup with LHC given segmental WMA, moderate to severe MR on TTE. It's possible that this MR may be improved by reduction in afterload given poorly controlled HTN, and possibly by revascularization if ischemic MR.   -Given ACOSTA and risk of dialysis, will need to optimize renal status before proceeding with LHC.  -At this point, LHC may be done as an outpatient once his renal function has been optimized, and he has completed his course of antibiotics given his recent amputation. Would not pursue LHC while the patient is being treated for possible infection.   -Close outpatient follow up upon discharge.     2. Acute on Chronic HFrEF, moderate-severe MR -   -Overall volume status appears much improved.  -Cont Metoprolol 25 mg po BID  -Cont Hydralazine to 25 mg po TID  -Cont Isordil 20 mg po TID  -Agree with transition to Bumex 2 mg po BID as per nephrology    3. HTN - BP remains elevated  -Uptitrate with aim for normotension given MR may improve with afterload reduction  -Cont Isordil 20 mg po BID  -Cont Hydralzine 25 mg po BID  -Start Norvasc 5 mg po QD    -Cardiology will sign off at this time. Please re-consult as needed.  -Upon discharge please arrange for outpatient cardiology follow-up within 4-6 weeks with at [facility] or [facility] by calling [phone].  -Call [phone] with questions/concerns.     Pascual Gupta MD  Cardiology Attending  [facility] / [facility],  [facility]   Cell: [phone]
Patient seen and examined independently. Agree with Dr. Mccrary's assessment and plan as above    60 y.o M w/ DM2, HTN, PAD s/p recent toe amputation (RUE PICC on antibiotics through 05/24/2021 per ID notes), now with ACOSTA, admitted with SOB, found to have elevated cardiac enzymes concerning for NSTEMI vs demand ischemia in the setting of acute on chronic HFrEF, treated with ASA/Plavix load/Heparin GTT, subsequent TTE 04/26/2021 with mild segmental LV dysfunction, moderate-severe MR in the setting of poorly controlled HTN.     1. NSTEMI - Suspect Type II NSTEMI in the setting of acute on chronic HFrEF, ACOSTA with Cr 4.41. However patient with hx of PAD, and likely has underlying CAD, now with segmental WMA of unclear duration. Therefore it's unclear if this represents at Type I vs Type II NSTEMI.   -He was treated empirically for Type I NSTEMI with Plavix load, ASA, Heparin GTT for >72 hours.  -Cont ASA 81 mg po QD  -Cont Plavix 75 mg po QD  -DC heparin GTT at this time (>72 hours)  -Cont Atorvastatin 80 mg po QHS  -Will plan for ischemic workup with LHC given segmental WMA, moderate to severe MR on TTE. It's possible that this MR may be improved by reduction in afterload given poorly controlled HTN, and possibly by revascularization if ischemic MR.   -Given ACOSTA and risk of dialysis, will need to optimize renal status before proceeding with LHC.  -At this point, LHC may be done as an outpatient once his renal function has been optimized, and he has completed his course of antibiotics given his recent amputation. Would not pursue LHC while the patient is being treated for possible infection.     2. Acute on Chronic HFrEF, moderate-severe MR -   -Overall volume status appears much improved. Euvolemic to mildly overloaded.  -Cont Metoprolol 25 mg po BID  -Start Hydralazine 10 gm po TID  -Start Isordil 10 mg po TID  -Cont Bumex to 2 mg IV QD. Will consider switch to PO for maintenance therapy tomorrow.     3. HTN - BP elevated  -Start Isordil, Hydralazine as above for afterload reduction in an attempt to improve mitral regurgitation.     Thank you for this interesting consult. [facility] cardiology team will continue to follow along with you.  -Call [phone] with questions/concerns.      Pascual Gupta MD  Cardiology Attending  [facility] / [facility],  [facility]   Cell: [phone]
improving presumed AIN on steroid taper
Pt. with ACOSTA. Pt. currently on oral prednisone therapy for suspected AIN. Scr decreased to 2.62 today. Monitor labs and urine output. Avoid any potential nephrotoxins. Dose medications as per eGFR.
Pt. with ACOSTA. Pt. currently on oral prednisone therapy for suspected AIN. Scr elevated at 3.15 today. Monitor labs and urine output. Avoid any potential nephrotoxins. Dose medications as per eGFR.

## 2021-05-04 NOTE — PROGRESS NOTE ADULT - ASSESSMENT
60-year-old male with HTN, IDDM2, PAD s/p RLE angioplasty, recent RLE 2nd digit distal amputation at [facility], presenting with sudden onset slurred speech that started on Wednesday night, persisted until presentation, found to be in acute HF, with NSTEMI and yoselyn

## 2021-05-04 NOTE — PROGRESS NOTE ADULT - PROBLEM SELECTOR PLAN 1
Pt. with non-oliguric ACOSTA in the setting of cefepime, rash and eosinophilia. No prior labs available for review on [facility]. Scr on arrival was 2.15 which progressively increased to 5.09 on 4/28/21. Cefepime discontinued and pt. started on Prednisone on 4/28/21 for suspected AIN. Scr has improved to 2.62 today. Pt. on steroid taper (decreasing Prednisone by 10 mg every 3 days). Monitor labs and urine output. Dose medications as per eGFR. Strict glycemic control. Avoid potential nephrotoxins.    Patient has follow up appointment with Dr. Zambrano when discharge ready (May 10 at 12 PM).  If any questions, please feel free to contact me  Fredis Gallego   Nephrology Fellow  [phone]  (After 5 pm or on weekends use Amion for fellow on call)

## 2021-05-05 ENCOUNTER — TRANSCRIPTION ENCOUNTER (OUTPATIENT)
Age: 61
End: 2021-05-05

## 2021-05-05 VITALS — SYSTOLIC BLOOD PRESSURE: 156 MMHG | HEART RATE: 91 BPM | DIASTOLIC BLOOD PRESSURE: 61 MMHG

## 2021-05-05 LAB
ANION GAP SERPL CALC-SCNC: 16 MMOL/L — HIGH (ref 7–14)
BASOPHILS # BLD AUTO: 0.02 K/UL — SIGNIFICANT CHANGE UP (ref 0–0.2)
BASOPHILS NFR BLD AUTO: 0.1 % — SIGNIFICANT CHANGE UP (ref 0–2)
BUN SERPL-MCNC: 89 MG/DL — HIGH (ref 7–23)
CALCIUM SERPL-MCNC: 9.1 MG/DL — SIGNIFICANT CHANGE UP (ref 8.4–10.5)
CHLORIDE SERPL-SCNC: 99 MMOL/L — SIGNIFICANT CHANGE UP (ref 98–107)
CO2 SERPL-SCNC: 22 MMOL/L — SIGNIFICANT CHANGE UP (ref 22–31)
CREAT SERPL-MCNC: 2.26 MG/DL — HIGH (ref 0.5–1.3)
EOSINOPHIL # BLD AUTO: 0.03 K/UL — SIGNIFICANT CHANGE UP (ref 0–0.5)
EOSINOPHIL NFR BLD AUTO: 0.2 % — SIGNIFICANT CHANGE UP (ref 0–6)
GLUCOSE BLDC GLUCOMTR-MCNC: 208 MG/DL — HIGH (ref 70–99)
GLUCOSE BLDC GLUCOMTR-MCNC: 244 MG/DL — HIGH (ref 70–99)
GLUCOSE SERPL-MCNC: 209 MG/DL — HIGH (ref 70–99)
HCT VFR BLD CALC: 31.1 % — LOW (ref 39–50)
HGB BLD-MCNC: 10.3 G/DL — LOW (ref 13–17)
IANC: 12.92 K/UL — HIGH (ref 1.5–8.5)
IMM GRANULOCYTES NFR BLD AUTO: 1.1 % — SIGNIFICANT CHANGE UP (ref 0–1.5)
LYMPHOCYTES # BLD AUTO: 14.9 % — SIGNIFICANT CHANGE UP (ref 13–44)
LYMPHOCYTES # BLD AUTO: 2.52 K/UL — SIGNIFICANT CHANGE UP (ref 1–3.3)
MAGNESIUM SERPL-MCNC: 1.6 MG/DL — SIGNIFICANT CHANGE UP (ref 1.6–2.6)
MCHC RBC-ENTMCNC: 28.3 PG — SIGNIFICANT CHANGE UP (ref 27–34)
MCHC RBC-ENTMCNC: 33.1 GM/DL — SIGNIFICANT CHANGE UP (ref 32–36)
MCV RBC AUTO: 85.4 FL — SIGNIFICANT CHANGE UP (ref 80–100)
MONOCYTES # BLD AUTO: 1.25 K/UL — HIGH (ref 0–0.9)
MONOCYTES NFR BLD AUTO: 7.4 % — SIGNIFICANT CHANGE UP (ref 2–14)
NEUTROPHILS # BLD AUTO: 12.92 K/UL — HIGH (ref 1.8–7.4)
NEUTROPHILS NFR BLD AUTO: 76.3 % — SIGNIFICANT CHANGE UP (ref 43–77)
NRBC # BLD: 0 /100 WBCS — SIGNIFICANT CHANGE UP
NRBC # FLD: 0 K/UL — SIGNIFICANT CHANGE UP
PHOSPHATE SERPL-MCNC: 3.9 MG/DL — SIGNIFICANT CHANGE UP (ref 2.5–4.5)
PLATELET # BLD AUTO: 357 K/UL — SIGNIFICANT CHANGE UP (ref 150–400)
POTASSIUM SERPL-MCNC: 4.1 MMOL/L — SIGNIFICANT CHANGE UP (ref 3.5–5.3)
POTASSIUM SERPL-SCNC: 4.1 MMOL/L — SIGNIFICANT CHANGE UP (ref 3.5–5.3)
RBC # BLD: 3.64 M/UL — LOW (ref 4.2–5.8)
RBC # FLD: 12.3 % — SIGNIFICANT CHANGE UP (ref 10.3–14.5)
SODIUM SERPL-SCNC: 137 MMOL/L — SIGNIFICANT CHANGE UP (ref 135–145)
WBC # BLD: 16.92 K/UL — HIGH (ref 3.8–10.5)
WBC # FLD AUTO: 16.92 K/UL — HIGH (ref 3.8–10.5)

## 2021-05-05 PROCEDURE — 99233 SBSQ HOSP IP/OBS HIGH 50: CPT

## 2021-05-05 PROCEDURE — 99232 SBSQ HOSP IP/OBS MODERATE 35: CPT

## 2021-05-05 RX ORDER — CLOPIDOGREL BISULFATE 75 MG/1
1 TABLET, FILM COATED ORAL
Qty: 30 | Refills: 0
Start: 2021-05-05 | End: 2021-06-03

## 2021-05-05 RX ORDER — ISOSORBIDE DINITRATE 5 MG/1
1 TABLET ORAL
Qty: 90 | Refills: 0
Start: 2021-05-05 | End: 2021-06-03

## 2021-05-05 RX ORDER — INSULIN GLARGINE 100 [IU]/ML
55 INJECTION, SOLUTION SUBCUTANEOUS
Qty: 10 | Refills: 0
Start: 2021-05-05 | End: 2021-06-03

## 2021-05-05 RX ORDER — NIFEDIPINE 30 MG
1 TABLET, EXTENDED RELEASE 24 HR ORAL
Qty: 30 | Refills: 0
Start: 2021-05-05 | End: 2021-06-03

## 2021-05-05 RX ORDER — INSULIN GLARGINE 100 [IU]/ML
55 INJECTION, SOLUTION SUBCUTANEOUS
Qty: 0 | Refills: 0 | DISCHARGE

## 2021-05-05 RX ORDER — METOPROLOL TARTRATE 50 MG
1 TABLET ORAL
Qty: 60 | Refills: 0
Start: 2021-05-05 | End: 2021-06-03

## 2021-05-05 RX ORDER — LOSARTAN POTASSIUM 100 MG/1
1 TABLET, FILM COATED ORAL
Qty: 0 | Refills: 0 | DISCHARGE

## 2021-05-05 RX ORDER — HYDRALAZINE HCL 50 MG
1 TABLET ORAL
Qty: 90 | Refills: 0
Start: 2021-05-05 | End: 2021-06-03

## 2021-05-05 RX ORDER — INSULIN LISPRO 100/ML
20 VIAL (ML) SUBCUTANEOUS
Qty: 10 | Refills: 0
Start: 2021-05-05 | End: 2021-06-03

## 2021-05-05 RX ADMIN — ISOSORBIDE DINITRATE 20 MILLIGRAM(S): 5 TABLET ORAL at 13:11

## 2021-05-05 RX ADMIN — Medication 25 MILLIGRAM(S): at 17:20

## 2021-05-05 RX ADMIN — HEPARIN SODIUM 5000 UNIT(S): 5000 INJECTION INTRAVENOUS; SUBCUTANEOUS at 13:15

## 2021-05-05 RX ADMIN — Medication 40 MILLIGRAM(S): at 05:15

## 2021-05-05 RX ADMIN — CLOPIDOGREL BISULFATE 75 MILLIGRAM(S): 75 TABLET, FILM COATED ORAL at 13:09

## 2021-05-05 RX ADMIN — Medication 28 UNIT(S): at 08:35

## 2021-05-05 RX ADMIN — Medication 25 MILLIGRAM(S): at 05:14

## 2021-05-05 RX ADMIN — ISOSORBIDE DINITRATE 20 MILLIGRAM(S): 5 TABLET ORAL at 17:20

## 2021-05-05 RX ADMIN — Medication 50 MILLIGRAM(S): at 13:09

## 2021-05-05 RX ADMIN — Medication 4: at 08:35

## 2021-05-05 RX ADMIN — Medication 4: at 13:10

## 2021-05-05 RX ADMIN — Medication 50 MILLIGRAM(S): at 05:14

## 2021-05-05 RX ADMIN — HEPARIN SODIUM 5000 UNIT(S): 5000 INJECTION INTRAVENOUS; SUBCUTANEOUS at 05:14

## 2021-05-05 RX ADMIN — CHLORHEXIDINE GLUCONATE 1 APPLICATION(S): 213 SOLUTION TOPICAL at 13:11

## 2021-05-05 RX ADMIN — Medication 60 MILLIGRAM(S): at 05:15

## 2021-05-05 RX ADMIN — ISOSORBIDE DINITRATE 20 MILLIGRAM(S): 5 TABLET ORAL at 05:14

## 2021-05-05 RX ADMIN — Medication 81 MILLIGRAM(S): at 13:08

## 2021-05-05 RX ADMIN — Medication 25 UNIT(S): at 13:10

## 2021-05-05 NOTE — PROGRESS NOTE ADULT - ASSESSMENT
HOLLY FITZGERALD is a 60-year-old male with HTN, IDDM2, PAD s/p RLE angioplasty, recent RLE 2nd digit distal amputation at [facility], presenting with sudden onset slurred speech that started on Wednesday night, persisted until presentation, found to be in acute HF, with NSTEMI and ACOSTA, endocrine consulted for uncontrolled Type 2 DM.  Patient is on prednisone with steroid exacerbated hyperglycemia.    1. Type 2 diabetes mellitus with other neurologic complication, with long-term current use of insulin  Patient with HbA1c 8.3%, on basal insulin only at home (no pre-meal insulin, no orals). Goal A1c less than 7%  Here with hyperglycemia exacerbated by steroids, on Prednisone taper    While inpatient:  Inpatient BG target 100-180 mg/dl, remains above goal  Currently on Prednisone 40 mg daily starting today 5/5. Will be gradually tapered as outpatient  Continue Lantus 55 units SQ qHS   Continue Admelog 28 units before breakfast, 25 units before lunch and 25 units before dinner (Hold if NPO/not eating meal)  Continue Admelog MODERATE dose correctional scale before meals, moderate dose at bedtime  Check BG before meals and bedtime  Consistent carbohydrate diet  Consider nutritional consultation for dietary education  Recommend DM education through RN staff     Discharge Plan:  Will plan to resume basal insulin pen (Lantus) and ADD rapid acting insulin pen before meals.  Will need new bolus insulin PEN - per primary team Humalog Kwikpen is covered     Recommend dosing as follows:  For Prednisone 40 mg (5/5, 5/6, 5/7): Recommend Lantus 55 units qHS, Humalog 26 units TID  For Prednisone 30 mg (5/8, 5/9, 5/10): Recommend Lantus 55 units qHS, Humalog 24 units TID  For Prednisone 20 mg (5/11, 5/12, 5/13): Recommend Lantus 50 units qHS, Humalog 20 units TID  For Prednisone 10 mg (5/14, 5/15, 5/16): Recommend Lantus 45 units qHS, Humalog 16 units TID  For OFF PREDNISONE (starting 5/17): Recommend Lantus 40 units qHS, Humalog 14 units TID     Patient given the instructions above and reports understanding, he also states his family can assist him at home  Patient was able to correctly identify BG targets and reviewed hypoglycemia recognition and treatment   Ensure he has glucometer, glucose test strips, lancets, alcohol swabs and insulin PEN needles (for 4x per day injections)  Patient prefers to follow with PCP  If desired, can follow up with [facility] [phone].    2. Essential hypertension  BP goal 130/80  Found to be in acute CHF on Bumetanide 2mg daily   Also on hydralazine, metoprolol and isosorbide dinitrate, medication management per primary team/cardiology    3. Hyperlipidemia LDL goal <70  Continue with statin therapy, consider up-titration, last LDL above goal    Discussed discharge plan with primary team ACP Richard Anderson  Nurse Practitioner  Division of Endocrinology & Diabetes  In house pager #[phone]/long range pager #[phone]    If before 9AM or after 6PM, or on weekends/holidays, please call endocrine answering service for assistance ([phone]).  For nonurgent matters email [email or web address] for assistance.

## 2021-05-05 NOTE — PROGRESS NOTE ADULT - PROVIDER SPECIALTY LIST ADULT
Cardiology
Nephrology
Cardiology
Endocrinology
Infectious Disease
Podiatry
Cardiology
Infectious Disease
Podiatry
Nephrology
Podiatry
Podiatry
Hospitalist
Infectious Disease
Infectious Disease
Nephrology
Hospitalist
Endocrinology
Hospitalist
Nephrology
Nephrology
Hospitalist

## 2021-05-05 NOTE — PROGRESS NOTE ADULT - PROBLEM SELECTOR PLAN 7
c/w ASA, statin  Right second toe partial amputation 4/12 at [facility], grew GBS alone  surgical site does not look infected but patient says he was on cefepime via PICC line for it.   ID f/u, changed to daptomycin, c/w dapto through 5/24 per ID

## 2021-05-05 NOTE — PROGRESS NOTE ADULT - PROBLEM SELECTOR PROBLEM 7
PAD (peripheral artery disease)

## 2021-05-05 NOTE — PROGRESS NOTE ADULT - PROBLEM SELECTOR PROBLEM 5
Type 2 diabetes mellitus, with long-term current use of insulin

## 2021-05-05 NOTE — PROGRESS NOTE ADULT - SUBJECTIVE AND OBJECTIVE BOX
Chief Complaint: DM 2 with hyperglycemia exacerbated by steroids     History: Patient seen at bedside. Reports he is feeling ok, eating meals, denies n/v, denies s/s of hypoglycemia  Prednisone dose lowered to 40 mg daily today 5/5  Per primary team, plan for discharge today and Prednisone taper as outpatient  Discussed basal/bolus insulin plan with patient, and need to gradually lower insulin dosing as Prednisone is tapered. He reports understanding. Patient states his family can help him at home with insulin doses, daughter is in the medical field     MEDICATIONS  (STANDING):  aspirin enteric coated 81 milliGRAM(s) Oral daily  atorvastatin 40 milliGRAM(s) Oral at bedtime  chlorhexidine 2% Cloths 1 Application(s) Topical daily  clopidogrel Tablet 75 milliGRAM(s) Oral daily  DAPTOmycin IVPB 350 milliGRAM(s) IV Intermittent every 48 hours  dextrose 40% Gel 15 Gram(s) Oral once  dextrose 5%. 1000 milliLiter(s) (50 mL/Hr) IV Continuous <Continuous>  dextrose 5%. 1000 milliLiter(s) (100 mL/Hr) IV Continuous <Continuous>  dextrose 50% Injectable 25 Gram(s) IV Push once  dextrose 50% Injectable 12.5 Gram(s) IV Push once  dextrose 50% Injectable 25 Gram(s) IV Push once  glucagon  Injectable 1 milliGRAM(s) IntraMuscular once  heparin   Injectable 5000 Unit(s) SubCutaneous every 8 hours  hydrALAZINE 50 milliGRAM(s) Oral three times a day  insulin glargine Injectable (LANTUS) 55 Unit(s) SubCutaneous at bedtime  insulin lispro (ADMELOG) corrective regimen sliding scale   SubCutaneous three times a day before meals  insulin lispro (ADMELOG) corrective regimen sliding scale   SubCutaneous at bedtime  insulin lispro Injectable (ADMELOG) 25 Unit(s) SubCutaneous before lunch  insulin lispro Injectable (ADMELOG) 25 Unit(s) SubCutaneous before dinner  insulin lispro Injectable (ADMELOG) 28 Unit(s) SubCutaneous before breakfast  isosorbide   dinitrate Tablet (ISORDIL) 20 milliGRAM(s) Oral three times a day  metoprolol tartrate 25 milliGRAM(s) Oral two times a day  NIFEdipine XL 60 milliGRAM(s) Oral daily  predniSONE   Tablet 40 milliGRAM(s) Oral daily    MEDICATIONS  (PRN):  melatonin 3 milliGRAM(s) Oral at bedtime PRN Insomnia    No Known Allergies    Review of Systems:  HEENT: No pain  Cardiovascular: No chest pain  Respiratory: No SOB  GI: No nausea, vomiting    PHYSICAL EXAM:  VITALS: T(C): 36.5 (05-05-21 @ 10:36)  T(F): 97.7 (05-05-21 @ 10:36), Max: 98.1 (05-05-21 @ 05:11)  HR: 75 (05-05-21 @ 10:36) (60 - 87)  BP: 153/61 (05-05-21 @ 10:36) (134/73 - 154/74)  RR:  (18 - 18)  SpO2:  (98% - 100%)  Wt(kg): --  GENERAL: NAD  EYES: No proptosis, no lid lag, anicteric  HEENT:  Atraumatic, Normocephalic, moist mucous membranes  RESPIRATORY: unlabored respirations   PSYCH: Alert and oriented x 3, normal affect, normal mood    CAPILLARY BLOOD GLUCOSE    POCT Blood Glucose.: 244 mg/dL (05 May 2021 11:57)  POCT Blood Glucose.: 208 mg/dL (05 May 2021 08:27)  POCT Blood Glucose.: 158 mg/dL (04 May 2021 21:45)  POCT Blood Glucose.: 211 mg/dL (04 May 2021 17:10)      05-05    137  |  99  |  89<H>  ----------------------------<  209<H>  4.1   |  22  |  2.26<H>    EGFR if : 35<L>  EGFR if non : 30<L>    Ca    9.1      05-05  Mg     1.6     05-05  Phos  3.9     05-05        Thyroid Function Tests:  04-23 @ 04:36 TSH 1.58 FreeT4 -- T3 -- Anti TPO -- Anti Thyroglobulin Ab -- TSI --      A1C with Estimated Average Glucose Result: 8.3 % (04-23-21 @ 04:36)    Diet, Consistent Carbohydrate Renal w/Evening Snack:   DASH/TLC Sodium & Cholesterol Restricted (DASH) (04-23-21 @ 16:18)

## 2021-05-05 NOTE — PROGRESS NOTE ADULT - PROBLEM SELECTOR PROBLEM 3
Hyperlipidemia LDL goal <70
ACOSTA (acute kidney injury)

## 2021-05-05 NOTE — PROGRESS NOTE ADULT - PROBLEM SELECTOR PLAN 3
Cr improving   appreciate renal recs - empiric steroids for AIN   started on prednisone 60 mg, now tapered to 40mg, taper 10 mg every 3 days per renal  gallium scan normal, no evidence of AIN, case d/w renal fellow, scan was done after prednisone started on 4/28 and renal fxn improving on steroid so can't exclude AIN, will taper prednisone as above  - s/p bumex   changed cefepime to daptomycin   - baseline Cr last month was 1.7  -outpt f/u nephrology Dr. Zambrano on May 10 at 12 PM

## 2021-05-05 NOTE — PROGRESS NOTE ADULT - PROBLEM SELECTOR PLAN 5
Monitoring FS - likely elevated due to steroids   -A1c 8.3%  - endocrine consult appreciated, insulin adjusted, increased lantus to 55u hs and admelog to 28/25/25u ac, will adjust as steroid is being tapered - will clarify discharge regimen   monitor FS

## 2021-05-05 NOTE — PROGRESS NOTE ADULT - ASSESSMENT
60-year-old male with HTN, IDDM2, PAD s/p RLE angioplasty, recent RLE 2nd digit distal amputation at [facility], presenting with sudden onset slurred speech that started on Wednesday night, persisted until presentation, found to be in acute HF, with NSTEMI and yoselyn

## 2021-05-05 NOTE — PROGRESS NOTE ADULT - SUBJECTIVE AND OBJECTIVE BOX
[facility] Division of Hospital Medicine  Maria Guadalupe Yoo MD  Pager (M-F, 8A-5P): [phone]  Other Times:  [phone]    Patient is a 60y old  Male who presents with a chief complaint of slurred speech (05 May 2021 13:26)    SUBJECTIVE / OVERNIGHT EVENTS:  pt denies complaints.      MEDICATIONS  (STANDING):  aspirin enteric coated 81 milliGRAM(s) Oral daily  atorvastatin 40 milliGRAM(s) Oral at bedtime  chlorhexidine 2% Cloths 1 Application(s) Topical daily  clopidogrel Tablet 75 milliGRAM(s) Oral daily  DAPTOmycin IVPB 350 milliGRAM(s) IV Intermittent every 48 hours  dextrose 40% Gel 15 Gram(s) Oral once  dextrose 5%. 1000 milliLiter(s) (50 mL/Hr) IV Continuous <Continuous>  dextrose 5%. 1000 milliLiter(s) (100 mL/Hr) IV Continuous <Continuous>  dextrose 50% Injectable 25 Gram(s) IV Push once  dextrose 50% Injectable 12.5 Gram(s) IV Push once  dextrose 50% Injectable 25 Gram(s) IV Push once  glucagon  Injectable 1 milliGRAM(s) IntraMuscular once  heparin   Injectable 5000 Unit(s) SubCutaneous every 8 hours  hydrALAZINE 50 milliGRAM(s) Oral three times a day  insulin glargine Injectable (LANTUS) 55 Unit(s) SubCutaneous at bedtime  insulin lispro (ADMELOG) corrective regimen sliding scale   SubCutaneous three times a day before meals  insulin lispro (ADMELOG) corrective regimen sliding scale   SubCutaneous at bedtime  insulin lispro Injectable (ADMELOG) 25 Unit(s) SubCutaneous before lunch  insulin lispro Injectable (ADMELOG) 25 Unit(s) SubCutaneous before dinner  insulin lispro Injectable (ADMELOG) 28 Unit(s) SubCutaneous before breakfast  isosorbide   dinitrate Tablet (ISORDIL) 20 milliGRAM(s) Oral three times a day  metoprolol tartrate 25 milliGRAM(s) Oral two times a day  NIFEdipine XL 60 milliGRAM(s) Oral daily  predniSONE   Tablet 40 milliGRAM(s) Oral daily    MEDICATIONS  (PRN):  melatonin 3 milliGRAM(s) Oral at bedtime PRN Insomnia      CAPILLARY BLOOD GLUCOSE      POCT Blood Glucose.: 244 mg/dL (05 May 2021 11:57)  POCT Blood Glucose.: 208 mg/dL (05 May 2021 08:27)  POCT Blood Glucose.: 158 mg/dL (04 May 2021 21:45)  POCT Blood Glucose.: 211 mg/dL (04 May 2021 17:10)    I&O's Summary    04 May 2021 07:01  -  05 May 2021 07:00  --------------------------------------------------------  IN: 1610 mL / OUT: 1650 mL / NET: -40 mL    05 May 2021 07:01  -  05 May 2021 14:53  --------------------------------------------------------  IN: 730 mL / OUT: 1600 mL / NET: -870 mL        PHYSICAL EXAM:  Vital Signs Last 24 Hrs  T(C): 36.5 (05 May 2021 10:36), Max: 36.7 (05 May 2021 05:11)  T(F): 97.7 (05 May 2021 10:36), Max: 98.1 (05 May 2021 05:11)  HR: 75 (05 May 2021 10:36) (60 - 87)  BP: 153/61 (05 May 2021 10:36) (134/73 - 154/74)  BP(mean): --  RR: 18 (05 May 2021 10:36) (18 - 18)  SpO2: 100% (05 May 2021 10:36) (98% - 100%)    CONSTITUTIONAL: NAD, well-developed, well-groomed  RESPIRATORY: Normal respiratory effort; lungs are clear to auscultation bilaterally  CARDIOVASCULAR: Regular rate and rhythm; No lower extremity edema;   ABDOMEN: Nontender to palpation, normoactive bowel sounds  MUSCULOSKELETAL:  no joint swelling or tenderness to palpation; R foot with dressing c/d/i  PSYCH: affect appropriate  NEUROLOGY:  no gross sensory deficits     LABS:                        10.3   16.92 )-----------( 357      ( 05 May 2021 06:56 )             31.1     05-05    137  |  99  |  89<H>  ----------------------------<  209<H>  4.1   |  22  |  2.26<H>    Ca    9.1      05 May 2021 06:56  Phos  3.9     05-05  Mg     1.6     05-05          RADIOLOGY & ADDITIONAL TESTS:  Results Reviewed:   Imaging Personally Reviewed:  Electrocardiogram Personally Reviewed:    COORDINATION OF CARE:  Care Discussed with Consultants/Other Providers [Y/N]: Y  Prior or Outpatient Records Reviewed [Y/N]: Y

## 2021-05-05 NOTE — DISCHARGE NOTE NURSING/CASE MANAGEMENT/SOCIAL WORK - NSSCNAMETXT_GEN_ALL_CORE
[facility]. This agency will deliver your IV antibiotic and supplies to your home.   [facility]- This agency will send a nurse to your home to teach IV antibiotic administration and Nursing care at home.

## 2021-05-05 NOTE — DISCHARGE NOTE NURSING/CASE MANAGEMENT/SOCIAL WORK - PATIENT PORTAL LINK FT
You can access the FollowMyHealth Patient Portal offered by [facility] by registering at the following website: [email or web address]. By joining [email or web address]’s FollowMyHealth portal, you will also be able to view your health information using other applications (apps) compatible with our system.

## 2021-05-05 NOTE — PROGRESS NOTE ADULT - PROBLEM SELECTOR PROBLEM 2
Essential hypertension
NSTEMI (non-ST elevated myocardial infarction)
Essential hypertension
NSTEMI (non-ST elevated myocardial infarction)

## 2021-05-05 NOTE — PROGRESS NOTE ADULT - NSICDXPILOT_GEN_ALL_CORE
[place]

## 2021-05-05 NOTE — PROGRESS NOTE ADULT - PROBLEM SELECTOR PROBLEM 1
ACOSTA (acute kidney injury)
ACOSTA (acute kidney injury)
Acute heart failure
ACOSTA (acute kidney injury)
Acute heart failure
Type 2 diabetes mellitus with other neurologic complication, with long-term current use of insulin
ACOSTA (acute kidney injury)
Type 2 diabetes mellitus with other neurologic complication, with long-term current use of insulin
Acute heart failure
Acute heart failure
Type 2 diabetes mellitus with other neurologic complication, with long-term current use of insulin
Type 2 diabetes mellitus with other neurologic complication, with long-term current use of insulin
ACOSTA (acute kidney injury)
Acute heart failure

## 2021-05-07 PROBLEM — Z00.00 ENCOUNTER FOR PREVENTIVE HEALTH EXAMINATION: Status: ACTIVE | Noted: 2021-05-07

## 2021-05-10 ENCOUNTER — APPOINTMENT (OUTPATIENT)
Dept: NEPHROLOGY | Facility: CLINIC | Age: 61
End: 2021-05-10
Payer: COMMERCIAL

## 2021-05-10 VITALS
WEIGHT: 187.39 LBS | BODY MASS INDEX: 26.83 KG/M2 | HEART RATE: 65 BPM | SYSTOLIC BLOOD PRESSURE: 161 MMHG | TEMPERATURE: 97.6 F | OXYGEN SATURATION: 99 % | DIASTOLIC BLOOD PRESSURE: 61 MMHG | HEIGHT: 70 IN

## 2021-05-10 VITALS — SYSTOLIC BLOOD PRESSURE: 147 MMHG | DIASTOLIC BLOOD PRESSURE: 61 MMHG

## 2021-05-10 DIAGNOSIS — Z56.0 UNEMPLOYMENT, UNSPECIFIED: ICD-10-CM

## 2021-05-10 DIAGNOSIS — Z86.79 PERSONAL HISTORY OF OTHER DISEASES OF THE CIRCULATORY SYSTEM: ICD-10-CM

## 2021-05-10 DIAGNOSIS — E11.9 TYPE 2 DIABETES MELLITUS W/OUT COMPLICATIONS: ICD-10-CM

## 2021-05-10 DIAGNOSIS — Z86.39 PERSONAL HISTORY OF OTHER ENDOCRINE, NUTRITIONAL AND METABOLIC DISEASE: ICD-10-CM

## 2021-05-10 DIAGNOSIS — Z78.9 OTHER SPECIFIED HEALTH STATUS: ICD-10-CM

## 2021-05-10 DIAGNOSIS — Z88.9 ALLERGY STATUS TO UNSPECIFIED DRUGS, MEDICAMENTS AND BIOLOGICAL SUBSTANCES: ICD-10-CM

## 2021-05-10 PROCEDURE — 99215 OFFICE O/P EST HI 40 MIN: CPT

## 2021-05-10 PROCEDURE — 99072 ADDL SUPL MATRL&STAF TM PHE: CPT

## 2021-05-10 RX ORDER — INSULIN GLARGINE 100 [IU]/ML
100 INJECTION, SOLUTION SUBCUTANEOUS
Refills: 0 | Status: ACTIVE | COMMUNITY

## 2021-05-10 RX ORDER — LATANOPROST/PF 0.005 %
0.01 DROPS OPHTHALMIC (EYE)
Refills: 0 | Status: ACTIVE | COMMUNITY

## 2021-05-10 RX ORDER — ISOSORBIDE DINITRATE 20 MG/1
20 TABLET ORAL
Refills: 0 | Status: ACTIVE | COMMUNITY

## 2021-05-10 RX ORDER — HYDRALAZINE HYDROCHLORIDE 50 MG/1
50 TABLET ORAL
Refills: 0 | Status: ACTIVE | COMMUNITY

## 2021-05-10 RX ORDER — DAPTOMYCIN 350 MG/7ML
INJECTION, POWDER, LYOPHILIZED, FOR SOLUTION INTRAVENOUS
Refills: 0 | Status: ACTIVE | COMMUNITY

## 2021-05-10 RX ORDER — PREDNISONE 10 MG
TABLET ORAL
Refills: 0 | Status: ACTIVE | COMMUNITY

## 2021-05-10 RX ORDER — ATORVASTATIN CALCIUM 40 MG/1
40 TABLET, FILM COATED ORAL
Refills: 0 | Status: ACTIVE | COMMUNITY

## 2021-05-10 RX ORDER — METOPROLOL TARTRATE 1 MG/ML
INJECTION, SOLUTION INTRAVENOUS
Refills: 0 | Status: ACTIVE | COMMUNITY

## 2021-05-10 RX ORDER — NIFEDIPINE 60 MG/1
60 TABLET, EXTENDED RELEASE ORAL
Refills: 0 | Status: ACTIVE | COMMUNITY

## 2021-05-10 RX ORDER — DORZOLAMIDE HYDROCHLORIDE AND TIMOLOL MALEATE 20; 5 MG/ML; MG/ML
SOLUTION/ DROPS OPHTHALMIC
Refills: 0 | Status: ACTIVE | COMMUNITY

## 2021-05-10 RX ORDER — GLUCOSAMINE HCL/CHONDROITIN SU 500-400 MG
3 CAPSULE ORAL
Refills: 0 | Status: ACTIVE | COMMUNITY

## 2021-05-10 RX ORDER — CLOPIDOGREL 75 MG/1
75 TABLET, FILM COATED ORAL
Refills: 0 | Status: ACTIVE | COMMUNITY

## 2021-05-10 RX ORDER — INSULIN LISPRO 100 [IU]/ML
100 INJECTION, SOLUTION INTRAVENOUS; SUBCUTANEOUS
Refills: 0 | Status: ACTIVE | COMMUNITY

## 2021-05-10 SDOH — ECONOMIC STABILITY - INCOME SECURITY: UNEMPLOYMENT, UNSPECIFIED: Z56.0

## 2021-05-10 NOTE — HISTORY OF PRESENT ILLNESS
[FreeTextEntry1] : Today I had the pleasure of meeting Monse Saul who is a 60 years old man from [facility] who is presently unemployed but used to work as a .  He is accompanied today by his wife.  I initially met Mr Saul as an inpatient at [facility] in late 4/21 where he was admitted with ACOSTA and volume overload.  The patient has been hypertensive / diabetic for 20 years and reports no microvascular / macrovascular complications in the past.  He was being followed by podiatry for a wound on his foot.  He was admitted to [facility] where he was noted to have osteomyelitis and was discharged on cefepime.  Subsequently he came to [facility] complaining of shortness of breath.  He was noted to have NSTEMI and was placed on aspirin / plavix / heparin drip and required IV diuresis.  Upon admission he had a creatinine of low 2's which progressively worsened during his hospital stay .  At the time of evaluation by nephrology creatinine had peaked to 5 and he had diffuse rash with peripheral eosinophilia.  The patient was treated empirically with steroids and creatinine returned to his admission level of low 2's.  volume status was improved upon discharge.  He was ordered for a 2 week taper of prednisone.  He is presently on 30 mg daily and will start 20 mg for 3 days then 10 for 3 days then will be done.  Of note the patient feels well today but momentarily gets a sensation of dizzyness here and there -- lightheadedness.  No chest pain no sob, no edema, normal urination.  Enjoyed his son's wedding this weekend.

## 2021-05-10 NOTE — REVIEW OF SYSTEMS
[Fever] : no fever [Chills] : no chills [Feeling Poorly] : not feeling poorly [Feeling Tired] : not feeling tired [Eyesight Problems] : no eyesight problems [Nosebleeds] : no nosebleeds [As noted in HPI] : as noted in HPI [Abdominal Pain] : no abdominal pain [Vomiting] : no vomiting [Dysuria] : no dysuria [Arthralgias] : no arthralgias [Joint Pain] : no joint pain [Itching] : no itching [As Noted in HPI] : as noted in HPI [Anxiety] : no anxiety [Easy Bleeding] : no tendency for easy bleeding

## 2021-05-10 NOTE — ASSESSMENT
[FreeTextEntry1] : 60 years old man here for follow up of AIN\par \par Acute interstitial Nephritis -- The patient has an elevated creatinine above baseline.  I have reviewed all the results from his most recent hospitalization and provided my independent assessment that this is ACOSTA which is due to AIN and is likely resolving.  The patient is presently clinically improved rash is gone, volume overload resolved (A line patter on POCUS today, no JVD which is in contrast to his hospital exam).  He is presently on prednisone 30 mg and he will start taking 20 mg po X 3 days then 10 mg PO X 3 days and then stop.  We will have to monitor his labs closely to ensure resolution if there is no improvement to normal will need to consider prolonging his treatment.  Plan for creatinine today as well as TPCR

## 2021-05-10 NOTE — PHYSICAL EXAM
[General Appearance - Alert] : alert [General Appearance - In No Acute Distress] : in no acute distress [General Appearance - Well Nourished] : well nourished [Sclera] : the sclera and conjunctiva were normal [Hearing Threshold Finger Rub Not [place]] : hearing was normal [Neck Appearance] : the appearance of the neck was normal [Neck Cervical Mass (___cm)] : no neck mass was observed [Jugular Venous Distention Increased] : there was no jugular-venous distention [Respiration, Rhythm And Depth] : normal respiratory rhythm and effort [Exaggerated Use Of Accessory Muscles For Inspiration] : no accessory muscle use [Auscultation Breath Sounds / Voice Sounds] : lungs were clear to auscultation bilaterally [Heart Sounds] : normal S1 and S2 [Edema] : there was no peripheral edema [Cervical Lymph Nodes Enlarged Posterior Bilaterally] : posterior cervical [Supraclavicular Lymph Nodes Enlarged Bilaterally] : supraclavicular [No CVA Tenderness] : no ~M costovertebral angle tenderness [] : no rash [Oriented To Time, Place, And Person] : oriented to person, place, and time [Impaired Insight] : insight and judgment were intact [Affect] : the affect was normal

## 2021-05-11 ENCOUNTER — NON-APPOINTMENT (OUTPATIENT)
Age: 61
End: 2021-05-11

## 2021-05-11 DIAGNOSIS — N10 ACUTE TUBULO-INTERSTITIAL NEPHRITIS: ICD-10-CM

## 2021-05-11 LAB
ALBUMIN SERPL ELPH-MCNC: 3.9 G/DL
ANION GAP SERPL CALC-SCNC: 12 MMOL/L
BUN SERPL-MCNC: 46 MG/DL
CALCIUM SERPL-MCNC: 9.3 MG/DL
CHLORIDE SERPL-SCNC: 107 MMOL/L
CO2 SERPL-SCNC: 23 MMOL/L
CREAT SERPL-MCNC: 1.89 MG/DL
CREAT SPEC-SCNC: 126 MG/DL
CREAT/PROT UR: 0.3 RATIO
GLUCOSE SERPL-MCNC: 67 MG/DL
PHOSPHATE SERPL-MCNC: 2.9 MG/DL
POTASSIUM SERPL-SCNC: 4.5 MMOL/L
PROT UR-MCNC: 35 MG/DL
SODIUM SERPL-SCNC: 142 MMOL/L

## 2021-06-01 ENCOUNTER — RESULT REVIEW (OUTPATIENT)
Age: 61
End: 2021-06-01

## 2021-06-01 ENCOUNTER — APPOINTMENT (OUTPATIENT)
Dept: CARDIOLOGY | Facility: CLINIC | Age: 61
End: 2021-06-01
Payer: COMMERCIAL

## 2021-06-01 ENCOUNTER — APPOINTMENT (OUTPATIENT)
Dept: ULTRASOUND IMAGING | Facility: HOSPITAL | Age: 61
End: 2021-06-01

## 2021-06-01 ENCOUNTER — OUTPATIENT (OUTPATIENT)
Dept: OUTPATIENT SERVICES | Facility: HOSPITAL | Age: 61
LOS: 1 days | End: 2021-06-01
Payer: COMMERCIAL

## 2021-06-01 VITALS
HEART RATE: 65 BPM | HEIGHT: 70 IN | SYSTOLIC BLOOD PRESSURE: 174 MMHG | BODY MASS INDEX: 26.77 KG/M2 | OXYGEN SATURATION: 99 % | WEIGHT: 187 LBS | DIASTOLIC BLOOD PRESSURE: 67 MMHG

## 2021-06-01 VITALS — SYSTOLIC BLOOD PRESSURE: 160 MMHG | DIASTOLIC BLOOD PRESSURE: 75 MMHG

## 2021-06-01 DIAGNOSIS — I70.229 ATHEROSCLEROSIS OF NATIVE ARTERIES OF EXTREMITIES WITH REST PAIN, UNSPECIFIED EXTREMITY: ICD-10-CM

## 2021-06-01 DIAGNOSIS — I82.401 ACUTE EMBOLISM AND THROMBOSIS OF UNSPECIFIED DEEP VEINS OF RIGHT LOWER EXTREMITY: ICD-10-CM

## 2021-06-01 DIAGNOSIS — Z89.429 ACQUIRED ABSENCE OF OTHER TOE(S), UNSPECIFIED SIDE: Chronic | ICD-10-CM

## 2021-06-01 DIAGNOSIS — Z98.62 PERIPHERAL VASCULAR ANGIOPLASTY STATUS: Chronic | ICD-10-CM

## 2021-06-01 DIAGNOSIS — I73.9 PERIPHERAL VASCULAR DISEASE, UNSPECIFIED: ICD-10-CM

## 2021-06-01 PROCEDURE — 93925 LOWER EXTREMITY STUDY: CPT

## 2021-06-01 PROCEDURE — 99072 ADDL SUPL MATRL&STAF TM PHE: CPT

## 2021-06-01 PROCEDURE — 99214 OFFICE O/P EST MOD 30 MIN: CPT

## 2021-06-01 PROCEDURE — 93925 LOWER EXTREMITY STUDY: CPT | Mod: 26

## 2021-06-02 ENCOUNTER — NON-APPOINTMENT (OUTPATIENT)
Age: 61
End: 2021-06-02

## 2021-06-02 LAB
ALBUMIN SERPL ELPH-MCNC: 4.3 G/DL
ALP BLD-CCNC: 188 U/L
ALT SERPL-CCNC: 25 U/L
ANION GAP SERPL CALC-SCNC: 12 MMOL/L
AST SERPL-CCNC: 19 U/L
BASOPHILS # BLD AUTO: 0.07 K/UL
BASOPHILS NFR BLD AUTO: 0.9 %
BILIRUB SERPL-MCNC: 0.3 MG/DL
BUN SERPL-MCNC: 34 MG/DL
CALCIUM SERPL-MCNC: 9.6 MG/DL
CHLORIDE SERPL-SCNC: 105 MMOL/L
CO2 SERPL-SCNC: 23 MMOL/L
CREAT SERPL-MCNC: 1.81 MG/DL
EOSINOPHIL # BLD AUTO: 0.57 K/UL
EOSINOPHIL NFR BLD AUTO: 7.1 %
GLUCOSE SERPL-MCNC: 134 MG/DL
HCT VFR BLD CALC: 36.6 %
HGB BLD-MCNC: 11.6 G/DL
IMM GRANULOCYTES NFR BLD AUTO: 0.4 %
LYMPHOCYTES # BLD AUTO: 1.89 K/UL
LYMPHOCYTES NFR BLD AUTO: 23.4 %
MAN DIFF?: NORMAL
MCHC RBC-ENTMCNC: 27.4 PG
MCHC RBC-ENTMCNC: 31.7 GM/DL
MCV RBC AUTO: 86.3 FL
MONOCYTES # BLD AUTO: 0.59 K/UL
MONOCYTES NFR BLD AUTO: 7.3 %
NEUTROPHILS # BLD AUTO: 4.93 K/UL
NEUTROPHILS NFR BLD AUTO: 60.9 %
PLATELET # BLD AUTO: 264 K/UL
POTASSIUM SERPL-SCNC: 4.5 MMOL/L
PROT SERPL-MCNC: 6.8 G/DL
RBC # BLD: 4.24 M/UL
RBC # FLD: 13.2 %
SODIUM SERPL-SCNC: 140 MMOL/L
WBC # FLD AUTO: 8.08 K/UL

## 2021-06-03 NOTE — REVIEW OF SYSTEMS
[Skin Lesions] : skin lesion(s): [Negative] : Cardiovascular [Cough] : no cough [Wheezing] : no wheezing [Abdominal Pain] : no abdominal pain [Joint Pain] : no joint pain [Rash] : no rash [Dizziness] : no dizziness [Easy Bleeding] : no tendency for easy bleeding

## 2021-06-03 NOTE — REASON FOR VISIT
[Initial Evaluation] : an initial evaluation of [FreeTextEntry1] : 60M T2DM, HTN, HLD, CKD here for evaluation of non-healing second toe ulcer on R foot after partial amputation. \par \par Partial amputation of 2nd toe on R foot April 2021 at [facility], also reports balloon angioplasty of RLE at that time, no stents were placed. \par  \par Toe is not healing. No pain, continues to ambulate. No claudication, fevers, chills, chest pain, shortness of breath. \par \par Cannot recall all of his medications, but remembers:\par Atorvastatin\par Insulin\par ASA 81 mg daily\par \par Vascular surgeon at [facility]: Remington Hawthorne MD

## 2021-06-03 NOTE — ASSESSMENT
[FreeTextEntry1] : Problems:\par #PAD, [place] 5\par - Gangrenous second toe of R foot after partial amputation 4/2021\par - Arterial duplex 6/1/2021 with distal disease \par #HTN\par #T2DM\par \par Plan:\par - Labs today\par - Recommend repeat lower extremity angiogram if Cr okay\par - Continue aspirin and statin \par - Continue to follow with podiatrist

## 2021-06-03 NOTE — PHYSICAL EXAM
[Normal Appearance] : normal appearance [General Appearance - Well Developed] : well developed [Well Groomed] : well groomed [General Appearance - Well Nourished] : well nourished [Heart Rate And Rhythm] : heart rate and rhythm were normal [Heart Sounds] : normal S1 and S2 [Murmurs] : no murmurs present [Edema] : no peripheral edema present [] : no respiratory distress [Auscultation Breath Sounds / Voice Sounds] : lungs were clear to auscultation bilaterally [Bowel Sounds] : normal bowel sounds [Oriented To Time, Place, And Person] : oriented to person, place, and time [Normal Conjunctiva] : the conjunctiva exhibited no abnormalities [Eyelids - No Xanthelasma] : the eyelids demonstrated no xanthelasmas [Normal Oral Mucosa] : normal oral mucosa [No Oral Pallor] : no oral pallor [No Oral Cyanosis] : no oral cyanosis [Normal Jugular Venous A Waves Present] : normal jugular venous A waves present [Normal Jugular Venous V Waves Present] : normal jugular venous V waves present [No Jugular Venous [place] A Waves] : no jugular venous [place] A waves [FreeTextEntry1] : Eschar on second digit of R foot, DP/PT non palp but dopplerable (AT and DP monophasic, PT biphasic) [Affect] : the affect was normal [Mood] : the mood was normal [No Anxiety] : not feeling anxious

## 2021-06-13 ENCOUNTER — APPOINTMENT (OUTPATIENT)
Dept: DISASTER EMERGENCY | Facility: CLINIC | Age: 61
End: 2021-06-13

## 2021-06-13 LAB — SARS-COV-2 N GENE NPH QL NAA+PROBE: NOT DETECTED

## 2021-06-16 ENCOUNTER — NON-APPOINTMENT (OUTPATIENT)
Age: 61
End: 2021-06-16

## 2021-07-03 DIAGNOSIS — Z01.818 ENCOUNTER FOR OTHER PREPROCEDURAL EXAMINATION: ICD-10-CM

## 2021-07-04 ENCOUNTER — APPOINTMENT (OUTPATIENT)
Dept: DISASTER EMERGENCY | Facility: CLINIC | Age: 61
End: 2021-07-04

## 2021-07-04 LAB — SARS-COV-2 N GENE NPH QL NAA+PROBE: NOT DETECTED

## 2021-07-07 ENCOUNTER — OUTPATIENT (OUTPATIENT)
Dept: OUTPATIENT SERVICES | Facility: HOSPITAL | Age: 61
LOS: 1 days | End: 2021-07-07
Payer: COMMERCIAL

## 2021-07-07 VITALS
HEART RATE: 73 BPM | DIASTOLIC BLOOD PRESSURE: 80 MMHG | RESPIRATION RATE: 18 BRPM | SYSTOLIC BLOOD PRESSURE: 168 MMHG | OXYGEN SATURATION: 97 %

## 2021-07-07 VITALS — HEIGHT: 70 IN | WEIGHT: 186.95 LBS

## 2021-07-07 DIAGNOSIS — Z98.62 PERIPHERAL VASCULAR ANGIOPLASTY STATUS: Chronic | ICD-10-CM

## 2021-07-07 DIAGNOSIS — Z89.429 ACQUIRED ABSENCE OF OTHER TOE(S), UNSPECIFIED SIDE: Chronic | ICD-10-CM

## 2021-07-07 DIAGNOSIS — I73.9 PERIPHERAL VASCULAR DISEASE, UNSPECIFIED: ICD-10-CM

## 2021-07-07 LAB
ANION GAP SERPL CALC-SCNC: 13 MMOL/L — SIGNIFICANT CHANGE UP (ref 5–17)
BUN SERPL-MCNC: 28 MG/DL — HIGH (ref 7–23)
CALCIUM SERPL-MCNC: 9.7 MG/DL — SIGNIFICANT CHANGE UP (ref 8.4–10.5)
CHLORIDE SERPL-SCNC: 99 MMOL/L — SIGNIFICANT CHANGE UP (ref 96–108)
CO2 SERPL-SCNC: 21 MMOL/L — LOW (ref 22–31)
CREAT SERPL-MCNC: 1.88 MG/DL — HIGH (ref 0.5–1.3)
GLUCOSE BLDC GLUCOMTR-MCNC: 278 MG/DL — HIGH (ref 70–99)
GLUCOSE SERPL-MCNC: 300 MG/DL — HIGH (ref 70–99)
HCT VFR BLD CALC: 35.4 % — LOW (ref 39–50)
HGB BLD-MCNC: 11.9 G/DL — LOW (ref 13–17)
MCHC RBC-ENTMCNC: 27.5 PG — SIGNIFICANT CHANGE UP (ref 27–34)
MCHC RBC-ENTMCNC: 33.6 GM/DL — SIGNIFICANT CHANGE UP (ref 32–36)
MCV RBC AUTO: 81.9 FL — SIGNIFICANT CHANGE UP (ref 80–100)
NRBC # BLD: 0 /100 WBCS — SIGNIFICANT CHANGE UP (ref 0–0)
PLATELET # BLD AUTO: 204 K/UL — SIGNIFICANT CHANGE UP (ref 150–400)
POTASSIUM SERPL-MCNC: 4.1 MMOL/L — SIGNIFICANT CHANGE UP (ref 3.5–5.3)
POTASSIUM SERPL-SCNC: 4.1 MMOL/L — SIGNIFICANT CHANGE UP (ref 3.5–5.3)
RBC # BLD: 4.32 M/UL — SIGNIFICANT CHANGE UP (ref 4.2–5.8)
RBC # FLD: 13 % — SIGNIFICANT CHANGE UP (ref 10.3–14.5)
SODIUM SERPL-SCNC: 133 MMOL/L — LOW (ref 135–145)
WBC # BLD: 11.72 K/UL — HIGH (ref 3.8–10.5)
WBC # FLD AUTO: 11.72 K/UL — HIGH (ref 3.8–10.5)

## 2021-07-07 PROCEDURE — 36247 INS CATH ABD/L-EXT ART 3RD: CPT

## 2021-07-07 PROCEDURE — 82962 GLUCOSE BLOOD TEST: CPT

## 2021-07-07 PROCEDURE — 80048 BASIC METABOLIC PNL TOTAL CA: CPT

## 2021-07-07 PROCEDURE — 75710 ARTERY X-RAYS ARM/LEG: CPT | Mod: 26

## 2021-07-07 PROCEDURE — C1887: CPT

## 2021-07-07 PROCEDURE — 93010 ELECTROCARDIOGRAM REPORT: CPT

## 2021-07-07 PROCEDURE — 93005 ELECTROCARDIOGRAM TRACING: CPT

## 2021-07-07 PROCEDURE — 85027 COMPLETE CBC AUTOMATED: CPT

## 2021-07-07 PROCEDURE — 75710 ARTERY X-RAYS ARM/LEG: CPT

## 2021-07-07 PROCEDURE — C1769: CPT

## 2021-07-07 RX ORDER — SODIUM CHLORIDE 9 MG/ML
3 INJECTION INTRAMUSCULAR; INTRAVENOUS; SUBCUTANEOUS EVERY 8 HOURS
Refills: 0 | Status: DISCONTINUED | OUTPATIENT
Start: 2021-07-07 | End: 2021-07-21

## 2021-07-07 RX ORDER — DORZOLAMIDE HYDROCHLORIDE TIMOLOL MALEATE 20; 5 MG/ML; MG/ML
1 SOLUTION/ DROPS OPHTHALMIC
Qty: 0 | Refills: 0 | DISCHARGE

## 2021-07-07 RX ORDER — LANOLIN ALCOHOL/MO/W.PET/CERES
1 CREAM (GRAM) TOPICAL
Qty: 0 | Refills: 0 | DISCHARGE

## 2021-07-07 NOTE — ASU DISCHARGE PLAN (ADULT/PEDIATRIC) - ASU DC SPECIAL INSTRUCTIONSFT

## 2021-07-07 NOTE — H&P CARDIOLOGY - HISTORY OF PRESENT ILLNESS
59 y/o male with Hx of HTN, HLD, DMT2 AIc 8.3, better controlled c/b PAD s/p RLE angioplasty, recent RLE 2nd digit distal amputation at [facility] (4/10/21) recently rehospitalized at [facility] 4/23/21 for sudden onset slurred speech, NSTEMI, Heart failure with TTE 4/26/21 with LVEF 52% mod-severe MR, ACOSTA possibly from Cefepime causing AIN (acute interstitial nephritis) was eventually changed to Daptomycin and d/c with PICC line.  He was followed by Dr. Lr from cardiology and follows Dr. Drake, PCP.  His CPK was trended post discharge.  He was referred to Dr. Shanks, Vascular-Cardiology for is non healing 2nd toe on right foot.  He presents for RLE peripheral angiogram with possible intervention.  He denies cp, sob, palpitations, sick contacts, fevers, chills and his COVID 19 PCR negative on 7/4/21.   61 y/o male with Hx of HTN, HLD, DMT2 AIc 8.3, better controlled c/b PAD s/p RLE angioplasty, recent RLE 2nd digit distal amputation at [facility] (4/10/21),  recently rehospitalized at [facility] 4/23/21 for sudden onset slurred speech, NSTEMI, Heart failure with TTE 4/26/21 with LVEF 52% mod-severe MR, CKD c/b ACOSTA possibly from Cefepime causing AIN (acute interstitial nephritis) (CR 1.81 on 6/1/21) was eventually changed to Daptomycin and d/c with PICC line.  He was followed by Dr. Lr from cardiology and follows Dr. Drake, PCP.  His CPK was trended post discharge.  He was referred to Dr. Shanks, Vascular-Cardiology for is non healing 2nd toe on right foot with necrosis.  He presents for RLE peripheral angiogram with possible intervention.  He denies cp, sob, palpitations, sick contacts, fevers, chills and his COVID 19 PCR negative on 7/4/21.  Was vaccinated with dose 1 of Pfizer.

## 2021-07-07 NOTE — H&P CARDIOLOGY - PMH
Essential hypertension    PAD (peripheral artery disease)    Type 2 diabetes mellitus, with long-term current use of insulin

## 2021-07-07 NOTE — ASU DISCHARGE PLAN (ADULT/PEDIATRIC) - CARE PROVIDER_API CALL
Calos Shanks (DO)  Cardiovascular Disease; Internal Medicine; Nuclear Cardiology  [address] [place] [address]  Phone: [phone]  Fax: [phone]  Follow Up Time: 2 weeks

## 2021-07-17 ENCOUNTER — INPATIENT (INPATIENT)
Facility: HOSPITAL | Age: 61
LOS: 17 days | Discharge: HOME | End: 2021-08-04
Attending: INTERNAL MEDICINE | Admitting: INTERNAL MEDICINE
Payer: COMMERCIAL

## 2021-07-17 VITALS
TEMPERATURE: 99 F | RESPIRATION RATE: 20 BRPM | OXYGEN SATURATION: 100 % | HEIGHT: 70 IN | DIASTOLIC BLOOD PRESSURE: 84 MMHG | SYSTOLIC BLOOD PRESSURE: 170 MMHG | HEART RATE: 79 BPM

## 2021-07-17 DIAGNOSIS — Z89.429 ACQUIRED ABSENCE OF OTHER TOE(S), UNSPECIFIED SIDE: Chronic | ICD-10-CM

## 2021-07-17 DIAGNOSIS — Z98.62 PERIPHERAL VASCULAR ANGIOPLASTY STATUS: Chronic | ICD-10-CM

## 2021-07-17 LAB
ALBUMIN SERPL ELPH-MCNC: 3.7 G/DL — SIGNIFICANT CHANGE UP (ref 3.3–5)
ALP SERPL-CCNC: 293 U/L — HIGH (ref 40–120)
ALT FLD-CCNC: 31 U/L — SIGNIFICANT CHANGE UP (ref 4–41)
ANION GAP SERPL CALC-SCNC: 16 MMOL/L — HIGH (ref 7–14)
APTT BLD: 32.1 SEC — SIGNIFICANT CHANGE UP (ref 27–36.3)
AST SERPL-CCNC: 28 U/L — SIGNIFICANT CHANGE UP (ref 4–40)
BASOPHILS # BLD AUTO: 0.05 K/UL — SIGNIFICANT CHANGE UP (ref 0–0.2)
BASOPHILS NFR BLD AUTO: 0.3 % — SIGNIFICANT CHANGE UP (ref 0–2)
BILIRUB SERPL-MCNC: 0.5 MG/DL — SIGNIFICANT CHANGE UP (ref 0.2–1.2)
BLD GP AB SCN SERPL QL: NEGATIVE — SIGNIFICANT CHANGE UP
BUN SERPL-MCNC: 22 MG/DL — SIGNIFICANT CHANGE UP (ref 7–23)
CALCIUM SERPL-MCNC: 9.5 MG/DL — SIGNIFICANT CHANGE UP (ref 8.4–10.5)
CHLORIDE SERPL-SCNC: 98 MMOL/L — SIGNIFICANT CHANGE UP (ref 98–107)
CO2 SERPL-SCNC: 20 MMOL/L — LOW (ref 22–31)
CREAT SERPL-MCNC: 1.64 MG/DL — HIGH (ref 0.5–1.3)
CRP SERPL-MCNC: 80.6 MG/L — HIGH
EOSINOPHIL # BLD AUTO: 0.16 K/UL — SIGNIFICANT CHANGE UP (ref 0–0.5)
EOSINOPHIL NFR BLD AUTO: 0.9 % — SIGNIFICANT CHANGE UP (ref 0–6)
ERYTHROCYTE [SEDIMENTATION RATE] IN BLOOD: 96 MM/HR — HIGH (ref 1–15)
GLUCOSE SERPL-MCNC: 162 MG/DL — HIGH (ref 70–99)
HCT VFR BLD CALC: 33.7 % — LOW (ref 39–50)
HGB BLD-MCNC: 11.1 G/DL — LOW (ref 13–17)
IANC: 15.08 K/UL — HIGH (ref 1.5–8.5)
IMM GRANULOCYTES NFR BLD AUTO: 0.5 % — SIGNIFICANT CHANGE UP (ref 0–1.5)
INR BLD: 1.17 RATIO — HIGH (ref 0.88–1.16)
LYMPHOCYTES # BLD AUTO: 1.62 K/UL — SIGNIFICANT CHANGE UP (ref 1–3.3)
LYMPHOCYTES # BLD AUTO: 8.9 % — LOW (ref 13–44)
MCHC RBC-ENTMCNC: 27.1 PG — SIGNIFICANT CHANGE UP (ref 27–34)
MCHC RBC-ENTMCNC: 32.9 GM/DL — SIGNIFICANT CHANGE UP (ref 32–36)
MCV RBC AUTO: 82.2 FL — SIGNIFICANT CHANGE UP (ref 80–100)
MONOCYTES # BLD AUTO: 1.12 K/UL — HIGH (ref 0–0.9)
MONOCYTES NFR BLD AUTO: 6.2 % — SIGNIFICANT CHANGE UP (ref 2–14)
NEUTROPHILS # BLD AUTO: 15.08 K/UL — HIGH (ref 1.8–7.4)
NEUTROPHILS NFR BLD AUTO: 83.2 % — HIGH (ref 43–77)
NRBC # BLD: 0 /100 WBCS — SIGNIFICANT CHANGE UP
NRBC # FLD: 0 K/UL — SIGNIFICANT CHANGE UP
PLATELET # BLD AUTO: 368 K/UL — SIGNIFICANT CHANGE UP (ref 150–400)
POTASSIUM SERPL-MCNC: 3.9 MMOL/L — SIGNIFICANT CHANGE UP (ref 3.5–5.3)
POTASSIUM SERPL-SCNC: 3.9 MMOL/L — SIGNIFICANT CHANGE UP (ref 3.5–5.3)
PROT SERPL-MCNC: 7.8 G/DL — SIGNIFICANT CHANGE UP (ref 6–8.3)
PROTHROM AB SERPL-ACNC: 13.2 SEC — SIGNIFICANT CHANGE UP (ref 10.6–13.6)
RBC # BLD: 4.1 M/UL — LOW (ref 4.2–5.8)
RBC # FLD: 12.9 % — SIGNIFICANT CHANGE UP (ref 10.3–14.5)
RH IG SCN BLD-IMP: POSITIVE — SIGNIFICANT CHANGE UP
SODIUM SERPL-SCNC: 134 MMOL/L — LOW (ref 135–145)
WBC # BLD: 18.12 K/UL — HIGH (ref 3.8–10.5)
WBC # FLD AUTO: 18.12 K/UL — HIGH (ref 3.8–10.5)

## 2021-07-17 PROCEDURE — 73630 X-RAY EXAM OF FOOT: CPT | Mod: 26,RT

## 2021-07-17 PROCEDURE — 99285 EMERGENCY DEPT VISIT HI MDM: CPT

## 2021-07-17 RX ORDER — FENTANYL CITRATE 50 UG/ML
50 INJECTION INTRAVENOUS ONCE
Refills: 0 | Status: DISCONTINUED | OUTPATIENT
Start: 2021-07-17 | End: 2021-07-17

## 2021-07-17 RX ORDER — VANCOMYCIN HCL 1 G
1000 VIAL (EA) INTRAVENOUS ONCE
Refills: 0 | Status: COMPLETED | OUTPATIENT
Start: 2021-07-17 | End: 2021-07-17

## 2021-07-17 RX ORDER — PIPERACILLIN AND TAZOBACTAM 4; .5 G/20ML; G/20ML
3.38 INJECTION, POWDER, LYOPHILIZED, FOR SOLUTION INTRAVENOUS ONCE
Refills: 0 | Status: COMPLETED | OUTPATIENT
Start: 2021-07-17 | End: 2021-07-17

## 2021-07-17 RX ADMIN — Medication 250 MILLIGRAM(S): at 23:19

## 2021-07-17 RX ADMIN — FENTANYL CITRATE 50 MICROGRAM(S): 50 INJECTION INTRAVENOUS at 21:38

## 2021-07-17 NOTE — ED PROVIDER NOTE - ATTENDING CONTRIBUTION TO CARE
taylor: 62 yo man DM, with pain and worsening redness to his 3rd toe, 2nd toe already with amputation to distal tip.  on outpt abx from his outpt podiatry.  pt with gangrene to 2nd toe, thrid toe is cyanotic, with surrounding erythema.  extremely tender. edema presnt, ? pulse dp.  [podiatry called, will need admission, iv abx and likely amputation    I performed a history and physical exam of the patient and discussed their management with the resident and /or advanced care provider. I reviewed the resident and /or ACP's note and agree with the documented findings and plan of care. My medical decison making and observations are found above.

## 2021-07-17 NOTE — ED ADULT TRIAGE NOTE - CHIEF COMPLAINT QUOTE
S/P right foot 2nd toe amputation at [facility]. in April, pt stated noted discoloration to his right foot 3rd toe since last wk have been f/u with foot MD. ref. pt to ER

## 2021-07-17 NOTE — ED ADULT NURSE NOTE - OBJECTIVE STATEMENT
Pt received to  6 c/o toe discoloration x 1 week. AxOx4 and ambulatory with cane at baseline. Pt has 2nd toe of R foot amputated in April of this year, pt now c/o worsening discoloration of 2nd and 3rd toe on R foot. Black discoloration noted to both toes, pt c/o pain/numbness to the area. Decreased sensation to 2nd and 3rd R toe noted. Pt appears comfortable at rest, in NAD, respirations even/unlabored. Pt denies headache, dizziness, chest pain, SOB, nausea, vomiting, diarrhea, fever, chills, cough. PMH of DM, HTN, pt takes insulin. As per pt, pt's doctor sent him to ED for surgery. 20G IV placed to R AC, labs drawn and sent, medicated as per orders, will continue to monitor.

## 2021-07-17 NOTE — ED ADULT NURSE NOTE - CHIEF COMPLAINT QUOTE
S/P right foot 2nd toe amputation at [facility]. in April, pt stated noted discoloration to his right foot 3rd toe since last wk have been f/u with foot MD. ref. pt to ER

## 2021-07-17 NOTE — ED PROVIDER NOTE - CLINICAL SUMMARY MEDICAL DECISION MAKING FREE TEXT BOX
60 y/o M w/ pmhx of DM2, HTN, HLD, PVD s/p R distal 2nd toe amputation (4/21) presenting with 1.5 wks of worsening pain, tingling, and discoloration to R 2nd and 3rd toe, no fever/chills, exam significant for decreased motor and sensation to affected toes. C/f ischemic     -CBC, CMP, coags, ESR, CRP, T+S  -Pain control  -Podiatry consult 60 y/o M w/ pmhx of DM2, HTN, HLD, PVD s/p R distal 2nd toe amputation (4/21) presenting with 1.5 wks of worsening pain, tingling, and discoloration to R 2nd and 3rd toe, no fever/chills, exam significant for decreased motor and sensation to affected toes. C/f ischemia +/- infectious process, less likely osteomyelitis, unlikely septic joint    -CBC, CMP, coags, ESR, CRP, T+S  -Pain control  -Podiatry consult 62 y/o M w/ pmhx of DM2, HTN, HLD, PVD s/p R distal 2nd toe amputation (4/21) presenting with 1.5 wks of worsening pain, tingling, and discoloration to R 2nd and 3rd toe, no fever/chills, exam significant for decreased motor and sensation to affected toes. C/f ischemia +/- infectious process, less likely osteomyelitis, unlikely septic joint   -CBC, CMP, coags, ESR, CRP, T+S -Podiatry consult    taylor: 62 yo man DM, with pain and worsening redness to his 3rd toe, 2nd toe already with amputation to distal tip.  on outpt abx from his outpt podiatry.  pt with gangrene to 2nd toe, thrid toe is cyanotic, with surrounding erythema.  extremely tender. edema presnt, ? pulse dp.  [podiatry called, will need admission, iv abx and likely amputation.  wbc 18 k

## 2021-07-17 NOTE — ED ADULT NURSE NOTE - NSIMPLEMENTINTERV_GEN_ALL_ED
Implemented All Fall with Harm Risk Interventions:  [place] to call system. Call bell, personal items and telephone within reach. Instruct patient to call for assistance. Room bathroom lighting operational. Non-slip footwear when patient is off stretcher. Physically safe environment: no spills, clutter or unnecessary equipment. Stretcher in lowest position, wheels locked, appropriate side rails in place. Provide visual cue, wrist band, yellow gown, etc. Monitor gait and stability. Monitor for mental status changes and reorient to person, place, and time. Review medications for side effects contributing to fall risk. Reinforce activity limits and safety measures with patient and family. Provide visual clues: red socks.

## 2021-07-17 NOTE — ED PROVIDER NOTE - OBJECTIVE STATEMENT
62 y/o M w/ pmhx HTN, HFrEF HLD, DM2, PAD s/p RLE 2nd digit amputation (4/10/21) 60 y/o M w/ pmhx HTN, HFrEF, CKD, HLD, DM2, PAD s/p RLE 2nd digit amputation (4/10/21) presenting with 1.5 wks of worsening R foot pain. Pt reports undergoing RLE angio 1.5 weeks ago, per patient was without significant findings. Since then, pt has had worsening pain to distal R 2nd and 3rd toes, tingling, and blue discoloration. He denies fever, chills, nausea, vomiting, abd pain, CP, or SOB. He was seen by his podiatrist earlier this week, prescribed course of Augmentin 4 days ago, has completed 4 days of tx without improvement of sxs. He has been taking Advil for pain without relief, most recently taken today at 3pm.

## 2021-07-17 NOTE — ED PROVIDER NOTE - LOWER EXTREMITY EXAM, RIGHT
R 2nd toe: S/p R distal toe amputation, distal remaining toe w/ black discoloration, no redness or erythema, decreased ROM and sensation to remaining toe. R 3rd toe: Blue discoloration, absent motor, decreased sensation. R dorsal foot: Erythema and induration to skin overlying distal 2nd and 3rd metatarsal.

## 2021-07-17 NOTE — ED PROVIDER NOTE - PROGRESS NOTE DETAILS
Paged podiatry. Awaiting call back. podiatry paged, awaiting call back Reassessed following fentanyl 50 micrograms IV. Pt states pain improved slightly, although still present. Spoke with patient regarding medication allergies. Pt states that he has never had an allergic reaction to any antibiotic in the past. States he received cefepime at [facility] previously, without developing any associated rashes, difficulty breathing, throat swelling, or any other reaction.

## 2021-07-18 DIAGNOSIS — I50.42 CHRONIC COMBINED SYSTOLIC (CONGESTIVE) AND DIASTOLIC (CONGESTIVE) HEART FAILURE: ICD-10-CM

## 2021-07-18 DIAGNOSIS — E11.628 TYPE 2 DIABETES MELLITUS WITH OTHER SKIN COMPLICATIONS: ICD-10-CM

## 2021-07-18 DIAGNOSIS — N18.30 CHRONIC KIDNEY DISEASE, STAGE 3 UNSPECIFIED: ICD-10-CM

## 2021-07-18 DIAGNOSIS — I73.9 PERIPHERAL VASCULAR DISEASE, UNSPECIFIED: ICD-10-CM

## 2021-07-18 DIAGNOSIS — I10 ESSENTIAL (PRIMARY) HYPERTENSION: ICD-10-CM

## 2021-07-18 DIAGNOSIS — E11.9 TYPE 2 DIABETES MELLITUS WITHOUT COMPLICATIONS: ICD-10-CM

## 2021-07-18 DIAGNOSIS — Z29.9 ENCOUNTER FOR PROPHYLACTIC MEASURES, UNSPECIFIED: ICD-10-CM

## 2021-07-18 LAB
ANION GAP SERPL CALC-SCNC: 19 MMOL/L — HIGH (ref 7–14)
BUN SERPL-MCNC: 24 MG/DL — HIGH (ref 7–23)
CALCIUM SERPL-MCNC: 8.8 MG/DL — SIGNIFICANT CHANGE UP (ref 8.4–10.5)
CHLORIDE SERPL-SCNC: 96 MMOL/L — LOW (ref 98–107)
CO2 SERPL-SCNC: 18 MMOL/L — LOW (ref 22–31)
CREAT SERPL-MCNC: 1.77 MG/DL — HIGH (ref 0.5–1.3)
GLUCOSE SERPL-MCNC: 230 MG/DL — HIGH (ref 70–99)
HCT VFR BLD CALC: 31 % — LOW (ref 39–50)
HGB BLD-MCNC: 10.2 G/DL — LOW (ref 13–17)
MAGNESIUM SERPL-MCNC: 1.8 MG/DL — SIGNIFICANT CHANGE UP (ref 1.6–2.6)
MCHC RBC-ENTMCNC: 26.8 PG — LOW (ref 27–34)
MCHC RBC-ENTMCNC: 32.9 GM/DL — SIGNIFICANT CHANGE UP (ref 32–36)
MCV RBC AUTO: 81.4 FL — SIGNIFICANT CHANGE UP (ref 80–100)
NRBC # BLD: 0 /100 WBCS — SIGNIFICANT CHANGE UP
NRBC # FLD: 0 K/UL — SIGNIFICANT CHANGE UP
PHOSPHATE SERPL-MCNC: 3.5 MG/DL — SIGNIFICANT CHANGE UP (ref 2.5–4.5)
PLATELET # BLD AUTO: 338 K/UL — SIGNIFICANT CHANGE UP (ref 150–400)
POTASSIUM SERPL-MCNC: 3.8 MMOL/L — SIGNIFICANT CHANGE UP (ref 3.5–5.3)
POTASSIUM SERPL-SCNC: 3.8 MMOL/L — SIGNIFICANT CHANGE UP (ref 3.5–5.3)
RBC # BLD: 3.81 M/UL — LOW (ref 4.2–5.8)
RBC # FLD: 12.6 % — SIGNIFICANT CHANGE UP (ref 10.3–14.5)
SARS-COV-2 RNA SPEC QL NAA+PROBE: SIGNIFICANT CHANGE UP
SODIUM SERPL-SCNC: 133 MMOL/L — LOW (ref 135–145)
WBC # BLD: 19.45 K/UL — HIGH (ref 3.8–10.5)
WBC # FLD AUTO: 19.45 K/UL — HIGH (ref 3.8–10.5)

## 2021-07-18 PROCEDURE — 12345: CPT | Mod: NC,GC

## 2021-07-18 PROCEDURE — 99223 1ST HOSP IP/OBS HIGH 75: CPT

## 2021-07-18 PROCEDURE — 93923 UPR/LXTR ART STDY 3+ LVLS: CPT | Mod: 26

## 2021-07-18 RX ORDER — VANCOMYCIN HCL 1 G
1000 VIAL (EA) INTRAVENOUS EVERY 12 HOURS
Refills: 0 | Status: DISCONTINUED | OUTPATIENT
Start: 2021-07-18 | End: 2021-07-19

## 2021-07-18 RX ORDER — ISOSORBIDE DINITRATE 5 MG/1
20 TABLET ORAL THREE TIMES A DAY
Refills: 0 | Status: DISCONTINUED | OUTPATIENT
Start: 2021-07-18 | End: 2021-08-04

## 2021-07-18 RX ORDER — INSULIN LISPRO 100/ML
28 VIAL (ML) SUBCUTANEOUS
Refills: 0 | Status: DISCONTINUED | OUTPATIENT
Start: 2021-07-18 | End: 2021-07-18

## 2021-07-18 RX ORDER — PIPERACILLIN AND TAZOBACTAM 4; .5 G/20ML; G/20ML
3.38 INJECTION, POWDER, LYOPHILIZED, FOR SOLUTION INTRAVENOUS EVERY 8 HOURS
Refills: 0 | Status: DISCONTINUED | OUTPATIENT
Start: 2021-07-18 | End: 2021-07-20

## 2021-07-18 RX ORDER — ACETAMINOPHEN 500 MG
650 TABLET ORAL EVERY 6 HOURS
Refills: 0 | Status: DISCONTINUED | OUTPATIENT
Start: 2021-07-18 | End: 2021-08-04

## 2021-07-18 RX ORDER — INSULIN GLARGINE 100 [IU]/ML
55 INJECTION, SOLUTION SUBCUTANEOUS AT BEDTIME
Refills: 0 | Status: DISCONTINUED | OUTPATIENT
Start: 2021-07-18 | End: 2021-07-19

## 2021-07-18 RX ORDER — ATORVASTATIN CALCIUM 80 MG/1
40 TABLET, FILM COATED ORAL AT BEDTIME
Refills: 0 | Status: DISCONTINUED | OUTPATIENT
Start: 2021-07-18 | End: 2021-08-04

## 2021-07-18 RX ORDER — SODIUM CHLORIDE 9 MG/ML
1000 INJECTION, SOLUTION INTRAVENOUS
Refills: 0 | Status: DISCONTINUED | OUTPATIENT
Start: 2021-07-18 | End: 2021-08-04

## 2021-07-18 RX ORDER — DEXTROSE 50 % IN WATER 50 %
12.5 SYRINGE (ML) INTRAVENOUS ONCE
Refills: 0 | Status: DISCONTINUED | OUTPATIENT
Start: 2021-07-18 | End: 2021-08-04

## 2021-07-18 RX ORDER — ASPIRIN/CALCIUM CARB/MAGNESIUM 324 MG
1 TABLET ORAL
Qty: 0 | Refills: 0 | DISCHARGE

## 2021-07-18 RX ORDER — ASPIRIN/CALCIUM CARB/MAGNESIUM 324 MG
81 TABLET ORAL DAILY
Refills: 0 | Status: DISCONTINUED | OUTPATIENT
Start: 2021-07-18 | End: 2021-08-04

## 2021-07-18 RX ORDER — POLYETHYLENE GLYCOL 3350 17 G/17G
17 POWDER, FOR SOLUTION ORAL DAILY
Refills: 0 | Status: DISCONTINUED | OUTPATIENT
Start: 2021-07-18 | End: 2021-08-04

## 2021-07-18 RX ORDER — GLUCAGON INJECTION, SOLUTION 0.5 MG/.1ML
1 INJECTION, SOLUTION SUBCUTANEOUS ONCE
Refills: 0 | Status: DISCONTINUED | OUTPATIENT
Start: 2021-07-18 | End: 2021-08-04

## 2021-07-18 RX ORDER — HYDRALAZINE HCL 50 MG
50 TABLET ORAL THREE TIMES A DAY
Refills: 0 | Status: DISCONTINUED | OUTPATIENT
Start: 2021-07-18 | End: 2021-08-04

## 2021-07-18 RX ORDER — INSULIN GLARGINE 100 [IU]/ML
55 INJECTION, SOLUTION SUBCUTANEOUS AT BEDTIME
Refills: 0 | Status: DISCONTINUED | OUTPATIENT
Start: 2021-07-18 | End: 2021-07-18

## 2021-07-18 RX ORDER — NIFEDIPINE 30 MG
30 TABLET, EXTENDED RELEASE 24 HR ORAL DAILY
Refills: 0 | Status: DISCONTINUED | OUTPATIENT
Start: 2021-07-18 | End: 2021-08-04

## 2021-07-18 RX ORDER — DEXTROSE 50 % IN WATER 50 %
25 SYRINGE (ML) INTRAVENOUS ONCE
Refills: 0 | Status: DISCONTINUED | OUTPATIENT
Start: 2021-07-18 | End: 2021-08-04

## 2021-07-18 RX ORDER — MORPHINE SULFATE 50 MG/1
2 CAPSULE, EXTENDED RELEASE ORAL EVERY 4 HOURS
Refills: 0 | Status: DISCONTINUED | OUTPATIENT
Start: 2021-07-18 | End: 2021-07-19

## 2021-07-18 RX ORDER — LATANOPROST 0.05 MG/ML
1 SOLUTION/ DROPS OPHTHALMIC; TOPICAL AT BEDTIME
Refills: 0 | Status: DISCONTINUED | OUTPATIENT
Start: 2021-07-18 | End: 2021-08-04

## 2021-07-18 RX ORDER — VANCOMYCIN HCL 1 G
500 VIAL (EA) INTRAVENOUS ONCE
Refills: 0 | Status: DISCONTINUED | OUTPATIENT
Start: 2021-07-18 | End: 2021-07-18

## 2021-07-18 RX ORDER — LANOLIN ALCOHOL/MO/W.PET/CERES
3 CREAM (GRAM) TOPICAL AT BEDTIME
Refills: 0 | Status: DISCONTINUED | OUTPATIENT
Start: 2021-07-18 | End: 2021-08-04

## 2021-07-18 RX ORDER — DEXTROSE 50 % IN WATER 50 %
15 SYRINGE (ML) INTRAVENOUS ONCE
Refills: 0 | Status: DISCONTINUED | OUTPATIENT
Start: 2021-07-18 | End: 2021-08-04

## 2021-07-18 RX ORDER — LATANOPROST 0.05 MG/ML
1 SOLUTION/ DROPS OPHTHALMIC; TOPICAL
Qty: 0 | Refills: 0 | DISCHARGE

## 2021-07-18 RX ORDER — INSULIN LISPRO 100/ML
VIAL (ML) SUBCUTANEOUS
Refills: 0 | Status: DISCONTINUED | OUTPATIENT
Start: 2021-07-18 | End: 2021-08-04

## 2021-07-18 RX ORDER — NIFEDIPINE 30 MG
1 TABLET, EXTENDED RELEASE 24 HR ORAL
Qty: 0 | Refills: 0 | DISCHARGE

## 2021-07-18 RX ORDER — ATORVASTATIN CALCIUM 80 MG/1
1 TABLET, FILM COATED ORAL
Qty: 0 | Refills: 0 | DISCHARGE

## 2021-07-18 RX ORDER — LABETALOL HCL 100 MG
10 TABLET ORAL ONCE
Refills: 0 | Status: DISCONTINUED | OUTPATIENT
Start: 2021-07-18 | End: 2021-07-18

## 2021-07-18 RX ORDER — HEPARIN SODIUM 5000 [USP'U]/ML
5000 INJECTION INTRAVENOUS; SUBCUTANEOUS EVERY 8 HOURS
Refills: 0 | Status: DISCONTINUED | OUTPATIENT
Start: 2021-07-18 | End: 2021-07-28

## 2021-07-18 RX ORDER — CARVEDILOL PHOSPHATE 80 MG/1
6.25 CAPSULE, EXTENDED RELEASE ORAL EVERY 12 HOURS
Refills: 0 | Status: DISCONTINUED | OUTPATIENT
Start: 2021-07-18 | End: 2021-08-04

## 2021-07-18 RX ORDER — INSULIN LISPRO 100/ML
25 VIAL (ML) SUBCUTANEOUS
Refills: 0 | Status: DISCONTINUED | OUTPATIENT
Start: 2021-07-18 | End: 2021-07-19

## 2021-07-18 RX ADMIN — ISOSORBIDE DINITRATE 20 MILLIGRAM(S): 5 TABLET ORAL at 16:56

## 2021-07-18 RX ADMIN — PIPERACILLIN AND TAZOBACTAM 25 GRAM(S): 4; .5 INJECTION, POWDER, LYOPHILIZED, FOR SOLUTION INTRAVENOUS at 09:16

## 2021-07-18 RX ADMIN — Medication 50 MILLIGRAM(S): at 21:54

## 2021-07-18 RX ADMIN — CARVEDILOL PHOSPHATE 6.25 MILLIGRAM(S): 80 CAPSULE, EXTENDED RELEASE ORAL at 05:02

## 2021-07-18 RX ADMIN — MORPHINE SULFATE 2 MILLIGRAM(S): 50 CAPSULE, EXTENDED RELEASE ORAL at 08:20

## 2021-07-18 RX ADMIN — ISOSORBIDE DINITRATE 20 MILLIGRAM(S): 5 TABLET ORAL at 11:51

## 2021-07-18 RX ADMIN — Medication 250 MILLIGRAM(S): at 21:28

## 2021-07-18 RX ADMIN — POLYETHYLENE GLYCOL 3350 17 GRAM(S): 17 POWDER, FOR SOLUTION ORAL at 11:58

## 2021-07-18 RX ADMIN — Medication 81 MILLIGRAM(S): at 11:58

## 2021-07-18 RX ADMIN — Medication 25 UNIT(S): at 09:14

## 2021-07-18 RX ADMIN — CARVEDILOL PHOSPHATE 6.25 MILLIGRAM(S): 80 CAPSULE, EXTENDED RELEASE ORAL at 17:01

## 2021-07-18 RX ADMIN — Medication 3 MILLIGRAM(S): at 21:28

## 2021-07-18 RX ADMIN — Medication 25 UNIT(S): at 13:35

## 2021-07-18 RX ADMIN — INSULIN GLARGINE 55 UNIT(S): 100 INJECTION, SOLUTION SUBCUTANEOUS at 21:28

## 2021-07-18 RX ADMIN — LATANOPROST 1 DROP(S): 0.05 SOLUTION/ DROPS OPHTHALMIC; TOPICAL at 21:29

## 2021-07-18 RX ADMIN — PIPERACILLIN AND TAZOBACTAM 25 GRAM(S): 4; .5 INJECTION, POWDER, LYOPHILIZED, FOR SOLUTION INTRAVENOUS at 16:56

## 2021-07-18 RX ADMIN — MORPHINE SULFATE 2 MILLIGRAM(S): 50 CAPSULE, EXTENDED RELEASE ORAL at 07:56

## 2021-07-18 RX ADMIN — HEPARIN SODIUM 5000 UNIT(S): 5000 INJECTION INTRAVENOUS; SUBCUTANEOUS at 14:31

## 2021-07-18 RX ADMIN — Medication 50 MILLIGRAM(S): at 14:30

## 2021-07-18 RX ADMIN — HEPARIN SODIUM 5000 UNIT(S): 5000 INJECTION INTRAVENOUS; SUBCUTANEOUS at 05:02

## 2021-07-18 RX ADMIN — HEPARIN SODIUM 5000 UNIT(S): 5000 INJECTION INTRAVENOUS; SUBCUTANEOUS at 21:28

## 2021-07-18 RX ADMIN — Medication 250 MILLIGRAM(S): at 11:51

## 2021-07-18 RX ADMIN — ATORVASTATIN CALCIUM 40 MILLIGRAM(S): 80 TABLET, FILM COATED ORAL at 21:29

## 2021-07-18 RX ADMIN — Medication 3: at 09:14

## 2021-07-18 RX ADMIN — Medication 30 MILLIGRAM(S): at 05:02

## 2021-07-18 RX ADMIN — ISOSORBIDE DINITRATE 20 MILLIGRAM(S): 5 TABLET ORAL at 05:04

## 2021-07-18 RX ADMIN — Medication 50 MILLIGRAM(S): at 05:02

## 2021-07-18 RX ADMIN — PIPERACILLIN AND TAZOBACTAM 200 GRAM(S): 4; .5 INJECTION, POWDER, LYOPHILIZED, FOR SOLUTION INTRAVENOUS at 01:09

## 2021-07-18 NOTE — PROGRESS NOTE ADULT - PROBLEM SELECTOR PLAN 5
-h/o recent ACOSTA d/t AIN , responded to prednisone, seen by Dr. Zambrano last admission  -dose meds per renal fxn, avoid nephrotoxins

## 2021-07-18 NOTE — PROVIDER CONTACT NOTE (OTHER) - ASSESSMENT
Patient resting comfortably in bed reporting no dizziness, headache or lightheadedness. No signs of acute distress noted

## 2021-07-18 NOTE — PROGRESS NOTE ADULT - PROBLEM SELECTOR PLAN 1
-pt with h/o right foot 2nd toe partial amputation in April at [facility], h/o severe RLE PVD with RLE angioplasty in past, no stent, now p/w 2nd residual toe/stump and 3rd toe infection, xray concerning for osteomyelitis, elevated inflammatory markers ESR 96, CRP 80.6 c/w acute OM.  -send blood cultures x2  -iv vanco/zosyn, monitor vanco trough  -MRI right foot  -podiatry consulted, f/u recs, plan for surgical intervention this week  -needs cardiology clearance, RCRI 3, high risk for periop MACE -pt with h/o right foot 2nd toe partial amputation in April at [facility], h/o severe RLE PVD with RLE angioplasty in past, no stent, now p/w 2nd residual toe/stump and 3rd toe infection, xray concerning for osteomyelitis, elevated inflammatory markers ESR 96, CRP 80.6 c/w acute OM.  -7/18 blood cultures x2  -iv vanco/zosyn, monitor vanco trough  -MRI right foot  -podiatry consulted, s/p I&D 7/17. plan for RF P2RR, P3RR pending MICHEL/PVR vasc recs  - vascular consult  -needs cardiology clearance, RCRI 3, high risk for periop MACE -pt with h/o right foot 2nd toe partial amputation in April at [facility], h/o severe RLE PVD with RLE angioplasty in past, no stent, now p/w 2nd residual toe/stump and 3rd toe infection, xray concerning for osteomyelitis, elevated inflammatory markers ESR 96, CRP 80.6 c/w acute OM.  -7/18 blood cultures x2  -iv vanco (cont 1g BID, 7/17 PM - )/zosyn (7/18 - ), monitor vanco trough before 3rd dose (7/18 AM)  -MRI right foot  -podiatry consulted, s/p I&D 7/17. plan for RF P2RR, P3RR pending MICHEL/PVR vasc recs  - vascular consult  -needs cardiology clearance, RCRI 3, high risk for periop MACE

## 2021-07-18 NOTE — PROGRESS NOTE ADULT - ASSESSMENT
62 y/o M patient presents with R foot toe infection  - Patient seen and evaluated   - Afebrile, WBC 19.1  - LAI: wound at the R 2nd interspace, probes to bone, tracks 0.5cm dorsal and proximal, mild malodor, no pus expressed, erythema to the dorsolateral foot up until the midfoot, diffuse edema of the R forefoot, gangrene of the 2nd toe stump, ischemic changes to the R 3rd digit, cool to touch   - RF Xray prelim indicates 2nd toe proximal phalanx OM, no signs of soft tissue gas  - MRI pending  - Pt on IV/ Vanc.zosyn  - Rec'd ID c/s  - MICHEL/PVR pending  - Ordered R foot wound culture   - Pod plan for RF P2RR, P3RR pending MICHEL/PVR vasc recs  - Discussed with attending

## 2021-07-18 NOTE — PROGRESS NOTE ADULT - SUBJECTIVE AND OBJECTIVE BOX
PROGRESS NOTE:   Authored by Marguerite Whitfield MD  Internal Medicine  Pager PIA [phone]  Pager [phone]    Patient is a 61y old  Male who presents with a chief complaint of right foot 2nd/3rd toe infection (18 Jul 2021 02:28)      SUBJECTIVE / OVERNIGHT EVENTS:    MEDICATIONS  (STANDING):  aspirin enteric coated 81 milliGRAM(s) Oral daily  atorvastatin 40 milliGRAM(s) Oral at bedtime  carvedilol 6.25 milliGRAM(s) Oral every 12 hours  dextrose 40% Gel 15 Gram(s) Oral once  dextrose 5%. 1000 milliLiter(s) (50 mL/Hr) IV Continuous <Continuous>  dextrose 5%. 1000 milliLiter(s) (100 mL/Hr) IV Continuous <Continuous>  dextrose 50% Injectable 25 Gram(s) IV Push once  dextrose 50% Injectable 12.5 Gram(s) IV Push once  dextrose 50% Injectable 25 Gram(s) IV Push once  glucagon  Injectable 1 milliGRAM(s) IntraMuscular once  heparin   Injectable 5000 Unit(s) SubCutaneous every 8 hours  hydrALAZINE 50 milliGRAM(s) Oral three times a day  insulin glargine Injectable (LANTUS) 55 Unit(s) SubCutaneous at bedtime  insulin lispro (ADMELOG) corrective regimen sliding scale   SubCutaneous three times a day before meals  insulin lispro Injectable (ADMELOG) 25 Unit(s) SubCutaneous three times a day before meals  isosorbide   dinitrate Tablet (ISORDIL) 20 milliGRAM(s) Oral three times a day  latanoprost 0.005% Ophthalmic Solution 1 Drop(s) Both EYES at bedtime  NIFEdipine XL 30 milliGRAM(s) Oral daily  piperacillin/tazobactam IVPB.. 3.375 Gram(s) IV Intermittent every 8 hours  polyethylene glycol 3350 17 Gram(s) Oral daily  vancomycin  IVPB 1000 milliGRAM(s) IV Intermittent every 12 hours    MEDICATIONS  (PRN):  acetaminophen   Tablet .. 650 milliGRAM(s) Oral every 6 hours PRN Temp greater or equal to 38.5C (101.3F), Mild Pain (1 - 3)  morphine  - Injectable 2 milliGRAM(s) IV Push every 4 hours PRN modeate to severe pain      CAPILLARY BLOOD GLUCOSE      POCT Blood Glucose.: 162 mg/dL (18 Jul 2021 03:33)  POCT Blood Glucose.: 196 mg/dL (17 Jul 2021 19:22)    I&O's Summary      PHYSICAL EXAM:  Vital Signs Last 24 Hrs  T(C): 36.4 (18 Jul 2021 04:20), Max: 37.8 (18 Jul 2021 03:20)  T(F): 97.5 (18 Jul 2021 04:20), Max: 100.1 (18 Jul 2021 03:20)  HR: 88 (18 Jul 2021 04:20) (73 - 91)  BP: 197/96 (18 Jul 2021 04:20) (170/84 - 197/96)  BP(mean): --  RR: 18 (18 Jul 2021 04:20) (18 - 20)  SpO2: 99% (18 Jul 2021 04:20) (98% - 100%)    GENERAL: No acute distress, well-developed  HEAD:  Atraumatic, Normocephalic  EYES: EOMI, PERRLA, conjunctiva and sclera clear  NECK: Supple, no lymphadenopathy, no JVD  CHEST/LUNG: CTAB; No wheezes, rales, or rhonchi  HEART: Regular rate and rhythm; No murmurs, rubs, or gallops  ABDOMEN: Soft, non-tender, non-distended; normal bowel sounds, no organomegaly  EXTREMITIES:  2+ peripheral pulses b/l, No clubbing, cyanosis, or edema  NEUROLOGY: A&O x 3, no focal deficits  SKIN: No rashes or lesions    LABS:                        11.1   18.12 )-----------( 368      ( 17 Jul 2021 21:49 )             33.7     07-17    134<L>  |  98  |  22  ----------------------------<  162<H>  3.9   |  20<L>  |  1.64<H>    Ca    9.5      17 Jul 2021 21:49    TPro  7.8  /  Alb  3.7  /  TBili  0.5  /  DBili  x   /  AST  28  /  ALT  31  /  AlkPhos  293<H>  07-17    PT/INR - ( 17 Jul 2021 21:49 )   PT: 13.2 sec;   INR: 1.17 ratio         PTT - ( 17 Jul 2021 21:49 )  PTT:32.1 sec            RADIOLOGY & ADDITIONAL TESTS:  Results Reviewed:   Imaging Personally Reviewed:  Electrocardiogram Personally Reviewed:    COORDINATION OF CARE:  Care Discussed with Consultants/Other Providers [Y/N]:  Prior or Outpatient Records Reviewed [Y/N]:   PROGRESS NOTE:   Authored by Marguerite Whitfield MD  Internal Medicine  Pager PIA [phone]  Pager [phone]    Patient is a 61y old  Male who presents with a chief complaint of right foot 2nd/3rd toe infection (18 Jul 2021 02:28)      SUBJECTIVE / OVERNIGHT EVENTS: This morning, Mr Saul reports that he slept well. has some pain, but not severe, got some morphine for it.    MEDICATIONS  (STANDING):  aspirin enteric coated 81 milliGRAM(s) Oral daily  atorvastatin 40 milliGRAM(s) Oral at bedtime  carvedilol 6.25 milliGRAM(s) Oral every 12 hours  dextrose 40% Gel 15 Gram(s) Oral once  dextrose 5%. 1000 milliLiter(s) (50 mL/Hr) IV Continuous <Continuous>  dextrose 5%. 1000 milliLiter(s) (100 mL/Hr) IV Continuous <Continuous>  dextrose 50% Injectable 25 Gram(s) IV Push once  dextrose 50% Injectable 12.5 Gram(s) IV Push once  dextrose 50% Injectable 25 Gram(s) IV Push once  glucagon  Injectable 1 milliGRAM(s) IntraMuscular once  heparin   Injectable 5000 Unit(s) SubCutaneous every 8 hours  hydrALAZINE 50 milliGRAM(s) Oral three times a day  insulin glargine Injectable (LANTUS) 55 Unit(s) SubCutaneous at bedtime  insulin lispro (ADMELOG) corrective regimen sliding scale   SubCutaneous three times a day before meals  insulin lispro Injectable (ADMELOG) 25 Unit(s) SubCutaneous three times a day before meals  isosorbide   dinitrate Tablet (ISORDIL) 20 milliGRAM(s) Oral three times a day  latanoprost 0.005% Ophthalmic Solution 1 Drop(s) Both EYES at bedtime  NIFEdipine XL 30 milliGRAM(s) Oral daily  piperacillin/tazobactam IVPB.. 3.375 Gram(s) IV Intermittent every 8 hours  polyethylene glycol 3350 17 Gram(s) Oral daily  vancomycin  IVPB 1000 milliGRAM(s) IV Intermittent every 12 hours    MEDICATIONS  (PRN):  acetaminophen   Tablet .. 650 milliGRAM(s) Oral every 6 hours PRN Temp greater or equal to 38.5C (101.3F), Mild Pain (1 - 3)  morphine  - Injectable 2 milliGRAM(s) IV Push every 4 hours PRN modeate to severe pain      CAPILLARY BLOOD GLUCOSE      POCT Blood Glucose.: 162 mg/dL (18 Jul 2021 03:33)  POCT Blood Glucose.: 196 mg/dL (17 Jul 2021 19:22)    I&O's Summary      PHYSICAL EXAM:  Vital Signs Last 24 Hrs  T(C): 36.4 (18 Jul 2021 04:20), Max: 37.8 (18 Jul 2021 03:20)  T(F): 97.5 (18 Jul 2021 04:20), Max: 100.1 (18 Jul 2021 03:20)  HR: 88 (18 Jul 2021 04:20) (73 - 91)  BP: 197/96 (18 Jul 2021 04:20) (170/84 - 197/96)  BP(mean): --  RR: 18 (18 Jul 2021 04:20) (18 - 20)  SpO2: 99% (18 Jul 2021 04:20) (98% - 100%)    GENERAL: No acute distress, well-developed  HEAD:  Atraumatic, Normocephalic, MMM  EYES: conjunctiva and sclera clear  CHEST/LUNG: CTAB; No wheezes, rales, or rhonchi  HEART: Regular rate and rhythm; No murmurs, rubs, or gallops  ABDOMEN: Soft, non-tender, non-distended; normal bowel sounds, no organomegaly  EXTREMITIES:  Right foot wrapped in dressing, without shadowing or drainage. DP 1+ on left side, trace on right side  NEUROLOGY: A&O x 3, no focal deficits      LABS:                        11.1   18.12 )-----------( 368      ( 17 Jul 2021 21:49 )             33.7     07-17    134<L>  |  98  |  22  ----------------------------<  162<H>  3.9   |  20<L>  |  1.64<H>    Ca    9.5      17 Jul 2021 21:49    TPro  7.8  /  Alb  3.7  /  TBili  0.5  /  DBili  x   /  AST  28  /  ALT  31  /  AlkPhos  293<H>  07-17    PT/INR - ( 17 Jul 2021 21:49 )   PT: 13.2 sec;   INR: 1.17 ratio         PTT - ( 17 Jul 2021 21:49 )  PTT:32.1 sec            RADIOLOGY & ADDITIONAL TESTS:  Results Reviewed:   Imaging Personally Reviewed:  Electrocardiogram Personally Reviewed:    COORDINATION OF CARE:  Care Discussed with Consultants/Other Providers [Y/N]:  Prior or Outpatient Records Reviewed [Y/N]:   PROGRESS NOTE:   Authored by Marguerite Whitfield MD  Internal Medicine  Pager PIA [phone]  Pager [phone]    Patient is a 61y old  Male who presents with a chief complaint of right foot 2nd/3rd toe infection (18 Jul 2021 02:28)      SUBJECTIVE / OVERNIGHT EVENTS: This morning, Mr Saul reports that he slept well. has some pain, but not severe, got some morphine for it.    MEDICATIONS  (STANDING):  aspirin enteric coated 81 milliGRAM(s) Oral daily  atorvastatin 40 milliGRAM(s) Oral at bedtime  carvedilol 6.25 milliGRAM(s) Oral every 12 hours  dextrose 40% Gel 15 Gram(s) Oral once  dextrose 5%. 1000 milliLiter(s) (50 mL/Hr) IV Continuous <Continuous>  dextrose 5%. 1000 milliLiter(s) (100 mL/Hr) IV Continuous <Continuous>  dextrose 50% Injectable 25 Gram(s) IV Push once  dextrose 50% Injectable 12.5 Gram(s) IV Push once  dextrose 50% Injectable 25 Gram(s) IV Push once  glucagon  Injectable 1 milliGRAM(s) IntraMuscular once  heparin   Injectable 5000 Unit(s) SubCutaneous every 8 hours  hydrALAZINE 50 milliGRAM(s) Oral three times a day  insulin glargine Injectable (LANTUS) 55 Unit(s) SubCutaneous at bedtime  insulin lispro (ADMELOG) corrective regimen sliding scale   SubCutaneous three times a day before meals  insulin lispro Injectable (ADMELOG) 25 Unit(s) SubCutaneous three times a day before meals  isosorbide   dinitrate Tablet (ISORDIL) 20 milliGRAM(s) Oral three times a day  latanoprost 0.005% Ophthalmic Solution 1 Drop(s) Both EYES at bedtime  NIFEdipine XL 30 milliGRAM(s) Oral daily  piperacillin/tazobactam IVPB.. 3.375 Gram(s) IV Intermittent every 8 hours  polyethylene glycol 3350 17 Gram(s) Oral daily  vancomycin  IVPB 1000 milliGRAM(s) IV Intermittent every 12 hours    MEDICATIONS  (PRN):  acetaminophen   Tablet .. 650 milliGRAM(s) Oral every 6 hours PRN Temp greater or equal to 38.5C (101.3F), Mild Pain (1 - 3)  morphine  - Injectable 2 milliGRAM(s) IV Push every 4 hours PRN modeate to severe pain      CAPILLARY BLOOD GLUCOSE      POCT Blood Glucose.: 162 mg/dL (18 Jul 2021 03:33)  POCT Blood Glucose.: 196 mg/dL (17 Jul 2021 19:22)    I&O's Summary      PHYSICAL EXAM:  Vital Signs Last 24 Hrs  T(C): 36.4 (18 Jul 2021 04:20), Max: 37.8 (18 Jul 2021 03:20)  T(F): 97.5 (18 Jul 2021 04:20), Max: 100.1 (18 Jul 2021 03:20)  HR: 88 (18 Jul 2021 04:20) (73 - 91)  BP: 197/96 (18 Jul 2021 04:20) (170/84 - 197/96)  BP(mean): --  RR: 18 (18 Jul 2021 04:20) (18 - 20)  SpO2: 99% (18 Jul 2021 04:20) (98% - 100%)    GENERAL: No acute distress, well-developed  HEAD:  Atraumatic, Normocephalic, MMM  EYES: conjunctiva and sclera clear  CHEST/LUNG: CTAB; No wheezes, rales, or rhonchi  HEART: Regular rate and rhythm; No murmurs, rubs, or gallops  ABDOMEN: Soft, non-tender, non-distended; normal bowel sounds, no organomegaly  EXTREMITIES:  Right foot wrapped in dressing, without shadowing or drainage. DP 1+ on left side, trace on right side  NEUROLOGY: A&O x 3, no focal deficits      LABS:                        11.1   18.12 )-----------( 368      ( 17 Jul 2021 21:49 )             33.7     07-17    134<L>  |  98  |  22  ----------------------------<  162<H>  3.9   |  20<L>  |  1.64<H>    Ca    9.5      17 Jul 2021 21:49    TPro  7.8  /  Alb  3.7  /  TBili  0.5  /  DBili  x   /  AST  28  /  ALT  31  /  AlkPhos  293<H>  07-17    PT/INR - ( 17 Jul 2021 21:49 )   PT: 13.2 sec;   INR: 1.17 ratio         PTT - ( 17 Jul 2021 21:49 )  PTT:32.1 sec            RADIOLOGY & ADDITIONAL TESTS:  Results Reviewed:   Imaging Personally Reviewed:    MICHEL/PVR 7/18  Summary/Impressions:  1. Right MICHEL was not accurately assessed due to  non-compressible (calcific) vessels.   Right digit  waveforms are flat.  Findings suggest the presence of  severe small vessel arterial disease in the right foot.  2. Left MICHEL was not accurately assessed due to  non-compressible (calcific) vessels.  Left digit waveforms  are flat.  Findings suggest the presence of severe small  vessel arterial disease in the left foot.    Right Foot XR 7/17  IMPRESSION:    There is ulceration of the soft tissue stump of the resected second digit and indistinct margins of the underlying bony cortex of the head of the second proximal phalanx, suggesting possible osteomyelitis. No subcutaneous tracking gas.      Electrocardiogram Personally Reviewed:    COORDINATION OF CARE:  Care Discussed with Consultants/Other Providers [Y/N]:  Prior or Outpatient Records Reviewed [Y/N]:

## 2021-07-18 NOTE — H&P ADULT - PROBLEM SELECTOR PLAN 2
-at home takes lantus 60u hs and premeal insulin 28-30u   -dose lantus 55u hs and premeal 28/25/25u actid  -check A1c  -monitor FS  -consider endocrine consult -at home takes lantus 60u hs and premeal insulin 28-30u   -c/w home regimen  -check A1c  -monitor FS  -Endocrine consult in AM -at home takes lantus 60u hs and premeal insulin 28-30u   -will give lantus 55u hs and premeal insulin 25u actid  -check A1c  -monitor FS  -Endocrine consult in AM

## 2021-07-18 NOTE — CONSULT NOTE ADULT - SUBJECTIVE AND OBJECTIVE BOX
Podiatry pager #: [phone]/ [phone]    Patient is a 61y old  Male who presents with a chief complaint of right foot 2nd/3rd toe infection (18 Jul 2021 01:31)      HPI:  60 y/o male with h/o HTN, insulin dependent DM-2, hld, glaucoma, CKD3 with recent interstitial nephritis treated with steroid, HFrEF, recent NSTEMI, PAD s/p RLE angioplasty, s/p right 2nd toe partial amputation at [facility] in April 2021, who presents with 2 week history of worsening right foot pain, associated with right 2nd residual toe stump/3rd toe infection/discoloration to blue/black, with tingling and pain. Patient denies fever/chill/purulent drainage. H was seen by outside podiatrist and prescribed course of Augmentin 4 days ago. Patient has been taking Advil for pain without relief. Patient had recent RLE angiogram on 7/7/21at [facility] which revealed severe atherosclerosis. Right anterior tibial: There was a 100 % stenosis. Right tibio-peroneal: There was a 50 % stenosis. Right posterior tibial: Angiography showed mild atherosclerosis. Right peroneal: There was a 100 % stenosis.  In the ED, pt was seen by podiatry, plan for surgical intervention next week. He was given a dose of vanco/zosyn, blood cultures not sent yet. Lab notable for WBC 18, ESR 96, CRP 80.6, creat stable at baseline 1.64. Xray right foot showed ulceration of the soft tissue stump of the resected second digit and indistinct margins of the underlying bony cortex of the head of the second proximal phalanx, suggesting possible osteomyelitis. No subcutaneous tracking gas. (18 Jul 2021 01:31)      PAST MEDICAL & SURGICAL HISTORY:  PAD (peripheral artery disease)    Essential hypertension    Type 2 diabetes mellitus, with long-term current use of insulin    History of amputation of toe    S/P angioplasty  RLE        MEDICATIONS  (STANDING):  aspirin enteric coated 81 milliGRAM(s) Oral daily  atorvastatin 40 milliGRAM(s) Oral at bedtime  carvedilol 6.25 milliGRAM(s) Oral every 12 hours  dextrose 40% Gel 15 Gram(s) Oral once  dextrose 5%. 1000 milliLiter(s) (50 mL/Hr) IV Continuous <Continuous>  dextrose 5%. 1000 milliLiter(s) (100 mL/Hr) IV Continuous <Continuous>  dextrose 50% Injectable 25 Gram(s) IV Push once  dextrose 50% Injectable 12.5 Gram(s) IV Push once  dextrose 50% Injectable 25 Gram(s) IV Push once  glucagon  Injectable 1 milliGRAM(s) IntraMuscular once  heparin   Injectable 5000 Unit(s) SubCutaneous every 8 hours  hydrALAZINE 50 milliGRAM(s) Oral three times a day  insulin glargine Injectable (LANTUS) 55 Unit(s) SubCutaneous at bedtime  insulin lispro (ADMELOG) corrective regimen sliding scale   SubCutaneous three times a day before meals  insulin lispro Injectable (ADMELOG) 25 Unit(s) SubCutaneous three times a day before meals  isosorbide   dinitrate Tablet (ISORDIL) 20 milliGRAM(s) Oral three times a day  latanoprost 0.005% Ophthalmic Solution 1 Drop(s) Both EYES at bedtime  NIFEdipine XL 30 milliGRAM(s) Oral daily  piperacillin/tazobactam IVPB.. 3.375 Gram(s) IV Intermittent every 8 hours  polyethylene glycol 3350 17 Gram(s) Oral daily  vancomycin  IVPB 1000 milliGRAM(s) IV Intermittent every 12 hours    MEDICATIONS  (PRN):  acetaminophen   Tablet .. 650 milliGRAM(s) Oral every 6 hours PRN Temp greater or equal to 38.5C (101.3F), Mild Pain (1 - 3)  morphine  - Injectable 2 milliGRAM(s) IV Push every 4 hours PRN modeate to severe pain      Allergies    cefepime (Other)    Intolerances        VITALS:    Vital Signs Last 24 Hrs  T(C): 37.3 (17 Jul 2021 21:49), Max: 37.3 (17 Jul 2021 21:49)  T(F): 99.2 (17 Jul 2021 21:49), Max: 99.2 (17 Jul 2021 21:49)  HR: 73 (17 Jul 2021 21:49) (73 - 79)  BP: 180/85 (17 Jul 2021 21:49) (170/84 - 180/85)  BP(mean): --  RR: 18 (17 Jul 2021 21:49) (18 - 20)  SpO2: 100% (17 Jul 2021 21:49) (100% - 100%)    LABS:                          11.1   18.12 )-----------( 368      ( 17 Jul 2021 21:49 )             33.7       07-17    134<L>  |  98  |  22  ----------------------------<  162<H>  3.9   |  20<L>  |  1.64<H>    Ca    9.5      17 Jul 2021 21:49    TPro  7.8  /  Alb  3.7  /  TBili  0.5  /  DBili  x   /  AST  28  /  ALT  31  /  AlkPhos  293<H>  07-17      CAPILLARY BLOOD GLUCOSE      POCT Blood Glucose.: 196 mg/dL (17 Jul 2021 19:22)      PT/INR - ( 17 Jul 2021 21:49 )   PT: 13.2 sec;   INR: 1.17 ratio         PTT - ( 17 Jul 2021 21:49 )  PTT:32.1 sec    LOWER EXTREMITY PHYSICAL EXAM:    Vasular: DP 1/4, B/L, PT non-palpable B/L, CFT <3 seconds B/L, Temperature gradient cool to warm on R, warm to cool on L    Neuro: Epicritic sensation intact to the level of ankle, B/L.  Musculoskeletal/Ortho: pain on palpation surrounding the 2nd digit   Skin: wound at the R 2nd interspace, probes to probe, tracks 0.5cm dorsal and proximal, malodor, 1cc of pus expressed, erythema to the dorsolateral foot up until the midfoot, diffuse edema of the R forefoot, gangrene of the 2nd toe stump, ischemic changes to the R 3rd digit, cool to touch     s/p I&D of R 2nd interspace with erythema and 0.5cc pus expressed           RADIOLOGY & ADDITIONAL STUDIES:    < from: Xray Foot AP + Lateral + Oblique, Right (07.17.21 @ 22:49) >  INTERPRETATION:  There is ulceration of the soft tissue stump of the resected second digit and indistinct margins of the underlying bony cortex of the head of the second proximal phalanx, suggesting possible osteomyelitis. No subcutaneous tracking gas.    < end of copied text >

## 2021-07-18 NOTE — PROGRESS NOTE ADULT - SUBJECTIVE AND OBJECTIVE BOX
Patient is a 61y old  Male who presents with a chief complaint of right foot 2nd/3rd toe infection (18 Jul 2021 07:34)       INTERVAL HPI/OVERNIGHT EVENTS:  Patient seen and evaluated at bedside.  Pt is resting comfortable in NAD. Denies N/V/F/C.  Pain rated at 8/10    Allergies    cefepime (Other)    Intolerances        Vital Signs Last 24 Hrs  T(C): 36.4 (18 Jul 2021 04:20), Max: 37.8 (18 Jul 2021 03:20)  T(F): 97.5 (18 Jul 2021 04:20), Max: 100.1 (18 Jul 2021 03:20)  HR: 89 (18 Jul 2021 07:50) (73 - 91)  BP: 149/72 (18 Jul 2021 07:50) (149/72 - 197/96)  BP(mean): --  RR: 18 (18 Jul 2021 07:50) (18 - 20)  SpO2: 98% (18 Jul 2021 07:50) (98% - 100%)    LABS:                        10.2   19.45 )-----------( 338      ( 18 Jul 2021 08:01 )             31.0     07-18    133<L>  |  96<L>  |  24<H>  ----------------------------<  230<H>  3.8   |  18<L>  |  1.77<H>    Ca    8.8      18 Jul 2021 08:01  Phos  3.5     07-18  Mg     1.80     07-18    TPro  7.8  /  Alb  3.7  /  TBili  0.5  /  DBili  x   /  AST  28  /  ALT  31  /  AlkPhos  293<H>  07-17    PT/INR - ( 17 Jul 2021 21:49 )   PT: 13.2 sec;   INR: 1.17 ratio         PTT - ( 17 Jul 2021 21:49 )  PTT:32.1 sec    CAPILLARY BLOOD GLUCOSE      POCT Blood Glucose.: 293 mg/dL (18 Jul 2021 08:39)  POCT Blood Glucose.: 162 mg/dL (18 Jul 2021 03:33)  POCT Blood Glucose.: 196 mg/dL (17 Jul 2021 19:22)      Lower Extremity Physical Exam:  Vasular: DP 1/4, B/L, PT non-palpable B/L, CFT <3 seconds B/L, Temperature gradient cool to warm on R, warm to cool on L    Neuro: Epicritic sensation intact to the level of ankle, B/L.  Musculoskeletal/Ortho: pain on palpation surrounding the 2nd digit   Skin: wound at the R 2nd interspace, probes to bone, tracks 0.5cm dorsal and proximal, malodor, no pus expressed, erythema to the dorsolateral foot up until the midfoot, diffuse edema of the R forefoot, gangrene of the 2nd toe stump, ischemic changes to the R 3rd digit, cool to touch     s/p I&D of R 2nd interspace with erythema and 0.5cc pus expressed     RADIOLOGY & ADDITIONAL TESTS:

## 2021-07-18 NOTE — H&P ADULT - HISTORY OF PRESENT ILLNESS
62 y/o male with h/o HTN, insulin dependent DM-2, hld, glaucoma, CKD3 with recent interstitial nephritis treated with steroid, HFrEF, recent NSTEMI, PAD s/p RLE angioplasty, s/p right 2nd toe partial amputation at [facility] in April 2021, who presents with 2 week history of worsening right foot pain, associated with right 2nd residual toe stump/3rd toe infection/discoloration to blue/black, with tingling and pain. Patient denies fever/chill/purulent drainage. H was seen by outside podiatrist and prescribed course of Augmentin 4 days ago. Patient has been taking Advil for pain without relief. Patient had recent RLE angiogram on 7/7/21at [facility] which revealed severe atherosclerosis. Right anterior tibial: There was a 100 % stenosis. Right tibio-peroneal: There was a 50 % stenosis. Right posterior tibial: Angiography showed mild atherosclerosis. Right peroneal: There was a 100 % stenosis.  In the ED, pt was seen by podiatry, plan for surgical intervention next week. He was given a dose of vanco/zosyn, blood cultures not sent yet. Lab notable for WBC 18, ESR 96, CRP 80.6, creat stable at baseline 1.64. Xray right foot showed ulceration of the soft tissue stump of the resected second digit and indistinct margins of the underlying bony cortex of the head of the second proximal phalanx, suggesting possible osteomyelitis. No subcutaneous tracking gas.

## 2021-07-18 NOTE — H&P ADULT - PROBLEM SELECTOR PLAN 6
-h/o recent admission in April-May for acute systolic chf, last TTE 4/26:  LVEF 52% , Mild segmental LV systolic dysfunction.  Hypokinesis of the basal to mid inferior wall.   -cardiology consult in AM for preop clearance  -EKG reviewed: NSR no ischemic ST/T change  -h/o medication compliance, was on coreg 6.125 mg bid, hydralazine 50mg tid, isordil 20mg tid last admission, continue

## 2021-07-18 NOTE — H&P ADULT - NSHPREVIEWOFSYSTEMS_GEN_ALL_CORE
CONSTITUTIONAL: No fever, weight loss, or fatigue  EYES: No eye pain, visual disturbances, or discharge  ENMT:  No difficulty hearing, tinnitus, vertigo; No sinus or throat pain  NECK: No pain or stiffness  BREASTS: No pain, masses, or nipple discharge  RESPIRATORY: No cough, wheezing, chills or hemoptysis; No shortness of breath  CARDIOVASCULAR: No chest pain, palpitations, dizziness, or leg swelling  GASTROINTESTINAL: No abdominal or epigastric pain. No nausea, vomiting, or hematemesis; No diarrhea or constipation. No melena or hematochezia.  GENITOURINARY: No dysuria, frequency, hematuria, or incontinence  NEUROLOGICAL: No headaches, memory loss, loss of strength, numbness, or tremors  SKIN: +right foot infection 2nd residual toe stump/3rd toe, blue/black discoloration  LYMPH NODES: No enlarged glands  ENDOCRINE: No heat or cold intolerance; No hair loss  MUSCULOSKELETAL: No muscle or back pain  PSYCHIATRIC: No depression, anxiety, mood swings, or difficulty sleeping  HEME/LYMPH: No easy bruising, or bleeding gums  ALLERGY AND IMMUNOLOGIC: No hives or eczema

## 2021-07-18 NOTE — H&P ADULT - NSHPPHYSICALEXAM_GEN_ALL_CORE
Vital Signs Last 24 Hrs  T(C): 37.3 (17 Jul 2021 21:49), Max: 37.3 (17 Jul 2021 21:49)  T(F): 99.2 (17 Jul 2021 21:49), Max: 99.2 (17 Jul 2021 21:49)  HR: 73 (17 Jul 2021 21:49) (73 - 79)  BP: 180/85 (17 Jul 2021 21:49) (170/84 - 180/85)  BP(mean): --  RR: 18 (17 Jul 2021 21:49) (18 - 20)  SpO2: 100% (17 Jul 2021 21:49) (100% - 100%)  CAPILLARY BLOOD GLUCOSE      POCT Blood Glucose.: 196 mg/dL (17 Jul 2021 19:22)    I&O's Summary      PHYSICAL EXAM:  GENERAL: NAD, well-developed  HEAD:  Atraumatic, Normocephalic  EYES: EOMI,  conjunctiva and sclera clear  NECK: Supple, No JVD  CHEST/LUNG: Clear to auscultation bilaterally; No wheeze  HEART: Regular rate and rhythm; No murmurs, rubs, or gallops  ABDOMEN: Soft, Nontender, Nondistended; Bowel sounds present  EXTREMITIES:  poor pedal pulse, 1+RLE edema, R distal toe amputation, right foot proximal residual 2nd toe stump/3rd toe infection, blue/black discoloration, decreased sensation   PSYCH: AAOx3  NEUROLOGY: non-focal  SKIN: No rashes or lesions

## 2021-07-18 NOTE — ED ADULT NURSE REASSESSMENT NOTE - NS ED NURSE REASSESS COMMENT FT1
Received report from mid shift RN. Patient received A&Ox4 with 20G IV. Pt offering no complaints and is in no acute distress at this time.  Respirations even and unlabored.  Stretcher in lowest position, wheels locked, side rails up as per protocol. Vital signs are per flowsheet. Pt received with dressed/wrapped R foot as per podiatry recommendation. Awaiting transport to inpatient bed assignment.

## 2021-07-18 NOTE — H&P ADULT - ASSESSMENT
60 y/o male with h/o HTN, insulin dependent DM-2, hld, glaucoma, CKD3 with recent interstitial nephritis treated with steroid, HFrEF, recent NSTEMI, PAD s/p RLE angioplasty, s/p right 2nd toe partial amputation at [facility] in April 2021, who presents with 2 week history of worsening right foot pain, associated with right 2nd residual toe stump/3rd toe infection/discoloration to blue/black, xray c/f osteomyelitis, plan for surgical intervention this admission per podiatry

## 2021-07-18 NOTE — PROGRESS NOTE ADULT - ASSESSMENT
60 y/o male with h/o HTN, insulin dependent DM-2, hld, glaucoma, CKD3 with recent interstitial nephritis treated with steroid, HFrEF, recent NSTEMI, PAD s/p RLE angioplasty, s/p right 2nd toe partial amputation at [facility] in April 2021, who presents with 2 week history of worsening right foot pain, associated with right 2nd residual toe stump/3rd toe infection/discoloration to blue/black, xray c/f osteomyelitis, plan for surgical intervention this admission per podiatry

## 2021-07-18 NOTE — PROGRESS NOTE ADULT - PROBLEM SELECTOR PLAN 2
-at home takes lantus 60u hs and premeal insulin 28-30u   -will give lantus 55u hs and premeal insulin 25u actid  -check A1c  -monitor FS  -Endocrine consult in AM

## 2021-07-18 NOTE — CONSULT NOTE ADULT - ASSESSMENT
60 y/o M patient presents with R foot toe infection  - Patient seen and evaluated   - Afebrile, WBC 18  - LAI: wound at the R 2nd interspace, probes to probe, tracks 0.5cm dorsal and proximal, malodor, 1cc of pus expressed, erythema to the dorsolateral foot up until the midfoot, diffuse edema of the R forefoot, gangrene of the 2nd toe stump, ischemic changes to the R 3rd digit, cool to touch   s/p I&D of R 2nd interspace with erythema and 0.5cc pus expressed   - RF Xray prelim indicates 2nd toe proximal phalanx OM   - Rec admit under medicine   - Rec IV antibiotics   - Ordered MICHEL/PVRs  - Ordered R foot wound culture   - Pod plan for possible surgical intervention for infected right foot toes  - Discussed with attending

## 2021-07-18 NOTE — H&P ADULT - NSHPLABSRESULTS_GEN_ALL_CORE
LABS:                        11.1   18.12 )-----------( 368      ( 17 Jul 2021 21:49 )             33.7     07-17    134<L>  |  98  |  22  ----------------------------<  162<H>  3.9   |  20<L>  |  1.64<H>    Ca    9.5      17 Jul 2021 21:49    TPro  7.8  /  Alb  3.7  /  TBili  0.5  /  DBili  x   /  AST  28  /  ALT  31  /  AlkPhos  293<H>  07-17 07-17 @ 21:49 ESR 96 / CRP 80.6    PT/INR - ( 17 Jul 2021 21:49 )   PT: 13.2 sec;   INR: 1.17 ratio         PTT - ( 17 Jul 2021 21:49 )  PTT:32.1 sec      EKG reviewed: NSR, no ischemic ST/T change    RADIOLOGY & ADDITIONAL TESTS:  < from: Xray Foot AP + Lateral + Oblique, Right (07.17.21 @ 22:49) >      INTERPRETATION:  There is ulceration of the soft tissue stump of the resected second digit and indistinct margins of the underlying bony cortex of the head of the second proximal phalanx, suggesting possible osteomyelitis. No subcutaneous tracking gas.      < from: Cardiac Cath Lab - Adult (07.07.21 @ 07:44) >    RIGHT LOWER EXTREMITY VESSELS: Right lower extremity angiography reveals  severe atherosclerosis. Right anterior tibial: There was a 100 % stenosis.  Right tibio-peroneal: There was a 50 % stenosis. Right posterior tibial:  Angiography showed mild atherosclerosis. Right peroneal: There was a 100 %  stenosis.  COMPLICATIONS: There were no complications.  SUMMARY:  RIGHT LOWER EXTREMITY VESSELS: Right lower extremity angiography reveals  severe atherosclerosis.    < from: Transthoracic Echocardiogram (04.26.21 @ 08:38) >    CONCLUSIONS:  LVEF 52%  1. Mitral annular calcification.  Thickened mitral valve  leaflets. Moderate-severe mitral regurgitation.  2. Mildly dilated left atrium.  LA volume index = 35 cc/m2.  3. Normal left ventricular internal dimensions and wall  thicknesses.  4. Mild segmental left ventricular systolic dysfunction.  Hypokinesis of the basal to mid inferior wall.  5. The right ventricle is not well visualized; grossly  normal right ventricular systolic function.  6. Right pleural effusion.  Consider CRISTY for further evaluation of the mitral valve, if  clinically indicated.

## 2021-07-18 NOTE — H&P ADULT - PROBLEM SELECTOR PLAN 1
-pt with h/o right foot 2nd toe partial amputation in April at [facility], h/o severe RLE PVD with RLE angioplasty in past, no stent, now p/w 2nd residual toe/stump and 3rd toe infection, xray concerning for osteomyelitis, elevated inflammatory markers ESR 96, CRP 80.6 c/w acute OM.  -send blood cultures x2  -iv vanco/zosyn, monitor vanco trough  -MRI right foot  -podiatry consulted, f/u recs, plan for surgical intervention this week  -needs cardiology clearance, RCRI 3, high risk for periop MACE

## 2021-07-18 NOTE — PROGRESS NOTE ADULT - PROBLEM SELECTOR PLAN 3
-bp uncontrolled, systolic 170-180's, likely medication noncompliance, was on hydralazine, isordil and beta blocker last admission  -c/w nifedipine 30mg qd, add coreg 6.25mg bid, hydralazine 50mg tid, isordil 20mg tid  -monitor bp

## 2021-07-18 NOTE — H&P ADULT - PROBLEM SELECTOR PLAN 3
-c/w nifedipine 30mg qd, monitor BP -bp uncontrolled, systolic 170-180's, likely medication noncompliance, was on hydralazine, isordil and beta blocker last admission  -c/w nifedipine 30mg qd, add coreg 6.25mg bid, hydralazine 50mg tid, isordil 20mg tid  -monitor bp

## 2021-07-19 DIAGNOSIS — N17.9 ACUTE KIDNEY FAILURE, UNSPECIFIED: ICD-10-CM

## 2021-07-19 DIAGNOSIS — J96.01 ACUTE RESPIRATORY FAILURE WITH HYPOXIA: ICD-10-CM

## 2021-07-19 LAB
A1C WITH ESTIMATED AVERAGE GLUCOSE RESULT: 8.5 % — HIGH (ref 4–5.6)
ALBUMIN SERPL ELPH-MCNC: 2.9 G/DL — LOW (ref 3.3–5)
ALP SERPL-CCNC: 228 U/L — HIGH (ref 40–120)
ALT FLD-CCNC: 21 U/L — SIGNIFICANT CHANGE UP (ref 4–41)
ANION GAP SERPL CALC-SCNC: 16 MMOL/L — HIGH (ref 7–14)
APPEARANCE UR: CLEAR — SIGNIFICANT CHANGE UP
AST SERPL-CCNC: 16 U/L — SIGNIFICANT CHANGE UP (ref 4–40)
BACTERIA # UR AUTO: NEGATIVE — SIGNIFICANT CHANGE UP
BASOPHILS # BLD AUTO: 0.06 K/UL — SIGNIFICANT CHANGE UP (ref 0–0.2)
BASOPHILS NFR BLD AUTO: 0.3 % — SIGNIFICANT CHANGE UP (ref 0–2)
BILIRUB SERPL-MCNC: 0.5 MG/DL — SIGNIFICANT CHANGE UP (ref 0.2–1.2)
BILIRUB UR-MCNC: NEGATIVE — SIGNIFICANT CHANGE UP
BUN SERPL-MCNC: 36 MG/DL — HIGH (ref 7–23)
CALCIUM SERPL-MCNC: 8.8 MG/DL — SIGNIFICANT CHANGE UP (ref 8.4–10.5)
CHLORIDE SERPL-SCNC: 96 MMOL/L — LOW (ref 98–107)
CHLORIDE UR-SCNC: <20 MMOL/L — SIGNIFICANT CHANGE UP
CHOLEST SERPL-MCNC: 116 MG/DL — SIGNIFICANT CHANGE UP
CO2 SERPL-SCNC: 18 MMOL/L — LOW (ref 22–31)
COLOR SPEC: YELLOW — SIGNIFICANT CHANGE UP
COVID-19 SPIKE DOMAIN AB INTERP: POSITIVE
COVID-19 SPIKE DOMAIN ANTIBODY RESULT: 2.28 U/ML — HIGH
CREAT ?TM UR-MCNC: 146 MG/DL — SIGNIFICANT CHANGE UP
CREAT SERPL-MCNC: 2.74 MG/DL — HIGH (ref 0.5–1.3)
DIFF PNL FLD: NEGATIVE — SIGNIFICANT CHANGE UP
EOSINOPHIL # BLD AUTO: 0.82 K/UL — HIGH (ref 0–0.5)
EOSINOPHIL NFR BLD AUTO: 4.6 % — SIGNIFICANT CHANGE UP (ref 0–6)
EPI CELLS # UR: 0 /HPF — SIGNIFICANT CHANGE UP (ref 0–5)
ESTIMATED AVERAGE GLUCOSE: 197 — SIGNIFICANT CHANGE UP
GLUCOSE SERPL-MCNC: 160 MG/DL — HIGH (ref 70–99)
GLUCOSE UR QL: NEGATIVE — SIGNIFICANT CHANGE UP
HCT VFR BLD CALC: 28.6 % — LOW (ref 39–50)
HCV AB S/CO SERPL IA: 0.12 S/CO — SIGNIFICANT CHANGE UP (ref 0–0.99)
HCV AB SERPL-IMP: SIGNIFICANT CHANGE UP
HDLC SERPL-MCNC: 26 MG/DL — LOW
HGB BLD-MCNC: 9.8 G/DL — LOW (ref 13–17)
HYALINE CASTS # UR AUTO: 1 /LPF — SIGNIFICANT CHANGE UP (ref 0–7)
IANC: 14.82 K/UL — HIGH (ref 1.5–8.5)
IMM GRANULOCYTES NFR BLD AUTO: 0.5 % — SIGNIFICANT CHANGE UP (ref 0–1.5)
KETONES UR-MCNC: NEGATIVE — SIGNIFICANT CHANGE UP
LEUKOCYTE ESTERASE UR-ACNC: NEGATIVE — SIGNIFICANT CHANGE UP
LIPID PNL WITH DIRECT LDL SERPL: 66 MG/DL — SIGNIFICANT CHANGE UP
LYMPHOCYTES # BLD AUTO: 0.93 K/UL — LOW (ref 1–3.3)
LYMPHOCYTES # BLD AUTO: 5.2 % — LOW (ref 13–44)
MAGNESIUM SERPL-MCNC: 1.7 MG/DL — SIGNIFICANT CHANGE UP (ref 1.6–2.6)
MCHC RBC-ENTMCNC: 27.8 PG — SIGNIFICANT CHANGE UP (ref 27–34)
MCHC RBC-ENTMCNC: 34.3 GM/DL — SIGNIFICANT CHANGE UP (ref 32–36)
MCV RBC AUTO: 81 FL — SIGNIFICANT CHANGE UP (ref 80–100)
MONOCYTES # BLD AUTO: 1 K/UL — HIGH (ref 0–0.9)
MONOCYTES NFR BLD AUTO: 5.6 % — SIGNIFICANT CHANGE UP (ref 2–14)
NEUTROPHILS # BLD AUTO: 14.82 K/UL — HIGH (ref 1.8–7.4)
NEUTROPHILS NFR BLD AUTO: 83.8 % — HIGH (ref 43–77)
NITRITE UR-MCNC: NEGATIVE — SIGNIFICANT CHANGE UP
NON HDL CHOLESTEROL: 90 MG/DL — SIGNIFICANT CHANGE UP
NRBC # BLD: 0 /100 WBCS — SIGNIFICANT CHANGE UP
NRBC # FLD: 0 K/UL — SIGNIFICANT CHANGE UP
PH UR: 5 — SIGNIFICANT CHANGE UP (ref 5–8)
PHOSPHATE SERPL-MCNC: 4.1 MG/DL — SIGNIFICANT CHANGE UP (ref 2.5–4.5)
PLATELET # BLD AUTO: 317 K/UL — SIGNIFICANT CHANGE UP (ref 150–400)
POTASSIUM SERPL-MCNC: 3.5 MMOL/L — SIGNIFICANT CHANGE UP (ref 3.5–5.3)
POTASSIUM SERPL-SCNC: 3.5 MMOL/L — SIGNIFICANT CHANGE UP (ref 3.5–5.3)
POTASSIUM UR-SCNC: 44.1 MMOL/L — SIGNIFICANT CHANGE UP
PROT SERPL-MCNC: 6.6 G/DL — SIGNIFICANT CHANGE UP (ref 6–8.3)
PROT UR-MCNC: ABNORMAL
RBC # BLD: 3.53 M/UL — LOW (ref 4.2–5.8)
RBC # FLD: 12.6 % — SIGNIFICANT CHANGE UP (ref 10.3–14.5)
RBC CASTS # UR COMP ASSIST: 2 /HPF — SIGNIFICANT CHANGE UP (ref 0–4)
SARS-COV-2 IGG+IGM SERPL QL IA: 2.28 U/ML — HIGH
SARS-COV-2 IGG+IGM SERPL QL IA: POSITIVE
SODIUM SERPL-SCNC: 130 MMOL/L — LOW (ref 135–145)
SODIUM UR-SCNC: <20 MMOL/L — SIGNIFICANT CHANGE UP
SP GR SPEC: 1.01 — SIGNIFICANT CHANGE UP (ref 1.01–1.02)
TRIGL SERPL-MCNC: 122 MG/DL — SIGNIFICANT CHANGE UP
UROBILINOGEN FLD QL: SIGNIFICANT CHANGE UP
VANCOMYCIN TROUGH SERPL-MCNC: 21.8 UG/ML — HIGH (ref 10–20)
WBC # BLD: 17.72 K/UL — HIGH (ref 3.8–10.5)
WBC # FLD AUTO: 17.72 K/UL — HIGH (ref 3.8–10.5)
WBC UR QL: 2 /HPF — SIGNIFICANT CHANGE UP (ref 0–5)

## 2021-07-19 PROCEDURE — 99233 SBSQ HOSP IP/OBS HIGH 50: CPT | Mod: GC

## 2021-07-19 PROCEDURE — 99222 1ST HOSP IP/OBS MODERATE 55: CPT

## 2021-07-19 PROCEDURE — 73719 MRI LOWER EXTREMITY W/DYE: CPT | Mod: 26,RT

## 2021-07-19 PROCEDURE — 99253 IP/OBS CNSLTJ NEW/EST LOW 45: CPT

## 2021-07-19 RX ORDER — SODIUM CHLORIDE 9 MG/ML
1000 INJECTION, SOLUTION INTRAVENOUS
Refills: 0 | Status: DISCONTINUED | OUTPATIENT
Start: 2021-07-19 | End: 2021-07-19

## 2021-07-19 RX ORDER — INSULIN LISPRO 100/ML
15 VIAL (ML) SUBCUTANEOUS
Refills: 0 | Status: DISCONTINUED | OUTPATIENT
Start: 2021-07-19 | End: 2021-07-19

## 2021-07-19 RX ORDER — INSULIN LISPRO 100/ML
5 VIAL (ML) SUBCUTANEOUS
Refills: 0 | Status: DISCONTINUED | OUTPATIENT
Start: 2021-07-19 | End: 2021-07-21

## 2021-07-19 RX ORDER — FUROSEMIDE 40 MG
40 TABLET ORAL ONCE
Refills: 0 | Status: COMPLETED | OUTPATIENT
Start: 2021-07-19 | End: 2021-07-19

## 2021-07-19 RX ORDER — INSULIN LISPRO 100/ML
8 VIAL (ML) SUBCUTANEOUS
Refills: 0 | Status: DISCONTINUED | OUTPATIENT
Start: 2021-07-19 | End: 2021-07-19

## 2021-07-19 RX ORDER — INSULIN GLARGINE 100 [IU]/ML
40 INJECTION, SOLUTION SUBCUTANEOUS AT BEDTIME
Refills: 0 | Status: DISCONTINUED | OUTPATIENT
Start: 2021-07-19 | End: 2021-07-21

## 2021-07-19 RX ORDER — VANCOMYCIN HCL 1 G
1000 VIAL (EA) INTRAVENOUS DAILY
Refills: 0 | Status: DISCONTINUED | OUTPATIENT
Start: 2021-07-19 | End: 2021-07-19

## 2021-07-19 RX ADMIN — LATANOPROST 1 DROP(S): 0.05 SOLUTION/ DROPS OPHTHALMIC; TOPICAL at 21:27

## 2021-07-19 RX ADMIN — Medication 1: at 18:41

## 2021-07-19 RX ADMIN — HEPARIN SODIUM 5000 UNIT(S): 5000 INJECTION INTRAVENOUS; SUBCUTANEOUS at 05:12

## 2021-07-19 RX ADMIN — ATORVASTATIN CALCIUM 40 MILLIGRAM(S): 80 TABLET, FILM COATED ORAL at 21:26

## 2021-07-19 RX ADMIN — ISOSORBIDE DINITRATE 20 MILLIGRAM(S): 5 TABLET ORAL at 21:27

## 2021-07-19 RX ADMIN — Medication 50 MILLIGRAM(S): at 21:26

## 2021-07-19 RX ADMIN — Medication 15 UNIT(S): at 09:10

## 2021-07-19 RX ADMIN — ISOSORBIDE DINITRATE 20 MILLIGRAM(S): 5 TABLET ORAL at 13:27

## 2021-07-19 RX ADMIN — POLYETHYLENE GLYCOL 3350 17 GRAM(S): 17 POWDER, FOR SOLUTION ORAL at 12:06

## 2021-07-19 RX ADMIN — CARVEDILOL PHOSPHATE 6.25 MILLIGRAM(S): 80 CAPSULE, EXTENDED RELEASE ORAL at 05:12

## 2021-07-19 RX ADMIN — SODIUM CHLORIDE 100 MILLILITER(S): 9 INJECTION, SOLUTION INTRAVENOUS at 12:06

## 2021-07-19 RX ADMIN — ISOSORBIDE DINITRATE 20 MILLIGRAM(S): 5 TABLET ORAL at 05:12

## 2021-07-19 RX ADMIN — PIPERACILLIN AND TAZOBACTAM 25 GRAM(S): 4; .5 INJECTION, POWDER, LYOPHILIZED, FOR SOLUTION INTRAVENOUS at 01:44

## 2021-07-19 RX ADMIN — Medication 3 MILLIGRAM(S): at 21:25

## 2021-07-19 RX ADMIN — HEPARIN SODIUM 5000 UNIT(S): 5000 INJECTION INTRAVENOUS; SUBCUTANEOUS at 13:27

## 2021-07-19 RX ADMIN — Medication 40 MILLIGRAM(S): at 20:04

## 2021-07-19 RX ADMIN — Medication 50 MILLIGRAM(S): at 13:26

## 2021-07-19 RX ADMIN — CARVEDILOL PHOSPHATE 6.25 MILLIGRAM(S): 80 CAPSULE, EXTENDED RELEASE ORAL at 18:42

## 2021-07-19 RX ADMIN — PIPERACILLIN AND TAZOBACTAM 25 GRAM(S): 4; .5 INJECTION, POWDER, LYOPHILIZED, FOR SOLUTION INTRAVENOUS at 09:10

## 2021-07-19 RX ADMIN — Medication 81 MILLIGRAM(S): at 12:06

## 2021-07-19 RX ADMIN — Medication 50 MILLIGRAM(S): at 05:12

## 2021-07-19 RX ADMIN — INSULIN GLARGINE 40 UNIT(S): 100 INJECTION, SOLUTION SUBCUTANEOUS at 21:26

## 2021-07-19 RX ADMIN — Medication 30 MILLIGRAM(S): at 05:12

## 2021-07-19 RX ADMIN — HEPARIN SODIUM 5000 UNIT(S): 5000 INJECTION INTRAVENOUS; SUBCUTANEOUS at 21:25

## 2021-07-19 RX ADMIN — PIPERACILLIN AND TAZOBACTAM 25 GRAM(S): 4; .5 INJECTION, POWDER, LYOPHILIZED, FOR SOLUTION INTRAVENOUS at 18:42

## 2021-07-19 NOTE — CONSULT NOTE ADULT - SUBJECTIVE AND OBJECTIVE BOX
CC: Patient is a 61y old  Male who presents with a chief complaint of right foot 2nd/3rd toe infection (2021 13:55)      HPI:  62 y/o male with h/o HTN, insulin dependent DM-2, hld, glaucoma, CKD3 with recent interstitial nephritis treated with steroid, HFrEF, recent NSTEMI, PAD s/p RLE angioplasty, s/p right 2nd toe partial amputation at [facility] in 2021, who presents with 2 week history of worsening right foot pain, associated with right 2nd residual toe stump/3rd toe infection/discoloration to blue/black, with tingling and pain. Patient denies fever/chill/purulent drainage. H was seen by outside podiatrist and prescribed course of Augmentin 4 days ago. Patient has been taking Advil for pain without relief. Patient had recent RLE angiogram on 21at [facility] which revealed severe atherosclerosis. Right anterior tibial: There was a 100 % stenosis. Right tibio-peroneal: There was a 50 % stenosis. Right posterior tibial: Angiography showed mild atherosclerosis. Right peroneal: There was a 100 % stenosis.  In the ED, pt was seen by podiatry, plan for surgical intervention next week. He was given a dose of vanco/zosyn, blood cultures not sent yet. Lab notable for WBC 18, ESR 96, CRP 80.6, creat stable at baseline 1.64. Xray right foot showed ulceration of the soft tissue stump of the resected second digit and indistinct margins of the underlying bony cortex of the head of the second proximal phalanx, suggesting possible osteomyelitis. No subcutaneous tracking gas. (2021 01:31)    Vascular Surgery Called to Evaluate    PMH  PAD (peripheral artery disease)    Essential hypertension    Type 2 diabetes mellitus, with long-term current use of insulin      PSH  History of amputation of toe    S/P angioplasty      MEDS    Allergies    cefepime (Other)    Intolerances        Social        Physical Exam  T(C): 37.1 (21 @ 13:25), Max: 37.1 (21 @ 05:04)  HR: 85 (21 @ 16:10) (72 - 85)  BP: 138/62 (21 @ 16:10) (124/76 - 145/66)  RR: 18 (21 @ 16:10) (16 - 18)  SpO2: 93% (21 @ 16:10) (93% - 97%)  Wt(kg): --  Tmax: T(C): , Max: 37.1 (21 @ 05:04)  Wt(kg): --    Gen: NAD  HEENT: normocephalic, atraumatic, no scleral icterus  Pulm: B/L chest wall rise, difficulty breathing  Abd: Soft, ND, NTP, no rebound, no guarding, no palpable organomegaly/masses  Ext: warm, no edema, sensorimotor intact, DP/PT/AT signals present      Labs:                        9.8    17.72 )-----------( 317      ( 2021 06:42 )             28.6         130<L>  |  96<L>  |  36<H>  ----------------------------<  160<H>  3.5   |  18<L>  |  2.74<H>    Ca    8.8      2021 06:42  Phos  4.1       Mg     1.70         TPro  6.6  /  Alb  2.9<L>  /  TBili  0.5  /  DBili  x   /  AST  16  /  ALT  21  /  AlkPhos  228<H>      PT/INR - ( 2021 21:49 )   PT: 13.2 sec;   INR: 1.17 ratio         PTT - ( 2021 21:49 )  PTT:32.1 sec  Urinalysis Basic - ( 2021 12:01 )    Color: Yellow / Appearance: Clear / S.015 / pH: x  Gluc: x / Ketone: Negative  / Bili: Negative / Urobili: <2 mg/dL   Blood: x / Protein: Trace / Nitrite: Negative   Leuk Esterase: Negative / RBC: 2 /HPF / WBC 2 /HPF   Sq Epi: x / Non Sq Epi: 0 /HPF / Bacteria: Negative    MICHEL/PVR:    Patient name: HOLLY FITZGERALD  Date of test: 2021  MR#: [id number]  [facility] #: [id number]    Location: [facility] Physician(s): , Dorcas Lane MD  Interpreted by: Michael Lr MD, formerly [facility], CHUCHO, SONIYA  Tech: Salome Syed RVT  Type of Test: LE Arterial: MICHEL/Segm.  ------------------------------------------------------------------------  Procedure: Segmental Pressure/Waveform - complete  noninvasive physiologic studies of the bilateral lower  extremity arteries.  Indications: Atherosclerosis of native arteries of right  leg with ulceration of other part of foot (I70.235)  ------------------------------------------------------------------------  PRESSURE (mm Hg):  ------------------------------------------------------------------------  RIGHT (BP):  Brachial: 140  High Thigh:  Low Thigh:  Calf:  Ankle-PT:  Ankle-DP:  Greate Toe: 10  MICHEL:  ------------------------------------------------------------------------  LEFT (BP):  Brachial: 141  High Thigh:  Low Thigh:  Calf:  Ankle-PT:  Ankle-DP:  Greate Toe: 31  MICHEL:  ------------------------------------------------------------------------  WAVEFORM:  ------------------------------------------------------------------------  RIGHT:  CFA:  Popliteal:  Ankle-PT:  Ankle-DP:  ------------------------------------------------------------------------  LEFT:  CFA:  Popliteal:  Ankle-PT:  Ankle-DP:  ------------------------------------------------------------------------  PSA/AVF:  ------------------------------------------------------------------------  RIGHT:  Pseudoaneurysm:  A-V fistula:  TBI: 0.07  ------------------------------------------------------------------------  LEFT:  Pseudoaneurysm:  A-V fistula:  TBI: 0.22  ------------------------------------------------------------------------  Right Findings: The ankle-brachial index (MICHEL) on the  right was not accurately assessed due to non-compressible  (calcific) vessels.  The toe-brachial index (TBI) on the right is severely  decreased (0.07).  The right toe pressure is 10 mmHg.  Pulse volume recording (PVR) waveforms appear biphasic  with normal amplitudes at the thigh and calf.  Waveforms  are mildly decreased in amplitude at the ankle, severely  diminished at the metatarsal, and flat at the digits.  Left Findings: The ankle-brachial index (MICHEL) on the left  was not accurately assessed due to non-compressible  (calcified) vessels.  The toe-brachial index (TBI) on the left is severely  decreased (0.22).  The left toe pressure is 31 mmHg.  Pulse volume recording (PVR) waveforms appear biphasic  with normal amplitudes at the thigh and calf.  Waveforms  are mildly decreased in amplitude at the ankle, severely  diminished at the metatarsal, and flat at the digits.  ------------------------------------------------------------------------  Summary/Impressions:  1. Right MICHEL was not accurately assessed due to  non-compressible (calcific) vessels.   Right digit  waveforms are flat.  Findings suggest the presence of  severe small vessel arterial disease in the right foot.  2. Left MICHEL was not accurately assessed due to  non-compressible (calcific) vessels.  Left digit waveforms  are flat.  Findings suggest the presence of severe small  vessel arterial disease in the left foot.

## 2021-07-19 NOTE — PROGRESS NOTE ADULT - PROBLEM SELECTOR PROBLEM 4
PAD (peripheral artery disease) Essential hypertension Type 2 diabetes mellitus, with long-term current use of insulin

## 2021-07-19 NOTE — PROGRESS NOTE ADULT - PROBLEM SELECTOR PLAN 3
-bp uncontrolled, systolic 170-180's, likely medication noncompliance, was on hydralazine, isordil and beta blocker last admission  -c/w nifedipine 30mg qd, add coreg 6.25mg bid, hydralazine 50mg tid, isordil 20mg tid  -monitor bp -at home takes lantus 60u hs and premeal insulin 28-30u   -will give lantus 55u hs and premeal insulin 25u actid  -check A1c  -monitor FS  -Endocrine consult in AM -pt with h/o right foot 2nd toe partial amputation in April at [facility], h/o severe RLE PVD with RLE angioplasty in past, no stent, now p/w 2nd residual toe/stump and 3rd toe infection, xray concerning for osteomyelitis, elevated inflammatory markers ESR 96, CRP 80.6 c/w acute OM.  -7/18 blood cultures x2  -iv vanco (cont 1g BID, 7/17 PM - )/zosyn (7/18 - ), monitor vanco trough before 3rd dose (7/18 AM)  -MRI right foot  -podiatry consulted, s/p I&D 7/17. plan for RF P2RR, P3RR pending MICHEL/PVR vasc recs  - vascular consult: angiogram with co2 angio  -needs cardiology clearance, RCRI 3, high risk for periop MACE

## 2021-07-19 NOTE — PROGRESS NOTE ADULT - SUBJECTIVE AND OBJECTIVE BOX
PROGRESS NOTE:   Authored by Marguerite Whitfield MD  Internal Medicine  Pager PIA [phone]  Pager [phone]    Patient is a 61y old  Male who presents with a chief complaint of right foot 2nd/3rd toe infection (18 Jul 2021 10:17)      SUBJECTIVE / OVERNIGHT EVENTS:    MEDICATIONS  (STANDING):  aspirin enteric coated 81 milliGRAM(s) Oral daily  atorvastatin 40 milliGRAM(s) Oral at bedtime  carvedilol 6.25 milliGRAM(s) Oral every 12 hours  dextrose 40% Gel 15 Gram(s) Oral once  dextrose 5%. 1000 milliLiter(s) (50 mL/Hr) IV Continuous <Continuous>  dextrose 5%. 1000 milliLiter(s) (100 mL/Hr) IV Continuous <Continuous>  dextrose 50% Injectable 25 Gram(s) IV Push once  dextrose 50% Injectable 12.5 Gram(s) IV Push once  dextrose 50% Injectable 25 Gram(s) IV Push once  glucagon  Injectable 1 milliGRAM(s) IntraMuscular once  heparin   Injectable 5000 Unit(s) SubCutaneous every 8 hours  hydrALAZINE 50 milliGRAM(s) Oral three times a day  insulin glargine Injectable (LANTUS) 55 Unit(s) SubCutaneous at bedtime  insulin lispro (ADMELOG) corrective regimen sliding scale   SubCutaneous three times a day before meals  insulin lispro Injectable (ADMELOG) 25 Unit(s) SubCutaneous three times a day before meals  isosorbide   dinitrate Tablet (ISORDIL) 20 milliGRAM(s) Oral three times a day  latanoprost 0.005% Ophthalmic Solution 1 Drop(s) Both EYES at bedtime  melatonin 3 milliGRAM(s) Oral at bedtime  NIFEdipine XL 30 milliGRAM(s) Oral daily  piperacillin/tazobactam IVPB.. 3.375 Gram(s) IV Intermittent every 8 hours  polyethylene glycol 3350 17 Gram(s) Oral daily  vancomycin  IVPB 1000 milliGRAM(s) IV Intermittent every 12 hours    MEDICATIONS  (PRN):  acetaminophen   Tablet .. 650 milliGRAM(s) Oral every 6 hours PRN Temp greater or equal to 38.5C (101.3F), Mild Pain (1 - 3)  morphine  - Injectable 2 milliGRAM(s) IV Push every 4 hours PRN modeate to severe pain      CAPILLARY BLOOD GLUCOSE      POCT Blood Glucose.: 239 mg/dL (18 Jul 2021 21:16)  POCT Blood Glucose.: 78 mg/dL (18 Jul 2021 17:48)  POCT Blood Glucose.: 111 mg/dL (18 Jul 2021 12:39)  POCT Blood Glucose.: 293 mg/dL (18 Jul 2021 08:39)    I&O's Summary      PHYSICAL EXAM:  Vital Signs Last 24 Hrs  T(C): 37.1 (19 Jul 2021 05:04), Max: 37.1 (19 Jul 2021 05:04)  T(F): 98.8 (19 Jul 2021 05:04), Max: 98.8 (19 Jul 2021 05:04)  HR: 72 (19 Jul 2021 05:04) (72 - 89)  BP: 145/66 (19 Jul 2021 05:04) (124/76 - 149/72)  BP(mean): --  RR: 16 (19 Jul 2021 05:04) (16 - 18)  SpO2: 97% (19 Jul 2021 05:04) (97% - 98%)    GENERAL: No acute distress, well-developed  HEAD:  Atraumatic, Normocephalic  EYES: EOMI, PERRLA, conjunctiva and sclera clear  NECK: Supple, no lymphadenopathy, no JVD  CHEST/LUNG: CTAB; No wheezes, rales, or rhonchi  HEART: Regular rate and rhythm; No murmurs, rubs, or gallops  ABDOMEN: Soft, non-tender, non-distended; normal bowel sounds, no organomegaly  EXTREMITIES:  2+ peripheral pulses b/l, No clubbing, cyanosis, or edema  NEUROLOGY: A&O x 3, no focal deficits  SKIN: No rashes or lesions    LABS:                        9.8    17.72 )-----------( 317      ( 19 Jul 2021 06:42 )             28.6     07-19    130<L>  |  96<L>  |  36<H>  ----------------------------<  160<H>  3.5   |  18<L>  |  2.74<H>    Ca    8.8      19 Jul 2021 06:42  Phos  4.1     07-19  Mg     1.70     07-19    TPro  6.6  /  Alb  2.9<L>  /  TBili  0.5  /  DBili  x   /  AST  16  /  ALT  21  /  AlkPhos  228<H>  07-19    PT/INR - ( 17 Jul 2021 21:49 )   PT: 13.2 sec;   INR: 1.17 ratio         PTT - ( 17 Jul 2021 21:49 )  PTT:32.1 sec            RADIOLOGY & ADDITIONAL TESTS:  Results Reviewed:   Imaging Personally Reviewed:  Electrocardiogram Personally Reviewed:    COORDINATION OF CARE:  Care Discussed with Consultants/Other Providers [Y/N]:  Prior or Outpatient Records Reviewed [Y/N]:   PROGRESS NOTE:   Authored by Marguerite Whitfield MD  Internal Medicine  Pager PIA [phone]  Pager [phone]    Patient is a 61y old  Male who presents with a chief complaint of right foot 2nd/3rd toe infection (18 Jul 2021 10:17)      SUBJECTIVE / OVERNIGHT EVENTS: NAEO. feels tired this morning, doesn't have a great appetite, in a little bit of pain but the pain meds do help.    Interval events: patient desatted to low 80s this afternoon. put on 5L NC with 93-95% sats. patient reports feeling short of breath and describes retrosternal chest pressure. slightly worse with breathing. bedside focused ultrasound remarkable for b lines and mitral regurg. likely 2/2 fluid bolus from ACOSTA treatment w/ hx of HFREF. stopping fluids, and diuresing as needed if continues to be hypoxic    MEDICATIONS  (STANDING):  aspirin enteric coated 81 milliGRAM(s) Oral daily  atorvastatin 40 milliGRAM(s) Oral at bedtime  carvedilol 6.25 milliGRAM(s) Oral every 12 hours  dextrose 40% Gel 15 Gram(s) Oral once  dextrose 5%. 1000 milliLiter(s) (50 mL/Hr) IV Continuous <Continuous>  dextrose 5%. 1000 milliLiter(s) (100 mL/Hr) IV Continuous <Continuous>  dextrose 50% Injectable 25 Gram(s) IV Push once  dextrose 50% Injectable 12.5 Gram(s) IV Push once  dextrose 50% Injectable 25 Gram(s) IV Push once  glucagon  Injectable 1 milliGRAM(s) IntraMuscular once  heparin   Injectable 5000 Unit(s) SubCutaneous every 8 hours  hydrALAZINE 50 milliGRAM(s) Oral three times a day  insulin glargine Injectable (LANTUS) 55 Unit(s) SubCutaneous at bedtime  insulin lispro (ADMELOG) corrective regimen sliding scale   SubCutaneous three times a day before meals  insulin lispro Injectable (ADMELOG) 25 Unit(s) SubCutaneous three times a day before meals  isosorbide   dinitrate Tablet (ISORDIL) 20 milliGRAM(s) Oral three times a day  latanoprost 0.005% Ophthalmic Solution 1 Drop(s) Both EYES at bedtime  melatonin 3 milliGRAM(s) Oral at bedtime  NIFEdipine XL 30 milliGRAM(s) Oral daily  piperacillin/tazobactam IVPB.. 3.375 Gram(s) IV Intermittent every 8 hours  polyethylene glycol 3350 17 Gram(s) Oral daily  vancomycin  IVPB 1000 milliGRAM(s) IV Intermittent every 12 hours    MEDICATIONS  (PRN):  acetaminophen   Tablet .. 650 milliGRAM(s) Oral every 6 hours PRN Temp greater or equal to 38.5C (101.3F), Mild Pain (1 - 3)  morphine  - Injectable 2 milliGRAM(s) IV Push every 4 hours PRN modeate to severe pain      CAPILLARY BLOOD GLUCOSE      POCT Blood Glucose.: 239 mg/dL (18 Jul 2021 21:16)  POCT Blood Glucose.: 78 mg/dL (18 Jul 2021 17:48)  POCT Blood Glucose.: 111 mg/dL (18 Jul 2021 12:39)  POCT Blood Glucose.: 293 mg/dL (18 Jul 2021 08:39)    I&O's Summary      PHYSICAL EXAM:  Vital Signs Last 24 Hrs  T(C): 37.1 (19 Jul 2021 05:04), Max: 37.1 (19 Jul 2021 05:04)  T(F): 98.8 (19 Jul 2021 05:04), Max: 98.8 (19 Jul 2021 05:04)  HR: 72 (19 Jul 2021 05:04) (72 - 89)  BP: 145/66 (19 Jul 2021 05:04) (124/76 - 149/72)  BP(mean): --  RR: 16 (19 Jul 2021 05:04) (16 - 18)  SpO2: 97% (19 Jul 2021 05:04) (97% - 98%)    GENERAL: No acute distress, well-developed  HEAD:  Atraumatic, Normocephalic  EYES: conjunctiva and sclera clear  CHEST/LUNG: CTAB; No wheezes, rales, or rhonchi  HEART: Regular rate and rhythm; No murmurs, rubs, or gallops  ABDOMEN: Soft, non-tender, protuberant; normal bowel sounds, no organomegaly  EXTREMITIES:  right foot wrapping in gauze, without shadowing or drainage. DP 1+ on left side, trace on right side  NEUROLOGY: A&O x 3, no focal deficits  SKIN: No rashes or lesions    LABS:                        9.8    17.72 )-----------( 317      ( 19 Jul 2021 06:42 )             28.6     07-19    130<L>  |  96<L>  |  36<H>  ----------------------------<  160<H>  3.5   |  18<L>  |  2.74<H>    Ca    8.8      19 Jul 2021 06:42  Phos  4.1     07-19  Mg     1.70     07-19    TPro  6.6  /  Alb  2.9<L>  /  TBili  0.5  /  DBili  x   /  AST  16  /  ALT  21  /  AlkPhos  228<H>  07-19    PT/INR - ( 17 Jul 2021 21:49 )   PT: 13.2 sec;   INR: 1.17 ratio         PTT - ( 17 Jul 2021 21:49 )  PTT:32.1 sec            RADIOLOGY & ADDITIONAL TESTS:  Results Reviewed:   Imaging Personally Reviewed:  Electrocardiogram Personally Reviewed:    COORDINATION OF CARE:  Care Discussed with Consultants/Other Providers [Y/N]:  Prior or Outpatient Records Reviewed [Y/N]:

## 2021-07-19 NOTE — PROGRESS NOTE ADULT - PROBLEM SELECTOR PLAN 4
h/o severe RLE PVD, s/p RLE angioplasty in past  c/w ASA/statin -bp uncontrolled, systolic 170-180's, likely medication noncompliance, was on hydralazine, isordil and beta blocker last admission  -c/w nifedipine 30mg qd, add coreg 6.25mg bid, hydralazine 50mg tid, isordil 20mg tid  -monitor bp - A1c 8.5  -at home takes lantus 60u hs and premeal insulin 28-30u   -will give lantus 55u hs and premeal insulin 25u actid  -monitor FS

## 2021-07-19 NOTE — PROGRESS NOTE ADULT - PROBLEM SELECTOR PROBLEM 7
Prophylactic measure Chronic heart failure with reduced ejection fraction and diastolic dysfunction Chronic kidney disease (CKD), stage III (moderate)

## 2021-07-19 NOTE — PROGRESS NOTE ADULT - ASSESSMENT
60 y/o M patient presents with R foot toe infection  - Patient seen and evaluated   - Afebrile, WBC 17  - LAI: wound at the R 2nd interspace, probes to bone, tracks 0.5cm dorsal and proximal, mild malodor, no pus expressed, erythema to the dorsolateral foot up until the midfoot, diffuse edema of the R forefoot, gangrene of the 2nd toe stump, ischemic changes to the R 3rd digit, cool to touch   - RF Xray prelim indicates 2nd toe proximal phalanx OM, no signs of soft tissue gas  - MRI pending  - Right foot wound culture results pending   - Rec ID consult  - MICHEL/PVR showing noncompressible vessels b/l, RTBI 0.07, LTBI 0.22, waveforms flat at digits  - Recommend Vasc consult  - Pod plan for RF partial 2nd and 3rd ray resection pending vasc recs  - Seen with attending

## 2021-07-19 NOTE — PROGRESS NOTE ADULT - ASSESSMENT
60 y/o male with h/o HTN, insulin dependent DM-2, hld, glaucoma, CKD3 with recent interstitial nephritis treated with steroid, HFrEF, recent NSTEMI, PAD s/p RLE angioplasty, s/p right 2nd toe partial amputation at [facility] in April 2021, who presents with 2 week history of worsening right foot pain, associated with right 2nd residual toe stump/3rd toe infection/discoloration to blue/black, xray c/f osteomyelitis, plan for surgical intervention this admission per podiatry

## 2021-07-19 NOTE — PROGRESS NOTE ADULT - PROBLEM SELECTOR PLAN 7
heparin sc -h/o recent admission in April-May for acute systolic chf, last TTE 4/26:  LVEF 52% , Mild segmental LV systolic dysfunction.  Hypokinesis of the basal to mid inferior wall.   -cardiology consult in AM for preop clearance  -EKG reviewed: NSR no ischemic ST/T change  -h/o medication compliance, was on coreg 6.125 mg bid, hydralazine 50mg tid, isordil 20mg tid last admission, continue -h/o recent ACOSTA d/t AIN , responded to prednisone, seen by Dr. Zambrano last admission  -dose meds per renal fxn, avoid nephrotoxins

## 2021-07-19 NOTE — PROGRESS NOTE ADULT - PROBLEM SELECTOR PROBLEM 5
Chronic kidney disease (CKD), stage III (moderate) PAD (peripheral artery disease) Essential hypertension

## 2021-07-19 NOTE — PROGRESS NOTE ADULT - PROBLEM SELECTOR PROBLEM 6
Chronic heart failure with reduced ejection fraction and diastolic dysfunction Chronic kidney disease (CKD), stage III (moderate) PAD (peripheral artery disease)

## 2021-07-19 NOTE — PROGRESS NOTE ADULT - SUBJECTIVE AND OBJECTIVE BOX
Podiatry pager #: [phone] ([place])/ [phone] ([facility])    Patient is a 61y old  Male who presents with a chief complaint of right foot 2nd/3rd toe infection (19 Jul 2021 07:35)       INTERVAL HPI/OVERNIGHT EVENTS:  Patient seen and evaluated at bedside.  Pt is resting comfortable in NAD. Denies N/V/F/C.     Allergies    cefepime (Other)    Intolerances        Vital Signs Last 24 Hrs  T(C): 37.1 (19 Jul 2021 05:04), Max: 37.1 (19 Jul 2021 05:04)  T(F): 98.8 (19 Jul 2021 05:04), Max: 98.8 (19 Jul 2021 05:04)  HR: 72 (19 Jul 2021 05:04) (72 - 82)  BP: 145/66 (19 Jul 2021 05:04) (124/76 - 146/79)  BP(mean): --  RR: 16 (19 Jul 2021 05:04) (16 - 18)  SpO2: 97% (19 Jul 2021 05:04) (97% - 98%)    LABS:                        9.8    17.72 )-----------( 317      ( 19 Jul 2021 06:42 )             28.6     07-19    130<L>  |  96<L>  |  36<H>  ----------------------------<  160<H>  3.5   |  18<L>  |  2.74<H>    Ca    8.8      19 Jul 2021 06:42  Phos  4.1     07-19  Mg     1.70     07-19    TPro  6.6  /  Alb  2.9<L>  /  TBili  0.5  /  DBili  x   /  AST  16  /  ALT  21  /  AlkPhos  228<H>  07-19    PT/INR - ( 17 Jul 2021 21:49 )   PT: 13.2 sec;   INR: 1.17 ratio         PTT - ( 17 Jul 2021 21:49 )  PTT:32.1 sec    CAPILLARY BLOOD GLUCOSE      POCT Blood Glucose.: 138 mg/dL (19 Jul 2021 08:26)  POCT Blood Glucose.: 239 mg/dL (18 Jul 2021 21:16)  POCT Blood Glucose.: 78 mg/dL (18 Jul 2021 17:48)  POCT Blood Glucose.: 111 mg/dL (18 Jul 2021 12:39)      Lower Extremity Physical Exam:  Vascular: DP 1/4, B/L, PT non-palpable B/L, CFT <3 seconds B/L, Temperature gradient cool to warm on R, warm to cool on L    Neuro: Epicritic sensation intact to the level of ankle, B/L.  Musculoskeletal/Ortho: pain on palpation surrounding the 2nd digit   Skin:  s/p I&D of R 2nd interspace with erythema, no pus expressed, tracks 1cm dorsal and proximal, mild malodor, erythema to the dorsolateral foot up until the midfoot, diffuse edema of the R forefoot, gangrene of the 2nd toe stump, ischemic changes to the R 3rd digit, cool to touch     RADIOLOGY & ADDITIONAL TESTS:  < from: [facility] Duplex Physiol Ext Low 3+ Level, BI. (07.18.21 @ 11:13) >    Patient name: HOLLY FITZGERALD  Date of test: 7/18/2021  MR#: [id number]  Hospital #: [id number]    Location: [facility] Physician(s): , Dorcas Lane MD  Interpreted by: Michael Lr MD, [facility], [facility], [facility]  Tech: Salome Syed ROSIO  Type of Test: LE Arterial: MICHEL/Segm.  ------------------------------------------------------------------------  Procedure: Segmental Pressure/Waveform - complete  noninvasive physiologic studies of the bilateral lower  extremity arteries.  Indications: Atherosclerosis of native arteries of right  leg with ulceration of other part of foot (I70.235)  ------------------------------------------------------------------------  PRESSURE (mm Hg):  ------------------------------------------------------------------------  RIGHT (BP):  Brachial: 140  High Thigh:  Low Thigh:  Calf:  Ankle-PT:  Ankle-DP:  Greate Toe: 10  MICHEL:  ------------------------------------------------------------------------  LEFT (BP):  Brachial: 141  High Thigh:  Low Thigh:  Calf:  Ankle-PT:  Ankle-DP:  Greate Toe: 31  MICHEL:  ------------------------------------------------------------------------  WAVEFORM:  ------------------------------------------------------------------------  RIGHT:  CFA:  Popliteal:  Ankle-PT:  Ankle-DP:  ------------------------------------------------------------------------  LEFT:  CFA:  Popliteal:  Ankle-PT:  Ankle-DP:  ------------------------------------------------------------------------  PSA/AVF:  ------------------------------------------------------------------------  RIGHT:  Pseudoaneurysm:  A-V fistula:  TBI: 0.07  ------------------------------------------------------------------------  LEFT:  Pseudoaneurysm:  A-V fistula:  TBI: 0.22  ------------------------------------------------------------------------  Right Findings: The ankle-brachial index (MICHEL) on the  right was not accurately assessed due to non-compressible  (calcific) vessels.  The toe-brachial index (TBI) on the right is severely  decreased (0.07).  The right toe pressure is 10 mmHg.  Pulse volume recording (PVR) waveforms appear biphasic  with normal amplitudes at the thigh and calf.  Waveforms  are mildly decreased in amplitude at the ankle, severely  diminished at the metatarsal, and flat at the digits.  Left Findings: The ankle-brachial index (MICHEL) on the left  was not accurately assessed due to non-compressible  (calcified) vessels.  The toe-brachial index (TBI) on the left is severely  decreased (0.22).  The left toe pressure is 31 mmHg.  Pulse volume recording (PVR) waveforms appear biphasic  with normal amplitudes at the thigh and calf.  Waveforms  are mildly decreased in amplitude at the ankle, severely  diminished at the metatarsal, and flat at the digits.  ------------------------------------------------------------------------  Summary/Impressions:  1. Right MICHEL was not accurately assessed due to  non-compressible (calcific) vessels.   Right digit  waveforms are flat.  Findings suggest the presence of  severe small vessel arterial disease in the right foot.  2.Left MICHEL was not accurately assessed due to  non-compressible (calcific) vessels.  Left digit waveforms  are flat.  Findings suggest the presence of severe small  vessel arterial disease in the left foot.  ------------------------------------------------------------------------  Confirmed on  7/18/2021 - 1:36 PM by Michael Lr MD, FACC,  FASE, RPVI  By signing this report, the attending physician certifies  that he or she has personally supervised and interpreted  the vascular study and has reviewed and or edited and  agrees with the written comments contained within the  report.    < end of copied text >

## 2021-07-19 NOTE — CONSULT NOTE ADULT - ASSESSMENT
60 y/o male with h/o HTN, insulin dependent DM-2, hld, glaucoma, CKD3 with recent interstitial nephritis treated with steroid, HFrEF, recent NSTEMI, PAD s/p RLE angioplasty, s/p right 2nd toe partial amputation at [facility] in April 2021, who presents with 2 week history of worsening right foot pain with plans for 2nd and 3rd toe ray amps.  Vascular Surgery called to evaluate.    Recommendations:    Patient will need Angiogram prior to any podiatry intervention  Nephrology consult for worsening ACOSTA  Vascular Surgery will follow  Discussed with attending surgeon Dr. Ras BARBOZA team  [id number]   62 y/o male with h/o HTN, insulin dependent DM-2, hld, glaucoma, CKD3 with recent interstitial nephritis treated with steroid, HFrEF, recent NSTEMI, PAD s/p RLE angioplasty, s/p right 2nd toe partial amputation at [facility] in April 2021, who presents with 2 week history of worsening right foot pain with plans for 2nd and 3rd toe ray amps.  Vascular Surgery called to evaluate.    Recommendations:    Patient will need Angiogram prior to any podiatry intervention  Will use CO2 angio to spare kidneys from contrast load  Nephrology consult for worsening ACOSTA  Vascular Surgery will follow  Discussed with attending surgeon Dr. Ras BARBOZA team  [id number]   60 y/o male with h/o HTN, insulin dependent DM-2, hld, glaucoma, CKD3 with recent interstitial nephritis treated with steroid, HFrEF, recent NSTEMI, PAD s/p RLE angioplasty, s/p right 2nd toe partial amputation at [facility] in April 2021, who presents with 2 week history of worsening right foot pain with plans for 2nd and 3rd toe ray amps.  Vascular Surgery called to evaluate.    Recommendations:    Patient will need Angiogram prior to any podiatry intervention  Will use CO2 angio to spare kidneys from contrast load  Medical clearance will be required as patient was short of breath and desatting during exam  Nephrology consult for worsening ACOSTA  Vascular Surgery will follow  Discussed with attending surgeon Dr. Ras BARBOZA team  [id number]

## 2021-07-19 NOTE — PROGRESS NOTE ADULT - PROBLEM SELECTOR PROBLEM 3
Essential hypertension Type 2 diabetes mellitus, with long-term current use of insulin Diabetic foot infection

## 2021-07-19 NOTE — CONSULT NOTE ADULT - ASSESSMENT
1) Right foot toe ulcers with imaging suggestive of OM    Treated empirically with vancomycin and zosyn  Wound culture pending  Vascular surgery evaluation  Podiatry planning for possible surgery    2) Leukocytosis  Blood cultures without growth    3) DM  Close monitoring and control of glucose     61 year old with h/o PVD and DM , with prior toe amputation presents with increased discoloration to his right second and third toe.   Appearance of dry gangrene and possible associated abscess.    1) Right foot toe ulcers with imaging suggestive of OM    Treated empirically with  zosyn  Can stop vancomycin    Await sensitivity- likely change zosyn at that time  Vascular surgery evaluation  Podiatry planning for possible surgery    2) Leukocytosis  Blood cultures without growth  Likely related to gangrene and associated abscess    3) DM  Close monitoring and control of glucose

## 2021-07-19 NOTE — PROGRESS NOTE ADULT - PROBLEM SELECTOR PLAN 8
heparin sc -h/o recent admission in April-May for acute systolic chf, last TTE 4/26:  LVEF 52% , Mild segmental LV systolic dysfunction.  Hypokinesis of the basal to mid inferior wall.   -cardiology consult in AM for preop clearance  -EKG reviewed: NSR no ischemic ST/T change  -h/o medication compliance, was on coreg 6.125 mg bid, hydralazine 50mg tid, isordil 20mg tid last admission, continue

## 2021-07-19 NOTE — PROGRESS NOTE ADULT - PROBLEM SELECTOR PLAN 6
-h/o recent admission in April-May for acute systolic chf, last TTE 4/26:  LVEF 52% , Mild segmental LV systolic dysfunction.  Hypokinesis of the basal to mid inferior wall.   -cardiology consult in AM for preop clearance  -EKG reviewed: NSR no ischemic ST/T change  -h/o medication compliance, was on coreg 6.125 mg bid, hydralazine 50mg tid, isordil 20mg tid last admission, continue -h/o recent ACOSTA d/t AIN , responded to prednisone, seen by Dr. Zambrano last admission  -dose meds per renal fxn, avoid nephrotoxins h/o severe RLE PVD, s/p RLE angioplasty in past  c/w ASA/statin

## 2021-07-19 NOTE — PROGRESS NOTE ADULT - PROBLEM SELECTOR PLAN 2
-at home takes lantus 60u hs and premeal insulin 28-30u   -will give lantus 55u hs and premeal insulin 25u actid  -check A1c  -monitor FS  -Endocrine consult in AM -pt with h/o right foot 2nd toe partial amputation in April at [facility], h/o severe RLE PVD with RLE angioplasty in past, no stent, now p/w 2nd residual toe/stump and 3rd toe infection, xray concerning for osteomyelitis, elevated inflammatory markers ESR 96, CRP 80.6 c/w acute OM.  -7/18 blood cultures x2  -iv vanco (cont 1g BID, 7/17 PM - )/zosyn (7/18 - ), monitor vanco trough before 3rd dose (7/18 AM)  -MRI right foot  -podiatry consulted, s/p I&D 7/17. plan for RF P2RR, P3RR pending MICHEL/PVR vasc recs  - vascular consult  -needs cardiology clearance, RCRI 3, high risk for periop MACE On CKD3, h/o recent ACOSTA d/t AIN , responded to prednisone. likely prerenal  - trend Cr  - hydrate as tolerated given pulmonary edema, holding for now  - urine lytes and u/a  -dose meds per renal fxn, avoid nephrotoxins, avoid ACEi and ARBs, Maintain MAP >65, renal diet

## 2021-07-19 NOTE — PROGRESS NOTE ADULT - PROBLEM SELECTOR PROBLEM 2
Type 2 diabetes mellitus, with long-term current use of insulin Diabetic foot infection ACOSTA (acute kidney injury)

## 2021-07-19 NOTE — CONSULT NOTE ADULT - SUBJECTIVE AND OBJECTIVE BOX
HPI:  60 y/o male with h/o HTN, insulin dependent DM-2, hld, glaucoma, CKD3 with recent interstitial nephritis treated with steroid, HFrEF, recent NSTEMI, PAD s/p RLE angioplasty, s/p right 2nd toe partial amputation at [facility] in 2021, who presents with 2 week history of worsening right foot pain, associated with right 2nd residual toe stump/3rd toe infection/discoloration to blue/black, with tingling and pain. Patient denies fever/chill/purulent drainage. H was seen by outside podiatrist and prescribed course of Augmentin 4 days ago. Patient has been taking Advil for pain without relief. Patient had recent RLE angiogram on 21at [facility] which revealed severe atherosclerosis. Right anterior tibial: There was a 100 % stenosis. Right tibio-peroneal: There was a 50 % stenosis. Right posterior tibial: Angiography showed mild atherosclerosis. Right peroneal: There was a 100 % stenosis.  In the ED, pt was seen by podiatry, plan for surgical intervention next week. He was given a dose of vanco/zosyn, blood cultures not sent yet. Lab notable for WBC 18, ESR 96, CRP 80.6, creat stable at baseline 1.64. Xray right foot showed ulceration of the soft tissue stump of the resected second digit and indistinct margins of the underlying bony cortex of the head of the second proximal phalanx, suggesting possible osteomyelitis. No subcutaneous tracking gas. (2021 01:31)      PAST MEDICAL & SURGICAL HISTORY:  PAD (peripheral artery disease)    Essential hypertension    Type 2 diabetes mellitus, with long-term current use of insulin    History of amputation of toe    S/P angioplasty  RLE        Allergies    cefepime (Other)    Intolerances        ANTIMICROBIALS:  piperacillin/tazobactam IVPB.. 3.375 every 8 hours  vancomycin  IVPB 1000 daily      OTHER MEDS:  acetaminophen   Tablet .. 650 milliGRAM(s) Oral every 6 hours PRN  aspirin enteric coated 81 milliGRAM(s) Oral daily  atorvastatin 40 milliGRAM(s) Oral at bedtime  carvedilol 6.25 milliGRAM(s) Oral every 12 hours  dextrose 40% Gel 15 Gram(s) Oral once  dextrose 5%. 1000 milliLiter(s) IV Continuous <Continuous>  dextrose 5%. 1000 milliLiter(s) IV Continuous <Continuous>  dextrose 50% Injectable 25 Gram(s) IV Push once  dextrose 50% Injectable 12.5 Gram(s) IV Push once  dextrose 50% Injectable 25 Gram(s) IV Push once  glucagon  Injectable 1 milliGRAM(s) IntraMuscular once  heparin   Injectable 5000 Unit(s) SubCutaneous every 8 hours  hydrALAZINE 50 milliGRAM(s) Oral three times a day  insulin glargine Injectable (LANTUS) 55 Unit(s) SubCutaneous at bedtime  insulin lispro (ADMELOG) corrective regimen sliding scale   SubCutaneous three times a day before meals  insulin lispro Injectable (ADMELOG) 15 Unit(s) SubCutaneous three times a day before meals  isosorbide   dinitrate Tablet (ISORDIL) 20 milliGRAM(s) Oral three times a day  lactated ringers. 1000 milliLiter(s) IV Continuous <Continuous>  latanoprost 0.005% Ophthalmic Solution 1 Drop(s) Both EYES at bedtime  melatonin 3 milliGRAM(s) Oral at bedtime  NIFEdipine XL 30 milliGRAM(s) Oral daily  polyethylene glycol 3350 17 Gram(s) Oral daily      SOCIAL HISTORY:    FAMILY HISTORY:      REVIEW OF SYSTEMS  [  ] ROS unobtainable because:    [  ] All other systems negative except as noted below:	    Constitutional:  [ ] fever [ ] chills  [ ] weight loss  [ ] weakness  Skin:  [ ] rash [ ] phlebitis	  Eyes: [ ] icterus [ ] pain  [ ] discharge	  ENMT: [ ] sore throat  [ ] thrush [ ] ulcers [ ] exudates  Respiratory: [ ] dyspnea [ ] hemoptysis [ ] cough [ ] sputum	  Cardiovascular:  [ ] chest pain [ ] palpitations [ ] edema	  Gastrointestinal:  [ ] nausea [ ] vomiting [ ] diarrhea [ ] constipation [ ] pain	  Genitourinary:  [ ] dysuria [ ] frequency [ ] hematuria [ ] discharge [ ] flank pain  [ ] incontinence  Musculoskeletal:  [ ] myalgias [ ] arthralgias [ ] arthritis  [ ] back pain  Neurological:  [ ] headache [ ] seizures  [ ] confusion/altered mental status  Psychiatric:  [ ] anxiety [ ] depression	  Hematology/Lymphatics:  [ ] lymphadenopathy  Endocrine:  [ ] adrenal [ ] thyroid  Allergic/Immunologic:	 [ ] transplant [ ] seasonal    PHYSICAL EXAM:  General: [ ] non-toxic  HEAD/EYES: [ ] PERRL [ ] white sclera [ ] icterus  ENT:  [ ] normal [ ] supple [ ] thrush [ ] pharyngeal exudate  Cardiovascular:   [ ] murmur [ ] normal [ ] PPM/AICD  Respiratory:  [ ] clear to ausculation bilaterally  GI:  [ ] soft, non-tender, normal bowel sounds  :  [ ] nixon [ ] no CVA tenderness   Musculoskeletal:  [ ] no synovitis  Neurologic:  [ ] non-focal exam   Skin:  [ ] no rash  Lymph: [ ] no lymphadenopathy  Psychiatric:  [ ] appropriate affect [ ] alert & oriented  Lines:  [ ] no phlebitis [ ] central line          Drug Dosing Weight  Height (cm): 177.8 (2021 04:20)  Weight (kg): 82.8 (2021 04:20)  BMI (kg/m2): 26.2 (2021 04:20)  BSA (m2): 2.01 (2021 04:20)    Vital Signs Last 24 Hrs  T(F): 98.7 (21 @ 13:25), Max: 100.1 (21 @ 03:20)    Vital Signs Last 24 Hrs  HR: 80 (21 @ 13:25) (72 - 82)  BP: 131/69 (21 @ 13:25) (124/76 - 145/66)  RR: 16 (21 @ 13:25)  SpO2: 96% (21 @ 13:25) (96% - 98%)  Wt(kg): --                          9.8    17.72 )-----------( 317      ( 2021 06:42 )             28.6           130<L>  |  96<L>  |  36<H>  ----------------------------<  160<H>  3.5   |  18<L>  |  2.74<H>    Ca    8.8      2021 06:42  Phos  4.1       Mg     1.70         TPro  6.6  /  Alb  2.9<L>  /  TBili  0.5  /  DBili  x   /  AST  16  /  ALT  21  /  AlkPhos  228<H>        Urinalysis Basic - ( 2021 12:01 )    Color: Yellow / Appearance: Clear / S.015 / pH: x  Gluc: x / Ketone: Negative  / Bili: Negative / Urobili: <2 mg/dL   Blood: x / Protein: Trace / Nitrite: Negative   Leuk Esterase: Negative / RBC: 2 /HPF / WBC 2 /HPF   Sq Epi: x / Non Sq Epi: 0 /HPF / Bacteria: Negative        MICROBIOLOGY:      Culture - Blood (21 @ 10:06)    Specimen Source: .Blood Blood-Peripheral    Culture Results:   No growth to date.    Wound culture: testing    RADIOLOGY:    < from: Xray Foot AP + Lateral + Oblique, Right (21 @ 22:49) >  IMPRESSION:    There is ulceration of the soft tissue stump of the resected second digit and indistinct margins of the underlying bony cortex of the head of the second proximal phalanx, suggesting possible osteomyelitis. No subcutaneous tracking gas.    < end of copied text >   HPI:  60 y/o male with h/o   ·	HTN/hyperlipidemia  ·	insulin dependent DM-2  ·	glaucoma  ·	CKD3 with recent interstitial nephritis treated with steroid  ·	HFrEF recent NSTEMI  ·	PAD s/p RLE angioplasty, s/p right 2nd toe partial amputation at [facility] in 2021      He with 2 week history of worsening right foot pain, associated with right 2nd residual toe stump/3rd toe infection/discoloration to blue/black, with tingling and pain. Patient denies fever/chill/purulent drainage. H was seen by outside podiatrist and prescribed course of Augmentin 4 days ago. Patient has been taking Advil for pain without relief.   Patient had recent RLE angiogram on 21at [facility] which revealed severe atherosclerosis. Right anterior tibial: There was a 100 % stenosis. Right tibio-peroneal: There was a 50 % stenosis. Right posterior tibial: Angiography showed mild atherosclerosis. Right peroneal: There was a 100 % stenosis.    In the ED, pt was seen by podiatry, plan for surgical intervention next week. He was given a dose of vanco/zosyn, blood cultures not sent yet. Lab notable for WBC 18, ESR 96, CRP 80.6, creat stable at baseline 1.64. Xray right foot showed ulceration of the soft tissue stump of the resected second digit and indistinct margins of the underlying bony cortex of the head of the second proximal phalanx, suggesting possible osteomyelitis. No subcutaneous tracking gas.     He denies trauma to his foot.  He denies fever at home.       PAST MEDICAL & SURGICAL HISTORY:  PAD (peripheral artery disease)    Essential hypertension    Type 2 diabetes mellitus, with long-term current use of insulin    History of amputation of toe    S/P angioplasty  RLE        Allergies    cefepime (Other)    Intolerances        ANTIMICROBIALS:  piperacillin/tazobactam IVPB.. 3.375 every 8 hours  vancomycin  IVPB 1000 daily      OTHER MEDS:  acetaminophen   Tablet .. 650 milliGRAM(s) Oral every 6 hours PRN  aspirin enteric coated 81 milliGRAM(s) Oral daily  atorvastatin 40 milliGRAM(s) Oral at bedtime  carvedilol 6.25 milliGRAM(s) Oral every 12 hours  dextrose 40% Gel 15 Gram(s) Oral once  dextrose 5%. 1000 milliLiter(s) IV Continuous <Continuous>  dextrose 5%. 1000 milliLiter(s) IV Continuous <Continuous>  dextrose 50% Injectable 25 Gram(s) IV Push once  dextrose 50% Injectable 12.5 Gram(s) IV Push once  dextrose 50% Injectable 25 Gram(s) IV Push once  glucagon  Injectable 1 milliGRAM(s) IntraMuscular once  heparin   Injectable 5000 Unit(s) SubCutaneous every 8 hours  hydrALAZINE 50 milliGRAM(s) Oral three times a day  insulin glargine Injectable (LANTUS) 55 Unit(s) SubCutaneous at bedtime  insulin lispro (ADMELOG) corrective regimen sliding scale   SubCutaneous three times a day before meals  insulin lispro Injectable (ADMELOG) 15 Unit(s) SubCutaneous three times a day before meals  isosorbide   dinitrate Tablet (ISORDIL) 20 milliGRAM(s) Oral three times a day  lactated ringers. 1000 milliLiter(s) IV Continuous <Continuous>  latanoprost 0.005% Ophthalmic Solution 1 Drop(s) Both EYES at bedtime  melatonin 3 milliGRAM(s) Oral at bedtime  NIFEdipine XL 30 milliGRAM(s) Oral daily  polyethylene glycol 3350 17 Gram(s) Oral daily      SOCIAL HISTORY:  No tobacco  Not employed  no travel     FAMILY HISTORY:  + DM    REVIEW OF SYSTEMS  [  ] ROS unobtainable because:    [ x ] All other systems negative except as noted below:	    Constitutional:  [ ] fever [ ] chills  [ ] weight loss  [ ] weakness  Skin:  [ ] rash [ ] phlebitis	  Eyes: [ ] icterus [ ] pain  [ ] discharge	  ENMT: [ ] sore throat  [ ] thrush [ ] ulcers [ ] exudates  Respiratory: [ ] dyspnea [ ] hemoptysis [ ] cough [ ] sputum	  Cardiovascular:  [ ] chest pain [ ] palpitations [ ] edema	  Gastrointestinal:  [ ] nausea [ ] vomiting [ ] diarrhea [ ] constipation [ ] pain	  Genitourinary:  [ ] dysuria [ ] frequency [ ] hematuria [ ] discharge [ ] flank pain  [ ] incontinence  Musculoskeletal:  [ ] myalgias [ ] arthralgias [ ] arthritis  [x foot tingling  Neurological:  [ ] headache [ ] seizures  [ ] confusion/altered mental status  Psychiatric:  [ ] anxiety [ ] depression	  Hematology/Lymphatics:  [ ] lymphadenopathy  Endocrine:  [ ] adrenal [ ] thyroid  Allergic/Immunologic:	 [ ] transplant [ ] seasonal    PHYSICAL EXAM:  General: x[ ] non-toxic  HEAD/EYES: [ ] PERRL [ ] white sclera [ ] icterus  ENT:  [ ] normal [ ] supple [ ] thrush [ ] pharyngeal exudate  Cardiovascular:   [ ] murmur [x ] normal [ ] PPM/AICD  Respiratory:  [ x] clear to ausculation bilaterally  GI:  [ ] soft, non-tender, normal bowel sounds  :  [ ] nixon [ x] no CVA tenderness   Musculoskeletal:  [ x] no synovitis  Neurologic:  [ ] non-focal exam   Skin:  [x ]right second and third toe with discoloration cw gangrene  Packing between toes  Lymph: [x ] no lymphadenopathy  Psychiatric:  [ ] appropriate affect x[ ] alert & oriented  Lines:  [ x] no phlebitis [ ] central line          Drug Dosing Weight  Height (cm): 177.8 (2021 04:20)  Weight (kg): 82.8 (2021 04:20)  BMI (kg/m2): 26.2 (2021 04:20)  BSA (m2): 2.01 (2021 04:20)    Vital Signs Last 24 Hrs  T(F): 98.7 (21 @ 13:25), Max: 100.1 (21 @ 03:20)    Vital Signs Last 24 Hrs  HR: 80 (21 @ 13:25) (72 - 82)  BP: 131/69 (21 @ 13:25) (124/76 - 145/66)  RR: 16 (21 @ 13:25)  SpO2: 96% (21 @ 13:25) (96% - 98%)  Wt(kg): --                          9.8    17.72 )-----------( 317      ( 2021 06:42 )             28.6           130<L>  |  96<L>  |  36<H>  ----------------------------<  160<H>  3.5   |  18<L>  |  2.74<H>    Ca    8.8      2021 06:42  Phos  4.1       Mg     1.70         TPro  6.6  /  Alb  2.9<L>  /  TBili  0.5  /  DBili  x   /  AST  16  /  ALT  21  /  AlkPhos  228<H>        Urinalysis Basic - ( 2021 12:01 )    Color: Yellow / Appearance: Clear / S.015 / pH: x  Gluc: x / Ketone: Negative  / Bili: Negative / Urobili: <2 mg/dL   Blood: x / Protein: Trace / Nitrite: Negative   Leuk Esterase: Negative / RBC: 2 /HPF / WBC 2 /HPF   Sq Epi: x / Non Sq Epi: 0 /HPF / Bacteria: Negative        MICROBIOLOGY:      Culture - Blood (21 @ 10:06)    Specimen Source: .Blood Blood-Peripheral    Culture Results:   No growth to date.    Culture Results:   Numerous Enterobacter aerogenes   Moderate Streptococcus agalactiae (Group B) isolated   Group B streptococci are susceptible to ampicillin,   penicillin and cefazolin, but may be resistant to   erythromycin and clindamycin.   Recommendations for intrapartum prophylaxis for Group B   streptococci are penicillin or ampicillin. Wound culture: testing    RADIOLOGY:    < from: Xray Foot AP + Lateral + Oblique, Right (21 @ 22:49) >  IMPRESSION:    There is ulceration of the soft tissue stump of the resected second digit and indistinct margins of the underlying bony cortex of the head of the second proximal phalanx, suggesting possible osteomyelitis. No subcutaneous tracking gas.    < end of copied text >

## 2021-07-19 NOTE — PROGRESS NOTE ADULT - PROBLEM SELECTOR PLAN 5
-h/o recent ACOSTA d/t AIN , responded to prednisone, seen by Dr. Zambrano last admission  -dose meds per renal fxn, avoid nephrotoxins h/o severe RLE PVD, s/p RLE angioplasty in past  c/w ASA/statin -bp uncontrolled, systolic 170-180's, likely medication noncompliance, was on hydralazine, isordil and beta blocker last admission  -c/w nifedipine 30mg qd, add coreg 6.25mg bid, hydralazine 50mg tid, isordil 20mg tid  -monitor bp

## 2021-07-19 NOTE — PROGRESS NOTE ADULT - PROBLEM SELECTOR PLAN 1
-pt with h/o right foot 2nd toe partial amputation in April at [facility], h/o severe RLE PVD with RLE angioplasty in past, no stent, now p/w 2nd residual toe/stump and 3rd toe infection, xray concerning for osteomyelitis, elevated inflammatory markers ESR 96, CRP 80.6 c/w acute OM.  -7/18 blood cultures x2  -iv vanco (cont 1g BID, 7/17 PM - )/zosyn (7/18 - ), monitor vanco trough before 3rd dose (7/18 AM)  -MRI right foot  -podiatry consulted, s/p I&D 7/17. plan for RF P2RR, P3RR pending MICHEL/PVR vasc recs  - vascular consult  -needs cardiology clearance, RCRI 3, high risk for periop MACE patient desatted to low 80s 7/19 afternoon. put on 5L NC with 93-95% sats. patient reports feeling short of breath and describes retrosternal chest pressure. slightly worse with breathing. bedside focused ultrasound remarkable for b lines and mitral regurg. likely 2/2 fluid bolus from ACOSTA treatment w/ hx of HFREF.   - stopping fluids, and   - diuresing as needed if continues to be hypoxic  - ctm patient desatted to low 80s 7/19 afternoon. put on 5L NC with 93-95% sats. patient reports feeling short of breath and describes retrosternal chest pressure. slightly worse with breathing. bedside focused ultrasound remarkable for b lines and mitral regurg. likely 2/2 fluid bolus from ACOSTA treatment w/ hx of HFREF.   - stopping fluids, and   - diuresing as needed with lasix 20 mg IV if continues to be hypoxic  - q4h pulse ox  - ctm

## 2021-07-20 LAB
-  AMIKACIN: SIGNIFICANT CHANGE UP
-  AMOXICILLIN/CLAVULANIC ACID: SIGNIFICANT CHANGE UP
-  AMPICILLIN/SULBACTAM: SIGNIFICANT CHANGE UP
-  AMPICILLIN: SIGNIFICANT CHANGE UP
-  AZTREONAM: SIGNIFICANT CHANGE UP
-  CEFAZOLIN: SIGNIFICANT CHANGE UP
-  CEFEPIME: SIGNIFICANT CHANGE UP
-  CEFOXITIN: SIGNIFICANT CHANGE UP
-  CEFTRIAXONE: SIGNIFICANT CHANGE UP
-  CIPROFLOXACIN: SIGNIFICANT CHANGE UP
-  ERTAPENEM: SIGNIFICANT CHANGE UP
-  GENTAMICIN: SIGNIFICANT CHANGE UP
-  IMIPENEM: SIGNIFICANT CHANGE UP
-  LEVOFLOXACIN: SIGNIFICANT CHANGE UP
-  MEROPENEM: SIGNIFICANT CHANGE UP
-  PIPERACILLIN/TAZOBACTAM: SIGNIFICANT CHANGE UP
-  TOBRAMYCIN: SIGNIFICANT CHANGE UP
-  TRIMETHOPRIM/SULFAMETHOXAZOLE: SIGNIFICANT CHANGE UP
ALBUMIN SERPL ELPH-MCNC: 3.4 G/DL — SIGNIFICANT CHANGE UP (ref 3.3–5)
ALP SERPL-CCNC: 232 U/L — HIGH (ref 40–120)
ALT FLD-CCNC: 21 U/L — SIGNIFICANT CHANGE UP (ref 4–41)
ANION GAP SERPL CALC-SCNC: 18 MMOL/L — HIGH (ref 7–14)
ANION GAP SERPL CALC-SCNC: 19 MMOL/L — HIGH (ref 7–14)
AST SERPL-CCNC: 24 U/L — SIGNIFICANT CHANGE UP (ref 4–40)
BASOPHILS # BLD AUTO: 0.1 K/UL — SIGNIFICANT CHANGE UP (ref 0–0.2)
BASOPHILS NFR BLD AUTO: 0.5 % — SIGNIFICANT CHANGE UP (ref 0–2)
BILIRUB SERPL-MCNC: 0.6 MG/DL — SIGNIFICANT CHANGE UP (ref 0.2–1.2)
BUN SERPL-MCNC: 47 MG/DL — HIGH (ref 7–23)
BUN SERPL-MCNC: 50 MG/DL — HIGH (ref 7–23)
CALCIUM SERPL-MCNC: 8.8 MG/DL — SIGNIFICANT CHANGE UP (ref 8.4–10.5)
CALCIUM SERPL-MCNC: 9 MG/DL — SIGNIFICANT CHANGE UP (ref 8.4–10.5)
CHLORIDE SERPL-SCNC: 89 MMOL/L — LOW (ref 98–107)
CHLORIDE SERPL-SCNC: 93 MMOL/L — LOW (ref 98–107)
CO2 SERPL-SCNC: 16 MMOL/L — LOW (ref 22–31)
CO2 SERPL-SCNC: 16 MMOL/L — LOW (ref 22–31)
CREAT ?TM UR-MCNC: 150 MG/DL — SIGNIFICANT CHANGE UP
CREAT SERPL-MCNC: 3.3 MG/DL — HIGH (ref 0.5–1.3)
CREAT SERPL-MCNC: 3.31 MG/DL — HIGH (ref 0.5–1.3)
EOSINOPHIL # BLD AUTO: 0.78 K/UL — HIGH (ref 0–0.5)
EOSINOPHIL NFR BLD AUTO: 3.9 % — SIGNIFICANT CHANGE UP (ref 0–6)
GLUCOSE BLDC GLUCOMTR-MCNC: 231 MG/DL — HIGH (ref 70–99)
GLUCOSE BLDC GLUCOMTR-MCNC: 284 MG/DL — HIGH (ref 70–99)
GLUCOSE BLDC GLUCOMTR-MCNC: 325 MG/DL — HIGH (ref 70–99)
GLUCOSE SERPL-MCNC: 202 MG/DL — HIGH (ref 70–99)
GLUCOSE SERPL-MCNC: 277 MG/DL — HIGH (ref 70–99)
HCT VFR BLD CALC: 28.9 % — LOW (ref 39–50)
HGB BLD-MCNC: 9.8 G/DL — LOW (ref 13–17)
IANC: 17.09 K/UL — HIGH (ref 1.5–8.5)
IMM GRANULOCYTES NFR BLD AUTO: 0.7 % — SIGNIFICANT CHANGE UP (ref 0–1.5)
LYMPHOCYTES # BLD AUTO: 0.89 K/UL — LOW (ref 1–3.3)
LYMPHOCYTES # BLD AUTO: 4.4 % — LOW (ref 13–44)
MAGNESIUM SERPL-MCNC: 1.9 MG/DL — SIGNIFICANT CHANGE UP (ref 1.6–2.6)
MAGNESIUM SERPL-MCNC: 2 MG/DL — SIGNIFICANT CHANGE UP (ref 1.6–2.6)
MCHC RBC-ENTMCNC: 27.7 PG — SIGNIFICANT CHANGE UP (ref 27–34)
MCHC RBC-ENTMCNC: 33.9 GM/DL — SIGNIFICANT CHANGE UP (ref 32–36)
MCV RBC AUTO: 81.6 FL — SIGNIFICANT CHANGE UP (ref 80–100)
METHOD TYPE: SIGNIFICANT CHANGE UP
MONOCYTES # BLD AUTO: 1.03 K/UL — HIGH (ref 0–0.9)
MONOCYTES NFR BLD AUTO: 5.1 % — SIGNIFICANT CHANGE UP (ref 2–14)
NEUTROPHILS # BLD AUTO: 17.09 K/UL — HIGH (ref 1.8–7.4)
NEUTROPHILS NFR BLD AUTO: 85.4 % — HIGH (ref 43–77)
NRBC # BLD: 0 /100 WBCS — SIGNIFICANT CHANGE UP
NRBC # FLD: 0 K/UL — SIGNIFICANT CHANGE UP
PHOSPHATE SERPL-MCNC: 5.6 MG/DL — HIGH (ref 2.5–4.5)
PHOSPHATE SERPL-MCNC: 5.6 MG/DL — HIGH (ref 2.5–4.5)
PLATELET # BLD AUTO: 388 K/UL — SIGNIFICANT CHANGE UP (ref 150–400)
POTASSIUM SERPL-MCNC: 3.6 MMOL/L — SIGNIFICANT CHANGE UP (ref 3.5–5.3)
POTASSIUM SERPL-MCNC: 3.8 MMOL/L — SIGNIFICANT CHANGE UP (ref 3.5–5.3)
POTASSIUM SERPL-SCNC: 3.6 MMOL/L — SIGNIFICANT CHANGE UP (ref 3.5–5.3)
POTASSIUM SERPL-SCNC: 3.8 MMOL/L — SIGNIFICANT CHANGE UP (ref 3.5–5.3)
PROT ?TM UR-MCNC: 23 MG/DL — SIGNIFICANT CHANGE UP
PROT SERPL-MCNC: 7.2 G/DL — SIGNIFICANT CHANGE UP (ref 6–8.3)
RBC # BLD: 3.54 M/UL — LOW (ref 4.2–5.8)
RBC # FLD: 12.7 % — SIGNIFICANT CHANGE UP (ref 10.3–14.5)
SODIUM SERPL-SCNC: 124 MMOL/L — LOW (ref 135–145)
SODIUM SERPL-SCNC: 127 MMOL/L — LOW (ref 135–145)
VANCOMYCIN FLD-MCNC: 17.8 UG/ML — SIGNIFICANT CHANGE UP
WBC # BLD: 20.03 K/UL — HIGH (ref 3.8–10.5)
WBC # FLD AUTO: 20.03 K/UL — HIGH (ref 3.8–10.5)

## 2021-07-20 PROCEDURE — 71045 X-RAY EXAM CHEST 1 VIEW: CPT | Mod: 26

## 2021-07-20 PROCEDURE — 99223 1ST HOSP IP/OBS HIGH 75: CPT | Mod: GC

## 2021-07-20 PROCEDURE — 99223 1ST HOSP IP/OBS HIGH 75: CPT

## 2021-07-20 PROCEDURE — 99232 SBSQ HOSP IP/OBS MODERATE 35: CPT

## 2021-07-20 PROCEDURE — 99233 SBSQ HOSP IP/OBS HIGH 50: CPT | Mod: GC

## 2021-07-20 RX ORDER — FUROSEMIDE 40 MG
80 TABLET ORAL DAILY
Refills: 0 | Status: DISCONTINUED | OUTPATIENT
Start: 2021-07-20 | End: 2021-07-21

## 2021-07-20 RX ORDER — FUROSEMIDE 40 MG
40 TABLET ORAL ONCE
Refills: 0 | Status: COMPLETED | OUTPATIENT
Start: 2021-07-20 | End: 2021-07-20

## 2021-07-20 RX ORDER — VANCOMYCIN HCL 1 G
1000 VIAL (EA) INTRAVENOUS ONCE
Refills: 0 | Status: COMPLETED | OUTPATIENT
Start: 2021-07-20 | End: 2021-07-20

## 2021-07-20 RX ORDER — ERTAPENEM SODIUM 1 G/1
500 INJECTION, POWDER, LYOPHILIZED, FOR SOLUTION INTRAMUSCULAR; INTRAVENOUS EVERY 24 HOURS
Refills: 0 | Status: DISCONTINUED | OUTPATIENT
Start: 2021-07-20 | End: 2021-08-04

## 2021-07-20 RX ORDER — AMPICILLIN SODIUM AND SULBACTAM SODIUM 250; 125 MG/ML; MG/ML
3 INJECTION, POWDER, FOR SUSPENSION INTRAMUSCULAR; INTRAVENOUS EVERY 12 HOURS
Refills: 0 | Status: DISCONTINUED | OUTPATIENT
Start: 2021-07-20 | End: 2021-07-20

## 2021-07-20 RX ADMIN — LATANOPROST 1 DROP(S): 0.05 SOLUTION/ DROPS OPHTHALMIC; TOPICAL at 22:58

## 2021-07-20 RX ADMIN — Medication 30 MILLIGRAM(S): at 05:16

## 2021-07-20 RX ADMIN — Medication 2: at 09:06

## 2021-07-20 RX ADMIN — HEPARIN SODIUM 5000 UNIT(S): 5000 INJECTION INTRAVENOUS; SUBCUTANEOUS at 13:41

## 2021-07-20 RX ADMIN — Medication 40 MILLIGRAM(S): at 02:08

## 2021-07-20 RX ADMIN — Medication 50 MILLIGRAM(S): at 13:41

## 2021-07-20 RX ADMIN — Medication 3: at 12:55

## 2021-07-20 RX ADMIN — CARVEDILOL PHOSPHATE 6.25 MILLIGRAM(S): 80 CAPSULE, EXTENDED RELEASE ORAL at 18:20

## 2021-07-20 RX ADMIN — POLYETHYLENE GLYCOL 3350 17 GRAM(S): 17 POWDER, FOR SOLUTION ORAL at 22:58

## 2021-07-20 RX ADMIN — Medication 50 MILLIGRAM(S): at 05:17

## 2021-07-20 RX ADMIN — Medication 4: at 18:38

## 2021-07-20 RX ADMIN — HEPARIN SODIUM 5000 UNIT(S): 5000 INJECTION INTRAVENOUS; SUBCUTANEOUS at 05:16

## 2021-07-20 RX ADMIN — ATORVASTATIN CALCIUM 40 MILLIGRAM(S): 80 TABLET, FILM COATED ORAL at 22:58

## 2021-07-20 RX ADMIN — ERTAPENEM SODIUM 100 MILLIGRAM(S): 1 INJECTION, POWDER, LYOPHILIZED, FOR SOLUTION INTRAMUSCULAR; INTRAVENOUS at 18:09

## 2021-07-20 RX ADMIN — Medication 3 MILLIGRAM(S): at 23:00

## 2021-07-20 RX ADMIN — HEPARIN SODIUM 5000 UNIT(S): 5000 INJECTION INTRAVENOUS; SUBCUTANEOUS at 22:57

## 2021-07-20 RX ADMIN — Medication 80 MILLIGRAM(S): at 18:06

## 2021-07-20 RX ADMIN — ISOSORBIDE DINITRATE 20 MILLIGRAM(S): 5 TABLET ORAL at 12:19

## 2021-07-20 RX ADMIN — PIPERACILLIN AND TAZOBACTAM 25 GRAM(S): 4; .5 INJECTION, POWDER, LYOPHILIZED, FOR SOLUTION INTRAVENOUS at 01:46

## 2021-07-20 RX ADMIN — PIPERACILLIN AND TAZOBACTAM 25 GRAM(S): 4; .5 INJECTION, POWDER, LYOPHILIZED, FOR SOLUTION INTRAVENOUS at 09:01

## 2021-07-20 RX ADMIN — Medication 5 UNIT(S): at 09:07

## 2021-07-20 RX ADMIN — Medication 5 UNIT(S): at 12:56

## 2021-07-20 RX ADMIN — Medication 250 MILLIGRAM(S): at 02:23

## 2021-07-20 RX ADMIN — ISOSORBIDE DINITRATE 20 MILLIGRAM(S): 5 TABLET ORAL at 05:16

## 2021-07-20 RX ADMIN — Medication 50 MILLIGRAM(S): at 22:58

## 2021-07-20 RX ADMIN — ISOSORBIDE DINITRATE 20 MILLIGRAM(S): 5 TABLET ORAL at 20:59

## 2021-07-20 RX ADMIN — CARVEDILOL PHOSPHATE 6.25 MILLIGRAM(S): 80 CAPSULE, EXTENDED RELEASE ORAL at 05:16

## 2021-07-20 RX ADMIN — Medication 81 MILLIGRAM(S): at 12:20

## 2021-07-20 RX ADMIN — INSULIN GLARGINE 40 UNIT(S): 100 INJECTION, SOLUTION SUBCUTANEOUS at 22:58

## 2021-07-20 NOTE — PROGRESS NOTE ADULT - SUBJECTIVE AND OBJECTIVE BOX
Patient is a 61y old  Male who presents with a chief complaint of right foot 2nd/3rd toe infection (2021 07:55)       INTERVAL HPI/OVERNIGHT EVENTS:  Patient seen and evaluated at bedside.  Pt is resting comfortable in NAD. Denies N/V/F/C.  Pain rated at X/10    Allergies    cefepime (Other)    Intolerances        Vital Signs Last 24 Hrs  T(C): 36.5 (2021 05:13), Max: 37.1 (2021 13:25)  T(F): 97.7 (2021 05:13), Max: 98.7 (2021 13:25)  HR: 92 (2021 06:26) (79 - 92)  BP: 139/66 (2021 05:13) (131/69 - 146/85)  BP(mean): --  RR: 18 (2021 05:13) (16 - 20)  SpO2: 98% (2021 06:26) (90% - 98%)    LABS:                        9.8    17.72 )-----------( 317      ( 2021 06:42 )             28.6     07-19    130<L>  |  96<L>  |  36<H>  ----------------------------<  160<H>  3.5   |  18<L>  |  2.74<H>    Ca    8.8      2021 06:42  Phos  4.1     07-19  Mg     1.70     07-19    TPro  6.6  /  Alb  2.9<L>  /  TBili  0.5  /  DBili  x   /  AST  16  /  ALT  21  /  AlkPhos  228<H>  07-19      Urinalysis Basic - ( 2021 12:01 )    Color: Yellow / Appearance: Clear / S.015 / pH: x  Gluc: x / Ketone: Negative  / Bili: Negative / Urobili: <2 mg/dL   Blood: x / Protein: Trace / Nitrite: Negative   Leuk Esterase: Negative / RBC: 2 /HPF / WBC 2 /HPF   Sq Epi: x / Non Sq Epi: 0 /HPF / Bacteria: Negative      CAPILLARY BLOOD GLUCOSE      POCT Blood Glucose.: 197 mg/dL (2021 21:17)  POCT Blood Glucose.: 189 mg/dL (2021 18:17)  POCT Blood Glucose.: 74 mg/dL (2021 12:39)  POCT Blood Glucose.: 138 mg/dL (2021 08:26)      Lower Extremity Physical Exam:  Vascular: DP 1/4, B/L, PT non-palpable B/L, CFT <3 seconds B/L, Temperature gradient cool to warm on R, warm to cool on L    Neuro: Epicritic sensation intact to the level of ankle, B/L.  Musculoskeletal/Ortho: pain on palpation surrounding the 2nd digit   Skin:  s/p I&D of R 2nd interspace with erythema, no pus expressed, tracks 1cm dorsal and proximal, no malodor,  resolving erythema to the dorsolateral foot up until the midfoot, diffuse edema of the R forefoot, gangrene of the 2nd toe stump, ischemic changes to the R 3rd digit, cool to touch         RADIOLOGY       EXAM:  MR FOOT IC RT        PROCEDURE DATE:  2021         INTERPRETATION:  Clinical indications: Peripheral vascular disease. Right second and third toe infection/osteomyelitis.    Multiplanar multisequence MRI of the right foot was performed with and without intravenous contrast.    7.5 cc of Gadavist was administered intravenously. 0 cc was discarded.    FINDINGS:    Patient is status post partial resection of the second toe at the level of the proximal phalanx. There is a soft tissue wound at the distal amputation margin. Subjacent to this there is osseous edema and enhancement within the second proximal phalangeal remnant with slightly decreased T1 marrow signal suggestive of early osteomyelitis.    There is patchy increased STIRsignal within the proximal, middle, and distal phalanges of the fourth and fifth toes and within the proximal and distal phalanges of the first toe with grossly preserved T1 marrow signal. This is nonspecific but may be reflective of vascular insufficiency. Correlate for overlying wound at these sites to exclude the possibility of osteomyelitis.    There is periosseous edema about the proximal phalanx of the third toe. Findings are consistent with periostitis. The marrow signal is grossly preserved without evidence of vinnie osteomyelitis.    There is cellulitis throughout the forefoot. There is no peripherally enhancing fluid collection to suggest abscess.    Visualized joint spaces are preserved. There is no evidence of tenosynovitis. There is patchy edema throughout the visualized musculature.    IMPRESSION:    Status post second toe partial amputation. Findings suggestive of early osteomyelitis within the second proximal phalangeal remnant.    Findings consistent with periostitis aboutthe proximal phalanx of the third toe without vinnie osteomyelitis.    Nonspecific patchy increased STIR signal within the proximal, middle, and distal phalanges of the fourth and fifth toes and within the proximal and distal phalanges of the first toe with grossly preserved T1 marrow signal. This may be reflective of vascular insufficiency. Correlate for overlying wound at these sites to exclude the possibility of developing osteolysis.    Cellulitis throughout the forefoot.    --- End of Report ---              GERBER LEWIS MD; Attending Radiologist  This document has been electronically signed. 2021  7:28PM

## 2021-07-20 NOTE — PROGRESS NOTE ADULT - ASSESSMENT
62 y/o M patient presents with R foot toe infection  - Patient seen and evaluated   - LAI: wound at the R 2nd interspace, probes to bone, tracks 0.5cm dorsal and proximal, mild malodor, no pus expressed, resolving erythema to the dorsolateral foot up until the midfoot, diffuse edema of the R forefoot, gangrene of the 2nd toe stump, ischemic changes to the R 3rd digit, cool to touch   - R foot MRI: early osteomyelitis within the second proximal phalangeal, periostitis to the proximal phalanx of 3rd digit without vinnie osteomyelitis.  - MICHEL/PVR showing noncompressible vessels b/l, RTBI 0.07, LTBI 0.22, waveforms flat at digits  - Pod plan for R foot partial 2nd and 3rd ray resection following Vascular Angiogram  - Discussed with attending

## 2021-07-20 NOTE — PROGRESS NOTE ADULT - SUBJECTIVE AND OBJECTIVE BOX
GENERAL SURGERY PROGRESS NOTE     HOLLY FITZGERALD  61y  Male  Hospital day :2d    OVERNIGHT EVENTS: no acute events overnight     T(F): 97.7 (21 @ 05:13), Max: 98.7 (21 @ 13:25)  HR: 92 (21 @ 06:26) (79 - 92)  BP: 139/66 (21 @ 05:13) (131/69 - 146/85)  RR: 18 (21 @ 05:13) (16 - 20)  SpO2: 98% (21 @ 06:26) (90% - 98%)    DIET/FLUIDS: dextrose 5%. 1000 milliLiter(s) IV Continuous <Continuous>  dextrose 5%. 1000 milliLiter(s) IV Continuous <Continuous>    AC/ proph: aspirin enteric coated 81 milliGRAM(s) Oral daily  heparin   Injectable 5000 Unit(s) SubCutaneous every 8 hours    ABx: piperacillin/tazobactam IVPB.. 3.375 Gram(s) IV Intermittent every 8 hours      LABS  CAPILLARY BLOOD GLUCOSE      POCT Blood Glucose.: 197 mg/dL (2021 21:17)  POCT Blood Glucose.: 189 mg/dL (2021 18:17)  POCT Blood Glucose.: 74 mg/dL (2021 12:39)  POCT Blood Glucose.: 138 mg/dL (2021 08:26)                          9.8    17.72 )-----------( 317      ( 2021 06:42 )             28.6         07-19    130<L>  |  96<L>  |  36<H>  ----------------------------<  160<H>  3.5   |  18<L>  |  2.74<H>          LFTs:             6.6  | 0.5  | 16       ------------------[228     ( 2021 06:42 )  2.9  | x    | 21          Lipase:x      Amylase:x             Coags:            Urinalysis Basic - ( 2021 12:01 )    Color: Yellow / Appearance: Clear / S.015 / pH: x  Gluc: x / Ketone: Negative  / Bili: Negative / Urobili: <2 mg/dL   Blood: x / Protein: Trace / Nitrite: Negative   Leuk Esterase: Negative / RBC: 2 /HPF / WBC 2 /HPF   Sq Epi: x / Non Sq Epi: 0 /HPF / Bacteria: Negative        Culture - Abscess with Gram Stain (collected 2021 10:37)  Source: .Abscess right 2nd interspace foot  Preliminary Report (2021 14:36):    Numerous Enterobacter aerogenes    Moderate Streptococcus agalactiae (Group B) isolated    Group B streptococci are susceptible to ampicillin,    penicillin and cefazolin, but may be resistant to    erythromycin and clindamycin.    Recommendations for intrapartum prophylaxis for Group B    streptococci are penicillin or ampicillin.    Culture - Blood (collected 2021 10:06)  Source: .Blood Blood-Peripheral  Preliminary Report (2021 11:02):    No growth to date.    Culture - Blood (collected 2021 10:06)  Source: .Blood Blood-Peripheral  Preliminary Report (2021 11:02):    No growth to date.

## 2021-07-20 NOTE — PROGRESS NOTE ADULT - ASSESSMENT
61 year old with h/o PVD and DM , with prior toe amputation presents with increased discoloration to his right second and third toe.   Appearance of dry gangrene and possible associated abscess.    1) Right foot toe ulcers with imaging suggestive of OM    Cx with enterobacter, s. maltophilia, group b strep    Change to unasyn/ levaquin    Vascular surgery evaluation  Podiatry planning for possible surgery    2) Leukocytosis  Blood cultures without growth  Likely related to gangrene and associated abscess    3) DM  Close monitoring and control of glucose

## 2021-07-20 NOTE — PROGRESS NOTE ADULT - PROBLEM SELECTOR PLAN 1
patient desatted to low 80s 7/19 afternoon. put on 5L NC with 93-95% sats. patient reports feeling short of breath and describes retrosternal chest pressure. slightly worse with breathing. bedside focused ultrasound remarkable for b lines and mitral regurg. likely 2/2 fluid bolus from ACOSTA treatment w/ hx of HFREF.   - stopping fluids, and   - diuresing as needed with lasix 20 mg IV if continues to be hypoxic  - q4h pulse ox  - ctm patient desatted to low 80s 7/19 afternoon. put on 5L NC with 93-95% sats. patient reports feeling short of breath and describes retrosternal chest pressure. slightly worse with breathing. bedside focused ultrasound remarkable for b lines and mitral regurg. likely flash pulmonary edema 2/2 fluid bolus from ACOSTA treatment w/ hx of HFREF.   - stopping fluids, and   - diuresing as needed got 80IV lasix  - continuous pulse ox with tele  - ctm

## 2021-07-20 NOTE — CONSULT NOTE ADULT - ASSESSMENT
62 y/o male with h/o HTN, HFrEF (EF 52%),  insulin dependent DM-2, hld, glaucoma, CKD3 with recent interstitial nephritis treated with steroid, recent NSTEMI, PAD s/p RLE angioplasty, s/p right 2nd toe partial amputation at [facility] in April 2021, who presents with 2 week history of worsening right foot pain, associated with right 2nd residual toe stump/3rd toe infection/discoloration to blue/black, xray c/f osteomyelitis, plan for surgical intervention this admission per podiatry. Cardiology consulted for pre-operative clearance.     Recommendations:  -Pt with unexplained O2 desaturation, s/p 40mg lasix. Do CXR r/o flash pulmonary edema, acute exacerbation of CHF.   -C/w carvedilol 6.25 q12, hydralizine 50mg TID, Isordil 20 mg TID, Nifedipine XL 30 mg qd.   -Recommend repeat echocardiogram  -If without acute exacerbation of CHF: Pt with 3/5 risk factors on RCRI. No further cardiac testing needed prior to surgery.  60 y/o male with h/o HTN, HFrEF (EF 52%),  insulin dependent DM-2, hld, glaucoma, CKD3 with recent interstitial nephritis treated with steroid, recent NSTEMI, PAD s/p RLE angioplasty, s/p right 2nd toe partial amputation at [facility] in April 2021, who presents with 2 week history of worsening right foot pain, associated with right 2nd residual toe stump/3rd toe infection/discoloration to blue/black, xray c/f osteomyelitis, plan for surgical intervention this admission per podiatry. Cardiology consulted for pre-operative clearance.     Recommendations (preliminary recs subject to change*):  -Pt with unexplained O2 desaturation, s/p 40mg lasix. Obtain CXR r/o flash pulmonary edema, acute exacerbation of CHF.  -C/w carvedilol 6.25 q12, hydralizine 50mg TID, Isordil 20 mg TID, Nifedipine XL 30 mg qd. *  -Recommend repeat echocardiogram  -If without acute exacerbation of CHF: Pt with 3/5 risk factors on RCRI. No further cardiac testing needed prior to surgery. * 62 y/o male with h/o HTN, HFrEF (EF 52%),  insulin dependent DM-2, hld, glaucoma, CKD3 with recent interstitial nephritis treated with steroid, recent NSTEMI, PAD s/p RLE angioplasty, s/p right 2nd toe partial amputation at [facility] in April 2021, who presents with 2 week history of worsening right foot pain, associated with right 2nd residual toe stump/3rd toe infection/discoloration to blue/black, xray c/f osteomyelitis, plan for surgical intervention this admission per podiatry. Cardiology consulted for pre-operative risk stratification.     Recommendations (preliminary recs subject to change*):  -Pt with unexplained O2 desaturation, s/p 40mg lasix. Obtain CXR r/o flash pulmonary edema, acute exacerbation of CHF.  -C/w carvedilol 6.25 q12, hydralizine 50mg TID, Isordil 20 mg TID, Nifedipine XL 30 mg qd. *  -Recommend repeat echocardiogram  -If without acute exacerbation of CHF: Pt with 3/5 risk factors on RCRI. No further cardiac testing needed prior to surgery. * 62 y/o male with h/o HTN, HFrEF (EF 52%),  insulin dependent DM-2, hld, glaucoma, CKD3 with recent interstitial nephritis treated with steroid, recent NSTEMI, PAD s/p RLE angioplasty, s/p right 2nd toe partial amputation at [facility] in April 2021, who presents with 2 week history of worsening right foot pain, associated with right 2nd residual toe stump/3rd toe infection/discoloration to blue/black, xray c/f osteomyelitis, plan for surgical intervention this admission per podiatry. Cardiology consulted for pre-operative risk stratification.     Recommendations:  -Pt with unexplained O2 desaturation, s/p 40mg lasix. Exam showing bilateral crackles, supported by CXR findings of interstitial edema bilaterally. Recommend 80 mg IV Lasix.  -Recommend repeat ECHO  -C/w carvedilol 6.25 q12, hydralizine 50mg TID, Isordil 20 mg TID, Nifedipine XL 30 mg qd.

## 2021-07-20 NOTE — PROGRESS NOTE ADULT - SUBJECTIVE AND OBJECTIVE BOX
Follow Up:      Inverval History/ROS:Patient is a 61y old  Male who presents with a chief complaint of right foot 2nd/3rd toe infection (2021 13:57)    No fever   Pain is controlled    Allergies    cefepime (Other)    Intolerances        ANTIMICROBIALS:  ampicillin/sulbactam  IVPB 3 every 12 hours  levoFLOXacin IVPB        OTHER MEDS:  acetaminophen   Tablet .. 650 milliGRAM(s) Oral every 6 hours PRN  aspirin enteric coated 81 milliGRAM(s) Oral daily  atorvastatin 40 milliGRAM(s) Oral at bedtime  carvedilol 6.25 milliGRAM(s) Oral every 12 hours  dextrose 40% Gel 15 Gram(s) Oral once  dextrose 5%. 1000 milliLiter(s) IV Continuous <Continuous>  dextrose 5%. 1000 milliLiter(s) IV Continuous <Continuous>  dextrose 50% Injectable 25 Gram(s) IV Push once  dextrose 50% Injectable 12.5 Gram(s) IV Push once  dextrose 50% Injectable 25 Gram(s) IV Push once  furosemide   Injectable 80 milliGRAM(s) IV Push daily  glucagon  Injectable 1 milliGRAM(s) IntraMuscular once  heparin   Injectable 5000 Unit(s) SubCutaneous every 8 hours  hydrALAZINE 50 milliGRAM(s) Oral three times a day  insulin glargine Injectable (LANTUS) 40 Unit(s) SubCutaneous at bedtime  insulin lispro (ADMELOG) corrective regimen sliding scale   SubCutaneous three times a day before meals  insulin lispro Injectable (ADMELOG) 5 Unit(s) SubCutaneous three times a day before meals  isosorbide   dinitrate Tablet (ISORDIL) 20 milliGRAM(s) Oral three times a day  latanoprost 0.005% Ophthalmic Solution 1 Drop(s) Both EYES at bedtime  melatonin 3 milliGRAM(s) Oral at bedtime  NIFEdipine XL 30 milliGRAM(s) Oral daily  polyethylene glycol 3350 17 Gram(s) Oral daily      Vital Signs Last 24 Hrs  T(C): 36.7 (2021 12:16), Max: 36.7 (2021 12:16)  T(F): 98 (2021 12:16), Max: 98 (2021 12:16)  HR: 80 (2021 13:40) (79 - 92)  BP: 138/68 (2021 13:40) (138/62 - 146/85)  BP(mean): --  RR: 18 (2021 13:40) (18 - 20)  SpO2: 96% (2021 13:40) (90% - 98%)    PHYSICAL EXAM:  General: x[ ] non-toxic  HEAD/EYES: [ ] PERRL [x ] white sclera [ ] icterus  ENT:  [ ] normal [ ] supple [ ] thrush [ ] pharyngeal exudate  Cardiovascular:   [ ] murmur [x ] normal [ ] PPM/AICD  Respiratory:  [x ] clear to ausculation bilaterally  GI:  [x ] soft, non-tender, normal bowel sounds  :  [ ] nixon [ ] no CVA tenderness   Musculoskeletal:  [ x] no synovitis  Neurologic:  [ ] non-focal exam   Skin:  [x ] no rash  Lymph: [ x] no lymphadenopathy  Psychiatric:  [ ] appropriate affect [ ] alert & oriented  Lines:  [x ] no phlebitis [ ] central line                                9.8    .03 )-----------( 388      ( 2021 07:33 )             28.9       07-20    127<L>  |  93<L>  |  50<H>  ----------------------------<  277<H>  3.8   |  16<L>  |  3.30<H>    Ca    8.8      2021 09:24  Phos  5.6     07-20  Mg     1.90     07-20    TPro  7.2  /  Alb  3.4  /  TBili  0.6  /  DBili  x   /  AST  24  /  ALT  21  /  AlkPhos  232<H>  07-20      Urinalysis Basic - ( 2021 12:01 )    Color: Yellow / Appearance: Clear / S.015 / pH: x  Gluc: x / Ketone: Negative  / Bili: Negative / Urobili: <2 mg/dL   Blood: x / Protein: Trace / Nitrite: Negative   Leuk Esterase: Negative / RBC: 2 /HPF / WBC 2 /HPF   Sq Epi: x / Non Sq Epi: 0 /HPF / Bacteria: Negative        MICROBIOLOGY:Culture Results:   Numerous Enterobacter aerogenes  Few Stenotrophomonas maltophilia Susceptibility to follow.  Moderate Streptococcus agalactiae (Group B) isolated  Group B streptococci are susceptible to ampicillin,  penicillin and cefazolin, but may be resistant to  erythromycin and clindamycin.  Recommendations for intrapartum prophylaxis for Group B  streptococci are penicillin or ampicillin. (21 @ 10:37)  Culture Results:   No growth to date. (21 @ 10:06)  Culture Results:   No growth to date. (21 @ 10:06)      RADIOLOGY:

## 2021-07-20 NOTE — CONSULT NOTE ADULT - SUBJECTIVE AND OBJECTIVE BOX
HPI:  62 y/o male with h/o HTN, insulin dependent DM-2, hld, glaucoma, CKD3 with recent interstitial nephritis treated with steroid, HFrEF, recent NSTEMI, PAD s/p RLE angioplasty, s/p right 2nd toe partial amputation at [facility] in 2021, who presents with 2 week history of worsening right foot pain, associated with right 2nd residual toe stump/3rd toe infection/discoloration to blue/black, with tingling and pain. Patient denies fever/chill/purulent drainage. H was seen by outside podiatrist and prescribed course of Augmentin 4 days ago. Patient has been taking Advil for pain without relief. Patient had recent RLE angiogram on 21at [facility] which revealed severe atherosclerosis. Right anterior tibial: There was a 100 % stenosis. Right tibio-peroneal: There was a 50 % stenosis. Right posterior tibial: Angiography showed mild atherosclerosis. Right peroneal: There was a 100 % stenosis.  In the ED, pt was seen by podiatry, plan for surgical intervention next week. He was given a dose of vanco/zosyn, blood cultures not sent yet. Lab notable for WBC 18, ESR 96, CRP 80.6, creat stable at baseline 1.64. Xray right foot showed ulceration of the soft tissue stump of the resected second digit and indistinct margins of the underlying bony cortex of the head of the second proximal phalanx, suggesting possible osteomyelitis. No subcutaneous tracking gas. (2021 01:31)    Patient seen and evaluated at bedside. Today states he is comfortable. Last night patient desaturated to 90% on 5-6L NC and was placed on Bipap with subsequent sat at 98%. This AM his he is satting 98% on 5L NC with no complaints of SOB. Denies cp, palpitations, lower extremity swelling. States he is able to lie flat when sleeping at home. Able to climb stairs at home without symptoms.         PMHx:   PAD (peripheral artery disease)    Essential hypertension    Type 2 diabetes mellitus, with long-term current use of insulin    HFrEF - EF 52%        PSHx:   History of amputation of toe    S/P angioplasty        Allergies:  cefepime (Other)      Home Meds:    Current Medications:   acetaminophen   Tablet .. 650 milliGRAM(s) Oral every 6 hours PRN  aspirin enteric coated 81 milliGRAM(s) Oral daily  atorvastatin 40 milliGRAM(s) Oral at bedtime  carvedilol 6.25 milliGRAM(s) Oral every 12 hours  dextrose 40% Gel 15 Gram(s) Oral once  dextrose 5%. 1000 milliLiter(s) IV Continuous <Continuous>  dextrose 5%. 1000 milliLiter(s) IV Continuous <Continuous>  dextrose 50% Injectable 25 Gram(s) IV Push once  dextrose 50% Injectable 12.5 Gram(s) IV Push once  dextrose 50% Injectable 25 Gram(s) IV Push once  glucagon  Injectable 1 milliGRAM(s) IntraMuscular once  heparin   Injectable 5000 Unit(s) SubCutaneous every 8 hours  hydrALAZINE 50 milliGRAM(s) Oral three times a day  insulin glargine Injectable (LANTUS) 40 Unit(s) SubCutaneous at bedtime  insulin lispro (ADMELOG) corrective regimen sliding scale   SubCutaneous three times a day before meals  insulin lispro Injectable (ADMELOG) 5 Unit(s) SubCutaneous three times a day before meals  isosorbide   dinitrate Tablet (ISORDIL) 20 milliGRAM(s) Oral three times a day  latanoprost 0.005% Ophthalmic Solution 1 Drop(s) Both EYES at bedtime  melatonin 3 milliGRAM(s) Oral at bedtime  NIFEdipine XL 30 milliGRAM(s) Oral daily  piperacillin/tazobactam IVPB.. 3.375 Gram(s) IV Intermittent every 8 hours  polyethylene glycol 3350 17 Gram(s) Oral daily      FAMILY HISTORY:      Social History: Personally reviewed   No tobacco, EtOH or IVDU     REVIEW OF SYSTEMS:  Constitutional:     [x ] negative [ ] fevers [ ] chills [ ] weight loss [ ] weight gain  HEENT:                  [x ] negative [ ] dry eyes [ ] eye irritation [ ] postnasal drip [ ] nasal congestion  CV:                         [ x] negative  [ ] chest pain [ ] orthopnea [ ] palpitations [ ] murmur  Resp:                     [x ] negative [ ] cough [ ] shortness of breath [ ] dyspnea [ ] wheezing [ ] sputum [ ]hemoptysis  GI:                          [ x] negative [ ] nausea [ ] vomiting [ ] diarrhea [ ] constipation [ ] abd pain [ ] dysphagia   :                        [ x] negative [ ] dysuria [ ] nocturia [ ] hematuria [ ] increased urinary frequency  Musculoskeletal: [x ] negative [ ] back pain [ ] myalgias [ ] arthralgias [ ] fracture  Skin:                       [ x] negative [ ] rash [ ] itch  Neurological:        [ x] negative [ ] headache [ ] dizziness [ ] syncope [ ] weakness [ ] numbness  Psychiatric:           [ x] negative [ ] anxiety [ ] depression  Endocrine:            [ x] negative [ ] diabetes [ ] thyroid problem  Heme/Lymph:      [ x] negative [ ] anemia [ ] bleeding problem  Allergic/Immune: [ x] negative [ ] itchy eyes [ ] nasal discharge [ ] hives [ ] angioedema    [ x] All other systems negative  [ ] Unable to assess ROS due to      Physical Exam:  T(F): 97.7 (), Max: 98.7 ()  HR: 92 () (79 - 92)  BP: 139/66 () (131/69 - 146/85)  RR: 18 ()  SpO2: 98% ()  GENERAL: No acute distress, well-developed  HEAD:  Atraumatic, Normocephalic  ENT: EOMI, PERRLA, conjunctiva and sclera clear, Neck supple, No JVD, moist mucosa  CHEST/LUNG: Crackles at bilateral lung bases. no wheezes appreciated. Good air entry.   BACK: No spinal tenderness  HEART: Regular rate and rhythm; No murmurs, rubs, or gallops  ABDOMEN: Soft, Nontender, Nondistended; Bowel sounds present  EXTREMITIES:  No clubbing, cyanosis, or edema  PSYCH: Nl behavior, nl affect  NEUROLOGY: AAOx3, non-focal, cranial nerves intact  SKIN: Normal color, No rashes or lesions  LINES:    Cardiovascular Diagnostic Testing:      Imaging:      Labs: Personally reviewed                        9.8    20.03 )-----------( 388      ( 2021 07:33 )             28.9     07-20    127<L>  |  93<L>  |  50<H>  ----------------------------<  277<H>  3.8   |  16<L>  |  3.30<H>    Ca    8.8      2021 09:24  Phos  5.6     07-20  Mg     1.90     -    TPro  7.2  /  Alb  3.4  /  TBili  0.6  /  DBili  x   /  AST  24  /  ALT  21  /  AlkPhos  232<H>  07-20        Total Cholesterol: 116  LDL: --  HDL: 26  T       HPI:  62 y/o male with h/o HTN, insulin dependent DM-2, hld, glaucoma, CKD3 with recent interstitial nephritis treated with steroid, HFrEF, recent NSTEMI, PAD s/p RLE angioplasty, s/p right 2nd toe partial amputation at [facility] in 2021, who presents with 2 week history of worsening right foot pain, associated with right 2nd residual toe stump/3rd toe infection/discoloration to blue/black, with tingling and pain. Patient denies fever/chill/purulent drainage. H was seen by outside podiatrist and prescribed course of Augmentin 4 days ago. Patient has been taking Advil for pain without relief. Patient had recent RLE angiogram on 21at [facility] which revealed severe atherosclerosis. Right anterior tibial: There was a 100 % stenosis. Right tibio-peroneal: There was a 50 % stenosis. Right posterior tibial: Angiography showed mild atherosclerosis. Right peroneal: There was a 100 % stenosis.  In the ED, pt was seen by podiatry, plan for surgical intervention next week. He was given a dose of vanco/zosyn, blood cultures not sent yet. Lab notable for WBC 18, ESR 96, CRP 80.6, creat stable at baseline 1.64. Xray right foot showed ulceration of the soft tissue stump of the resected second digit and indistinct margins of the underlying bony cortex of the head of the second proximal phalanx, suggesting possible osteomyelitis. No subcutaneous tracking gas. (2021 01:31)    Patient seen and evaluated at bedside. Today states he is comfortable. Last night patient desaturated to 90% on 5-6L NC and was placed on Bipap with subsequent sat at 98%. This AM his he is satting 98% on 5L NC with no complaints of SOB. Denies cp, palpitations, lower extremity swelling. States he is able to lie flat when sleeping at home. Able to climb stairs at home without symptoms.         PMHx:   PAD (peripheral artery disease)    Essential hypertension    Type 2 diabetes mellitus, with long-term current use of insulin    HFrEF - EF 52%        PSHx:   History of amputation of toe    S/P angioplasty        Allergies:  cefepime (Other)      Home Meds:    Current Medications:   acetaminophen   Tablet .. 650 milliGRAM(s) Oral every 6 hours PRN  aspirin enteric coated 81 milliGRAM(s) Oral daily  atorvastatin 40 milliGRAM(s) Oral at bedtime  carvedilol 6.25 milliGRAM(s) Oral every 12 hours  dextrose 40% Gel 15 Gram(s) Oral once  dextrose 5%. 1000 milliLiter(s) IV Continuous <Continuous>  dextrose 5%. 1000 milliLiter(s) IV Continuous <Continuous>  dextrose 50% Injectable 25 Gram(s) IV Push once  dextrose 50% Injectable 12.5 Gram(s) IV Push once  dextrose 50% Injectable 25 Gram(s) IV Push once  glucagon  Injectable 1 milliGRAM(s) IntraMuscular once  heparin   Injectable 5000 Unit(s) SubCutaneous every 8 hours  hydrALAZINE 50 milliGRAM(s) Oral three times a day  insulin glargine Injectable (LANTUS) 40 Unit(s) SubCutaneous at bedtime  insulin lispro (ADMELOG) corrective regimen sliding scale   SubCutaneous three times a day before meals  insulin lispro Injectable (ADMELOG) 5 Unit(s) SubCutaneous three times a day before meals  isosorbide   dinitrate Tablet (ISORDIL) 20 milliGRAM(s) Oral three times a day  latanoprost 0.005% Ophthalmic Solution 1 Drop(s) Both EYES at bedtime  melatonin 3 milliGRAM(s) Oral at bedtime  NIFEdipine XL 30 milliGRAM(s) Oral daily  piperacillin/tazobactam IVPB.. 3.375 Gram(s) IV Intermittent every 8 hours  polyethylene glycol 3350 17 Gram(s) Oral daily      FAMILY HISTORY:      Social History: Personally reviewed   No tobacco, EtOH or IVDU     REVIEW OF SYSTEMS:  Constitutional:     [x ] negative [ ] fevers [ ] chills [ ] weight loss [ ] weight gain  HEENT:                  [x ] negative [ ] dry eyes [ ] eye irritation [ ] postnasal drip [ ] nasal congestion  CV:                         [ x] negative  [ ] chest pain [ ] orthopnea [ ] palpitations [ ] murmur  Resp:                     [x ] negative [ ] cough [ ] shortness of breath [ ] dyspnea [ ] wheezing [ ] sputum [ ]hemoptysis  GI:                          [ x] negative [ ] nausea [ ] vomiting [ ] diarrhea [ ] constipation [ ] abd pain [ ] dysphagia   :                        [ x] negative [ ] dysuria [ ] nocturia [ ] hematuria [ ] increased urinary frequency  Musculoskeletal: [x ] negative [ ] back pain [ ] myalgias [ ] arthralgias [ ] fracture  Skin:                       [ x] negative [ ] rash [ ] itch  Neurological:        [ x] negative [ ] headache [ ] dizziness [ ] syncope [ ] weakness [ ] numbness  Psychiatric:           [ x] negative [ ] anxiety [ ] depression  Endocrine:            [ x] negative [ ] diabetes [ ] thyroid problem  Heme/Lymph:      [ x] negative [ ] anemia [ ] bleeding problem  Allergic/Immune: [ x] negative [ ] itchy eyes [ ] nasal discharge [ ] hives [ ] angioedema    [ x] All other systems negative  [ ] Unable to assess ROS due to      Physical Exam:  T(F): 97.7 (), Max: 98.7 ()  HR: 92 () (79 - 92)  BP: 139/66 () (131/69 - 146/85)  RR: 18 (-)  SpO2: 98% ()  GENERAL: No acute distress, well-developed  HEAD:  Atraumatic, Normocephalic  ENT: EOMI, PERRLA, conjunctiva and sclera clear, Neck supple, No JVD, moist mucosa  CHEST/LUNG: Crackles at bilateral lung bases. no wheezes appreciated. Good air entry.   BACK: No spinal tenderness  HEART: Regular rate and rhythm; No murmurs, rubs, or gallops. JVP 12-13, left >right.   ABDOMEN: Soft, Nontender, Nondistended; Bowel sounds present  EXTREMITIES:  No clubbing, cyanosis, or edema  PSYCH: Nl behavior, nl affect  NEUROLOGY: AAOx3, non-focal, cranial nerves intact  SKIN: Normal color, No rashes or lesions      Cardiovascular Diagnostic Testing:    < from: Transthoracic Echocardiogram (21 @ 08:38) >  CONCLUSIONS:  1. Mitral annular calcification.  Thickened mitral valve  leaflets. Moderate-severe mitral regurgitation.  2. Mildly dilated left atrium.  LA volume index = 35 cc/m2.  3. Normal left ventricular internal dimensions and wall  thicknesses.  4. Mild segmental left ventricular systolic dysfunction.  Hypokinesis of the basal to mid inferior wall.  5. The right ventricle is not well visualized; grossly  normal right ventricular systolic function.  6. Right pleural effusion.  Consider CRISTY for further evaluation of the mitral valve, if  clinically indicated.    < end of copied text >      Imaging:  < from: Xray Chest 1 View- PORTABLE-Urgent (Xray Chest 1 View- PORTABLE-Urgent .) (21 @ 12:37) >  IMPRESSION:  Findings suggesting mild interstitial pulmonary edema.    Linear atelectasis at the left base.    --- End of Report ---        < end of copied text >        Labs: Personally reviewed                        9.8    .03 )-----------( 388      ( 2021 07:33 )             28.9         127<L>  |  93<L>  |  50<H>  ----------------------------<  277<H>  3.8   |  16<L>  |  3.30<H>    Ca    8.8      2021 09:24  Phos  5.6       Mg     1.90         TPro  7.2  /  Alb  3.4  /  TBili  0.6  /  DBili  x   /  AST  24  /  ALT  21  /  AlkPhos  232<H>          Total Cholesterol: 116  LDL: --  HDL: 26  T

## 2021-07-20 NOTE — CONSULT NOTE ADULT - PROBLEM SELECTOR RECOMMENDATION 9
- h/o recent ACOSTA d/t AIN , responded to prednisone.  - patient currently non-oliguric, not uremic  - no dialysis warranted at this time   - ESR 96, CRP 80.6   - recommend C3, C4, c-ANCA, antiphospholipid work up   - consider switching unaysn to meropenam given possible renal benefit but consult with ID for Abx benefit

## 2021-07-20 NOTE — PROGRESS NOTE ADULT - SUBJECTIVE AND OBJECTIVE BOX
PROGRESS NOTE:   Authored by Marguerite Whitfield MD  Internal Medicine  Pager PIA [phone]  Pager [phone]    Patient is a 61y old  Male who presents with a chief complaint of right foot 2nd/3rd toe infection (2021 16:25)      SUBJECTIVE / OVERNIGHT EVENTS:    MEDICATIONS  (STANDING):  aspirin enteric coated 81 milliGRAM(s) Oral daily  atorvastatin 40 milliGRAM(s) Oral at bedtime  carvedilol 6.25 milliGRAM(s) Oral every 12 hours  dextrose 40% Gel 15 Gram(s) Oral once  dextrose 5%. 1000 milliLiter(s) (50 mL/Hr) IV Continuous <Continuous>  dextrose 5%. 1000 milliLiter(s) (100 mL/Hr) IV Continuous <Continuous>  dextrose 50% Injectable 25 Gram(s) IV Push once  dextrose 50% Injectable 12.5 Gram(s) IV Push once  dextrose 50% Injectable 25 Gram(s) IV Push once  glucagon  Injectable 1 milliGRAM(s) IntraMuscular once  heparin   Injectable 5000 Unit(s) SubCutaneous every 8 hours  hydrALAZINE 50 milliGRAM(s) Oral three times a day  insulin glargine Injectable (LANTUS) 40 Unit(s) SubCutaneous at bedtime  insulin lispro (ADMELOG) corrective regimen sliding scale   SubCutaneous three times a day before meals  insulin lispro Injectable (ADMELOG) 5 Unit(s) SubCutaneous three times a day before meals  isosorbide   dinitrate Tablet (ISORDIL) 20 milliGRAM(s) Oral three times a day  latanoprost 0.005% Ophthalmic Solution 1 Drop(s) Both EYES at bedtime  melatonin 3 milliGRAM(s) Oral at bedtime  NIFEdipine XL 30 milliGRAM(s) Oral daily  piperacillin/tazobactam IVPB.. 3.375 Gram(s) IV Intermittent every 8 hours  polyethylene glycol 3350 17 Gram(s) Oral daily    MEDICATIONS  (PRN):  acetaminophen   Tablet .. 650 milliGRAM(s) Oral every 6 hours PRN Temp greater or equal to 38.5C (101.3F), Mild Pain (1 - 3)      CAPILLARY BLOOD GLUCOSE      POCT Blood Glucose.: 197 mg/dL (2021 21:17)  POCT Blood Glucose.: 189 mg/dL (2021 18:17)  POCT Blood Glucose.: 74 mg/dL (2021 12:39)  POCT Blood Glucose.: 138 mg/dL (2021 08:26)    I&O's Summary      PHYSICAL EXAM:  Vital Signs Last 24 Hrs  T(C): 36.5 (2021 05:13), Max: 37.1 (2021 13:25)  T(F): 97.7 (2021 05:13), Max: 98.7 (2021 13:25)  HR: 92 (2021 06:26) (79 - 92)  BP: 139/66 (2021 05:13) (131/69 - 146/85)  BP(mean): --  RR: 18 (2021 05:13) (16 - 20)  SpO2: 98% (2021 06:26) (90% - 98%)    GENERAL: No acute distress, well-developed  HEAD:  Atraumatic, Normocephalic  EYES: EOMI, PERRLA, conjunctiva and sclera clear  NECK: Supple, no lymphadenopathy, no JVD  CHEST/LUNG: CTAB; No wheezes, rales, or rhonchi  HEART: Regular rate and rhythm; No murmurs, rubs, or gallops  ABDOMEN: Soft, non-tender, non-distended; normal bowel sounds, no organomegaly  EXTREMITIES:  2+ peripheral pulses b/l, No clubbing, cyanosis, or edema  NEUROLOGY: A&O x 3, no focal deficits  SKIN: No rashes or lesions    LABS:                        9.8    17.72 )-----------( 317      ( 2021 06:42 )             28.6     07-19    130<L>  |  96<L>  |  36<H>  ----------------------------<  160<H>  3.5   |  18<L>  |  2.74<H>    Ca    8.8      2021 06:42  Phos  4.1     07-  Mg     1.70     -    TPro  6.6  /  Alb  2.9<L>  /  TBili  0.5  /  DBili  x   /  AST  16  /  ALT  21  /  AlkPhos  228<H>  07-19          Urinalysis Basic - ( 2021 12:01 )    Color: Yellow / Appearance: Clear / S.015 / pH: x  Gluc: x / Ketone: Negative  / Bili: Negative / Urobili: <2 mg/dL   Blood: x / Protein: Trace / Nitrite: Negative   Leuk Esterase: Negative / RBC: 2 /HPF / WBC 2 /HPF   Sq Epi: x / Non Sq Epi: 0 /HPF / Bacteria: Negative        Culture - Abscess with Gram Stain (collected 2021 10:37)  Source: .Abscess right 2nd interspace foot  Preliminary Report (2021 14:36):    Numerous Enterobacter aerogenes    Moderate Streptococcus agalactiae (Group B) isolated    Group B streptococci are susceptible to ampicillin,    penicillin and cefazolin, but may be resistant to    erythromycin and clindamycin.    Recommendations for intrapartum prophylaxis for Group B    streptococci are penicillin or ampicillin.    Culture - Blood (collected 2021 10:06)  Source: .Blood Blood-Peripheral  Preliminary Report (2021 11:02):    No growth to date.    Culture - Blood (collected 2021 10:06)  Source: .Blood Blood-Peripheral  Preliminary Report (2021 11:02):    No growth to date.        RADIOLOGY & ADDITIONAL TESTS:  Results Reviewed:   Imaging Personally Reviewed:  Electrocardiogram Personally Reviewed:    COORDINATION OF CARE:  Care Discussed with Consultants/Other Providers [Y/N]:  Prior or Outpatient Records Reviewed [Y/N]:   PROGRESS NOTE:   Authored by Marguerite Whitfield MD  Internal Medicine  Pager PIA [phone]  Pager [phone]    Patient is a 61y old  Male who presents with a chief complaint of right foot 2nd/3rd toe infection (2021 16:25)      SUBJECTIVE / OVERNIGHT EVENTS:   hypoxic to 90% on 6L NC, gave 40 IV lasix and started on bipap. this morning patient reports he feels fatigued but better, less sob, compared to yesterdaay. no more pain.      MEDICATIONS  (STANDING):  aspirin enteric coated 81 milliGRAM(s) Oral daily  atorvastatin 40 milliGRAM(s) Oral at bedtime  carvedilol 6.25 milliGRAM(s) Oral every 12 hours  dextrose 40% Gel 15 Gram(s) Oral once  dextrose 5%. 1000 milliLiter(s) (50 mL/Hr) IV Continuous <Continuous>  dextrose 5%. 1000 milliLiter(s) (100 mL/Hr) IV Continuous <Continuous>  dextrose 50% Injectable 25 Gram(s) IV Push once  dextrose 50% Injectable 12.5 Gram(s) IV Push once  dextrose 50% Injectable 25 Gram(s) IV Push once  glucagon  Injectable 1 milliGRAM(s) IntraMuscular once  heparin   Injectable 5000 Unit(s) SubCutaneous every 8 hours  hydrALAZINE 50 milliGRAM(s) Oral three times a day  insulin glargine Injectable (LANTUS) 40 Unit(s) SubCutaneous at bedtime  insulin lispro (ADMELOG) corrective regimen sliding scale   SubCutaneous three times a day before meals  insulin lispro Injectable (ADMELOG) 5 Unit(s) SubCutaneous three times a day before meals  isosorbide   dinitrate Tablet (ISORDIL) 20 milliGRAM(s) Oral three times a day  latanoprost 0.005% Ophthalmic Solution 1 Drop(s) Both EYES at bedtime  melatonin 3 milliGRAM(s) Oral at bedtime  NIFEdipine XL 30 milliGRAM(s) Oral daily  piperacillin/tazobactam IVPB.. 3.375 Gram(s) IV Intermittent every 8 hours  polyethylene glycol 3350 17 Gram(s) Oral daily    MEDICATIONS  (PRN):  acetaminophen   Tablet .. 650 milliGRAM(s) Oral every 6 hours PRN Temp greater or equal to 38.5C (101.3F), Mild Pain (1 - 3)      CAPILLARY BLOOD GLUCOSE      POCT Blood Glucose.: 197 mg/dL (2021 21:17)  POCT Blood Glucose.: 189 mg/dL (2021 18:17)  POCT Blood Glucose.: 74 mg/dL (2021 12:39)  POCT Blood Glucose.: 138 mg/dL (2021 08:26)    I&O's Summary      PHYSICAL EXAM:  Vital Signs Last 24 Hrs  T(C): 36.5 (2021 05:13), Max: 37.1 (2021 13:25)  T(F): 97.7 (2021 05:13), Max: 98.7 (2021 13:25)  HR: 92 (2021 06:26) (79 - 92)  BP: 139/66 (2021 05:13) (131/69 - 146/85)  BP(mean): --  RR: 18 (2021 05:13) (16 - 20)  SpO2: 98% (2021 06:26) (90% - 98%)    GENERAL: No acute distress, well-developed  HEAD:  Atraumatic, Normocephalic  EYES: conjunctiva and sclera clear  NECK: , no JVD  CHEST/LUNG: mild crackles diffusely  HEART: Regular rate and rhythm; No murmurs, rubs, or gallops  ABDOMEN: Soft, non-tender, non-distended; normal bowel sounds, no organomegaly  EXTREMITIES:   No clubbing, cyanosis, or edema. right foot wrapped  NEUROLOGY: A&O x 3, no focal deficits      LABS:                        9.8    17.72 )-----------( 317      ( 2021 06:42 )             28.6     07-19    130<L>  |  96<L>  |  36<H>  ----------------------------<  160<H>  3.5   |  18<L>  |  2.74<H>    Ca    8.8      2021 06:42  Phos  4.1     07-19  Mg     1.70     07-19    TPro  6.6  /  Alb  2.9<L>  /  TBili  0.5  /  DBili  x   /  AST  16  /  ALT  21  /  AlkPhos  228<H>  07-19          Urinalysis Basic - ( 2021 12:01 )    Color: Yellow / Appearance: Clear / S.015 / pH: x  Gluc: x / Ketone: Negative  / Bili: Negative / Urobili: <2 mg/dL   Blood: x / Protein: Trace / Nitrite: Negative   Leuk Esterase: Negative / RBC: 2 /HPF / WBC 2 /HPF   Sq Epi: x / Non Sq Epi: 0 /HPF / Bacteria: Negative        Culture - Abscess with Gram Stain (collected 2021 10:37)  Source: .Abscess right 2nd interspace foot  Preliminary Report (2021 14:36):    Numerous Enterobacter aerogenes    Moderate Streptococcus agalactiae (Group B) isolated    Group B streptococci are susceptible to ampicillin,    penicillin and cefazolin, but may be resistant to    erythromycin and clindamycin.    Recommendations for intrapartum prophylaxis for Group B    streptococci are penicillin or ampicillin.    Culture - Blood (collected 2021 10:06)  Source: .Blood Blood-Peripheral  Preliminary Report (2021 11:02):    No growth to date.    Culture - Blood (collected 2021 10:06)  Source: .Blood Blood-Peripheral  Preliminary Report (2021 11:02):    No growth to date.        RADIOLOGY & ADDITIONAL TESTS:  Results Reviewed:   Imaging Personally Reviewed:  Electrocardiogram Personally Reviewed:    COORDINATION OF CARE:  Care Discussed with Consultants/Other Providers [Y/N]:  Prior or Outpatient Records Reviewed [Y/N]:

## 2021-07-20 NOTE — PROGRESS NOTE ADULT - PROBLEM SELECTOR PLAN 4
- A1c 8.5  -at home takes lantus 60u hs and premeal insulin 28-30u   -will give lantus 55u hs and premeal insulin 25u actid  -monitor FS

## 2021-07-20 NOTE — PROGRESS NOTE ADULT - ASSESSMENT
60 y/o male with h/o HTN, insulin dependent DM-2, hld, glaucoma, CKD3 with recent interstitial nephritis treated with steroid, HFrEF, recent NSTEMI, PAD s/p RLE angioplasty, s/p right 2nd toe partial amputation at [facility] in April 2021, who presents with 2 week history of worsening right foot pain with plans for 2nd and 3rd toe ray amps.  Vascular Surgery called to evaluate.    Recommendations:    Patient will need Angiogram prior to any podiatry intervention  Will use CO2 angio to spare kidneys from contrast load  Medical clearance will be required as patient was short of breath and desatting during exam  Nephrology consult for worsening ACOSTA  Vascular Surgery will follow  Discussed with attending surgeon Dr. Ras BARBOZA team  [phone]

## 2021-07-20 NOTE — PROGRESS NOTE ADULT - PROBLEM SELECTOR PLAN 2
On CKD3, h/o recent ACOSTA d/t AIN , responded to prednisone. likely prerenal  - trend Cr  - hydrate as tolerated given pulmonary edema, holding for now  - urine lytes and u/a  -dose meds per renal fxn, avoid nephrotoxins, avoid ACEi and ARBs, Maintain MAP >65, renal diet On CKD3, h/o recent ACOSTA d/t AIN , responded to prednisone. likely prerenal vs intrinsic  - trend Cr  - hydrate as tolerated given pulmonary edema, holding for now  - urine lytes and u/a  - neph c/s, optimize pre op  - stop vanc  - renal and bladder ultrasound, spot urine cr and total protein  -dose meds per renal fxn, avoid nephrotoxins, avoid ACEi and ARBs, Maintain MAP >65, renal diet

## 2021-07-20 NOTE — CONSULT NOTE ADULT - ASSESSMENT
60 y/o male with h/o HTN, insulin dependent DM-2, hld, glaucoma, CKD3 with recent interstitial nephritis treated with steroid, HFrEF, recent NSTEMI, PAD s/p RLE angioplasty, s/p right 2nd toe partial amputation at [facility] in April 2021, who presents with 2 week history of worsening right foot pain, associated with right 2nd residual toe stump/3rd toe infection/discoloration to blue/black, xray c/f osteomyelitis, plan for surgical intervention this admission per podiatry

## 2021-07-20 NOTE — PROGRESS NOTE ADULT - PROBLEM SELECTOR PLAN 3
-pt with h/o right foot 2nd toe partial amputation in April at [facility], h/o severe RLE PVD with RLE angioplasty in past, no stent, now p/w 2nd residual toe/stump and 3rd toe infection, xray concerning for osteomyelitis, elevated inflammatory markers ESR 96, CRP 80.6 c/w acute OM.  -7/18 blood cultures x2  -iv vanco (cont 1g BID, 7/17 PM - )/zosyn (7/18 - ), monitor vanco trough before 3rd dose (7/18 AM)  -MRI right foot  -podiatry consulted, s/p I&D 7/17. plan for RF P2RR, P3RR pending MICHEL/PVR vasc recs  - vascular consult: angiogram with co2 angio  -needs cardiology clearance, RCRI 3, high risk for periop MACE -pt with h/o right foot 2nd toe partial amputation in April at [facility], h/o severe RLE PVD with RLE angioplasty in past, no stent, now p/w 2nd residual toe/stump and 3rd toe infection, xray concerning for osteomyelitis, elevated inflammatory markers ESR 96, CRP 80.6 c/w acute OM. rec'd IV vanc 1 g bid (7/17 pm - 7/20), zoxyn (7/18-7/20)  -7/18 blood cultures x2  -ID c/s start erta and levaquin (7/20 - )  -MRI right foot: osteomyelitis in 2nd toe, periostitis w/o osteo on 3rd toe  -podiatry consulted, s/p I&D 7/17. plan for RF P2RR, P3RR pending MICHEL/PVR vasc recs  - vascular consult: angiogram with co2 angio  -needs cardiology clearance, RCRI 3, high risk for periop MACE

## 2021-07-20 NOTE — CONSULT NOTE ADULT - SUBJECTIVE AND OBJECTIVE BOX
62 y/o male with h/o HTN, insulin dependent DM-2, hld, glaucoma, CKD3 with recent interstitial nephritis treated with steroid, HFrEF, recent NSTEMI, PAD s/p RLE angioplasty, s/p right 2nd toe partial amputation at [facility] in April 2021, who presents with 2 week history of worsening right foot pain, associated with right 2nd residual toe stump/3rd toe infection/discoloration to blue/black, xray c/f osteomyelitis, plan for surgical intervention this admission per podiatry   [facility]  [phone]  NEPHROLOGY      INITIAL CONSULT NOTE  --------------------------------------------------------------------------------  HPI:   62 y/o male with h/o HTN, insulin dependent DM-2, hld, glaucoma, CKD3 with recent interstitial nephritis treated with steroid, HFrEF, recent NSTEMI, PAD s/p RLE angioplasty, s/p right 2nd toe partial amputation at [facility] in April 2021, who presents with 2 week history of worsening right foot pain, associated with right 2nd residual toe stump/3rd toe infection/discoloration to blue/black, with tingling and pain. Patient denies fever/chill/purulent drainage. He was seen by outside podiatrist and prescribed course of Augmentin 4 days ago. Patient has been taking Advil for pain without relief. Patient had recent RLE angiogram on 7/7/21at [facility] which revealed severe atherosclerosis. Right anterior tibial: There was a 100 % stenosis. Right tibio-peroneal: There was a 50 % stenosis. Right posterior tibial: Angiography showed mild atherosclerosis. Right peroneal: There was a 100 % stenosis.    In the ED, Lab notable for WBC 18, ESR 96, CRP 80.6, creat stable at baseline 1.64. Xray right foot showed ulceration of the soft tissue stump of the resected second digit and indistinct margins of the underlying bony cortex of the head of the second proximal phalanx, suggesting possible osteomyelitis.    Nephrology consulted for ACOSTA amidst rising creatinine levels and continued malaise.        PAST HISTORY  --------------------------------------------------------------------------------  PAST MEDICAL & SURGICAL HISTORY:  PAD (peripheral artery disease)    Essential hypertension    Type 2 diabetes mellitus, with long-term current use of insulin    History of amputation of toe    S/P angioplasty  RLE      FAMILY HISTORY:    PAST SOCIAL HISTORY:    ALLERGIES & MEDICATIONS  --------------------------------------------------------------------------------  Allergies    cefepime (Other)    Intolerances      Standing Inpatient Medications  ampicillin/sulbactam  IVPB 3 Gram(s) IV Intermittent every 12 hours  aspirin enteric coated 81 milliGRAM(s) Oral daily  atorvastatin 40 milliGRAM(s) Oral at bedtime  carvedilol 6.25 milliGRAM(s) Oral every 12 hours  dextrose 40% Gel 15 Gram(s) Oral once  dextrose 5%. 1000 milliLiter(s) IV Continuous <Continuous>  dextrose 5%. 1000 milliLiter(s) IV Continuous <Continuous>  dextrose 50% Injectable 25 Gram(s) IV Push once  dextrose 50% Injectable 12.5 Gram(s) IV Push once  dextrose 50% Injectable 25 Gram(s) IV Push once  furosemide   Injectable 80 milliGRAM(s) IV Push daily  glucagon  Injectable 1 milliGRAM(s) IntraMuscular once  heparin   Injectable 5000 Unit(s) SubCutaneous every 8 hours  hydrALAZINE 50 milliGRAM(s) Oral three times a day  insulin glargine Injectable (LANTUS) 40 Unit(s) SubCutaneous at bedtime  insulin lispro (ADMELOG) corrective regimen sliding scale   SubCutaneous three times a day before meals  insulin lispro Injectable (ADMELOG) 5 Unit(s) SubCutaneous three times a day before meals  isosorbide   dinitrate Tablet (ISORDIL) 20 milliGRAM(s) Oral three times a day  latanoprost 0.005% Ophthalmic Solution 1 Drop(s) Both EYES at bedtime  levoFLOXacin IVPB 500 milliGRAM(s) IV Intermittent once  levoFLOXacin IVPB      melatonin 3 milliGRAM(s) Oral at bedtime  NIFEdipine XL 30 milliGRAM(s) Oral daily  polyethylene glycol 3350 17 Gram(s) Oral daily    PRN Inpatient Medications  acetaminophen   Tablet .. 650 milliGRAM(s) Oral every 6 hours PRN      REVIEW OF SYSTEMS  --------------------------------------------------------------------------------  Gen: No  fevers/chills   Skin: No rashes  Head/Eyes/Ears/Mouth: No headache; Normal hearing;  No sinus pain/discomfort, sore throat  Respiratory: No dyspnea, cough, wheezing, hemoptysis  CV: No chest pain, orthopnea  GI: Wide abdominal girth, No abdominal pain, diarrhea, nausea, vomiting, melena, hematochezia  : No dysuria, decrease urination or hesitancy urinating  hematuria, nocturia, non oliguric, non uremic  MSK: No joint pain/swelling; no back pain  Neuro: No dizziness/lightheadedness, weakness, seizures, numbness, tingling  Heme: No easy bruising or bleeding  Endo: No heat/cold intolerance  Psych: No significant nervousness, anxiety or depression  also with no edema     All other systems were reviewed and are negative, except as noted.    VITALS/PHYSICAL EXAM  --------------------------------------------------------------------------------  T(C): 36.7 (07-20-21 @ 12:16), Max: 36.7 (07-20-21 @ 12:16)  HR: 80 (07-20-21 @ 13:40) (79 - 92)  BP: 138/68 (07-20-21 @ 13:40) (138/62 - 146/85)  RR: 18 (07-20-21 @ 13:40) (18 - 20)  SpO2: 96% (07-20-21 @ 13:40) (90% - 98%)  Wt(kg): --        07-20-21 @ 07:01  -  07-20-21 @ 15:59  --------------------------------------------------------  IN: 0 mL / OUT: 500 mL / NET: -500 mL      Physical Exam:  	Gen: Non toxic comfortable appearing   	no jvd , supple neck,   	Pulm: decrease bs  no rales or ronchi or wheezing  	CV: RRR, S1S2; no rub  	Back: No CVA tenderness; no sacral edema  	Abd: +BS, soft, nontender/nondistended  	: No suprapubic tenderness  	UE: Warm, no cyanosis  no clubbing,  no edema; no asterixis  	LE: Warm, no cyanosis  no clubbing, no edema  	Neuro: alert and oriented. speech coherent   	Psych: Normal affect and mood  	Skin: wound at the R 2nd interspace, probes to probe, tracks 0.5cm dorsal and proximal, malodor, 1cc of pus expressed, erythema to the dorsolateral foot up until the midfoot, diffuse edema of the R forefoot, gangrene of the 2nd toe stump, ischemic changes to the R 3rd digit, cool to touch     	Vascular access:    LABS/STUDIES  --------------------------------------------------------------------------------              9.8    20.03 >-----------<  388      [07-20-21 @ 07:33]              28.9     127  |  93  |  50  ----------------------------<  277      [07-20-21 @ 09:24]  3.8   |  16  |  3.30        Ca     8.8     [07-20-21 @ 09:24]      Mg     1.90     [07-20-21 @ 09:24]      Phos  5.6     [07-20-21 @ 09:24]    TPro  7.2  /  Alb  3.4  /  TBili  0.6  /  DBili  x   /  AST  24  /  ALT  21  /  AlkPhos  232  [07-20-21 @ 07:33]          Creatinine Trend:  SCr 3.30 [07-20 @ 09:24]  SCr 3.31 [07-20 @ 07:33]  SCr 2.74 [07-19 @ 06:42]  SCr 1.77 [07-18 @ 08:01]  SCr 1.64 [07-17 @ 21:49]    Urinalysis - [07-19-21 @ 12:01]      Color Yellow / Appearance Clear / SG 1.015 / pH 5.0      Gluc Negative / Ketone Negative  / Bili Negative / Urobili <2 mg/dL       Blood Negative / Protein Trace / Leuk Est Negative / Nitrite Negative      RBC 2 / WBC 2 / Hyaline 1 / Gran  / Sq Epi  / Non Sq Epi 0 / Bacteria Negative    Urine Creatinine 146      [07-19-21 @ 12:01]  Urine Sodium <20      [07-19-21 @ 12:01]  Urine Potassium 44.1      [07-19-21 @ 12:01]  Urine Chloride <20      [07-19-21 @ 12:01]    TSH 1.58      [04-23-21 @ 04:36]  Lipid: chol 116, , HDL 26, LDL --      [07-19-21 @ 06:42]    HCV 0.12, Nonreact      [07-19-21 @ 09:49]

## 2021-07-20 NOTE — PROGRESS NOTE ADULT - ASSESSMENT
62 y/o male with h/o HTN, insulin dependent DM-2, hld, glaucoma, CKD3 with recent interstitial nephritis treated with steroid, HFrEF, recent NSTEMI, PAD s/p RLE angioplasty, s/p right 2nd toe partial amputation at [facility] in April 2021, who presents with 2 week history of worsening right foot pain, associated with right 2nd residual toe stump/3rd toe infection/discoloration to blue/black, xray c/f osteomyelitis, plan for surgical intervention this admission per podiatry

## 2021-07-20 NOTE — CONSULT NOTE ADULT - CONSULT REASON
Pre-operative Clearance for Right foot digit resection Pre-operative risk stratification for Right foot digit resection

## 2021-07-21 DIAGNOSIS — I73.9 PERIPHERAL VASCULAR DISEASE, UNSPECIFIED: ICD-10-CM

## 2021-07-21 DIAGNOSIS — N17.9 ACUTE KIDNEY FAILURE, UNSPECIFIED: ICD-10-CM

## 2021-07-21 LAB
-  CEFTAZIDIME: SIGNIFICANT CHANGE UP
-  LEVOFLOXACIN: SIGNIFICANT CHANGE UP
-  TRIMETHOPRIM/SULFAMETHOXAZOLE: SIGNIFICANT CHANGE UP
ALBUMIN SERPL ELPH-MCNC: 3 G/DL — LOW (ref 3.3–5)
ALP SERPL-CCNC: 208 U/L — HIGH (ref 40–120)
ALT FLD-CCNC: 18 U/L — SIGNIFICANT CHANGE UP (ref 4–41)
ANION GAP SERPL CALC-SCNC: 19 MMOL/L — HIGH (ref 7–14)
ANION GAP SERPL CALC-SCNC: 20 MMOL/L — HIGH (ref 7–14)
APPEARANCE UR: CLEAR — SIGNIFICANT CHANGE UP
AST SERPL-CCNC: 20 U/L — SIGNIFICANT CHANGE UP (ref 4–40)
BACTERIA # UR AUTO: NEGATIVE — SIGNIFICANT CHANGE UP
BASOPHILS # BLD AUTO: 0.07 K/UL — SIGNIFICANT CHANGE UP (ref 0–0.2)
BASOPHILS NFR BLD AUTO: 0.4 % — SIGNIFICANT CHANGE UP (ref 0–2)
BILIRUB SERPL-MCNC: 0.5 MG/DL — SIGNIFICANT CHANGE UP (ref 0.2–1.2)
BILIRUB UR-MCNC: NEGATIVE — SIGNIFICANT CHANGE UP
BLD GP AB SCN SERPL QL: NEGATIVE — SIGNIFICANT CHANGE UP
BUN SERPL-MCNC: 62 MG/DL — HIGH (ref 7–23)
BUN SERPL-MCNC: 68 MG/DL — HIGH (ref 7–23)
C3 SERPL-MCNC: 160 MG/DL — SIGNIFICANT CHANGE UP (ref 90–180)
C4 SERPL-MCNC: 58 MG/DL — HIGH (ref 10–40)
CALCIUM SERPL-MCNC: 8.7 MG/DL — SIGNIFICANT CHANGE UP (ref 8.4–10.5)
CALCIUM SERPL-MCNC: 8.8 MG/DL — SIGNIFICANT CHANGE UP (ref 8.4–10.5)
CHLORIDE SERPL-SCNC: 92 MMOL/L — LOW (ref 98–107)
CHLORIDE SERPL-SCNC: 92 MMOL/L — LOW (ref 98–107)
CO2 SERPL-SCNC: 16 MMOL/L — LOW (ref 22–31)
CO2 SERPL-SCNC: 16 MMOL/L — LOW (ref 22–31)
COLOR SPEC: YELLOW — SIGNIFICANT CHANGE UP
CREAT ?TM UR-MCNC: 149 MG/DL — SIGNIFICANT CHANGE UP
CREAT SERPL-MCNC: 3.74 MG/DL — HIGH (ref 0.5–1.3)
CREAT SERPL-MCNC: 3.93 MG/DL — HIGH (ref 0.5–1.3)
DIFF PNL FLD: NEGATIVE — SIGNIFICANT CHANGE UP
EOSINOPHIL # BLD AUTO: 0.45 K/UL — SIGNIFICANT CHANGE UP (ref 0–0.5)
EOSINOPHIL NFR BLD AUTO: 2.4 % — SIGNIFICANT CHANGE UP (ref 0–6)
EPI CELLS # UR: 1 /HPF — SIGNIFICANT CHANGE UP (ref 0–5)
GLUCOSE BLDC GLUCOMTR-MCNC: 137 MG/DL — HIGH (ref 70–99)
GLUCOSE BLDC GLUCOMTR-MCNC: 163 MG/DL — HIGH (ref 70–99)
GLUCOSE BLDC GLUCOMTR-MCNC: 190 MG/DL — HIGH (ref 70–99)
GLUCOSE BLDC GLUCOMTR-MCNC: 211 MG/DL — HIGH (ref 70–99)
GLUCOSE BLDC GLUCOMTR-MCNC: 237 MG/DL — HIGH (ref 70–99)
GLUCOSE SERPL-MCNC: 164 MG/DL — HIGH (ref 70–99)
GLUCOSE SERPL-MCNC: 217 MG/DL — HIGH (ref 70–99)
GLUCOSE UR QL: NEGATIVE — SIGNIFICANT CHANGE UP
HCT VFR BLD CALC: 26.2 % — LOW (ref 39–50)
HGB BLD-MCNC: 9 G/DL — LOW (ref 13–17)
HYALINE CASTS # UR AUTO: 2 /LPF — SIGNIFICANT CHANGE UP (ref 0–7)
IANC: 15.48 K/UL — HIGH (ref 1.5–8.5)
IMM GRANULOCYTES NFR BLD AUTO: 1.6 % — HIGH (ref 0–1.5)
KETONES UR-MCNC: NEGATIVE — SIGNIFICANT CHANGE UP
LEUKOCYTE ESTERASE UR-ACNC: NEGATIVE — SIGNIFICANT CHANGE UP
LYMPHOCYTES # BLD AUTO: 1.07 K/UL — SIGNIFICANT CHANGE UP (ref 1–3.3)
LYMPHOCYTES # BLD AUTO: 5.8 % — LOW (ref 13–44)
MAGNESIUM SERPL-MCNC: 2 MG/DL — SIGNIFICANT CHANGE UP (ref 1.6–2.6)
MAGNESIUM SERPL-MCNC: 2.1 MG/DL — SIGNIFICANT CHANGE UP (ref 1.6–2.6)
MCHC RBC-ENTMCNC: 27.4 PG — SIGNIFICANT CHANGE UP (ref 27–34)
MCHC RBC-ENTMCNC: 34.4 GM/DL — SIGNIFICANT CHANGE UP (ref 32–36)
MCV RBC AUTO: 79.6 FL — LOW (ref 80–100)
METHOD TYPE: SIGNIFICANT CHANGE UP
MONOCYTES # BLD AUTO: 1.06 K/UL — HIGH (ref 0–0.9)
MONOCYTES NFR BLD AUTO: 5.8 % — SIGNIFICANT CHANGE UP (ref 2–14)
NEUTROPHILS # BLD AUTO: 15.48 K/UL — HIGH (ref 1.8–7.4)
NEUTROPHILS NFR BLD AUTO: 84 % — HIGH (ref 43–77)
NITRITE UR-MCNC: NEGATIVE — SIGNIFICANT CHANGE UP
NRBC # BLD: 0 /100 WBCS — SIGNIFICANT CHANGE UP
NRBC # FLD: 0 K/UL — SIGNIFICANT CHANGE UP
NT-PROBNP SERPL-SCNC: HIGH PG/ML
OSMOLALITY UR: 304 MOSM/KG — SIGNIFICANT CHANGE UP (ref 50–1200)
PH UR: 5.5 — SIGNIFICANT CHANGE UP (ref 5–8)
PHOSPHATE SERPL-MCNC: 5.8 MG/DL — HIGH (ref 2.5–4.5)
PHOSPHATE SERPL-MCNC: 5.8 MG/DL — HIGH (ref 2.5–4.5)
PLATELET # BLD AUTO: 363 K/UL — SIGNIFICANT CHANGE UP (ref 150–400)
POTASSIUM SERPL-MCNC: 3.5 MMOL/L — SIGNIFICANT CHANGE UP (ref 3.5–5.3)
POTASSIUM SERPL-MCNC: 3.7 MMOL/L — SIGNIFICANT CHANGE UP (ref 3.5–5.3)
POTASSIUM SERPL-SCNC: 3.5 MMOL/L — SIGNIFICANT CHANGE UP (ref 3.5–5.3)
POTASSIUM SERPL-SCNC: 3.7 MMOL/L — SIGNIFICANT CHANGE UP (ref 3.5–5.3)
PROT SERPL-MCNC: 6.7 G/DL — SIGNIFICANT CHANGE UP (ref 6–8.3)
PROT UR-MCNC: ABNORMAL
RBC # BLD: 3.29 M/UL — LOW (ref 4.2–5.8)
RBC # FLD: 12.7 % — SIGNIFICANT CHANGE UP (ref 10.3–14.5)
RBC CASTS # UR COMP ASSIST: 6 /HPF — HIGH (ref 0–4)
RH IG SCN BLD-IMP: POSITIVE — SIGNIFICANT CHANGE UP
SODIUM SERPL-SCNC: 127 MMOL/L — LOW (ref 135–145)
SODIUM SERPL-SCNC: 128 MMOL/L — LOW (ref 135–145)
SODIUM UR-SCNC: <20 MMOL/L — SIGNIFICANT CHANGE UP
SP GR SPEC: 1.02 — SIGNIFICANT CHANGE UP (ref 1.01–1.02)
UROBILINOGEN FLD QL: SIGNIFICANT CHANGE UP
UUN UR-MCNC: 397 MG/DL — SIGNIFICANT CHANGE UP
WBC # BLD: 18.42 K/UL — HIGH (ref 3.8–10.5)
WBC # FLD AUTO: 18.42 K/UL — HIGH (ref 3.8–10.5)
WBC UR QL: 2 /HPF — SIGNIFICANT CHANGE UP (ref 0–5)

## 2021-07-21 PROCEDURE — 99233 SBSQ HOSP IP/OBS HIGH 50: CPT | Mod: GC

## 2021-07-21 PROCEDURE — 99233 SBSQ HOSP IP/OBS HIGH 50: CPT

## 2021-07-21 PROCEDURE — 76770 US EXAM ABDO BACK WALL COMP: CPT | Mod: 26

## 2021-07-21 PROCEDURE — 99291 CRITICAL CARE FIRST HOUR: CPT

## 2021-07-21 PROCEDURE — 93306 TTE W/DOPPLER COMPLETE: CPT | Mod: 26

## 2021-07-21 RX ORDER — FUROSEMIDE 40 MG
80 TABLET ORAL EVERY 12 HOURS
Refills: 0 | Status: DISCONTINUED | OUTPATIENT
Start: 2021-07-21 | End: 2021-07-21

## 2021-07-21 RX ORDER — INSULIN GLARGINE 100 [IU]/ML
30 INJECTION, SOLUTION SUBCUTANEOUS AT BEDTIME
Refills: 0 | Status: DISCONTINUED | OUTPATIENT
Start: 2021-07-21 | End: 2021-07-26

## 2021-07-21 RX ORDER — BUMETANIDE 0.25 MG/ML
4 INJECTION INTRAMUSCULAR; INTRAVENOUS
Refills: 0 | Status: DISCONTINUED | OUTPATIENT
Start: 2021-07-21 | End: 2021-07-25

## 2021-07-21 RX ADMIN — Medication 50 MILLIGRAM(S): at 05:12

## 2021-07-21 RX ADMIN — HEPARIN SODIUM 5000 UNIT(S): 5000 INJECTION INTRAVENOUS; SUBCUTANEOUS at 22:00

## 2021-07-21 RX ADMIN — Medication 1: at 09:00

## 2021-07-21 RX ADMIN — INSULIN GLARGINE 30 UNIT(S): 100 INJECTION, SOLUTION SUBCUTANEOUS at 22:01

## 2021-07-21 RX ADMIN — CARVEDILOL PHOSPHATE 6.25 MILLIGRAM(S): 80 CAPSULE, EXTENDED RELEASE ORAL at 05:12

## 2021-07-21 RX ADMIN — Medication 30 MILLIGRAM(S): at 05:12

## 2021-07-21 RX ADMIN — LATANOPROST 1 DROP(S): 0.05 SOLUTION/ DROPS OPHTHALMIC; TOPICAL at 21:59

## 2021-07-21 RX ADMIN — ISOSORBIDE DINITRATE 20 MILLIGRAM(S): 5 TABLET ORAL at 14:00

## 2021-07-21 RX ADMIN — Medication 50 MILLIGRAM(S): at 14:00

## 2021-07-21 RX ADMIN — Medication 3 MILLIGRAM(S): at 22:00

## 2021-07-21 RX ADMIN — ISOSORBIDE DINITRATE 20 MILLIGRAM(S): 5 TABLET ORAL at 21:59

## 2021-07-21 RX ADMIN — Medication 2: at 18:49

## 2021-07-21 RX ADMIN — Medication 81 MILLIGRAM(S): at 14:00

## 2021-07-21 RX ADMIN — HEPARIN SODIUM 5000 UNIT(S): 5000 INJECTION INTRAVENOUS; SUBCUTANEOUS at 05:13

## 2021-07-21 RX ADMIN — Medication 650 MILLIGRAM(S): at 06:50

## 2021-07-21 RX ADMIN — HEPARIN SODIUM 5000 UNIT(S): 5000 INJECTION INTRAVENOUS; SUBCUTANEOUS at 14:00

## 2021-07-21 RX ADMIN — Medication 650 MILLIGRAM(S): at 05:56

## 2021-07-21 RX ADMIN — Medication 650 MILLIGRAM(S): at 22:30

## 2021-07-21 RX ADMIN — ISOSORBIDE DINITRATE 20 MILLIGRAM(S): 5 TABLET ORAL at 05:11

## 2021-07-21 RX ADMIN — ERTAPENEM SODIUM 100 MILLIGRAM(S): 1 INJECTION, POWDER, LYOPHILIZED, FOR SOLUTION INTRAMUSCULAR; INTRAVENOUS at 16:50

## 2021-07-21 RX ADMIN — ATORVASTATIN CALCIUM 40 MILLIGRAM(S): 80 TABLET, FILM COATED ORAL at 22:01

## 2021-07-21 RX ADMIN — POLYETHYLENE GLYCOL 3350 17 GRAM(S): 17 POWDER, FOR SOLUTION ORAL at 14:00

## 2021-07-21 RX ADMIN — CARVEDILOL PHOSPHATE 6.25 MILLIGRAM(S): 80 CAPSULE, EXTENDED RELEASE ORAL at 18:40

## 2021-07-21 RX ADMIN — Medication 650 MILLIGRAM(S): at 21:59

## 2021-07-21 RX ADMIN — BUMETANIDE 132 MILLIGRAM(S): 0.25 INJECTION INTRAMUSCULAR; INTRAVENOUS at 18:39

## 2021-07-21 RX ADMIN — Medication 50 MILLIGRAM(S): at 22:00

## 2021-07-21 RX ADMIN — Medication 80 MILLIGRAM(S): at 05:12

## 2021-07-21 NOTE — PROGRESS NOTE ADULT - SUBJECTIVE AND OBJECTIVE BOX
PROGRESS NOTE:   Authored by Marguerite Whitfield MD  Internal Medicine  Pager PIA [phone]  Pager [phone]    Patient is a 61y old  Male who presents with a chief complaint of right foot 2nd/3rd toe infection (2021 15:18)      SUBJECTIVE / OVERNIGHT EVENTS:    MEDICATIONS  (STANDING):  aspirin enteric coated 81 milliGRAM(s) Oral daily  atorvastatin 40 milliGRAM(s) Oral at bedtime  carvedilol 6.25 milliGRAM(s) Oral every 12 hours  dextrose 40% Gel 15 Gram(s) Oral once  dextrose 5%. 1000 milliLiter(s) (50 mL/Hr) IV Continuous <Continuous>  dextrose 5%. 1000 milliLiter(s) (100 mL/Hr) IV Continuous <Continuous>  dextrose 50% Injectable 25 Gram(s) IV Push once  dextrose 50% Injectable 12.5 Gram(s) IV Push once  dextrose 50% Injectable 25 Gram(s) IV Push once  ertapenem  IVPB 500 milliGRAM(s) IV Intermittent every 24 hours  furosemide   Injectable 80 milliGRAM(s) IV Push daily  glucagon  Injectable 1 milliGRAM(s) IntraMuscular once  heparin   Injectable 5000 Unit(s) SubCutaneous every 8 hours  hydrALAZINE 50 milliGRAM(s) Oral three times a day  insulin glargine Injectable (LANTUS) 40 Unit(s) SubCutaneous at bedtime  insulin lispro (ADMELOG) corrective regimen sliding scale   SubCutaneous three times a day before meals  insulin lispro Injectable (ADMELOG) 5 Unit(s) SubCutaneous three times a day before meals  isosorbide   dinitrate Tablet (ISORDIL) 20 milliGRAM(s) Oral three times a day  latanoprost 0.005% Ophthalmic Solution 1 Drop(s) Both EYES at bedtime  levoFLOXacin IVPB 250 milliGRAM(s) IV Intermittent every 24 hours  levoFLOXacin IVPB      melatonin 3 milliGRAM(s) Oral at bedtime  NIFEdipine XL 30 milliGRAM(s) Oral daily  polyethylene glycol 3350 17 Gram(s) Oral daily    MEDICATIONS  (PRN):  acetaminophen   Tablet .. 650 milliGRAM(s) Oral every 6 hours PRN Temp greater or equal to 38.5C (101.3F), Mild Pain (1 - 3)      CAPILLARY BLOOD GLUCOSE  325 (2021 18:02)      POCT Blood Glucose.: 231 mg/dL (2021 22:46)  POCT Blood Glucose.: 325 mg/dL (2021 18:02)  POCT Blood Glucose.: 284 mg/dL (2021 12:40)  POCT Blood Glucose.: 220 mg/dL (2021 08:54)    I&O's Summary    2021 07:01  -  2021 07:00  --------------------------------------------------------  IN: 100 mL / OUT: 1200 mL / NET: -1100 mL        PHYSICAL EXAM:  Vital Signs Last 24 Hrs  T(C): 36.7 (2021 05:10), Max: 36.7 (2021 12:16)  T(F): 98.1 (2021 05:10), Max: 98.1 (2021 05:10)  HR: 80 (2021 05:10) (80 - 92)  BP: 128/60 (2021 05:10) (128/60 - 143/74)  BP(mean): --  RR: 16 (2021 05:10) (16 - 18)  SpO2: 93% (2021 05:10) (90% - 99%)    GENERAL: No acute distress, well-developed  HEAD:  Atraumatic, Normocephalic  EYES: EOMI, PERRLA, conjunctiva and sclera clear  NECK: Supple, no lymphadenopathy, no JVD  CHEST/LUNG: CTAB; No wheezes, rales, or rhonchi  HEART: Regular rate and rhythm; No murmurs, rubs, or gallops  ABDOMEN: Soft, non-tender, non-distended; normal bowel sounds, no organomegaly  EXTREMITIES:  2+ peripheral pulses b/l, No clubbing, cyanosis, or edema  NEUROLOGY: A&O x 3, no focal deficits  SKIN: No rashes or lesions    LABS:                        9.8    20.03 )-----------( 388      ( 2021 07:33 )             28.9     07-20    127<L>  |  93<L>  |  50<H>  ----------------------------<  277<H>  3.8   |  16<L>  |  3.30<H>    Ca    8.8      2021 09:24  Phos  5.6     07-  Mg     1.90         TPro  7.2  /  Alb  3.4  /  TBili  0.6  /  DBili  x   /  AST  24  /  ALT  21  /  AlkPhos  232<H>  -20          Urinalysis Basic - ( 2021 12:01 )    Color: Yellow / Appearance: Clear / S.015 / pH: x  Gluc: x / Ketone: Negative  / Bili: Negative / Urobili: <2 mg/dL   Blood: x / Protein: Trace / Nitrite: Negative   Leuk Esterase: Negative / RBC: 2 /HPF / WBC 2 /HPF   Sq Epi: x / Non Sq Epi: 0 /HPF / Bacteria: Negative        Culture - Abscess with Gram Stain (collected 2021 10:37)  Source: .Abscess right 2nd interspace foot  Preliminary Report (2021 10:24):    Numerous Enterobacter aerogenes    Few Stenotrophomonas maltophilia Susceptibility to follow.    Moderate Streptococcus agalactiae (Group B) isolated    Group B streptococci are susceptible to ampicillin,    penicillin and cefazolin, but may be resistant to    erythromycin and clindamycin.    Recommendations for intrapartum prophylaxis for Group B    streptococci are penicillin or ampicillin.  Organism: Enterobacter aerogenes (2021 08:23)  Organism: Enterobacter aerogenes (2021 08:23)    Culture - Blood (collected 2021 10:06)  Source: .Blood Blood-Peripheral  Preliminary Report (2021 11:02):    No growth to date.    Culture - Blood (collected 2021 10:06)  Source: .Blood Blood-Peripheral  Preliminary Report (2021 11:02):    No growth to date.        RADIOLOGY & ADDITIONAL TESTS:  Results Reviewed:   Imaging Personally Reviewed:  Electrocardiogram Personally Reviewed:    COORDINATION OF CARE:  Care Discussed with Consultants/Other Providers [Y/N]:  Prior or Outpatient Records Reviewed [Y/N]:   PROGRESS NOTE:   Authored by Marguerite Whitfield MD  Internal Medicine  Pager PIA [phone]  Pager [phone]    Patient is a 61y old  Male who presents with a chief complaint of right foot 2nd/3rd toe infection (2021 15:18)      SUBJECTIVE / OVERNIGHT EVENTS: was on bipap from 10 pm to 4 am, now on 4l nc  satting 91%. this morning feels tired, didn't sleep well last night. no pain. didn't pee a lot overnight but reports it's because he couldn't drink as much water yesterday and overnight while on bipap.    MEDICATIONS  (STANDING):  aspirin enteric coated 81 milliGRAM(s) Oral daily  atorvastatin 40 milliGRAM(s) Oral at bedtime  carvedilol 6.25 milliGRAM(s) Oral every 12 hours  dextrose 40% Gel 15 Gram(s) Oral once  dextrose 5%. 1000 milliLiter(s) (50 mL/Hr) IV Continuous <Continuous>  dextrose 5%. 1000 milliLiter(s) (100 mL/Hr) IV Continuous <Continuous>  dextrose 50% Injectable 25 Gram(s) IV Push once  dextrose 50% Injectable 12.5 Gram(s) IV Push once  dextrose 50% Injectable 25 Gram(s) IV Push once  ertapenem  IVPB 500 milliGRAM(s) IV Intermittent every 24 hours  furosemide   Injectable 80 milliGRAM(s) IV Push daily  glucagon  Injectable 1 milliGRAM(s) IntraMuscular once  heparin   Injectable 5000 Unit(s) SubCutaneous every 8 hours  hydrALAZINE 50 milliGRAM(s) Oral three times a day  insulin glargine Injectable (LANTUS) 40 Unit(s) SubCutaneous at bedtime  insulin lispro (ADMELOG) corrective regimen sliding scale   SubCutaneous three times a day before meals  insulin lispro Injectable (ADMELOG) 5 Unit(s) SubCutaneous three times a day before meals  isosorbide   dinitrate Tablet (ISORDIL) 20 milliGRAM(s) Oral three times a day  latanoprost 0.005% Ophthalmic Solution 1 Drop(s) Both EYES at bedtime  levoFLOXacin IVPB 250 milliGRAM(s) IV Intermittent every 24 hours  levoFLOXacin IVPB      melatonin 3 milliGRAM(s) Oral at bedtime  NIFEdipine XL 30 milliGRAM(s) Oral daily  polyethylene glycol 3350 17 Gram(s) Oral daily    MEDICATIONS  (PRN):  acetaminophen   Tablet .. 650 milliGRAM(s) Oral every 6 hours PRN Temp greater or equal to 38.5C (101.3F), Mild Pain (1 - 3)      CAPILLARY BLOOD GLUCOSE  325 (2021 18:02)      POCT Blood Glucose.: 231 mg/dL (2021 22:46)  POCT Blood Glucose.: 325 mg/dL (2021 18:02)  POCT Blood Glucose.: 284 mg/dL (2021 12:40)  POCT Blood Glucose.: 220 mg/dL (2021 08:54)    I&O's Summary    2021 07:01  -  2021 07:00  --------------------------------------------------------  IN: 100 mL / OUT: 1200 mL / NET: -1100 mL        PHYSICAL EXAM:  Vital Signs Last 24 Hrs  T(C): 36.7 (2021 05:10), Max: 36.7 (2021 12:16)  T(F): 98.1 (2021 05:10), Max: 98.1 (2021 05:10)  HR: 80 (2021 05:10) (80 - 92)  BP: 128/60 (2021 05:10) (128/60 - 143/74)  BP(mean): --  RR: 16 (2021 05:10) (16 - 18)  SpO2: 93% (2021 05:10) (90% - 99%)    GENERAL: mild distress, looks tired, on nasal cannula lying in bed  HEAD:  Atraumatic, Normocephalic  EYES: conjunctiva and sclera clear  NECK:  no JVD  CHEST/LUNG: diffuse crackles, increased wob, using accessory mm,   HEART: Regular rate and rhythm; No murmurs, rubs, or gallops  ABDOMEN: Soft, non-tender, non-distended; normal bowel sounds, no organomegaly  EXTREMITIES:  2+ peripheral pulses b/l, No clubbing, cyanosis, trace pitting edema  NEUROLOGY: A&O x 3, no focal deficits      LABS:                        9.8    20.03 )-----------( 388      ( 2021 07:33 )             28.9     07-20    127<L>  |  93<L>  |  50<H>  ----------------------------<  277<H>  3.8   |  16<L>  |  3.30<H>    Ca    8.8      2021 09:24  Phos  5.6     -  Mg     1.90     -    TPro  7.2  /  Alb  3.4  /  TBili  0.6  /  DBili  x   /  AST  24  /  ALT  21  /  AlkPhos  232<H>  -20          Urinalysis Basic - ( 2021 12:01 )    Color: Yellow / Appearance: Clear / S.015 / pH: x  Gluc: x / Ketone: Negative  / Bili: Negative / Urobili: <2 mg/dL   Blood: x / Protein: Trace / Nitrite: Negative   Leuk Esterase: Negative / RBC: 2 /HPF / WBC 2 /HPF   Sq Epi: x / Non Sq Epi: 0 /HPF / Bacteria: Negative        Culture - Abscess with Gram Stain (collected 2021 10:37)  Source: .Abscess right 2nd interspace foot  Preliminary Report (2021 10:24):    Numerous Enterobacter aerogenes    Few Stenotrophomonas maltophilia Susceptibility to follow.    Moderate Streptococcus agalactiae (Group B) isolated    Group B streptococci are susceptible to ampicillin,    penicillin and cefazolin, but may be resistant to    erythromycin and clindamycin.    Recommendations for intrapartum prophylaxis for Group B    streptococci are penicillin or ampicillin.  Organism: Enterobacter aerogenes (2021 08:23)  Organism: Enterobacter aerogenes (2021 08:23)    Culture - Blood (collected 2021 10:06)  Source: .Blood Blood-Peripheral  Preliminary Report (2021 11:02):    No growth to date.    Culture - Blood (collected 2021 10:06)  Source: .Blood Blood-Peripheral  Preliminary Report (2021 11:02):    No growth to date.        RADIOLOGY & ADDITIONAL TESTS:  Results Reviewed:   Imaging Personally Reviewed:  Electrocardiogram Personally Reviewed:    COORDINATION OF CARE:  Care Discussed with Consultants/Other Providers [Y/N]:  Prior or Outpatient Records Reviewed [Y/N]:

## 2021-07-21 NOTE — PROGRESS NOTE ADULT - ASSESSMENT
60 y/o male with h/o HTN, insulin dependent DM-2, hld, glaucoma, CKD3 with recent interstitial nephritis treated with steroid, HFrEF, recent NSTEMI, PAD s/p RLE angioplasty, s/p right 2nd toe partial amputation at [facility] in April 2021, who presents with 2 week history of worsening right foot pain, associated with right 2nd residual toe stump/3rd toe infection/discoloration to blue/black, xray c/f osteomyelitis, plan for surgical intervention this admission per podiatry

## 2021-07-21 NOTE — PROGRESS NOTE ADULT - PROBLEM SELECTOR PLAN 2
- - f/u C3, C4, c-ANCA, antiphospholipid work up given eosinophilia - f/u C3, C4, c-ANCA, antiphospholipid work up given eosinophilia - C3 levels wnl, C4 elevated  - f/u ANCA work up given eosinophilia Patient had recent RLE angiogram on 7/7/21at [facility] which revealed severe atherosclerosis. Right anterior tibial: There was a 100 % stenosis. Right tibio-peroneal: There was a 50 % stenosis. Right posterior tibial: Angiography showed mild atherosclerosis. Right peroneal: There was a 100 % stenosis.  - C3 levels wnl, C4 elevated  - f/u ANCA work up given eosinophilia

## 2021-07-21 NOTE — CONSULT NOTE ADULT - CONSULT REQUESTED DATE/TIME
18-Jul-2021 02:29
19-Jul-2021
20-Jul-2021 10:31
20-Jul-2021 13:57
21-Jul-2021 21:18
19-Jul-2021 13:56

## 2021-07-21 NOTE — PROGRESS NOTE ADULT - ASSESSMENT
60 y/o M patient presents with R foot toe infection  - Patient seen and evaluated   - gangrenous right foot 2nd and 3rd digits, RF 3rd interspace wound probes to bone, increased tracking proximally to 2nd and 3rd MPJ, mild wet conversion of gangrenous digits, scant pus expressed from interdigital wound, erythema consistent  - R foot MRI: early osteomyelitis within the second proximal phalangeal, periostitis to the proximal phalanx of 3rd digit without vinnie osteomyelitis.  - MICHEL/PVR showing noncompressible vessels b/l, RTBI 0.07, LTBI 0.22, waveforms flat at digits  - Vasc planning RLE angio today  - Pod plan for R foot partial 2nd and 3rd ray resection following Vascular Angiogram  - Discussed with attending  62 y/o M patient presents with R foot toe infection  - Patient seen and evaluated   - gangrenous right foot 2nd and 3rd digits, RF 3rd interspace wound probes to bone, increased tracking proximally to 2nd and 3rd MPJ, mild wet conversion of gangrenous digits, scant pus expressed from interdigital wound, erythema consistent  - R foot MRI: early osteomyelitis within the second proximal phalangeal, periostitis to the proximal phalanx of 3rd digit without vinnie osteomyelitis.  - MICHEL/PVR showing noncompressible vessels b/l, RTBI 0.07, LTBI 0.22, waveforms flat at digits  - Vasc planning RLE angio today  - Pod plan for R foot partial 2nd and 3rd ray resection following Vascular Angiogram  - Please document medical clearance for right foot surgery under liught sedation with local anesthesia  - Discussed with attending

## 2021-07-21 NOTE — PROGRESS NOTE ADULT - ASSESSMENT
60 y/o male with h/o HTN, insulin dependent DM-2, hld, glaucoma, CKD3 with recent interstitial nephritis treated with steroid, HFrEF, recent NSTEMI, PAD s/p RLE angioplasty, s/p right 2nd toe partial amputation at [facility] in April 2021, who presents with 2 week history of worsening right foot pain with plans for 2nd and 3rd toe ray amps.  Vascular Surgery called to evaluate.    Recommendations:  - will need angiogram however Cr uptrending  - will need nephro optimization and clearance  - will follow    C team  [id number]

## 2021-07-21 NOTE — PROGRESS NOTE ADULT - SUBJECTIVE AND OBJECTIVE BOX
Patient is a 61y old  Male who presents with a chief complaint of right foot 2nd/3rd toe infection (2021 08:52)       INTERVAL HPI/OVERNIGHT EVENTS:  Patient seen and evaluated at bedside.  Pt is resting comfortable in NAD. Denies N/V/F/C.  Pain rated at X/10    Allergies    cefepime (Other)    Intolerances        Vital Signs Last 24 Hrs  T(C): 36.7 (2021 05:10), Max: 36.7 (2021 12:16)  T(F): 98.1 (2021 05:10), Max: 98.1 (2021 05:10)  HR: 66 (2021 06:42) (66 - 92)  BP: 128/60 (2021 05:10) (128/60 - 143/74)  BP(mean): --  RR: 16 (2021 05:10) (16 - 18)  SpO2: 94% (2021 06:42) (90% - 99%)    LABS:                        9.0    18.42 )-----------( 363      ( 2021 07:42 )             26.2     07-21    127<L>  |  92<L>  |  62<H>  ----------------------------<  164<H>  3.5   |  16<L>  |  3.74<H>    Ca    8.7      2021 07:42  Phos  5.8     07-21  Mg     2.10     07-21    TPro  6.7  /  Alb  3.0<L>  /  TBili  0.5  /  DBili  x   /  AST  20  /  ALT  18  /  AlkPhos  208<H>  07-21      Urinalysis Basic - ( 2021 12:01 )    Color: Yellow / Appearance: Clear / S.015 / pH: x  Gluc: x / Ketone: Negative  / Bili: Negative / Urobili: <2 mg/dL   Blood: x / Protein: Trace / Nitrite: Negative   Leuk Esterase: Negative / RBC: 2 /HPF / WBC 2 /HPF   Sq Epi: x / Non Sq Epi: 0 /HPF / Bacteria: Negative      CAPILLARY BLOOD GLUCOSE  325 (2021 18:02)      POCT Blood Glucose.: 163 mg/dL (2021 08:33)  POCT Blood Glucose.: 231 mg/dL (2021 22:46)  POCT Blood Glucose.: 325 mg/dL (2021 18:02)  POCT Blood Glucose.: 284 mg/dL (2021 12:40)      Lower Extremity Physical Exam:  Vascular: DP 1/4, B/L, PT non-palpable B/L, CFT <3 seconds B/L, Temperature gradient cool to warm on R, warm to cool on L    Neuro: Epicritic sensation intact to the level of ankle, B/L.  Musculoskeletal/Ortho: pain on palpation surrounding the 2nd digit   Skin: gangrenous right foot 2nd and 3rd digits, RF 3rd interspace wound probes to bone, increased tracking proximally to 2nd and 3rd MPJ, mild wet conversion of gangrenous digits, scant pus expressed from interdigital wound, erythema consistent     s/p I&D of R 2nd interspace with erythema, no pus expressed, tracks 1cm dorsal and proximal, no malodor,  resolving erythema to the dorsolateral foot up until the midfoot, diffuse edema of the R forefoot, gangrene of the 2nd toe stump, ischemic changes to the R 3rd digit, cool to touch     RADIOLOGY & ADDITIONAL TESTS:

## 2021-07-21 NOTE — CONSULT NOTE ADULT - ATTENDING COMMENTS
60 y/o male with h/o HTN, insulin dependent DM-2, hld, glaucoma, CKD3 with recent interstitial nephritis treated with steroid, HFrEF, recent NSTEMI, PAD s/p RLE angioplasty, s/p right 2nd toe partial amputation. MICU consulted for shortness of breath requiring new bipap and possible need for urgent HD given worsening renal function.  patient with pulm edema in the setting of heart failure and worsening CKD   Bipap for work of breathing (currently 10/5 40%) currently resting comfortably on arrival to my exam    Bumex 4mg IV BID, consider adding metolazone, may give additional push of bumex (close monitor of electrolytes)  nixon for strict I and O while diuresing  VBG while on bipap   renal for possible HD   reconsult as needed  DVT ppx on hepain  Full code
sig pad on non inasive stdies.  plan co2 angiogram, revasc
Alert, conversant, h/o kidney disease  1.  ARF on CKD--multifactorial including antibiotics.  Vanco zosyn is major cause of ARF.  It is unclear if another sulbactam penicillin such as unasyn shares this effect but would check with ID whether an unrelated drug like meropenam would be satisfactory.    2.  Peripheral vascular disease--unclear if atheromatous vessels had plaques dislodged--> atheroemboli but would screen complements given eosinophilia  discussed with surgical team
Pt's presentation is consistent with acute on chronic HFimpEF exacerbation. JVP is moderately elevated with +HJR and bilateral crackles on exam. elevated SCr, probably multifactorial.    recommend 80mg IV furosemide BID for diuresis  recent suspected ACS in 4/2021, cont carvedilol, afterload reducing agents  repeat TTE to better evaluate MR    not yet optimized from a cardiac standpoint to undergo procedures. He has significant cardiac risk factors and a suspected ACS event in the past year without PCI, however he is currently without symptoms of active cardiac ischemia and is at elevated risk of decompensation without adequate management of his osteomyelitis,

## 2021-07-21 NOTE — PROGRESS NOTE ADULT - ASSESSMENT
60 y/o male with h/o HTN, insulin dependent DM-2, hld, glaucoma, CKD3 with recent interstitial nephritis treated with steroid, HFrEF, recent NSTEMI, PAD s/p RLE angioplasty, s/p right 2nd toe partial amputation at [facility] in April 2021, who presents with 2 week history of worsening right foot pain, associated with right 2nd residual toe stump/3rd toe infection/discoloration to blue/black, xray c/f osteomyelitis, plan for surgical intervention this admission per podiatry 62 y/o male with h/o HTN, insulin dependent DM-2, hld, glaucoma, CKD3 with recent interstitial nephritis treated with steroid, HFrEF, recent NSTEMI, PAD s/p RLE angioplasty, s/p right 2nd toe partial amputation at [facility] in April 2021 presented for ACOSTA

## 2021-07-21 NOTE — PROGRESS NOTE ADULT - PROBLEM SELECTOR PLAN 1
patient desatted to low 80s 7/19 afternoon. put on 5L NC with 93-95% sats. patient reports feeling short of breath and describes retrosternal chest pressure. slightly worse with breathing. bedside focused ultrasound remarkable for b lines and mitral regurg. likely flash pulmonary edema 2/2 fluid bolus from ACOSTA treatment w/ hx of HFREF.   - stopping fluids, and   - diuresing as needed got 80IV lasix  - continuous pulse ox with tele  - ctm with concern for acute decompensated heart failure. stable. patient desatted to low 80s 7/19 afternoon. put on 5L NC with 93-95% sats. patient reports feeling short of breath and describes retrosternal chest pressure. slightly worse with breathing. bedside focused ultrasound remarkable for b lines and mitral regurg. likely flash pulmonary edema 2/2 fluid bolus from ACOTSA treatment w/ hx of HFREF.   - stopping fluids, and   - 80 IV lasix BID  - BMP and BNP BID  - IVC ultrasound  - continuous pulse ox with tele  - ctm

## 2021-07-21 NOTE — PROGRESS NOTE ADULT - PROBLEM SELECTOR PLAN 3
-pt with h/o right foot 2nd toe partial amputation in April at [facility], h/o severe RLE PVD with RLE angioplasty in past, no stent, now p/w 2nd residual toe/stump and 3rd toe infection, xray concerning for osteomyelitis, elevated inflammatory markers ESR 96, CRP 80.6 c/w acute OM. rec'd IV vanc 1 g bid (7/17 pm - 7/20), zoxyn (7/18-7/20)  -7/18 blood cultures x2  -ID c/s start erta and levaquin (7/20 - )  -MRI right foot: osteomyelitis in 2nd toe, periostitis w/o osteo on 3rd toe  -podiatry consulted, s/p I&D 7/17. plan for RF P2RR, P3RR pending MICHEL/PVR vasc recs  - vascular consult: angiogram with co2 angio  -needs cardiology clearance, RCRI 3, high risk for periop MACE

## 2021-07-21 NOTE — CONSULT NOTE ADULT - SUBJECTIVE AND OBJECTIVE BOX
CHIEF COMPLAINT: shortness of breath     HPI:  60 y/o male with h/o HTN, insulin dependent DM-2, hld, glaucoma, CKD3 with recent interstitial nephritis treated with steroid, HFrEF, recent NSTEMI, PAD s/p RLE angioplasty, s/p right 2nd toe partial amputation at [facility] in 2021, who presents with 2 week history of worsening right foot pain, associated with right 2nd residual toe stump/3rd toe infection/discoloration to blue/black, with tingling and pain. Patient has been treated with IV abx with plans for surgical intervention. On PM 21 patient became short of breath while on 5% NC O2, SPO2 was 92%. Patient was given IV bumex 4mg at 640PM and transitioned to BiPAP for SOB. MICU consulted for shortness of breath and possible need for urgent HD given worsening renal function.     PAST MEDICAL & SURGICAL HISTORY:  PAD (peripheral artery disease)    Essential hypertension    Type 2 diabetes mellitus, with long-term current use of insulin    History of amputation of toe    S/P angioplasty  RLE        FAMILY HISTORY:      SOCIAL HISTORY:  Smoking: n/a   EtOH Use: n/a   Marital Status:    Recent Travel: n/a   Country of Birth:      Allergies    cefepime (Other)    Intolerances        HOME MEDICATIONS:    REVIEW OF SYSTEMS:  CONSTITUTIONAL: No weakness, fevers or chills  EYES/ENT: No visual changes;  No vertigo or throat pain   NECK: No pain or stiffness  RESPIRATORY: + shortness of breath, no cough   CARDIOVASCULAR: No chest pain or palpitations  GASTROINTESTINAL: No abdominal or epigastric pain. No nausea, vomiting, or hematemesis; No diarrhea or constipation. No melena or hematochezia.  GENITOURINARY: No dysuria, frequency or hematuria  NEUROLOGICAL: No flaccid paralysis  SKIN: RLE wound wrapped in guaze   MSK: no joint deformities  PSYCH: no SI/HI     OBJECTIVE:  ICU Vital Signs Last 24 Hrs  T(C): 37.1 (2021 18:39), Max: 37.1 (2021 18:39)  T(F): 98.7 (2021 18:39), Max: 98.7 (2021 18:39)  HR: 78 (2021 18:39) (66 - 92)  BP: 134/76 (2021 18:39) (128/60 - 144/72)  BP(mean): --  ABP: --  ABP(mean): --  RR: 16 (2021 18:39) (16 - 18)  SpO2: 93% (2021 18:39) (93% - 99%)        07 @ 07:01  -   @ 07:00  --------------------------------------------------------  IN: 200 mL / OUT: 1250 mL / NET: -1050 mL     @ 07:  -   @ 21:37  --------------------------------------------------------  IN: 100 mL / OUT: 975 mL / NET: -875 mL      CAPILLARY BLOOD GLUCOSE  325 (2021 18:02)      POCT Blood Glucose.: 211 mg/dL (2021 18:45)      PHYSICAL EXAM:  GENERAL: patient laying in bed with BiPAP on face   HEAD:  Atraumatic, Normocephalic  EYES: EOMI, PERRLA, conjunctiva and sclera clear  ENT: unable to assess given BiPAP   NECK: Supple, No JVD  CHEST/LUNG: clear to auscultation bilaterally, upper airway sounds transmitted on expiration given bipap on   HEART: Regular rate and rhythm; No murmurs, rubs, or gallops  ABDOMEN: distended abdomen with no rebound or guarding, no tenderness to palpation   EXTREMITIES:  2+ Peripheral Pulses, brisk capillary refill. No clubbing, cyanosis, or edema  NERVOUS SYSTEM:  Alert & Oriented X3, speech clear. No deficits   MSK: moving all 4 extremities spontaneously   SKIN: patient with guaze wrapped on right lower foot     HOSPITAL MEDICATIONS:  MEDICATIONS  (STANDING):  aspirin enteric coated 81 milliGRAM(s) Oral daily  atorvastatin 40 milliGRAM(s) Oral at bedtime  buMETAnide IVPB 4 milliGRAM(s) IV Intermittent two times a day  carvedilol 6.25 milliGRAM(s) Oral every 12 hours  dextrose 40% Gel 15 Gram(s) Oral once  dextrose 5%. 1000 milliLiter(s) (50 mL/Hr) IV Continuous <Continuous>  dextrose 5%. 1000 milliLiter(s) (100 mL/Hr) IV Continuous <Continuous>  dextrose 50% Injectable 25 Gram(s) IV Push once  dextrose 50% Injectable 12.5 Gram(s) IV Push once  dextrose 50% Injectable 25 Gram(s) IV Push once  ertapenem  IVPB 500 milliGRAM(s) IV Intermittent every 24 hours  glucagon  Injectable 1 milliGRAM(s) IntraMuscular once  heparin   Injectable 5000 Unit(s) SubCutaneous every 8 hours  hydrALAZINE 50 milliGRAM(s) Oral three times a day  insulin glargine Injectable (LANTUS) 30 Unit(s) SubCutaneous at bedtime  insulin lispro (ADMELOG) corrective regimen sliding scale   SubCutaneous three times a day before meals  isosorbide   dinitrate Tablet (ISORDIL) 20 milliGRAM(s) Oral three times a day  latanoprost 0.005% Ophthalmic Solution 1 Drop(s) Both EYES at bedtime  levoFLOXacin IVPB 250 milliGRAM(s) IV Intermittent every 24 hours  levoFLOXacin IVPB      melatonin 3 milliGRAM(s) Oral at bedtime  NIFEdipine XL 30 milliGRAM(s) Oral daily  polyethylene glycol 3350 17 Gram(s) Oral daily    MEDICATIONS  (PRN):  acetaminophen   Tablet .. 650 milliGRAM(s) Oral every 6 hours PRN Temp greater or equal to 38.5C (101.3F), Mild Pain (1 - 3)      LABS:                        9.0    18.42 )-----------( 363      ( 2021 07:42 )             26.2     07-21    128<L>  |  92<L>  |  68<H>  ----------------------------<  217<H>  3.7   |  16<L>  |  3.93<H>    Ca    8.8      2021 18:45  Phos  5.8     07-  Mg     2.00     -    TPro  6.7  /  Alb  3.0<L>  /  TBili  0.5  /  DBili  x   /  AST  20  /  ALT  18  /  AlkPhos  208<H>        Urinalysis Basic - ( 2021 17:25 )    Color: Yellow / Appearance: Clear / S.016 / pH: x  Gluc: x / Ketone: Negative  / Bili: Negative / Urobili: <2 mg/dL   Blood: x / Protein: Trace / Nitrite: Negative   Leuk Esterase: Negative / RBC: 6 /HPF / WBC 2 /HPF   Sq Epi: x / Non Sq Epi: 1 /HPF / Bacteria: Negative              RADIOLOGY:  [ x] Reviewed and interpreted by me  EXAM: XR CHEST PORTABLE URGENT 1V      PROCEDURE DATE: 2021        INTERPRETATION: TIME OF EXAM: 2021 at 11:25 AM.    CLINICAL INFORMATION: Hypoxia.    COMPARISON: 2021.    TECHNIQUE: AP Portable chest x-ray.    INTERPRETATION:    Heart size and the mediastinum cannot be accurately evaluated on this projection. Calcified thoracic aorta.  There is accentuation and indistinctness of the edilma and central pulmonary vascular markings suggesting mild interstitial pulmonary edema.  There is linear atelectasis at the left base.  Thickening of minor fissure versus small amount of fluid in the minor fissure. Otherwise no pleural effusion. No pneumothorax.  There is osteoarthritic degenerative change of the spine.        IMPRESSION: Findings suggesting mild interstitial pulmonary edema.    Linear atelectasis at the left base.

## 2021-07-21 NOTE — CONSULT NOTE ADULT - ASSESSMENT
62 y/o male with h/o HTN, insulin dependent DM-2, hld, glaucoma, CKD3 with recent interstitial nephritis treated with steroid, HFrEF, recent NSTEMI, PAD s/p RLE angioplasty, s/p right 2nd toe partial amputation. MICU consulted for shortness of breath and possible need for urgent HD given worsening renal function.       Recommendations:     Respiratory:  - acute shortness of breath likely secondary to underlying interstitial pulmonary edema 2/2 underlying heart failure.   -continue with IV Bumex 4mg BID, can give additional push of IV bumex if patient's shortness of breath is worsening   -continue with BiPAP as tolerated, patient currently on minimal settings of IPAP 10, EPAP 6, rate 14, FiO2 40%   -reconsult MICU as needed     Renal:   -patient non-oliguric with adequate UOP per flowsheet   -continue with diuresis, monitor SCr and BUN.   -patient would benefit from indwelling nixon catheter placement with strict I/O to assess response to diuresis/assessment of urinary retention   -abdominal U/S noted w/ no hydronephrosis   -renal consult for assessment if patient needs HD which would require MICU admission. Primary team to reach out to nephrology. currently, patient is making urine, is potassium and other electrolytes stable, uremia is stable, SCr is uptrending. recommend continue to monitor electrolytes and Scr, avoid nephrotoxins   -reconsult MICU as needed     DVT Prophylaxis:  continue with heparin subq 5000mg q8     GOC: Patient is full code

## 2021-07-21 NOTE — PROGRESS NOTE ADULT - SUBJECTIVE AND OBJECTIVE BOX
[facility] DIVISION OF KIDNEY DISEASES AND HYPERTENSION -- FOLLOW UP NOTE  --------------------------------------------------------------------------------  Chief Complaint:    24 hour events/subjective:      REVIEW OF SYSTEMS  --------------------------------------------------------------------------------  Gen: No fevers/chills, weakness  Skin: No rashes  Head/Eyes/Ears/Mouth: No headache; No hearing changes, mucous discharge, vision changes  Respiratory: No dyspnea, cough, wheezing  CV: No chest pain, palpitations  GI: No abdominal pain, diarrhea, constipation, nausea, vomiting, melena, hematochezia  : No increased frequency, dysuria, hematuria  MSK: No joint pain/swelling; no back pain; no edema  Neuro: No dizziness/lightheadedness, weakness, seizures, numbness  Heme: No easy bruising or bleeding    All other systems were reviewed and are negative, except as noted.    PAST HISTORY  --------------------------------------------------------------------------------  No significant changes to PMH, PSH, FHx, SHx, unless otherwise noted    ALLERGIES & MEDICATIONS  --------------------------------------------------------------------------------  Allergies    cefepime (Other)    Intolerances      Standing Inpatient Medications  aspirin enteric coated 81 milliGRAM(s) Oral daily  atorvastatin 40 milliGRAM(s) Oral at bedtime  carvedilol 6.25 milliGRAM(s) Oral every 12 hours  dextrose 40% Gel 15 Gram(s) Oral once  dextrose 5%. 1000 milliLiter(s) IV Continuous <Continuous>  dextrose 5%. 1000 milliLiter(s) IV Continuous <Continuous>  dextrose 50% Injectable 25 Gram(s) IV Push once  dextrose 50% Injectable 12.5 Gram(s) IV Push once  dextrose 50% Injectable 25 Gram(s) IV Push once  ertapenem  IVPB 500 milliGRAM(s) IV Intermittent every 24 hours  furosemide   Injectable 80 milliGRAM(s) IV Push daily  glucagon  Injectable 1 milliGRAM(s) IntraMuscular once  heparin   Injectable 5000 Unit(s) SubCutaneous every 8 hours  hydrALAZINE 50 milliGRAM(s) Oral three times a day  insulin glargine Injectable (LANTUS) 40 Unit(s) SubCutaneous at bedtime  insulin lispro (ADMELOG) corrective regimen sliding scale   SubCutaneous three times a day before meals  insulin lispro Injectable (ADMELOG) 5 Unit(s) SubCutaneous three times a day before meals  isosorbide   dinitrate Tablet (ISORDIL) 20 milliGRAM(s) Oral three times a day  latanoprost 0.005% Ophthalmic Solution 1 Drop(s) Both EYES at bedtime  levoFLOXacin IVPB 250 milliGRAM(s) IV Intermittent every 24 hours  levoFLOXacin IVPB      melatonin 3 milliGRAM(s) Oral at bedtime  NIFEdipine XL 30 milliGRAM(s) Oral daily  polyethylene glycol 3350 17 Gram(s) Oral daily    PRN Inpatient Medications  acetaminophen   Tablet .. 650 milliGRAM(s) Oral every 6 hours PRN        VITALS/PHYSICAL EXAM  --------------------------------------------------------------------------------  T(C): 36.7 (07-21-21 @ 05:10), Max: 36.7 (07-20-21 @ 12:16)  HR: 66 (07-21-21 @ 06:42) (66 - 92)  BP: 128/60 (07-21-21 @ 05:10) (128/60 - 143/74)  RR: 16 (07-21-21 @ 05:10) (16 - 18)  SpO2: 94% (07-21-21 @ 06:42) (90% - 99%)  Wt(kg): --        07-20-21 @ 07:01  -  07-21-21 @ 07:00  --------------------------------------------------------  IN: 200 mL / OUT: 1250 mL / NET: -1050 mL      Physical Exam:  	Gen: NAD, well-appearing  	HEENT: PERRL, supple neck, clear oropharynx  	Pulm: CTA B/L  	CV: RRR, S1S2; no rub  	Back: No spinal or CVA tenderness; no sacral edema  	Abd: +BS, soft, nontender/nondistended  	: No suprapubic tenderness  	UE: Warm, FROM, no clubbing, intact strength; no edema; no asterixis  	LE: Warm, FROM, no clubbing, intact strength; no edema  	Neuro: No focal deficits, intact gait  	Psych: Normal affect and mood  	Skin: Warm, without rashes  	Vascular access:    LABS/STUDIES  --------------------------------------------------------------------------------              9.0    18.42 >-----------<  363      [07-21-21 @ 07:42]              26.2     127  |  92  |  62  ----------------------------<  164      [07-21-21 @ 07:42]  3.5   |  16  |  3.74        Ca     8.7     [07-21-21 @ 07:42]      Mg     2.10     [07-21-21 @ 07:42]      Phos  5.8     [07-21-21 @ 07:42]    TPro  6.7  /  Alb  3.0  /  TBili  0.5  /  DBili  x   /  AST  20  /  ALT  18  /  AlkPhos  208  [07-21-21 @ 07:42]          Creatinine Trend:  SCr 3.74 [07-21 @ 07:42]  SCr 3.30 [07-20 @ 09:24]  SCr 3.31 [07-20 @ 07:33]  SCr 2.74 [07-19 @ 06:42]  SCr 1.77 [07-18 @ 08:01]    Urinalysis - [07-19-21 @ 12:01]      Color Yellow / Appearance Clear / SG 1.015 / pH 5.0      Gluc Negative / Ketone Negative  / Bili Negative / Urobili <2 mg/dL       Blood Negative / Protein Trace / Leuk Est Negative / Nitrite Negative      RBC 2 / WBC 2 / Hyaline 1 / Gran  / Sq Epi  / Non Sq Epi 0 / Bacteria Negative    Urine Creatinine 150      [07-20-21 @ 16:48]  Urine Protein 23      [07-20-21 @ 16:48]  Urine Sodium <20      [07-19-21 @ 12:01]  Urine Potassium 44.1      [07-19-21 @ 12:01]  Urine Chloride <20      [07-19-21 @ 12:01]    TSH 1.58      [04-23-21 @ 04:36]  Lipid: chol 116, , HDL 26, LDL --      [07-19-21 @ 06:42]    HCV 0.12, Nonreact      [07-19-21 @ 09:49]    C3 Complement 160      [07-21-21 @ 07:42]  C4 Complement 58      [07-21-21 @ 07:42]   [facility] DIVISION OF KIDNEY DISEASES AND HYPERTENSION -- FOLLOW UP NOTE  --------------------------------------------------------------------------------  Chief Complaint: ACOSTA    24 hour events/subjective: Feeling better from the previous day. Currently on oxygen but mentating well with no complaints of SOB, N/V. Interested in getting his angiogram soon       REVIEW OF SYSTEMS  --------------------------------------------------------------------------------  Gen: No  fevers/chills   Skin: No rashes  Head/Eyes/Ears/Mouth: No headache; Normal hearing;  No sinus pain/discomfort, sore throat  Respiratory: No dyspnea, cough, wheezing, hemoptysis  CV: No chest pain, orthopnea  GI: Wide abdominal girth, No abdominal pain, diarrhea, nausea, vomiting, melena, hematochezia  : No dysuria, decrease urination or hesitancy urinating  hematuria, nocturia, non oliguric, non uremic  MSK: No joint pain/swelling; no back pain  Neuro: No dizziness/lightheadedness, weakness, seizures, numbness, tingling  Heme: No easy bruising or bleeding  Endo: No heat/cold intolerance  Psych: No significant nervousness, anxiety or depression  also with no edema     All other systems were reviewed and are negative, except as noted.    PAST HISTORY  --------------------------------------------------------------------------------  No significant changes to PMH, PSH, FHx, SHx, unless otherwise noted    ALLERGIES & MEDICATIONS  --------------------------------------------------------------------------------  Allergies    cefepime (Other)    Intolerances      Standing Inpatient Medications  aspirin enteric coated 81 milliGRAM(s) Oral daily  atorvastatin 40 milliGRAM(s) Oral at bedtime  carvedilol 6.25 milliGRAM(s) Oral every 12 hours  dextrose 40% Gel 15 Gram(s) Oral once  dextrose 5%. 1000 milliLiter(s) IV Continuous <Continuous>  dextrose 5%. 1000 milliLiter(s) IV Continuous <Continuous>  dextrose 50% Injectable 25 Gram(s) IV Push once  dextrose 50% Injectable 12.5 Gram(s) IV Push once  dextrose 50% Injectable 25 Gram(s) IV Push once  ertapenem  IVPB 500 milliGRAM(s) IV Intermittent every 24 hours  furosemide   Injectable 80 milliGRAM(s) IV Push daily  glucagon  Injectable 1 milliGRAM(s) IntraMuscular once  heparin   Injectable 5000 Unit(s) SubCutaneous every 8 hours  hydrALAZINE 50 milliGRAM(s) Oral three times a day  insulin glargine Injectable (LANTUS) 40 Unit(s) SubCutaneous at bedtime  insulin lispro (ADMELOG) corrective regimen sliding scale   SubCutaneous three times a day before meals  insulin lispro Injectable (ADMELOG) 5 Unit(s) SubCutaneous three times a day before meals  isosorbide   dinitrate Tablet (ISORDIL) 20 milliGRAM(s) Oral three times a day  latanoprost 0.005% Ophthalmic Solution 1 Drop(s) Both EYES at bedtime  levoFLOXacin IVPB 250 milliGRAM(s) IV Intermittent every 24 hours  levoFLOXacin IVPB      melatonin 3 milliGRAM(s) Oral at bedtime  NIFEdipine XL 30 milliGRAM(s) Oral daily  polyethylene glycol 3350 17 Gram(s) Oral daily    PRN Inpatient Medications  acetaminophen   Tablet .. 650 milliGRAM(s) Oral every 6 hours PRN        VITALS/PHYSICAL EXAM  --------------------------------------------------------------------------------  T(C): 36.7 (07-21-21 @ 05:10), Max: 36.7 (07-20-21 @ 12:16)  HR: 66 (07-21-21 @ 06:42) (66 - 92)  BP: 128/60 (07-21-21 @ 05:10) (128/60 - 143/74)  RR: 16 (07-21-21 @ 05:10) (16 - 18)  SpO2: 94% (07-21-21 @ 06:42) (90% - 99%)  Wt(kg): --        07-20-21 @ 07:01  -  07-21-21 @ 07:00  --------------------------------------------------------  IN: 200 mL / OUT: 1250 mL / NET: -1050 mL      Physical Exam:  	Gen: Non toxic comfortable appearing   	no jvd , supple neck,   	Pulm: decrease bs at the base of the lungs, no rales or ronchi or wheezing  	CV: RRR, S1S2; no rub  	Back: No CVA tenderness; no sacral edema  	Abd: +BS, soft, nontender/nondistended  	: No suprapubic tenderness  	UE: Warm, no cyanosis  no clubbing,  no edema; no asterixis  	LE: Warm, no cyanosis  no clubbing, no edema  	Neuro: alert and oriented. speech coherent   	Psych: Normal affect and mood  	Skin: wound at the R 2nd interspace, probes to probe, tracks 0.5cm dorsal and proximal, malodor, 1cc of pus expressed, erythema to the dorsolateral foot up until the midfoot, diffuse edema of the R forefoot, gangrene of the 2nd toe stump, ischemic changes to the R 3rd digit, cool to touch   LABS/STUDIES  --------------------------------------------------------------------------------              9.0    18.42 >-----------<  363      [07-21-21 @ 07:42]              26.2     127  |  92  |  62  ----------------------------<  164      [07-21-21 @ 07:42]  3.5   |  16  |  3.74        Ca     8.7     [07-21-21 @ 07:42]      Mg     2.10     [07-21-21 @ 07:42]      Phos  5.8     [07-21-21 @ 07:42]    TPro  6.7  /  Alb  3.0  /  TBili  0.5  /  DBili  x   /  AST  20  /  ALT  18  /  AlkPhos  208  [07-21-21 @ 07:42]          Creatinine Trend:  SCr 3.74 [07-21 @ 07:42]  SCr 3.30 [07-20 @ 09:24]  SCr 3.31 [07-20 @ 07:33]  SCr 2.74 [07-19 @ 06:42]  SCr 1.77 [07-18 @ 08:01]    Urinalysis - [07-19-21 @ 12:01]      Color Yellow / Appearance Clear / SG 1.015 / pH 5.0      Gluc Negative / Ketone Negative  / Bili Negative / Urobili <2 mg/dL       Blood Negative / Protein Trace / Leuk Est Negative / Nitrite Negative      RBC 2 / WBC 2 / Hyaline 1 / Gran  / Sq Epi  / Non Sq Epi 0 / Bacteria Negative    Urine Creatinine 150      [07-20-21 @ 16:48]  Urine Protein 23      [07-20-21 @ 16:48]  Urine Sodium <20      [07-19-21 @ 12:01]  Urine Potassium 44.1      [07-19-21 @ 12:01]  Urine Chloride <20      [07-19-21 @ 12:01]    TSH 1.58      [04-23-21 @ 04:36]  Lipid: chol 116, , HDL 26, LDL --      [07-19-21 @ 06:42]    HCV 0.12, Nonreact      [07-19-21 @ 09:49]    C3 Complement 160      [07-21-21 @ 07:42]  C4 Complement 58      [07-21-21 @ 07:42]   [facility] DIVISION OF KIDNEY DISEASES AND HYPERTENSION -- FOLLOW UP NOTE  --------------------------------------------------------------------------------    61M with history of HTN, T2DM, HLD, glaucoma, CKD with recent AIN treated with steroid, HFrEF, recent NSTEMI, PAD s/p RLE angioplasty, s/p right 2nd toe patient amputation at [facility] presents to [facility] with worsening foot pain and associated right 203 toe infection. Recent RLE angiogram revealed severe atherosclerosis. Nephrology consulted for ACOSTA amidst rising creatinine levels and continued malaise.      Chief Complaint: ACOSTA    24 hour events/subjective: Feeling better from the previous day. Currently on oxygen but mentating well with no complaints of SOB, N/V. Interested in getting his angiogram soon       REVIEW OF SYSTEMS  --------------------------------------------------------------------------------  Gen: No  fevers/chills   Skin: No rashes  Head/Eyes/Ears/Mouth: No headache; Normal hearing;  No sinus pain/discomfort, sore throat  Respiratory: No dyspnea, cough, wheezing, hemoptysis  CV: No chest pain, orthopnea  GI: Wide abdominal girth, No abdominal pain, diarrhea, nausea, vomiting, melena, hematochezia  : No dysuria, decrease urination or hesitancy urinating  hematuria, nocturia, non oliguric, non uremic  MSK: No joint pain/swelling; no back pain  Neuro: No dizziness/lightheadedness, weakness, seizures, numbness, tingling  Heme: No easy bruising or bleeding  Endo: No heat/cold intolerance  Psych: No significant nervousness, anxiety or depression  also with no edema     All other systems were reviewed and are negative, except as noted.    PAST HISTORY  --------------------------------------------------------------------------------  No significant changes to PMH, PSH, FHx, SHx, unless otherwise noted    ALLERGIES & MEDICATIONS  --------------------------------------------------------------------------------  Allergies    cefepime (Other)    Intolerances      Standing Inpatient Medications  aspirin enteric coated 81 milliGRAM(s) Oral daily  atorvastatin 40 milliGRAM(s) Oral at bedtime  carvedilol 6.25 milliGRAM(s) Oral every 12 hours  dextrose 40% Gel 15 Gram(s) Oral once  dextrose 5%. 1000 milliLiter(s) IV Continuous <Continuous>  dextrose 5%. 1000 milliLiter(s) IV Continuous <Continuous>  dextrose 50% Injectable 25 Gram(s) IV Push once  dextrose 50% Injectable 12.5 Gram(s) IV Push once  dextrose 50% Injectable 25 Gram(s) IV Push once  ertapenem  IVPB 500 milliGRAM(s) IV Intermittent every 24 hours  furosemide   Injectable 80 milliGRAM(s) IV Push daily  glucagon  Injectable 1 milliGRAM(s) IntraMuscular once  heparin   Injectable 5000 Unit(s) SubCutaneous every 8 hours  hydrALAZINE 50 milliGRAM(s) Oral three times a day  insulin glargine Injectable (LANTUS) 40 Unit(s) SubCutaneous at bedtime  insulin lispro (ADMELOG) corrective regimen sliding scale   SubCutaneous three times a day before meals  insulin lispro Injectable (ADMELOG) 5 Unit(s) SubCutaneous three times a day before meals  isosorbide   dinitrate Tablet (ISORDIL) 20 milliGRAM(s) Oral three times a day  latanoprost 0.005% Ophthalmic Solution 1 Drop(s) Both EYES at bedtime  levoFLOXacin IVPB 250 milliGRAM(s) IV Intermittent every 24 hours  levoFLOXacin IVPB      melatonin 3 milliGRAM(s) Oral at bedtime  NIFEdipine XL 30 milliGRAM(s) Oral daily  polyethylene glycol 3350 17 Gram(s) Oral daily    PRN Inpatient Medications  acetaminophen   Tablet .. 650 milliGRAM(s) Oral every 6 hours PRN        VITALS/PHYSICAL EXAM  --------------------------------------------------------------------------------  T(C): 36.7 (07-21-21 @ 05:10), Max: 36.7 (07-20-21 @ 12:16)  HR: 66 (07-21-21 @ 06:42) (66 - 92)  BP: 128/60 (07-21-21 @ 05:10) (128/60 - 143/74)  RR: 16 (07-21-21 @ 05:10) (16 - 18)  SpO2: 94% (07-21-21 @ 06:42) (90% - 99%)  Wt(kg): --        07-20-21 @ 07:01  -  07-21-21 @ 07:00  --------------------------------------------------------  IN: 200 mL / OUT: 1250 mL / NET: -1050 mL      Physical Exam:  	Gen: Non toxic comfortable appearing   	no jvd , supple neck,   	Pulm: decrease bs at the base of the lungs, no rales or ronchi or wheezing  	CV: RRR, S1S2; no rub  	Back: No CVA tenderness; no sacral edema  	Abd: +BS, soft, nontender/nondistended  	: No suprapubic tenderness  	UE: Warm, no cyanosis  no clubbing,  no edema; no asterixis  	LE: Warm, no cyanosis  no clubbing, no edema  	Neuro: alert and oriented. speech coherent   	Psych: Normal affect and mood  	Skin: wound at the R 2nd interspace, probes to probe, tracks 0.5cm dorsal and proximal, malodor, 1cc of pus expressed, erythema to the dorsolateral foot up until the midfoot, diffuse edema of the R forefoot, gangrene of the 2nd toe stump, ischemic changes to the R 3rd digit, cool to touch   LABS/STUDIES  --------------------------------------------------------------------------------              9.0    18.42 >-----------<  363      [07-21-21 @ 07:42]              26.2     127  |  92  |  62  ----------------------------<  164      [07-21-21 @ 07:42]  3.5   |  16  |  3.74        Ca     8.7     [07-21-21 @ 07:42]      Mg     2.10     [07-21-21 @ 07:42]      Phos  5.8     [07-21-21 @ 07:42]    TPro  6.7  /  Alb  3.0  /  TBili  0.5  /  DBili  x   /  AST  20  /  ALT  18  /  AlkPhos  208  [07-21-21 @ 07:42]          Creatinine Trend:  SCr 3.74 [07-21 @ 07:42]  SCr 3.30 [07-20 @ 09:24]  SCr 3.31 [07-20 @ 07:33]  SCr 2.74 [07-19 @ 06:42]  SCr 1.77 [07-18 @ 08:01]    Urinalysis - [07-19-21 @ 12:01]      Color Yellow / Appearance Clear / SG 1.015 / pH 5.0      Gluc Negative / Ketone Negative  / Bili Negative / Urobili <2 mg/dL       Blood Negative / Protein Trace / Leuk Est Negative / Nitrite Negative      RBC 2 / WBC 2 / Hyaline 1 / Gran  / Sq Epi  / Non Sq Epi 0 / Bacteria Negative    Urine Creatinine 150      [07-20-21 @ 16:48]  Urine Protein 23      [07-20-21 @ 16:48]  Urine Sodium <20      [07-19-21 @ 12:01]  Urine Potassium 44.1      [07-19-21 @ 12:01]  Urine Chloride <20      [07-19-21 @ 12:01]    TSH 1.58      [04-23-21 @ 04:36]  Lipid: chol 116, , HDL 26, LDL --      [07-19-21 @ 06:42]    HCV 0.12, Nonreact      [07-19-21 @ 09:49]    C3 Complement 160      [07-21-21 @ 07:42]  C4 Complement 58      [07-21-21 @ 07:42]

## 2021-07-21 NOTE — CONSULT NOTE ADULT - ATTENDING SUPERVISION STATEMENT
Patient Education     Well-Child Checkup: 6 to 10 Years     Struggles in school can indicate problems with a child’s health or development. If your child is having trouble in school, talk to the child’s healthcare provider.   Even if your child is healthy, keep bringing him or her in for yearly checkups. These visits make sure that your child’s health is protected with scheduled vaccines and health screenings. Your child's healthcare provider will also check his or her growth and development. This sheet describes some of what you can expect.  School and social issues  Here are some topics you, your child, and the healthcare provider may want to discuss during this visit:  · Reading. Does your child like to read? Is the child reading at the right level for his or her age group?   · Friendships. Does your child have friends at school? How do they get along? Do you like your child’s friends? Do you have any concerns about your child’s friendships or problems that may be happening with other children, such as bullying?  · Activities. What does your child like to do for fun? Is he or she involved in after-school activities such as sports, scouting, or music classes?   · Family interaction. How are things at home? Does your child have good relationships with others in the family? Does he or she talk to you about problems? How is the child’s behavior at home?   · Behavior and participation at school. How does your child act at school? Does the child follow the classroom routine and take part in group activities? What do teachers say about the child’s behavior? Is homework finished on time? Do you or other family members help with homework?  · Household chores. Does your child help around the house with chores such as taking out the trash or setting the table?  Nutrition and exercise tips  Teaching your child healthy eating and lifestyle habits can lead to a lifetime of good health. To help, set a good example with your 
words and actions. Remember, good habits formed now will stay with your child forever. Here are some tips:  · Help your child get at least 30 to 60 minutes of active play per day. Moving around helps keep your child healthy. Go to the park, ride bikes, or play active games like tag or ball.  · Limit “screen time” to 1 hour each day. This includes time spent watching TV, playing video games, using the computer, and texting. If your child has a TV, computer, or video game console in the bedroom, replace it with a music player. For many kids, dancing and singing are fun ways to get moving.  · Limit sugary drinks. Soda, juice, and sports drinks lead to unhealthy weight gain and tooth decay. Water and low-fat or nonfat milk are best to drink. In moderation (6 ounces for a child 6 years old and 12 ounces for a child 7 to 10 years old daily), 100% fruit juice is OK. Save soda and other sugary drinks for special occasions.   · Serve nutritious foods. Keep a variety of healthy foods on hand for snacks, including fresh fruits and vegetables, lean meats, and whole grains. Foods like french fries, candy, and snack foods should only be served rarely.   · Serve child-sized portions. Children don’t need as much food as adults. Serve your child portions that make sense for his or her age and size. Let your child stop eating when he or she is full. If your child is still hungry after a meal, offer more vegetables or fruit.  · Ask the healthcare provider about your child’s weight. Your child should gain about 4 to 5 pounds (1.81 to 2.27 kg) each year. If your child is gaining more than that, talk to the healthcare provider about healthy eating habits and exercise guidelines.  · Bring your child to the dentist at least twice a year for teeth cleaning and a checkup.  Sleeping tips  Now that your child is in school, a good night’s sleep is even more important. At this age, your child needs about 10 hours of sleep each night. Here are 
some tips:  · Set a bedtime and make sure your child follows it each night.  · TV, computer, and video games can agitate a child and make it hard to calm down for the night. Turn them off at least an hour before bed. Instead, read a chapter of a book together.  · Remind your child to brush and floss his or her teeth before bed. Directly supervise your child's dental self-care to make sure that both the back teeth and the front teeth are cleaned.  Safety tips  Recommendations to keep your child safe include the following:   · When riding a bike, your child should wear a helmet with the strap fastened. While roller-skating, roller-blading, or using a scooter or skateboard, it’s safest to wear wrist guards, elbow pads, knee pads, and a helmet.  · In the car, continue to use a booster seat until your child is taller than 4 feet 9 inches. At this height, kids are able to sit with the seat belt fitting correctly over the collarbone and hips. Ask the healthcare provider if you have questions about when your child will be ready to stop using a booster seat. All children younger than 13 should sit in the back seat.  · Teach your child not to talk to strangers or go anywhere with a stranger.  · Teach your child to swim. Many communities offer low-cost swimming lessons. Do not let your child play in or around a pool unattended, even if he or she knows how to swim.  Vaccines  Based on recommendations from the CDC, at this visit your child may receive the following vaccines:  · Diphtheria, tetanus, and pertussis (age 6 only)  · Human papillomavirus (HPV) (ages 9 and up)  · Influenza (flu), annually  · Measles, mumps, and rubella (age 6)  · Polio (age 6)  · Varicella (chickenpox) (age 6)  Bedwetting: It’s not your child’s fault  Bedwetting, or urinating when sleeping, can be frustrating for both you and your child. But it’s usually not a sign of a major problem. Your child’s body may simply need more time to mature. If a child 
suddenly starts wetting the bed, the cause is often a lifestyle change (such as starting school) or a stressful event (such as the birth of a sibling). But whatever the cause, it’s not in your child’s direct control. If your child wets the bed:  · Keep in mind that your child is not wetting on purpose. Never punish or tease a child for wetting the bed. Punishment or shaming may make the problem worse, not better.  · To help your child, be positive and supportive. Praise your child for not wetting and even for trying hard to stay dry.  · Two hours before bedtime don’t serve your child anything to drink.  · Remind your child to use the toilet before bed. You could also wake him or her to use the bathroom before you go to bed yourself.  · Have a routine for changing sheets and pajamas when the child wets. Try to make this routine as calm and orderly as possible. This will help keep both you and your child from getting too upset or frustrated to go back to sleep.  · Put up a calendar or chart and give your child a star or sticker for nights that he or she doesn’t wet the bed.  · Encourage your child to get out of bed and try to use the toilet if he or she wakes during the night. Put night-lights in the bedroom, hallway, and bathroom to help your child feel safer walking to the bathroom.  · If you have concerns about bedwetting, discuss them with the healthcare provider.  [employer] last reviewed this educational content on 4/1/2020 © 2000-2020 [employer]. All rights reserved. This information is not intended as a substitute for professional medical care. Always follow your healthcare professional's instructions.           
Resident

## 2021-07-21 NOTE — PROGRESS NOTE ADULT - ASSESSMENT
60 y/o male with h/o HTN, HFrEF (EF 52%),  insulin dependent DM-2, hld, glaucoma, CKD3 with recent interstitial nephritis treated with steroids, recent NSTEMI tx w/ heparin and dual antiplatelets, PAD s/p RLE angioplasty, s/p right 2nd toe partial amputation at [facility] in April 2021, who presents with 2 week history of worsening right foot pain, associated with right 2nd residual toe stump/3rd toe infection/discoloration to blue/black, xray c/f osteomyelitis, plan for surgical intervention this admission per podiatry. Cardiology consulted for pre-operative risk stratification.     Recommendations:       60 y/o male with h/o HTN, HFrEF (EF 52%),  insulin dependent DM-2, hld, glaucoma, CKD3 with recent interstitial nephritis treated with steroids, recent NSTEMI tx w/ heparin and dual antiplatelets, PAD s/p RLE angioplasty, s/p right 2nd toe partial amputation at [facility] in April 2021, who presents with 2 week history of worsening right foot pain, associated with right 2nd residual toe stump/3rd toe infection/discoloration to blue/black, xray c/f osteomyelitis, plan for surgical intervention this admission per podiatry. Cardiology consulted for pre-operative risk stratification.     Recommendations:  -Pt not adequately responding to IV Lasix. given worsening kidney function Recommend Bumex 4mg IV bolus for diuresis. Will reassess patient's volume status tomorrow AM.   -Pt still not optimized for surgery from cardiac standpoint

## 2021-07-21 NOTE — PROGRESS NOTE ADULT - PROBLEM SELECTOR PLAN 2
On CKD3, h/o recent ACOSTA d/t AIN , responded to prednisone. likely prerenal vs intrinsic  - trend Cr  - hydrate as tolerated given pulmonary edema, holding for now  - urine lytes and u/a  - neph c/s, optimize pre op  - stop vanc  - renal and bladder ultrasound, spot urine cr and total protein  -dose meds per renal fxn, avoid nephrotoxins, avoid ACEi and ARBs, Maintain MAP >65, renal diet worsening, cr is uptrending with decreasing UOP. On CKD3, h/o recent ACOSTA d/t AIN , responded to prednisone. likely prerenal vs intrinsic, renal u/s without hydronephrosis  - neph following appreciate assistance  - strict I+O  - condom cath  - trend Cr  - hydrate as tolerated given pulmonary edema, holding for now  - repeat u/a and ur lytes/osm  - stop vanc  -dose meds per renal fxn, avoid nephrotoxins, avoid ACEi and ARBs, Maintain MAP >65, renal diet

## 2021-07-21 NOTE — PROGRESS NOTE ADULT - PROBLEM SELECTOR PLAN 1
-  h/o recent ACOSTA d/t AIN , responded to prednisone.  - patient currently non-oliguric, not uremic  - currently on ertapenam 500mg QD

## 2021-07-21 NOTE — PROGRESS NOTE ADULT - SUBJECTIVE AND OBJECTIVE BOX
GENERAL SURGERY PROGRESS NOTE     HOLLY FITZGERALD  61y  Male  Hospital day :3d    OVERNIGHT EVENTS: no acute events overnight     T(F): 98.1 (07-21-21 @ 05:10), Max: 98.1 (07-21-21 @ 05:10)  HR: 66 (07-21-21 @ 06:42) (66 - 92)  BP: 128/60 (07-21-21 @ 05:10) (128/60 - 143/74)  RR: 16 (07-21-21 @ 05:10) (16 - 18)  SpO2: 94% (07-21-21 @ 06:42) (90% - 99%)    DIET/FLUIDS: dextrose 5%. 1000 milliLiter(s) IV Continuous <Continuous>  dextrose 5%. 1000 milliLiter(s) IV Continuous <Continuous>     GI proph:    AC/ proph: aspirin enteric coated 81 milliGRAM(s) Oral daily  heparin   Injectable 5000 Unit(s) SubCutaneous every 8 hours    ABx: ertapenem  IVPB 500 milliGRAM(s) IV Intermittent every 24 hours  levoFLOXacin IVPB 250 milliGRAM(s) IV Intermittent every 24 hours  levoFLOXacin IVPB        LABS  CAPILLARY BLOOD GLUCOSE  325 (20 Jul 2021 18:02)    POCT Blood Glucose.: 137 mg/dL (21 Jul 2021 12:04)  POCT Blood Glucose.: 163 mg/dL (21 Jul 2021 08:33)  POCT Blood Glucose.: 231 mg/dL (20 Jul 2021 22:46)  POCT Blood Glucose.: 325 mg/dL (20 Jul 2021 18:02)                          9.0    18.42 )-----------( 363      ( 21 Jul 2021 07:42 )             26.2       Auto Neutrophil %: 84.0 % (07-21-21 @ 07:42)  Auto Immature Granulocyte %: 1.6 % (07-21-21 @ 07:42)    07-21    127<L>  |  92<L>  |  62<H>  ----------------------------<  164<H>  3.5   |  16<L>  |  3.74<H>      Calcium, Total Serum: 8.7 mg/dL (07-21-21 @ 07:42)      LFTs:             6.7  | 0.5  | 20       ------------------[208      21 Jul 2021 07:42 )  3.0  | x    | 18          Lipase:x      Amylase:x             Coags:        Serum Pro-Brain Natriuretic Peptide: 80942 pg/mL (07-21-21 @ 07:54)

## 2021-07-21 NOTE — PROGRESS NOTE ADULT - SUBJECTIVE AND OBJECTIVE BOX
Patient seen and examined at bedside.    Overnight Events:   Patient desaturated to lowest 89% overnight. Was placed on Bipap with improvement to 98%. Currently saturating 94-98% on 5L NC.     Patient states he had trouble sleeping due to being placed on Bipap overnight. States he did not have any sob, and feels the same as he did yesterday. Denied any HA, CP, abd pain.    Patient's angiogram was cancelled this AM secondary to worsening kidney function.       Review Of Systems: No chest pain, shortness of breath, or palpitations            Current Meds:  acetaminophen   Tablet .. 650 milliGRAM(s) Oral every 6 hours PRN  aspirin enteric coated 81 milliGRAM(s) Oral daily  atorvastatin 40 milliGRAM(s) Oral at bedtime  carvedilol 6.25 milliGRAM(s) Oral every 12 hours  dextrose 40% Gel 15 Gram(s) Oral once  dextrose 5%. 1000 milliLiter(s) IV Continuous <Continuous>  dextrose 5%. 1000 milliLiter(s) IV Continuous <Continuous>  dextrose 50% Injectable 25 Gram(s) IV Push once  dextrose 50% Injectable 12.5 Gram(s) IV Push once  dextrose 50% Injectable 25 Gram(s) IV Push once  ertapenem  IVPB 500 milliGRAM(s) IV Intermittent every 24 hours  furosemide   Injectable 80 milliGRAM(s) IV Push daily  glucagon  Injectable 1 milliGRAM(s) IntraMuscular once  heparin   Injectable 5000 Unit(s) SubCutaneous every 8 hours  hydrALAZINE 50 milliGRAM(s) Oral three times a day  insulin glargine Injectable (LANTUS) 40 Unit(s) SubCutaneous at bedtime  insulin lispro (ADMELOG) corrective regimen sliding scale   SubCutaneous three times a day before meals  insulin lispro Injectable (ADMELOG) 5 Unit(s) SubCutaneous three times a day before meals  isosorbide   dinitrate Tablet (ISORDIL) 20 milliGRAM(s) Oral three times a day  latanoprost 0.005% Ophthalmic Solution 1 Drop(s) Both EYES at bedtime  levoFLOXacin IVPB 250 milliGRAM(s) IV Intermittent every 24 hours  levoFLOXacin IVPB      melatonin 3 milliGRAM(s) Oral at bedtime  NIFEdipine XL 30 milliGRAM(s) Oral daily  polyethylene glycol 3350 17 Gram(s) Oral daily      Vitals:  T(F): 98.1 (07-21), Max: 98.1 (07-21)  HR: 66 (07-21) (66 - 92)  BP: 128/60 (07-21) (128/60 - 143/74)  RR: 16 (07-21)  SpO2: 94% (07-21)  I&O's Summary    20 Jul 2021 07:01  -  21 Jul 2021 07:00  --------------------------------------------------------  IN: 200 mL / OUT: 1250 mL / NET: -1050 mL        Physical Exam:  Appearance: No acute distress. Tired appearing.  Eyes: PERRL, EOMI, pink conjunctiva  HEENT: Normal oral mucosa  Cardiovascular: RRR, S1, S2, no murmurs, rubs, or gallops.  Respiratory: Faint crackles heard at bilateral bases.   Gastrointestinal: soft, non-tender, non-distended with normal bowel sounds  Musculoskeletal: No clubbing; no joint deformity   Neurologic: Non-focal  Lymphatic: No lymphadenopathy  Psychiatry: AAOx3, mood & affect appropriate  Skin: No rashes, ecchymoses, or cyanosis                          9.0    18.42 )-----------( 363      ( 21 Jul 2021 07:42 )             26.2     07-21    127<L>  |  92<L>  |  62<H>  ----------------------------<  164<H>  3.5   |  16<L>  |  3.74<H>    Ca    8.7      21 Jul 2021 07:42  Phos  5.8     07-21  Mg     2.10     07-21    TPro  6.7  /  Alb  3.0<L>  /  TBili  0.5  /  DBili  x   /  AST  20  /  ALT  18  /  AlkPhos  208<H>  07-21   Patient seen and examined at bedside.    Overnight Events:   Patient desaturated to lowest 89% overnight. Was placed on Bipap with improvement to 98%. Currently saturating 94-98% on 5L NC. Was given 80mg IV push of Lasix. This AM patient endorses urinating twice since 8AM, unknown volume.     Patient states he had trouble sleeping due to being placed on Bipap overnight. States he did not have any sob, and feels the same as he did yesterday. Denied any HA, CP, abd pain.    Patient's angiogram was cancelled this AM secondary to worsening kidney function.       Review Of Systems: No chest pain, shortness of breath, or palpitations            Current Meds:  acetaminophen   Tablet .. 650 milliGRAM(s) Oral every 6 hours PRN  aspirin enteric coated 81 milliGRAM(s) Oral daily  atorvastatin 40 milliGRAM(s) Oral at bedtime  carvedilol 6.25 milliGRAM(s) Oral every 12 hours  dextrose 40% Gel 15 Gram(s) Oral once  dextrose 5%. 1000 milliLiter(s) IV Continuous <Continuous>  dextrose 5%. 1000 milliLiter(s) IV Continuous <Continuous>  dextrose 50% Injectable 25 Gram(s) IV Push once  dextrose 50% Injectable 12.5 Gram(s) IV Push once  dextrose 50% Injectable 25 Gram(s) IV Push once  ertapenem  IVPB 500 milliGRAM(s) IV Intermittent every 24 hours  furosemide   Injectable 80 milliGRAM(s) IV Push daily  glucagon  Injectable 1 milliGRAM(s) IntraMuscular once  heparin   Injectable 5000 Unit(s) SubCutaneous every 8 hours  hydrALAZINE 50 milliGRAM(s) Oral three times a day  insulin glargine Injectable (LANTUS) 40 Unit(s) SubCutaneous at bedtime  insulin lispro (ADMELOG) corrective regimen sliding scale   SubCutaneous three times a day before meals  insulin lispro Injectable (ADMELOG) 5 Unit(s) SubCutaneous three times a day before meals  isosorbide   dinitrate Tablet (ISORDIL) 20 milliGRAM(s) Oral three times a day  latanoprost 0.005% Ophthalmic Solution 1 Drop(s) Both EYES at bedtime  levoFLOXacin IVPB 250 milliGRAM(s) IV Intermittent every 24 hours  levoFLOXacin IVPB      melatonin 3 milliGRAM(s) Oral at bedtime  NIFEdipine XL 30 milliGRAM(s) Oral daily  polyethylene glycol 3350 17 Gram(s) Oral daily      Vitals:  T(F): 98.1 (07-21), Max: 98.1 (07-21)  HR: 66 (07-21) (66 - 92)  BP: 128/60 (07-21) (128/60 - 143/74)  RR: 16 (07-21)  SpO2: 94% (07-21)  I&O's Summary    20 Jul 2021 07:01  -  21 Jul 2021 07:00  --------------------------------------------------------  IN: 200 mL / OUT: 1250 mL / NET: -1050 mL        Physical Exam:  Appearance: No acute distress. Tired appearing.  Eyes: PERRL, EOMI, pink conjunctiva  HEENT: Normal oral mucosa  Cardiovascular: RRR. No murmur auscultated.  Respiratory: Crackles heard at bilateral bases. Left>Right.  Gastrointestinal: soft, non-tender, non-distended with normal bowel sounds  Musculoskeletal: No clubbing; no joint deformity   Neurologic: Non-focal  Lymphatic: No lymphadenopathy  Psychiatry: AAOx3, mood & affect appropriate  Skin: No rashes, ecchymoses, or cyanosis                          9.0    18.42 )-----------( 363      ( 21 Jul 2021 07:42 )             26.2     07-21    127<L>  |  92<L>  |  62<H>  ----------------------------<  164<H>  3.5   |  16<L>  |  3.74<H>    Ca    8.7      21 Jul 2021 07:42  Phos  5.8     07-21  Mg     2.10     07-21    TPro  6.7  /  Alb  3.0<L>  /  TBili  0.5  /  DBili  x   /  AST  20  /  ALT  18  /  AlkPhos  208<H>  07-21   Patient seen and examined at bedside.    Overnight Events:   Patient desaturated to lowest 89% overnight. Was placed on Bipap with improvement to 98%. Currently saturating 94-98% on 5L NC. Was given 80mg IV push of Lasix. This AM patient endorses urinating twice since 8AM, unknown volume.     Patient states he had trouble sleeping due to being placed on Bipap overnight. States he did not have any sob, and feels the same as he did yesterday. Denied any HA, CP, abd pain.    Patient's angiogram was cancelled this AM secondary to worsening kidney function.       Review Of Systems: No chest pain, shortness of breath, or palpitations            Current Meds:  acetaminophen   Tablet .. 650 milliGRAM(s) Oral every 6 hours PRN  aspirin enteric coated 81 milliGRAM(s) Oral daily  atorvastatin 40 milliGRAM(s) Oral at bedtime  carvedilol 6.25 milliGRAM(s) Oral every 12 hours  dextrose 40% Gel 15 Gram(s) Oral once  dextrose 5%. 1000 milliLiter(s) IV Continuous <Continuous>  dextrose 5%. 1000 milliLiter(s) IV Continuous <Continuous>  dextrose 50% Injectable 25 Gram(s) IV Push once  dextrose 50% Injectable 12.5 Gram(s) IV Push once  dextrose 50% Injectable 25 Gram(s) IV Push once  ertapenem  IVPB 500 milliGRAM(s) IV Intermittent every 24 hours  furosemide   Injectable 80 milliGRAM(s) IV Push daily  glucagon  Injectable 1 milliGRAM(s) IntraMuscular once  heparin   Injectable 5000 Unit(s) SubCutaneous every 8 hours  hydrALAZINE 50 milliGRAM(s) Oral three times a day  insulin glargine Injectable (LANTUS) 40 Unit(s) SubCutaneous at bedtime  insulin lispro (ADMELOG) corrective regimen sliding scale   SubCutaneous three times a day before meals  insulin lispro Injectable (ADMELOG) 5 Unit(s) SubCutaneous three times a day before meals  isosorbide   dinitrate Tablet (ISORDIL) 20 milliGRAM(s) Oral three times a day  latanoprost 0.005% Ophthalmic Solution 1 Drop(s) Both EYES at bedtime  levoFLOXacin IVPB 250 milliGRAM(s) IV Intermittent every 24 hours  levoFLOXacin IVPB      melatonin 3 milliGRAM(s) Oral at bedtime  NIFEdipine XL 30 milliGRAM(s) Oral daily  polyethylene glycol 3350 17 Gram(s) Oral daily      Vitals:  T(F): 98.1 (07-21), Max: 98.1 (07-21)  HR: 66 (07-21) (66 - 92)  BP: 128/60 (07-21) (128/60 - 143/74)  RR: 16 (07-21)  SpO2: 94% (07-21)  I&O's Summary    20 Jul 2021 07:01  -  21 Jul 2021 07:00  --------------------------------------------------------  IN: 200 mL / OUT: 1250 mL / NET: -1050 mL        Physical Exam:  Appearance: No acute distress. Tired appearing.  Eyes: PERRL, EOMI, pink conjunctiva  HEENT: Normal oral mucosa  Cardiovascular: RRR. No murmur auscultated. +JVD   Respiratory: Crackles heard at bilateral bases. Left>Right.  Gastrointestinal: soft, non-tender, non-distended with normal bowel sounds  Musculoskeletal: No clubbing; no joint deformity   Neurologic: Non-focal  Lymphatic: No lymphadenopathy  Psychiatry: AAOx3, mood & affect appropriate  Skin: No rashes, ecchymoses, or cyanosis                          9.0    18.42 )-----------( 363      ( 21 Jul 2021 07:42 )             26.2     07-21    127<L>  |  92<L>  |  62<H>  ----------------------------<  164<H>  3.5   |  16<L>  |  3.74<H>    Ca    8.7      21 Jul 2021 07:42  Phos  5.8     07-21  Mg     2.10     07-21    TPro  6.7  /  Alb  3.0<L>  /  TBili  0.5  /  DBili  x   /  AST  20  /  ALT  18  /  AlkPhos  208<H>  07-21

## 2021-07-22 LAB
ALBUMIN SERPL ELPH-MCNC: 3.1 G/DL — LOW (ref 3.3–5)
ALP SERPL-CCNC: 219 U/L — HIGH (ref 40–120)
ALT FLD-CCNC: 20 U/L — SIGNIFICANT CHANGE UP (ref 4–41)
ANION GAP SERPL CALC-SCNC: 19 MMOL/L — HIGH (ref 7–14)
ANION GAP SERPL CALC-SCNC: 20 MMOL/L — HIGH (ref 7–14)
AST SERPL-CCNC: 21 U/L — SIGNIFICANT CHANGE UP (ref 4–40)
BASOPHILS # BLD AUTO: 0.07 K/UL — SIGNIFICANT CHANGE UP (ref 0–0.2)
BASOPHILS NFR BLD AUTO: 0.4 % — SIGNIFICANT CHANGE UP (ref 0–2)
BILIRUB SERPL-MCNC: 0.5 MG/DL — SIGNIFICANT CHANGE UP (ref 0.2–1.2)
BUN SERPL-MCNC: 71 MG/DL — HIGH (ref 7–23)
BUN SERPL-MCNC: 73 MG/DL — HIGH (ref 7–23)
CALCIUM SERPL-MCNC: 8.7 MG/DL — SIGNIFICANT CHANGE UP (ref 8.4–10.5)
CALCIUM SERPL-MCNC: 9 MG/DL — SIGNIFICANT CHANGE UP (ref 8.4–10.5)
CHLORIDE SERPL-SCNC: 94 MMOL/L — LOW (ref 98–107)
CHLORIDE SERPL-SCNC: 94 MMOL/L — LOW (ref 98–107)
CO2 SERPL-SCNC: 15 MMOL/L — LOW (ref 22–31)
CO2 SERPL-SCNC: 18 MMOL/L — LOW (ref 22–31)
CREAT SERPL-MCNC: 3.57 MG/DL — HIGH (ref 0.5–1.3)
CREAT SERPL-MCNC: 3.75 MG/DL — HIGH (ref 0.5–1.3)
EOSINOPHIL # BLD AUTO: 0.64 K/UL — HIGH (ref 0–0.5)
EOSINOPHIL NFR BLD AUTO: 3.8 % — SIGNIFICANT CHANGE UP (ref 0–6)
GLUCOSE BLDC GLUCOMTR-MCNC: 111 MG/DL — HIGH (ref 70–99)
GLUCOSE BLDC GLUCOMTR-MCNC: 227 MG/DL — HIGH (ref 70–99)
GLUCOSE BLDC GLUCOMTR-MCNC: 229 MG/DL — HIGH (ref 70–99)
GLUCOSE BLDC GLUCOMTR-MCNC: 240 MG/DL — HIGH (ref 70–99)
GLUCOSE SERPL-MCNC: 153 MG/DL — HIGH (ref 70–99)
GLUCOSE SERPL-MCNC: 222 MG/DL — HIGH (ref 70–99)
HCT VFR BLD CALC: 26.4 % — LOW (ref 39–50)
HGB BLD-MCNC: 8.9 G/DL — LOW (ref 13–17)
IANC: 13.21 K/UL — HIGH (ref 1.5–8.5)
IMM GRANULOCYTES NFR BLD AUTO: 1.7 % — HIGH (ref 0–1.5)
LYMPHOCYTES # BLD AUTO: 1.56 K/UL — SIGNIFICANT CHANGE UP (ref 1–3.3)
LYMPHOCYTES # BLD AUTO: 9.3 % — LOW (ref 13–44)
MAGNESIUM SERPL-MCNC: 2.1 MG/DL — SIGNIFICANT CHANGE UP (ref 1.6–2.6)
MAGNESIUM SERPL-MCNC: 2.2 MG/DL — SIGNIFICANT CHANGE UP (ref 1.6–2.6)
MCHC RBC-ENTMCNC: 27.1 PG — SIGNIFICANT CHANGE UP (ref 27–34)
MCHC RBC-ENTMCNC: 33.7 GM/DL — SIGNIFICANT CHANGE UP (ref 32–36)
MCV RBC AUTO: 80.5 FL — SIGNIFICANT CHANGE UP (ref 80–100)
MONOCYTES # BLD AUTO: 1 K/UL — HIGH (ref 0–0.9)
MONOCYTES NFR BLD AUTO: 6 % — SIGNIFICANT CHANGE UP (ref 2–14)
MPO AB + PR3 PNL SER: SIGNIFICANT CHANGE UP
NEUTROPHILS # BLD AUTO: 13.21 K/UL — HIGH (ref 1.8–7.4)
NEUTROPHILS NFR BLD AUTO: 78.8 % — HIGH (ref 43–77)
NRBC # BLD: 0 /100 WBCS — SIGNIFICANT CHANGE UP
NRBC # FLD: 0 K/UL — SIGNIFICANT CHANGE UP
NT-PROBNP SERPL-SCNC: HIGH PG/ML
NT-PROBNP SERPL-SCNC: HIGH PG/ML
PHOSPHATE SERPL-MCNC: 5.6 MG/DL — HIGH (ref 2.5–4.5)
PHOSPHATE SERPL-MCNC: 5.8 MG/DL — HIGH (ref 2.5–4.5)
PLATELET # BLD AUTO: 404 K/UL — HIGH (ref 150–400)
POTASSIUM SERPL-MCNC: 3.4 MMOL/L — LOW (ref 3.5–5.3)
POTASSIUM SERPL-MCNC: 3.8 MMOL/L — SIGNIFICANT CHANGE UP (ref 3.5–5.3)
POTASSIUM SERPL-SCNC: 3.4 MMOL/L — LOW (ref 3.5–5.3)
POTASSIUM SERPL-SCNC: 3.8 MMOL/L — SIGNIFICANT CHANGE UP (ref 3.5–5.3)
PROT SERPL-MCNC: 7 G/DL — SIGNIFICANT CHANGE UP (ref 6–8.3)
RBC # BLD: 3.28 M/UL — LOW (ref 4.2–5.8)
RBC # FLD: 13 % — SIGNIFICANT CHANGE UP (ref 10.3–14.5)
SODIUM SERPL-SCNC: 129 MMOL/L — LOW (ref 135–145)
SODIUM SERPL-SCNC: 131 MMOL/L — LOW (ref 135–145)
WBC # BLD: 16.76 K/UL — HIGH (ref 3.8–10.5)
WBC # FLD AUTO: 16.76 K/UL — HIGH (ref 3.8–10.5)

## 2021-07-22 PROCEDURE — 99232 SBSQ HOSP IP/OBS MODERATE 35: CPT

## 2021-07-22 PROCEDURE — 71045 X-RAY EXAM CHEST 1 VIEW: CPT | Mod: 26

## 2021-07-22 PROCEDURE — 93970 EXTREMITY STUDY: CPT | Mod: 26

## 2021-07-22 PROCEDURE — 99233 SBSQ HOSP IP/OBS HIGH 50: CPT

## 2021-07-22 PROCEDURE — 99233 SBSQ HOSP IP/OBS HIGH 50: CPT | Mod: GC

## 2021-07-22 PROCEDURE — 78582 LUNG VENTILAT&PERFUS IMAGING: CPT | Mod: 26,GC

## 2021-07-22 RX ORDER — MULTIVIT WITH MIN/MFOLATE/K2 340-15/3 G
1 POWDER (GRAM) ORAL ONCE
Refills: 0 | Status: COMPLETED | OUTPATIENT
Start: 2021-07-22 | End: 2021-07-22

## 2021-07-22 RX ORDER — SENNA PLUS 8.6 MG/1
2 TABLET ORAL AT BEDTIME
Refills: 0 | Status: DISCONTINUED | OUTPATIENT
Start: 2021-07-22 | End: 2021-08-04

## 2021-07-22 RX ORDER — POTASSIUM CHLORIDE 20 MEQ
20 PACKET (EA) ORAL ONCE
Refills: 0 | Status: COMPLETED | OUTPATIENT
Start: 2021-07-22 | End: 2021-07-22

## 2021-07-22 RX ADMIN — CARVEDILOL PHOSPHATE 6.25 MILLIGRAM(S): 80 CAPSULE, EXTENDED RELEASE ORAL at 05:42

## 2021-07-22 RX ADMIN — SENNA PLUS 2 TABLET(S): 8.6 TABLET ORAL at 21:28

## 2021-07-22 RX ADMIN — Medication 2: at 18:41

## 2021-07-22 RX ADMIN — Medication 30 MILLIGRAM(S): at 05:42

## 2021-07-22 RX ADMIN — ISOSORBIDE DINITRATE 20 MILLIGRAM(S): 5 TABLET ORAL at 21:23

## 2021-07-22 RX ADMIN — LATANOPROST 1 DROP(S): 0.05 SOLUTION/ DROPS OPHTHALMIC; TOPICAL at 21:25

## 2021-07-22 RX ADMIN — Medication 2: at 12:26

## 2021-07-22 RX ADMIN — Medication 650 MILLIGRAM(S): at 11:41

## 2021-07-22 RX ADMIN — Medication 81 MILLIGRAM(S): at 11:42

## 2021-07-22 RX ADMIN — ATORVASTATIN CALCIUM 40 MILLIGRAM(S): 80 TABLET, FILM COATED ORAL at 21:24

## 2021-07-22 RX ADMIN — CARVEDILOL PHOSPHATE 6.25 MILLIGRAM(S): 80 CAPSULE, EXTENDED RELEASE ORAL at 17:16

## 2021-07-22 RX ADMIN — BUMETANIDE 132 MILLIGRAM(S): 0.25 INJECTION INTRAMUSCULAR; INTRAVENOUS at 17:15

## 2021-07-22 RX ADMIN — ISOSORBIDE DINITRATE 20 MILLIGRAM(S): 5 TABLET ORAL at 14:01

## 2021-07-22 RX ADMIN — HEPARIN SODIUM 5000 UNIT(S): 5000 INJECTION INTRAVENOUS; SUBCUTANEOUS at 05:44

## 2021-07-22 RX ADMIN — ISOSORBIDE DINITRATE 20 MILLIGRAM(S): 5 TABLET ORAL at 05:42

## 2021-07-22 RX ADMIN — Medication 3 MILLIGRAM(S): at 21:24

## 2021-07-22 RX ADMIN — Medication 50 MILLIGRAM(S): at 14:07

## 2021-07-22 RX ADMIN — Medication 1 BOTTLE: at 11:45

## 2021-07-22 RX ADMIN — HEPARIN SODIUM 5000 UNIT(S): 5000 INJECTION INTRAVENOUS; SUBCUTANEOUS at 14:01

## 2021-07-22 RX ADMIN — INSULIN GLARGINE 30 UNIT(S): 100 INJECTION, SOLUTION SUBCUTANEOUS at 21:56

## 2021-07-22 RX ADMIN — Medication 650 MILLIGRAM(S): at 12:15

## 2021-07-22 RX ADMIN — Medication 50 MILLIGRAM(S): at 21:28

## 2021-07-22 RX ADMIN — Medication 20 MILLIEQUIVALENT(S): at 11:41

## 2021-07-22 RX ADMIN — BUMETANIDE 132 MILLIGRAM(S): 0.25 INJECTION INTRAMUSCULAR; INTRAVENOUS at 05:41

## 2021-07-22 RX ADMIN — ERTAPENEM SODIUM 100 MILLIGRAM(S): 1 INJECTION, POWDER, LYOPHILIZED, FOR SOLUTION INTRAMUSCULAR; INTRAVENOUS at 16:53

## 2021-07-22 RX ADMIN — POLYETHYLENE GLYCOL 3350 17 GRAM(S): 17 POWDER, FOR SOLUTION ORAL at 11:43

## 2021-07-22 RX ADMIN — Medication 50 MILLIGRAM(S): at 05:42

## 2021-07-22 RX ADMIN — HEPARIN SODIUM 5000 UNIT(S): 5000 INJECTION INTRAVENOUS; SUBCUTANEOUS at 21:27

## 2021-07-22 NOTE — PROGRESS NOTE ADULT - ASSESSMENT
60 y/o M patient presents with R foot toe infection  - Patient seen and evaluated   - gangrenous right foot 2nd and 3rd digits, RF 3rd interspace wound probes to bone, increased tracking proximally to 2nd and 3rd MPJ, mild wet conversion of gangrenous digits, scant pus expressed from interdigital wound, erythema consistent  - R foot MRI: early osteomyelitis within the second proximal phalangeal, periostitis to the proximal phalanx of 3rd digit without vinnie osteomyelitis.  - MICHEL/PVR showing noncompressible vessels b/l, RTBI 0.07, LTBI 0.22, waveforms flat at digits  - Vasc planning RLE angio today  - Pod plan for R foot partial 2nd and 3rd ray resection following Vascular Angiogram  - Vasc to perform Angio tentatively Thursday next week  - Please document medical clearance for right foot surgery under light sedation with local anesthesia  - Discussed with attending

## 2021-07-22 NOTE — PROGRESS NOTE ADULT - PROBLEM SELECTOR PLAN 4
- A1c 8.5  - have been having fluctuating glucose checks, suspect impaired insulin filtration in the setting of worsening ACOSTA on CKD  -at home takes lantus 60u hs and premeal insulin 28-30u   -will give lantus 30u hs and SSI. stopping premeal insulin   -monitor FS

## 2021-07-22 NOTE — PROGRESS NOTE ADULT - PROBLEM SELECTOR PLAN 2
Patient had recent RLE angiogram on 7/7/21at [facility] which revealed severe atherosclerosis. Right anterior tibial: There was a 100 % stenosis. Right tibio-peroneal: There was a 50 % stenosis. Right posterior tibial: Angiography showed mild atherosclerosis. Right peroneal: There was a 100 % stenosis.  - C3 levels wnl, C4 elevated  - f/u ANCA work up given eosinophilia

## 2021-07-22 NOTE — PROGRESS NOTE ADULT - PROBLEM SELECTOR PLAN 3
-pt with h/o right foot 2nd toe partial amputation in April at [facility], h/o severe RLE PVD with RLE angioplasty in past, no stent, now p/w 2nd residual toe/stump and 3rd toe infection, xray concerning for osteomyelitis, elevated inflammatory markers ESR 96, CRP 80.6 c/w acute OM. rec'd IV vanc 1 g bid (7/17 pm - 7/20), zoxyn (7/18-7/20)  -7/18 blood cultures x2  -ID c/s start erta and levaquin (7/20 - )  -MRI right foot: osteomyelitis in 2nd toe, periostitis w/o osteo on 3rd toe  -podiatry consulted, s/p I&D 7/17. plan for RF P2RR, P3RR pending MICHEL/PVR vasc recs  - vascular consult: angiogram with co2 angio  -needs cardiology clearance, RCRI 3, high risk for periop MACE

## 2021-07-22 NOTE — PROGRESS NOTE ADULT - ASSESSMENT
60 y/o male with h/o HTN, HFrEF (EF 52%),  insulin dependent DM-2, hld, glaucoma, CKD3 with recent interstitial nephritis treated with steroids, recent NSTEMI tx w/ heparin and dual antiplatelets, PAD s/p RLE angioplasty, s/p right 2nd toe partial amputation at [facility] in April 2021, who presents with 2 week history of worsening right foot pain, associated with right 2nd residual toe stump/3rd toe infection/discoloration to blue/black, xray c/f osteomyelitis, plan for surgical intervention this admission per podiatry. Cardiology consulted for pre-operative risk stratification.     Recommendations:  -Pt kidney function improved from yesterday (Cr 3.93->3.75). Pt also putting out significant amount of urine. Continue with Bumex 4mg IV. Will continue to monitor volume status.  -Pt still not optimized for surgery from cardiac standpoint

## 2021-07-22 NOTE — PROGRESS NOTE ADULT - SUBJECTIVE AND OBJECTIVE BOX
Patient is a 61y old  Male who presents with a chief complaint of right foot 2nd/3rd toe infection (2021 09:49)       INTERVAL HPI/OVERNIGHT EVENTS:  Patient seen and evaluated at bedside.  Pt is resting comfortable in NAD. Denies N/V/F/C.  Pain rated at X/10    Allergies    cefepime (Other)    Intolerances        Vital Signs Last 24 Hrs  T(C): 36.5 (2021 05:35), Max: 37.1 (2021 18:39)  T(F): 97.7 (2021 05:35), Max: 98.7 (2021 18:39)  HR: 75 (2021 05:35) (73 - 90)  BP: 146/70 (2021 05:35) (134/76 - 146/70)  BP(mean): --  RR: 16 (2021 05:35) (16 - 16)  SpO2: 94% (2021 05:35) (93% - 97%)    LABS:                        8.9    16.76 )-----------( 404      ( 2021 06:32 )             26.4     07-22    129<L>  |  94<L>  |  71<H>  ----------------------------<  153<H>  3.4<L>   |  15<L>  |  3.75<H>    Ca    8.7      2021 06:32  Phos  5.8     07-22  Mg     2.10     07-22    TPro  7.0  /  Alb  3.1<L>  /  TBili  0.5  /  DBili  x   /  AST  21  /  ALT  20  /  AlkPhos  219<H>  07-22      Urinalysis Basic - ( 2021 17:25 )    Color: Yellow / Appearance: Clear / S.016 / pH: x  Gluc: x / Ketone: Negative  / Bili: Negative / Urobili: <2 mg/dL   Blood: x / Protein: Trace / Nitrite: Negative   Leuk Esterase: Negative / RBC: 6 /HPF / WBC 2 /HPF   Sq Epi: x / Non Sq Epi: 1 /HPF / Bacteria: Negative      CAPILLARY BLOOD GLUCOSE      POCT Blood Glucose.: 111 mg/dL (2021 08:36)  POCT Blood Glucose.: 190 mg/dL (2021 21:45)  POCT Blood Glucose.: 211 mg/dL (2021 18:45)  POCT Blood Glucose.: 237 mg/dL (2021 17:38)  POCT Blood Glucose.: 137 mg/dL (2021 12:04)      Lower Extremity Physical Exam:  Vascular: DP 1/4, B/L, PT non-palpable B/L, CFT <3 seconds B/L, Temperature gradient cool to warm on R, warm to cool on L    Neuro: Epicritic sensation intact to the level of ankle, B/L.  Musculoskeletal/Ortho: pain on palpation surrounding the 2nd digit   Skin: gangrenous right foot 2nd and 3rd digits, RF 3rd interspace wound probes to bone, increased tracking proximally to 2nd and 3rd MPJ, mild wet conversion of gangrenous digits, scant pus expressed from interdigital wound, erythema consistent     s/p I&D of R 2nd interspace with erythema, no pus expressed, tracks 1cm dorsal and proximal, no malodor,  resolving erythema to the dorsolateral foot up until the midfoot, diffuse edema of the R forefoot, gangrene of the 2nd toe stump, ischemic changes to the R 3rd digit, cool to touch     RADIOLOGY & ADDITIONAL TESTS:

## 2021-07-22 NOTE — PROGRESS NOTE ADULT - SUBJECTIVE AND OBJECTIVE BOX
Patient seen and examined at bedside.    Overnight Events:     Pt placed on Bipap again last night due to transient hypoxia. Pt states he was urinating through the night. Otherwise no acute events overnight.     At bedside this AM pt is comfortable without any complaints of SOB, cp, palpitations.       Review Of Systems: No chest pain, shortness of breath, or palpitations            Current Meds:  acetaminophen   Tablet .. 650 milliGRAM(s) Oral every 6 hours PRN  aspirin enteric coated 81 milliGRAM(s) Oral daily  atorvastatin 40 milliGRAM(s) Oral at bedtime  buMETAnide IVPB 4 milliGRAM(s) IV Intermittent two times a day  carvedilol 6.25 milliGRAM(s) Oral every 12 hours  dextrose 40% Gel 15 Gram(s) Oral once  dextrose 5%. 1000 milliLiter(s) IV Continuous <Continuous>  dextrose 5%. 1000 milliLiter(s) IV Continuous <Continuous>  dextrose 50% Injectable 25 Gram(s) IV Push once  dextrose 50% Injectable 12.5 Gram(s) IV Push once  dextrose 50% Injectable 25 Gram(s) IV Push once  ertapenem  IVPB 500 milliGRAM(s) IV Intermittent every 24 hours  glucagon  Injectable 1 milliGRAM(s) IntraMuscular once  heparin   Injectable 5000 Unit(s) SubCutaneous every 8 hours  hydrALAZINE 50 milliGRAM(s) Oral three times a day  insulin glargine Injectable (LANTUS) 30 Unit(s) SubCutaneous at bedtime  insulin lispro (ADMELOG) corrective regimen sliding scale   SubCutaneous three times a day before meals  isosorbide   dinitrate Tablet (ISORDIL) 20 milliGRAM(s) Oral three times a day  latanoprost 0.005% Ophthalmic Solution 1 Drop(s) Both EYES at bedtime  levoFLOXacin IVPB 250 milliGRAM(s) IV Intermittent every 24 hours  levoFLOXacin IVPB      magnesium citrate Oral Solution 1 Bottle Oral once  melatonin 3 milliGRAM(s) Oral at bedtime  NIFEdipine XL 30 milliGRAM(s) Oral daily  polyethylene glycol 3350 17 Gram(s) Oral daily  potassium chloride    Tablet ER 20 milliEquivalent(s) Oral once  senna 2 Tablet(s) Oral at bedtime      Vitals:  T(F): 97.7 (07-22), Max: 98.7 (07-21)  HR: 75 (07-22) (73 - 90)  BP: 146/70 (07-22) (134/76 - 146/70)  RR: 16 (07-22)  SpO2: 94% (07-22)  I&O's Summary    21 Jul 2021 07:01  -  22 Jul 2021 07:00  --------------------------------------------------------  IN: 350 mL / OUT: 1900 mL / NET: -1550 mL    22 Jul 2021 07:01  -  22 Jul 2021 09:50  --------------------------------------------------------  IN: 100 mL / OUT: 250 mL / NET: -150 mL        Physical Exam:  Appearance: No acute distress; well appearing  Eyes: PERRL, EOMI, pink conjunctiva  HEENT: Normal oral mucosa  Cardiovascular: RRR, S1, S2, no murmurs, rubs, or gallops; no edema; + JVD, improved from yesterday  Respiratory: + crackles at bilateral bases.   Gastrointestinal: soft, non-tender, non-distended with normal bowel sounds  Musculoskeletal: No clubbing; no joint deformity   Neurologic: Non-focal  Lymphatic: No lymphadenopathy  Psychiatry: AAOx3, mood & affect appropriate  Skin: No rashes, ecchymoses, or cyanosis                          8.9    16.76 )-----------( 404      ( 22 Jul 2021 06:32 )             26.4     07-22    129<L>  |  94<L>  |  71<H>  ----------------------------<  153<H>  3.4<L>   |  15<L>  |  3.75<H>    Ca    8.7      22 Jul 2021 06:32  Phos  5.8     07-22  Mg     2.10     07-22    TPro  7.0  /  Alb  3.1<L>  /  TBili  0.5  /  DBili  x   /  AST  21  /  ALT  20  /  AlkPhos  219<H>  07-22          Serum Pro-Brain Natriuretic Peptide: 19699 pg/mL (07-22 @ 06:32)  Serum Pro-Brain Natriuretic Peptide: 34023 pg/mL (07-21 @ 07:54)          New ECG(s): Personally reviewed    Echo:    < from: Transthoracic Echocardiogram (07.21.21 @ 12:36) >  CONCLUSIONS:  1. Mitral annular calcification, otherwise normal mitral  valve. Mild mitral regurgitation.  2. Normal left ventricular internal dimensions and wall  thicknesses.  3. Endocardium not well visualized; grossly normal left  ventricular systolic function.  4. Normal left ventricular diastolic function.  5. The right ventricle is not well visualized; grossly  normal right ventricular systolic function.  *** Compared with echocardiogram of 4/26/2021,  significantly less mitral regurgitation is seen on the    < end of copied text >      Stress Testing:

## 2021-07-22 NOTE — PROGRESS NOTE ADULT - SUBJECTIVE AND OBJECTIVE BOX
PROGRESS NOTE:   Authored by Marguerite Whitfield MD  Internal Medicine  Pager PIA [phone]  Pager [phone]    Patient is a 61y old  Male who presents with a chief complaint of right foot 2nd/3rd toe infection (2021 21:17)      SUBJECTIVE / OVERNIGHT EVENTS:    MEDICATIONS  (STANDING):  aspirin enteric coated 81 milliGRAM(s) Oral daily  atorvastatin 40 milliGRAM(s) Oral at bedtime  buMETAnide IVPB 4 milliGRAM(s) IV Intermittent two times a day  carvedilol 6.25 milliGRAM(s) Oral every 12 hours  dextrose 40% Gel 15 Gram(s) Oral once  dextrose 5%. 1000 milliLiter(s) (50 mL/Hr) IV Continuous <Continuous>  dextrose 5%. 1000 milliLiter(s) (100 mL/Hr) IV Continuous <Continuous>  dextrose 50% Injectable 25 Gram(s) IV Push once  dextrose 50% Injectable 12.5 Gram(s) IV Push once  dextrose 50% Injectable 25 Gram(s) IV Push once  ertapenem  IVPB 500 milliGRAM(s) IV Intermittent every 24 hours  glucagon  Injectable 1 milliGRAM(s) IntraMuscular once  heparin   Injectable 5000 Unit(s) SubCutaneous every 8 hours  hydrALAZINE 50 milliGRAM(s) Oral three times a day  insulin glargine Injectable (LANTUS) 30 Unit(s) SubCutaneous at bedtime  insulin lispro (ADMELOG) corrective regimen sliding scale   SubCutaneous three times a day before meals  isosorbide   dinitrate Tablet (ISORDIL) 20 milliGRAM(s) Oral three times a day  latanoprost 0.005% Ophthalmic Solution 1 Drop(s) Both EYES at bedtime  levoFLOXacin IVPB 250 milliGRAM(s) IV Intermittent every 24 hours  levoFLOXacin IVPB      melatonin 3 milliGRAM(s) Oral at bedtime  NIFEdipine XL 30 milliGRAM(s) Oral daily  polyethylene glycol 3350 17 Gram(s) Oral daily    MEDICATIONS  (PRN):  acetaminophen   Tablet .. 650 milliGRAM(s) Oral every 6 hours PRN Temp greater or equal to 38.5C (101.3F), Mild Pain (1 - 3)      CAPILLARY BLOOD GLUCOSE      POCT Blood Glucose.: 190 mg/dL (2021 21:45)  POCT Blood Glucose.: 211 mg/dL (2021 18:45)  POCT Blood Glucose.: 237 mg/dL (2021 17:38)  POCT Blood Glucose.: 137 mg/dL (2021 12:04)  POCT Blood Glucose.: 163 mg/dL (2021 08:33)    I&O's Summary    2021 07:01  -  2021 07:00  --------------------------------------------------------  IN: 350 mL / OUT: 1900 mL / NET: -1550 mL        PHYSICAL EXAM:  Vital Signs Last 24 Hrs  T(C): 36.5 (2021 05:35), Max: 37.1 (2021 18:39)  T(F): 97.7 (2021 05:35), Max: 98.7 (2021 18:39)  HR: 75 (2021 05:35) (73 - 90)  BP: 146/70 (2021 05:35) (134/76 - 146/70)  BP(mean): --  RR: 16 (2021 05:35) (16 - 16)  SpO2: 94% (2021 05:35) (93% - 97%)    GENERAL: No acute distress, well-developed  HEAD:  Atraumatic, Normocephalic  EYES: EOMI, PERRLA, conjunctiva and sclera clear  NECK: Supple, no lymphadenopathy, no JVD  CHEST/LUNG: CTAB; No wheezes, rales, or rhonchi  HEART: Regular rate and rhythm; No murmurs, rubs, or gallops  ABDOMEN: Soft, non-tender, non-distended; normal bowel sounds, no organomegaly  EXTREMITIES:  2+ peripheral pulses b/l, No clubbing, cyanosis, or edema  NEUROLOGY: A&O x 3, no focal deficits  SKIN: No rashes or lesions    LABS:                        8.9    16.76 )-----------( 404      ( 2021 06:32 )             26.4     07-21    128<L>  |  92<L>  |  68<H>  ----------------------------<  217<H>  3.7   |  16<L>  |  3.93<H>    Ca    8.8      2021 18:45  Phos  5.8       Mg     2.00         TPro  6.7  /  Alb  3.0<L>  /  TBili  0.5  /  DBili  x   /  AST  20  /  ALT  18  /  AlkPhos  208<H>            Urinalysis Basic - ( 2021 17:25 )    Color: Yellow / Appearance: Clear / S.016 / pH: x  Gluc: x / Ketone: Negative  / Bili: Negative / Urobili: <2 mg/dL   Blood: x / Protein: Trace / Nitrite: Negative   Leuk Esterase: Negative / RBC: 6 /HPF / WBC 2 /HPF   Sq Epi: x / Non Sq Epi: 1 /HPF / Bacteria: Negative          RADIOLOGY & ADDITIONAL TESTS:  Results Reviewed:   Imaging Personally Reviewed:  Electrocardiogram Personally Reviewed:    COORDINATION OF CARE:  Care Discussed with Consultants/Other Providers [Y/N]:  Prior or Outpatient Records Reviewed [Y/N]:   PROGRESS NOTE:   Authored by Marguerite Whitfield MD  Internal Medicine  Pager PIA [phone]  Pager [phone]    Patient is a 61y old  Male who presents with a chief complaint of right foot 2nd/3rd toe infection (2021 21:17)      SUBJECTIVE / OVERNIGHT EVENTS: Overnight had increasing wob and oxygen requirement. Was placed on bipap, and micu was consulted. not transferred to micu. This morning patient reports that he wasn't able to sleep well overnight. Doesn't have pain. Reports that he is able     MEDICATIONS  (STANDING):  aspirin enteric coated 81 milliGRAM(s) Oral daily  atorvastatin 40 milliGRAM(s) Oral at bedtime  buMETAnide IVPB 4 milliGRAM(s) IV Intermittent two times a day  carvedilol 6.25 milliGRAM(s) Oral every 12 hours  dextrose 40% Gel 15 Gram(s) Oral once  dextrose 5%. 1000 milliLiter(s) (50 mL/Hr) IV Continuous <Continuous>  dextrose 5%. 1000 milliLiter(s) (100 mL/Hr) IV Continuous <Continuous>  dextrose 50% Injectable 25 Gram(s) IV Push once  dextrose 50% Injectable 12.5 Gram(s) IV Push once  dextrose 50% Injectable 25 Gram(s) IV Push once  ertapenem  IVPB 500 milliGRAM(s) IV Intermittent every 24 hours  glucagon  Injectable 1 milliGRAM(s) IntraMuscular once  heparin   Injectable 5000 Unit(s) SubCutaneous every 8 hours  hydrALAZINE 50 milliGRAM(s) Oral three times a day  insulin glargine Injectable (LANTUS) 30 Unit(s) SubCutaneous at bedtime  insulin lispro (ADMELOG) corrective regimen sliding scale   SubCutaneous three times a day before meals  isosorbide   dinitrate Tablet (ISORDIL) 20 milliGRAM(s) Oral three times a day  latanoprost 0.005% Ophthalmic Solution 1 Drop(s) Both EYES at bedtime  levoFLOXacin IVPB 250 milliGRAM(s) IV Intermittent every 24 hours  levoFLOXacin IVPB      melatonin 3 milliGRAM(s) Oral at bedtime  NIFEdipine XL 30 milliGRAM(s) Oral daily  polyethylene glycol 3350 17 Gram(s) Oral daily    MEDICATIONS  (PRN):  acetaminophen   Tablet .. 650 milliGRAM(s) Oral every 6 hours PRN Temp greater or equal to 38.5C (101.3F), Mild Pain (1 - 3)      CAPILLARY BLOOD GLUCOSE      POCT Blood Glucose.: 190 mg/dL (2021 21:45)  POCT Blood Glucose.: 211 mg/dL (2021 18:45)  POCT Blood Glucose.: 237 mg/dL (2021 17:38)  POCT Blood Glucose.: 137 mg/dL (2021 12:04)  POCT Blood Glucose.: 163 mg/dL (2021 08:33)    I&O's Summary    2021 07:01  -  2021 07:00  --------------------------------------------------------  IN: 350 mL / OUT: 1900 mL / NET: -1550 mL        PHYSICAL EXAM:  Vital Signs Last 24 Hrs  T(C): 36.5 (2021 05:35), Max: 37.1 (2021 18:39)  T(F): 97.7 (2021 05:35), Max: 98.7 (2021 18:39)  HR: 75 (2021 05:35) (73 - 90)  BP: 146/70 (2021 05:35) (134/76 - 146/70)  BP(mean): --  RR: 16 (2021 05:35) (16 - 16)  SpO2: 94% (2021 05:35) (93% - 97%)    GENERAL: No acute distress, well-developed  HEAD:  Atraumatic, Normocephalic  EYES: EOMI, PERRLA, conjunctiva and sclera clear  NECK: Supple, no lymphadenopathy, no JVD  CHEST/LUNG: CTAB; No wheezes, rales, or rhonchi  HEART: Regular rate and rhythm; No murmurs, rubs, or gallops  ABDOMEN: Soft, non-tender, non-distended; normal bowel sounds, no organomegaly  EXTREMITIES:  2+ peripheral pulses b/l, No clubbing, cyanosis, or edema  NEUROLOGY: A&O x 3, no focal deficits  SKIN: No rashes or lesions    LABS:                        8.9    16.76 )-----------( 404      ( 2021 06:32 )             26.4     07-    128<L>  |  92<L>  |  68<H>  ----------------------------<  217<H>  3.7   |  16<L>  |  3.93<H>    Ca    8.8      2021 18:45  Phos  5.8       Mg     2.00         TPro  6.7  /  Alb  3.0<L>  /  TBili  0.5  /  DBili  x   /  AST  20  /  ALT  18  /  AlkPhos  208<H>            Urinalysis Basic - ( 2021 17:25 )    Color: Yellow / Appearance: Clear / S.016 / pH: x  Gluc: x / Ketone: Negative  / Bili: Negative / Urobili: <2 mg/dL   Blood: x / Protein: Trace / Nitrite: Negative   Leuk Esterase: Negative / RBC: 6 /HPF / WBC 2 /HPF   Sq Epi: x / Non Sq Epi: 1 /HPF / Bacteria: Negative          RADIOLOGY & ADDITIONAL TESTS:  Results Reviewed:   Imaging Personally Reviewed:  Electrocardiogram Personally Reviewed:    COORDINATION OF CARE:  Care Discussed with Consultants/Other Providers [Y/N]:  Prior or Outpatient Records Reviewed [Y/N]:   PROGRESS NOTE:   Authored by Marguerite Whitfield MD  Internal Medicine  Pager PIA [phone]  Pager [phone]    Patient is a 61y old  Male who presents with a chief complaint of right foot 2nd/3rd toe infection (2021 21:17)      SUBJECTIVE / OVERNIGHT EVENTS: Overnight had increasing wob and oxygen requirement. Was placed on bipap, and micu was consulted. not transferred to micu. This morning patient reports that he wasn't able to sleep well overnight. Doesn't have pain. Reports that he is able to urinate    MEDICATIONS  (STANDING):  aspirin enteric coated 81 milliGRAM(s) Oral daily  atorvastatin 40 milliGRAM(s) Oral at bedtime  buMETAnide IVPB 4 milliGRAM(s) IV Intermittent two times a day  carvedilol 6.25 milliGRAM(s) Oral every 12 hours  dextrose 40% Gel 15 Gram(s) Oral once  dextrose 5%. 1000 milliLiter(s) (50 mL/Hr) IV Continuous <Continuous>  dextrose 5%. 1000 milliLiter(s) (100 mL/Hr) IV Continuous <Continuous>  dextrose 50% Injectable 25 Gram(s) IV Push once  dextrose 50% Injectable 12.5 Gram(s) IV Push once  dextrose 50% Injectable 25 Gram(s) IV Push once  ertapenem  IVPB 500 milliGRAM(s) IV Intermittent every 24 hours  glucagon  Injectable 1 milliGRAM(s) IntraMuscular once  heparin   Injectable 5000 Unit(s) SubCutaneous every 8 hours  hydrALAZINE 50 milliGRAM(s) Oral three times a day  insulin glargine Injectable (LANTUS) 30 Unit(s) SubCutaneous at bedtime  insulin lispro (ADMELOG) corrective regimen sliding scale   SubCutaneous three times a day before meals  isosorbide   dinitrate Tablet (ISORDIL) 20 milliGRAM(s) Oral three times a day  latanoprost 0.005% Ophthalmic Solution 1 Drop(s) Both EYES at bedtime  levoFLOXacin IVPB 250 milliGRAM(s) IV Intermittent every 24 hours  levoFLOXacin IVPB      melatonin 3 milliGRAM(s) Oral at bedtime  NIFEdipine XL 30 milliGRAM(s) Oral daily  polyethylene glycol 3350 17 Gram(s) Oral daily    MEDICATIONS  (PRN):  acetaminophen   Tablet .. 650 milliGRAM(s) Oral every 6 hours PRN Temp greater or equal to 38.5C (101.3F), Mild Pain (1 - 3)      CAPILLARY BLOOD GLUCOSE      POCT Blood Glucose.: 190 mg/dL (2021 21:45)  POCT Blood Glucose.: 211 mg/dL (2021 18:45)  POCT Blood Glucose.: 237 mg/dL (2021 17:38)  POCT Blood Glucose.: 137 mg/dL (2021 12:04)  POCT Blood Glucose.: 163 mg/dL (2021 08:33)    I&O's Summary    2021 07:01  -  2021 07:00  --------------------------------------------------------  IN: 350 mL / OUT: 1900 mL / NET: -1550 mL        PHYSICAL EXAM:  Vital Signs Last 24 Hrs  T(C): 36.5 (2021 05:35), Max: 37.1 (2021 18:39)  T(F): 97.7 (2021 05:35), Max: 98.7 (2021 18:39)  HR: 75 (2021 05:35) (73 - 90)  BP: 146/70 (2021 05:35) (134/76 - 146/70)  BP(mean): --  RR: 16 (2021 05:35) (16 - 16)  SpO2: 94% (2021 05:35) (93% - 97%)    GENERAL: mild distress, on nasal cannula  HEAD:  Atraumatic, Normocephalic  EYES:  conjunctiva and sclera clear  NECK:, no JVD  CHEST/LUNG: mild diffuse crackles, increased wob  HEART: Regular rate and rhythm; No murmurs, rubs, or gallops  ABDOMEN: Soft, non-tender, non-distended; normal bowel sounds, no organomegaly  EXTREMITIES:  2+ peripheral pulses b/l, No clubbing, cyanosis, or edema  NEUROLOGY: A&O x 3, no focal deficits  SKIN: No rashes or lesions    LABS:                        8.9    16.76 )-----------( 404      ( 2021 06:32 )             26.4     07-    128<L>  |  92<L>  |  68<H>  ----------------------------<  217<H>  3.7   |  16<L>  |  3.93<H>    Ca    8.8      2021 18:45  Phos  5.8     -  Mg     2.00     -    TPro  6.7  /  Alb  3.0<L>  /  TBili  0.5  /  DBili  x   /  AST  20  /  ALT  18  /  AlkPhos  208<H>            Urinalysis Basic - ( 2021 17:25 )    Color: Yellow / Appearance: Clear / S.016 / pH: x  Gluc: x / Ketone: Negative  / Bili: Negative / Urobili: <2 mg/dL   Blood: x / Protein: Trace / Nitrite: Negative   Leuk Esterase: Negative / RBC: 6 /HPF / WBC 2 /HPF   Sq Epi: x / Non Sq Epi: 1 /HPF / Bacteria: Negative          RADIOLOGY & ADDITIONAL TESTS:  Results Reviewed:   Imaging Personally Reviewed:  Electrocardiogram Personally Reviewed:    COORDINATION OF CARE:  Care Discussed with Consultants/Other Providers [Y/N]:  Prior or Outpatient Records Reviewed [Y/N]:

## 2021-07-22 NOTE — PROGRESS NOTE ADULT - ASSESSMENT
60 y/o male with h/o HTN, insulin dependent DM-2, hld, glaucoma, CKD3 with recent interstitial nephritis treated with steroid, HFrEF, recent NSTEMI, PAD s/p RLE angioplasty, s/p right 2nd toe partial amputation at [facility] in April 2021, who presents with 2 week history of worsening right foot pain, associated with right 2nd residual toe stump/3rd toe infection/discoloration to blue/black, xray c/f osteomyelitis, plan for surgical intervention this admission per podiatry

## 2021-07-22 NOTE — PROGRESS NOTE ADULT - SUBJECTIVE AND OBJECTIVE BOX
Follow Up:      Inverval History/ROS:Patient is a 61y old  Male who presents with a chief complaint of right foot 2nd/3rd toe infection (2021 10:39)    No fever  Episode of SOB    Allergies    cefepime (Other)    Intolerances        ANTIMICROBIALS:  ertapenem  IVPB 500 every 24 hours  levoFLOXacin IVPB 250 every 24 hours  levoFLOXacin IVPB        OTHER MEDS:  acetaminophen   Tablet .. 650 milliGRAM(s) Oral every 6 hours PRN  aspirin enteric coated 81 milliGRAM(s) Oral daily  atorvastatin 40 milliGRAM(s) Oral at bedtime  buMETAnide IVPB 4 milliGRAM(s) IV Intermittent two times a day  carvedilol 6.25 milliGRAM(s) Oral every 12 hours  dextrose 40% Gel 15 Gram(s) Oral once  dextrose 5%. 1000 milliLiter(s) IV Continuous <Continuous>  dextrose 5%. 1000 milliLiter(s) IV Continuous <Continuous>  dextrose 50% Injectable 25 Gram(s) IV Push once  dextrose 50% Injectable 12.5 Gram(s) IV Push once  dextrose 50% Injectable 25 Gram(s) IV Push once  glucagon  Injectable 1 milliGRAM(s) IntraMuscular once  heparin   Injectable 5000 Unit(s) SubCutaneous every 8 hours  hydrALAZINE 50 milliGRAM(s) Oral three times a day  insulin glargine Injectable (LANTUS) 30 Unit(s) SubCutaneous at bedtime  insulin lispro (ADMELOG) corrective regimen sliding scale   SubCutaneous three times a day before meals  isosorbide   dinitrate Tablet (ISORDIL) 20 milliGRAM(s) Oral three times a day  latanoprost 0.005% Ophthalmic Solution 1 Drop(s) Both EYES at bedtime  melatonin 3 milliGRAM(s) Oral at bedtime  NIFEdipine XL 30 milliGRAM(s) Oral daily  polyethylene glycol 3350 17 Gram(s) Oral daily  senna 2 Tablet(s) Oral at bedtime      Vital Signs Last 24 Hrs  T(C): 36.5 (2021 14:00), Max: 37.1 (2021 18:39)  T(F): 97.7 (2021 14:00), Max: 98.7 (2021 18:39)  HR: 70 (2021 14:00) (70 - 90)  BP: 137/61 (2021 14:00) (134/76 - 146/70)  BP(mean): --  RR: 16 (2021 14:00) (16 - 16)  SpO2: 97% (2021 14:00) (93% - 97%)    PHYSICAL EXAM:  General: [ ] non-toxic  HEAD/EYES: [ ] PERRL [x ] white sclera [ ] icterus  ENT:  [ ] normal [x ] supple [ ] thrush [ ] pharyngeal exudate  Cardiovascular:   [ ] murmur [x ] normal [ ] PPM/AICD  Respiratory:  [ x] clear to ausculation bilaterally  GI:  x[ ] soft, non-tender, normal bowel sounds  :  [ ] nixon [ ] no CVA tenderness   Musculoskeletal:  [ ] no synovitis  Neurologic:  [ ] non-focal exam   Skin:  [x ] no rash  Lymph: x[ ] no lymphadenopathy  Psychiatric:  [ ] appropriate affect [ ] alert & oriented  Lines:  x[ ] no phlebitis [ ] central line                                8.9    16.76 )-----------( 404      ( 2021 06:32 )             26.4       07-22    129<L>  |  94<L>  |  71<H>  ----------------------------<  153<H>  3.4<L>   |  15<L>  |  3.75<H>    Ca    8.7      2021 06:32  Phos  5.8     07-  Mg     2.10     07-    TPro  7.0  /  Alb  3.1<L>  /  TBili  0.5  /  DBili  x   /  AST  21  /  ALT  20  /  AlkPhos  219<H>  07-22      Urinalysis Basic - ( 2021 17:25 )    Color: Yellow / Appearance: Clear / S.016 / pH: x  Gluc: x / Ketone: Negative  / Bili: Negative / Urobili: <2 mg/dL   Blood: x / Protein: Trace / Nitrite: Negative   Leuk Esterase: Negative / RBC: 6 /HPF / WBC 2 /HPF   Sq Epi: x / Non Sq Epi: 1 /HPF / Bacteria: Negative        MICROBIOLOGY:Culture Results:   Numerous Enterobacter aerogenes  Few Stenotrophomonas maltophilia  Moderate Streptococcus agalactiae (Group B) isolated  Group B streptococci are susceptible to ampicillin,  penicillin and cefazolin, but may be resistant to  erythromycin and clindamycin.  Recommendations for intrapartum prophylaxis for Group B  streptococci are penicillin or ampicillin. (21 @ 10:37)  Culture Results:   No growth to date. (21 @ 10:06)  Culture Results:   No growth to date. (21 @ 10:06)      RADIOLOGY:

## 2021-07-22 NOTE — PROGRESS NOTE ADULT - ASSESSMENT
60 y/o male with h/o HTN, insulin dependent DM-2, hld, glaucoma, CKD3 with recent interstitial nephritis treated with steroid, HFrEF, recent NSTEMI, PAD s/p RLE angioplasty, s/p right 2nd toe partial amputation at [facility] in April 2021, who presents with 2 week history of worsening right foot pain with plans for 2nd and 3rd toe ray amps.  Vascular Surgery called to evaluate.    Recommendations:  - will need angiogram however Cr uptrending  - will need nephro optimization and clearance  - fluid overloaded overnight, will need further medical and cardiology clearance  - will follow

## 2021-07-22 NOTE — PROGRESS NOTE ADULT - PROBLEM SELECTOR PLAN 1
with concern for acute decompensated heart failure. stable. patient desatted to low 80s 7/19 afternoon. put on 5L NC with 93-95% sats. patient reports feeling short of breath and describes retrosternal chest pressure. slightly worse with breathing. bedside focused ultrasound remarkable for b lines and mitral regurg. likely flash pulmonary edema 2/2 fluid bolus from ACOSTA treatment w/ hx of HFREF.   - stopping fluids, and   - 80 IV lasix BID  - BMP and BNP BID  - IVC ultrasound  - continuous pulse ox with tele  - ctm worsening with concern for acute decompensated heart failure vs PE. stable. patient desatted to low 80s 7/19 afternoon. put on 5L NC with 93-95% sats. patient reports feeling short of breath and describes retrosternal chest pressure. slightly worse with breathing. bedside focused ultrasound remarkable for b lines and mitral regurg. likely flash pulmonary edema 2/2 fluid bolus from ACOSTA treatment w/ hx of HFREF.   - VQ scan and DVT ultrasound  - bumex 4 mg bid  - BMP and BNP BID  - IVC ultrasound  - continuous pulse ox with tele  - ctm

## 2021-07-22 NOTE — PROGRESS NOTE ADULT - PROBLEM SELECTOR PLAN 1
-  h/o recent ACOSTA d/t AIN , responded to prednisone.  - patient currently non-oliguric, not uremic  - currently on ertapenam 500mg QD -  h/o recent ACOSTA d/t AIN , responded to prednisone.  - patient currently non-oliguric, not uremic  - currently on ertapenam 500mg QD  - recommend FEun workup   - recommend increase Bumex to 5mg BID -  h/o recent ACOSTA d/t AIN , responded to prednisone.  - patient currently non-oliguric, not uremic  - currently on ertapenam 500mg QD  - recommend FEun workup   - BNP: 95686  - recommend increase Bumex to 5mg BID -  h/o recent ACOSTA d/t AIN , responded to prednisone.  - patient currently non-oliguric, not uremic  - currently on ertapenam 500mg QD  - FEun: 14.1% demonstrating pre-renal etiology   - BNP: 00698  - recommend increase Bumex to 5mg BID

## 2021-07-22 NOTE — PROGRESS NOTE ADULT - ASSESSMENT
61 year old with h/o PVD and DM , with prior toe amputation presents with increased discoloration to his right second and third toe.   Appearance of dry gangrene and possible associated abscess.    1) Right foot toe ulcers with imaging suggestive of OM    Cx with enterobacter, s. maltophilia, group b strep    Continue ertapenem/ levaquin    2) PAD  Vascular surgery evaluation  Podiatry planning for possible surgery based on vascular eval     Pt was right lower extemity angiogram with cardiology at [place] on 7/7    2) Leukocytosis  Blood cultures without growth  Likely related to gangrene and associated abscess    3) DM  Close monitoring and control of glucose

## 2021-07-22 NOTE — PROGRESS NOTE ADULT - SUBJECTIVE AND OBJECTIVE BOX
GENERAL SURGERY PROGRESS NOTE     HOLLY FITZGERALD  61y  Male  Hospital day :4d    OVERNIGHT EVENTS: uptrending Cr, angiogram cancelled, overnight patient was short of breath, fluid overloaded requiring diuresis with bumex    T(F): 97.7 (21 @ 05:35), Max: 98.7 (21 @ 18:39)  HR: 75 (21 @ 05:35) (73 - 90)  BP: 146/70 (21 @ 05:35) (134/76 - 146/70)  RR: 16 (21 @ 05:35) (16 - 16)  SpO2: 94% (21 @ 05:35) (93% - 97%)    DIET/FLUIDS: dextrose 5%. 1000 milliLiter(s) IV Continuous <Continuous>  dextrose 5%. 1000 milliLiter(s) IV Continuous <Continuous>      URINE:      GI proph:    AC/ proph: aspirin enteric coated 81 milliGRAM(s) Oral daily  heparin   Injectable 5000 Unit(s) SubCutaneous every 8 hours    ABx: ertapenem  IVPB 500 milliGRAM(s) IV Intermittent every 24 hours  levoFLOXacin IVPB 250 milliGRAM(s) IV Intermittent every 24 hours  levoFLOXacin IVPB          LABS  Labs:  CAPILLARY BLOOD GLUCOSE      POCT Blood Glucose.: 190 mg/dL (2021 21:45)  POCT Blood Glucose.: 211 mg/dL (2021 18:45)  POCT Blood Glucose.: 237 mg/dL (2021 17:38)  POCT Blood Glucose.: 137 mg/dL (2021 12:04)  POCT Blood Glucose.: 163 mg/dL (2021 08:33)                          8.9    16.76 )-----------( 404      ( 2021 06:32 )             26.4       Auto Neutrophil %: 78.8 % (21 @ 06:32)  Auto Immature Granulocyte %: 1.7 % (21 @ 06:32)  Auto Neutrophil %: 84.0 % (21 @ 07:42)  Auto Immature Granulocyte %: 1.6 % (21 @ 07:42)        129<L>  |  94<L>  |  71<H>  ----------------------------<  153<H>  3.4<L>   |  15<L>  |  3.75<H>      Calcium, Total Serum: 8.7 mg/dL (21 @ 06:32)      LFTs:             7.0  | 0.5  | 21       ------------------[219     ( 2021 06:32 )  3.1  | x    | 20          Lipase:x      Amylase:x             Coags:        Serum Pro-Brain Natriuretic Peptide: 75403 pg/mL (21 @ 06:32)  Serum Pro-Brain Natriuretic Peptide: 98701 pg/mL (21 @ 07:54)      Urinalysis Basic - ( 2021 17:25 )    Color: Yellow / Appearance: Clear / S.016 / pH: x  Gluc: x / Ketone: Negative  / Bili: Negative / Urobili: <2 mg/dL   Blood: x / Protein: Trace / Nitrite: Negative   Leuk Esterase: Negative / RBC: 6 /HPF / WBC 2 /HPF   Sq Epi: x / Non Sq Epi: 1 /HPF / Bacteria: Negative

## 2021-07-22 NOTE — PROGRESS NOTE ADULT - ASSESSMENT
60 y/o male with h/o HTN, insulin dependent DM-2, hld, glaucoma, CKD3 with recent interstitial nephritis treated with steroid, HFrEF, recent NSTEMI, PAD s/p RLE angioplasty, s/p right 2nd toe partial amputation at [facility] in April 2021 presented for ACOSTA

## 2021-07-22 NOTE — PROGRESS NOTE ADULT - PROBLEM SELECTOR PLAN 2
worsening, cr is uptrending with decreasing UOP. On CKD3, h/o recent ACOSTA d/t AIN , responded to prednisone. likely prerenal vs intrinsic, renal u/s without hydronephrosis  - neph following appreciate assistance  - strict I+O  - condom cath  - trend Cr  - hydrate as tolerated given pulmonary edema, holding for now  - repeat u/a and ur lytes/osm  - stop vanc  -dose meds per renal fxn, avoid nephrotoxins, avoid ACEi and ARBs, Maintain MAP >65, renal diet improving creatinine. On CKD3, h/o recent ACOSTA d/t AIN , responded to prednisone. likely prerenal vs intrinsic, renal u/s without hydronephrosis. repeat ua bland, FeUrea reflects prerenal  - neph following appreciate assistance  - strict I+O  - trend Cr  - hydrate as tolerated given pulmonary edema, holding for now  - stop vanc  -dose meds per renal fxn, avoid nephrotoxins, avoid ACEi and ARBs, Maintain MAP >65, renal diet

## 2021-07-22 NOTE — PROGRESS NOTE ADULT - SUBJECTIVE AND OBJECTIVE BOX
[facility] DIVISION OF KIDNEY DISEASES AND HYPERTENSION -- FOLLOW UP NOTE  --------------------------------------------------------------------------------  Chief Complaint:    24 hour events/subjective:      REVIEW OF SYSTEMS  --------------------------------------------------------------------------------  Gen: No fevers/chills, weakness  Skin: No rashes  Head/Eyes/Ears/Mouth: No headache; No hearing changes, mucous discharge, vision changes  Respiratory: No dyspnea, cough, wheezing  CV: No chest pain, palpitations  GI: No abdominal pain, diarrhea, constipation, nausea, vomiting, melena, hematochezia  : No increased frequency, dysuria, hematuria  MSK: No joint pain/swelling; no back pain; no edema  Neuro: No dizziness/lightheadedness, weakness, seizures, numbness  Heme: No easy bruising or bleeding    All other systems were reviewed and are negative, except as noted.    PAST HISTORY  --------------------------------------------------------------------------------  No significant changes to PMH, PSH, FHx, SHx, unless otherwise noted    ALLERGIES & MEDICATIONS  --------------------------------------------------------------------------------  Allergies    cefepime (Other)    Intolerances      Standing Inpatient Medications  aspirin enteric coated 81 milliGRAM(s) Oral daily  atorvastatin 40 milliGRAM(s) Oral at bedtime  buMETAnide IVPB 4 milliGRAM(s) IV Intermittent two times a day  carvedilol 6.25 milliGRAM(s) Oral every 12 hours  dextrose 40% Gel 15 Gram(s) Oral once  dextrose 5%. 1000 milliLiter(s) IV Continuous <Continuous>  dextrose 5%. 1000 milliLiter(s) IV Continuous <Continuous>  dextrose 50% Injectable 25 Gram(s) IV Push once  dextrose 50% Injectable 12.5 Gram(s) IV Push once  dextrose 50% Injectable 25 Gram(s) IV Push once  ertapenem  IVPB 500 milliGRAM(s) IV Intermittent every 24 hours  glucagon  Injectable 1 milliGRAM(s) IntraMuscular once  heparin   Injectable 5000 Unit(s) SubCutaneous every 8 hours  hydrALAZINE 50 milliGRAM(s) Oral three times a day  insulin glargine Injectable (LANTUS) 30 Unit(s) SubCutaneous at bedtime  insulin lispro (ADMELOG) corrective regimen sliding scale   SubCutaneous three times a day before meals  isosorbide   dinitrate Tablet (ISORDIL) 20 milliGRAM(s) Oral three times a day  latanoprost 0.005% Ophthalmic Solution 1 Drop(s) Both EYES at bedtime  levoFLOXacin IVPB 250 milliGRAM(s) IV Intermittent every 24 hours  levoFLOXacin IVPB      melatonin 3 milliGRAM(s) Oral at bedtime  NIFEdipine XL 30 milliGRAM(s) Oral daily  polyethylene glycol 3350 17 Gram(s) Oral daily    PRN Inpatient Medications  acetaminophen   Tablet .. 650 milliGRAM(s) Oral every 6 hours PRN        VITALS/PHYSICAL EXAM  --------------------------------------------------------------------------------  T(C): 36.5 (07-22-21 @ 05:35), Max: 37.1 (07-21-21 @ 18:39)  HR: 75 (07-22-21 @ 05:35) (73 - 90)  BP: 146/70 (07-22-21 @ 05:35) (134/76 - 146/70)  RR: 16 (07-22-21 @ 05:35) (16 - 16)  SpO2: 94% (07-22-21 @ 05:35) (93% - 97%)  Wt(kg): --        07-21-21 @ 07:01  -  07-22-21 @ 07:00  --------------------------------------------------------  IN: 350 mL / OUT: 1900 mL / NET: -1550 mL      Physical Exam:  	Gen: NAD, well-appearing  	HEENT: PERRL, supple neck, clear oropharynx  	Pulm: CTA B/L  	CV: RRR, S1S2; no rub  	Back: No spinal or CVA tenderness; no sacral edema  	Abd: +BS, soft, nontender/nondistended  	: No suprapubic tenderness  	UE: Warm, FROM, no clubbing, intact strength; no edema; no asterixis  	LE: Warm, FROM, no clubbing, intact strength; no edema  	Neuro: No focal deficits, intact gait  	Psych: Normal affect and mood  	Skin: Warm, without rashes  	Vascular access:    LABS/STUDIES  --------------------------------------------------------------------------------              8.9    16.76 >-----------<  404      [07-22-21 @ 06:32]              26.4     129  |  94  |  71  ----------------------------<  153      [07-22-21 @ 06:32]  3.4   |  15  |  3.75        Ca     8.7     [07-22-21 @ 06:32]      Mg     2.10     [07-22-21 @ 06:32]      Phos  5.8     [07-22-21 @ 06:32]    TPro  7.0  /  Alb  3.1  /  TBili  0.5  /  DBili  x   /  AST  21  /  ALT  20  /  AlkPhos  219  [07-22-21 @ 06:32]          Creatinine Trend:  SCr 3.75 [07-22 @ 06:32]  SCr 3.93 [07-21 @ 18:45]  SCr 3.74 [07-21 @ 07:42]  SCr 3.30 [07-20 @ 09:24]  SCr 3.31 [07-20 @ 07:33]    Urinalysis - [07-21-21 @ 17:25]      Color Yellow / Appearance Clear / SG 1.016 / pH 5.5      Gluc Negative / Ketone Negative  / Bili Negative / Urobili <2 mg/dL       Blood Negative / Protein Trace / Leuk Est Negative / Nitrite Negative      RBC 6 / WBC 2 / Hyaline 2 / Gran  / Sq Epi  / Non Sq Epi 1 / Bacteria Negative    Urine Creatinine 149      [07-21-21 @ 17:25]  Urine Protein 23      [07-20-21 @ 16:48]  Urine Sodium <20      [07-21-21 @ 17:25]  Urine Urea Nitrogen 397.0      [07-21-21 @ 17:25]  Urine Potassium 44.1      [07-19-21 @ 12:01]  Urine Chloride <20      [07-19-21 @ 12:01]  Urine Osmolality 304      [07-21-21 @ 17:25]    TSH 1.58      [04-23-21 @ 04:36]  Lipid: chol 116, , HDL 26, LDL --      [07-19-21 @ 06:42]    HCV 0.12, Nonreact      [07-19-21 @ 09:49]    C3 Complement 160      [07-21-21 @ 07:42]  C4 Complement 58      [07-21-21 @ 07:42]   [facility] DIVISION OF KIDNEY DISEASES AND HYPERTENSION -- FOLLOW UP NOTE  --------------------------------------------------------------------------------  61M with history of HTN, T2DM, HLD, glaucoma, CKD with recent AIN treated with steroid, HFrEF, recent NSTEMI, PAD s/p RLE angioplasty, s/p right 2nd toe patient amputation at [facility] presents to [facility] with worsening foot pain and associated right 203 toe infection. Recent RLE angiogram revealed severe atherosclerosis. Nephrology consulted for ACOSTA amidst rising creatinine levels and continued malaise.      Chief Complaint: ACOSTA    24 hour events/subjective: Feeling better from the previous day. Currently on oxygen but mentating well with no complaints of SOB, N/V.       REVIEW OF SYSTEMS  --------------------------------------------------------------------------------  Gen: No fevers/chills, weakness  Skin: No rashes  Head/Eyes/Ears/Mouth: No headache; No hearing changes, mucous discharge, vision changes  Respiratory: No dyspnea, cough, wheezing  CV: No chest pain, palpitations  GI: No abdominal pain, diarrhea, constipation, nausea, vomiting, melena, hematochezia  : No increased frequency, dysuria, hematuria  MSK: No joint pain/swelling; no back pain; no edema  Neuro: No dizziness/lightheadedness, weakness, seizures, numbness  Heme: No easy bruising or bleeding    All other systems were reviewed and are negative, except as noted.    PAST HISTORY  --------------------------------------------------------------------------------  No significant changes to PMH, PSH, FHx, SHx, unless otherwise noted    ALLERGIES & MEDICATIONS  --------------------------------------------------------------------------------  Allergies    cefepime (Other)    Intolerances      Standing Inpatient Medications  aspirin enteric coated 81 milliGRAM(s) Oral daily  atorvastatin 40 milliGRAM(s) Oral at bedtime  buMETAnide IVPB 4 milliGRAM(s) IV Intermittent two times a day  carvedilol 6.25 milliGRAM(s) Oral every 12 hours  dextrose 40% Gel 15 Gram(s) Oral once  dextrose 5%. 1000 milliLiter(s) IV Continuous <Continuous>  dextrose 5%. 1000 milliLiter(s) IV Continuous <Continuous>  dextrose 50% Injectable 25 Gram(s) IV Push once  dextrose 50% Injectable 12.5 Gram(s) IV Push once  dextrose 50% Injectable 25 Gram(s) IV Push once  ertapenem  IVPB 500 milliGRAM(s) IV Intermittent every 24 hours  glucagon  Injectable 1 milliGRAM(s) IntraMuscular once  heparin   Injectable 5000 Unit(s) SubCutaneous every 8 hours  hydrALAZINE 50 milliGRAM(s) Oral three times a day  insulin glargine Injectable (LANTUS) 30 Unit(s) SubCutaneous at bedtime  insulin lispro (ADMELOG) corrective regimen sliding scale   SubCutaneous three times a day before meals  isosorbide   dinitrate Tablet (ISORDIL) 20 milliGRAM(s) Oral three times a day  latanoprost 0.005% Ophthalmic Solution 1 Drop(s) Both EYES at bedtime  levoFLOXacin IVPB 250 milliGRAM(s) IV Intermittent every 24 hours  levoFLOXacin IVPB      melatonin 3 milliGRAM(s) Oral at bedtime  NIFEdipine XL 30 milliGRAM(s) Oral daily  polyethylene glycol 3350 17 Gram(s) Oral daily    PRN Inpatient Medications  acetaminophen   Tablet .. 650 milliGRAM(s) Oral every 6 hours PRN        VITALS/PHYSICAL EXAM  --------------------------------------------------------------------------------  T(C): 36.5 (07-22-21 @ 05:35), Max: 37.1 (07-21-21 @ 18:39)  HR: 75 (07-22-21 @ 05:35) (73 - 90)  BP: 146/70 (07-22-21 @ 05:35) (134/76 - 146/70)  RR: 16 (07-22-21 @ 05:35) (16 - 16)  SpO2: 94% (07-22-21 @ 05:35) (93% - 97%)  Wt(kg): --        07-21-21 @ 07:01  -  07-22-21 @ 07:00  --------------------------------------------------------  IN: 350 mL / OUT: 1900 mL / NET: -1550 mL      Physical Exam:  	Gen: Non toxic comfortable appearing   	no jvd , supple neck,   	Pulm: decrease bs at the base of the lungs, no rales or ronchi or wheezing  	CV: RRR, S1S2; no rub  	Back: No CVA tenderness; no sacral edema  	Abd: +BS, soft, nontender/nondistended  	: No suprapubic tenderness  	UE: Warm, no cyanosis  no clubbing,  no edema; no asterixis  	LE: Warm, no cyanosis  no clubbing, no edema  	Neuro: alert and oriented. speech coherent   	Psych: Normal affect and mood  	Skin: wound at the R 2nd interspace, probes to probe, tracks 0.5cm dorsal and proximal, malodor, 1cc of pus expressed, erythema to the dorsolateral foot up until the midfoot, diffuse edema of the R forefoot, gangrene of the 2nd toe stump, ischemic changes to the R 3rd digit, cool to touch       LABS/STUDIES  --------------------------------------------------------------------------------              8.9    16.76 >-----------<  404      [07-22-21 @ 06:32]              26.4     129  |  94  |  71  ----------------------------<  153      [07-22-21 @ 06:32]  3.4   |  15  |  3.75        Ca     8.7     [07-22-21 @ 06:32]      Mg     2.10     [07-22-21 @ 06:32]      Phos  5.8     [07-22-21 @ 06:32]    TPro  7.0  /  Alb  3.1  /  TBili  0.5  /  DBili  x   /  AST  21  /  ALT  20  /  AlkPhos  219  [07-22-21 @ 06:32]          Creatinine Trend:  SCr 3.75 [07-22 @ 06:32]  SCr 3.93 [07-21 @ 18:45]  SCr 3.74 [07-21 @ 07:42]  SCr 3.30 [07-20 @ 09:24]  SCr 3.31 [07-20 @ 07:33]    Urinalysis - [07-21-21 @ 17:25]      Color Yellow / Appearance Clear / SG 1.016 / pH 5.5      Gluc Negative / Ketone Negative  / Bili Negative / Urobili <2 mg/dL       Blood Negative / Protein Trace / Leuk Est Negative / Nitrite Negative      RBC 6 / WBC 2 / Hyaline 2 / Gran  / Sq Epi  / Non Sq Epi 1 / Bacteria Negative    Urine Creatinine 149      [07-21-21 @ 17:25]  Urine Protein 23      [07-20-21 @ 16:48]  Urine Sodium <20      [07-21-21 @ 17:25]  Urine Urea Nitrogen 397.0      [07-21-21 @ 17:25]  Urine Potassium 44.1      [07-19-21 @ 12:01]  Urine Chloride <20      [07-19-21 @ 12:01]  Urine Osmolality 304      [07-21-21 @ 17:25]    TSH 1.58      [04-23-21 @ 04:36]  Lipid: chol 116, , HDL 26, LDL --      [07-19-21 @ 06:42]    HCV 0.12, Nonreact      [07-19-21 @ 09:49]    C3 Complement 160      [07-21-21 @ 07:42]  C4 Complement 58      [07-21-21 @ 07:42]

## 2021-07-23 ENCOUNTER — APPOINTMENT (OUTPATIENT)
Dept: CARDIOLOGY | Facility: CLINIC | Age: 61
End: 2021-07-23

## 2021-07-23 LAB
ANION GAP SERPL CALC-SCNC: 16 MMOL/L — HIGH (ref 7–14)
ANION GAP SERPL CALC-SCNC: 19 MMOL/L — HIGH (ref 7–14)
BASOPHILS # BLD AUTO: 0.14 K/UL — SIGNIFICANT CHANGE UP (ref 0–0.2)
BASOPHILS NFR BLD AUTO: 0.9 % — SIGNIFICANT CHANGE UP (ref 0–2)
BUN SERPL-MCNC: 81 MG/DL — HIGH (ref 7–23)
BUN SERPL-MCNC: 82 MG/DL — HIGH (ref 7–23)
CALCIUM SERPL-MCNC: 8.9 MG/DL — SIGNIFICANT CHANGE UP (ref 8.4–10.5)
CALCIUM SERPL-MCNC: 9.2 MG/DL — SIGNIFICANT CHANGE UP (ref 8.4–10.5)
CHLORIDE SERPL-SCNC: 94 MMOL/L — LOW (ref 98–107)
CHLORIDE SERPL-SCNC: 94 MMOL/L — LOW (ref 98–107)
CO2 SERPL-SCNC: 19 MMOL/L — LOW (ref 22–31)
CO2 SERPL-SCNC: 20 MMOL/L — LOW (ref 22–31)
CREAT SERPL-MCNC: 3.28 MG/DL — HIGH (ref 0.5–1.3)
CREAT SERPL-MCNC: 3.44 MG/DL — HIGH (ref 0.5–1.3)
CULTURE RESULTS: SIGNIFICANT CHANGE UP
EOSINOPHIL # BLD AUTO: 0.82 K/UL — HIGH (ref 0–0.5)
EOSINOPHIL NFR BLD AUTO: 5.2 % — SIGNIFICANT CHANGE UP (ref 0–6)
GLUCOSE BLDC GLUCOMTR-MCNC: 155 MG/DL — HIGH (ref 70–99)
GLUCOSE BLDC GLUCOMTR-MCNC: 210 MG/DL — HIGH (ref 70–99)
GLUCOSE BLDC GLUCOMTR-MCNC: 240 MG/DL — HIGH (ref 70–99)
GLUCOSE BLDC GLUCOMTR-MCNC: 276 MG/DL — HIGH (ref 70–99)
GLUCOSE SERPL-MCNC: 182 MG/DL — HIGH (ref 70–99)
GLUCOSE SERPL-MCNC: 216 MG/DL — HIGH (ref 70–99)
HCT VFR BLD CALC: 29.9 % — LOW (ref 39–50)
HGB BLD-MCNC: 9.9 G/DL — LOW (ref 13–17)
IANC: 11.68 K/UL — HIGH (ref 1.5–8.5)
LYMPHOCYTES # BLD AUTO: 0.68 K/UL — LOW (ref 1–3.3)
LYMPHOCYTES # BLD AUTO: 4.3 % — LOW (ref 13–44)
MAGNESIUM SERPL-MCNC: 2.4 MG/DL — SIGNIFICANT CHANGE UP (ref 1.6–2.6)
MAGNESIUM SERPL-MCNC: 2.5 MG/DL — SIGNIFICANT CHANGE UP (ref 1.6–2.6)
MCHC RBC-ENTMCNC: 27.2 PG — SIGNIFICANT CHANGE UP (ref 27–34)
MCHC RBC-ENTMCNC: 33.1 GM/DL — SIGNIFICANT CHANGE UP (ref 32–36)
MCV RBC AUTO: 82.1 FL — SIGNIFICANT CHANGE UP (ref 80–100)
MONOCYTES # BLD AUTO: 1.1 K/UL — HIGH (ref 0–0.9)
MONOCYTES NFR BLD AUTO: 7 % — SIGNIFICANT CHANGE UP (ref 2–14)
NEUTROPHILS # BLD AUTO: 12.74 K/UL — HIGH (ref 1.8–7.4)
NEUTROPHILS NFR BLD AUTO: 80.9 % — HIGH (ref 43–77)
NT-PROBNP SERPL-SCNC: HIGH PG/ML
ORGANISM # SPEC MICROSCOPIC CNT: SIGNIFICANT CHANGE UP
PHOSPHATE SERPL-MCNC: 5.1 MG/DL — HIGH (ref 2.5–4.5)
PHOSPHATE SERPL-MCNC: 5.4 MG/DL — HIGH (ref 2.5–4.5)
PLATELET # BLD AUTO: 478 K/UL — HIGH (ref 150–400)
POTASSIUM SERPL-MCNC: 3.9 MMOL/L — SIGNIFICANT CHANGE UP (ref 3.5–5.3)
POTASSIUM SERPL-MCNC: 4.1 MMOL/L — SIGNIFICANT CHANGE UP (ref 3.5–5.3)
POTASSIUM SERPL-SCNC: 3.9 MMOL/L — SIGNIFICANT CHANGE UP (ref 3.5–5.3)
POTASSIUM SERPL-SCNC: 4.1 MMOL/L — SIGNIFICANT CHANGE UP (ref 3.5–5.3)
RBC # BLD: 3.64 M/UL — LOW (ref 4.2–5.8)
RBC # FLD: 13 % — SIGNIFICANT CHANGE UP (ref 10.3–14.5)
SODIUM SERPL-SCNC: 130 MMOL/L — LOW (ref 135–145)
SODIUM SERPL-SCNC: 132 MMOL/L — LOW (ref 135–145)
SPECIMEN SOURCE: SIGNIFICANT CHANGE UP
WBC # BLD: 15.75 K/UL — HIGH (ref 3.8–10.5)
WBC # FLD AUTO: 15.75 K/UL — HIGH (ref 3.8–10.5)

## 2021-07-23 PROCEDURE — 99232 SBSQ HOSP IP/OBS MODERATE 35: CPT

## 2021-07-23 PROCEDURE — 99233 SBSQ HOSP IP/OBS HIGH 50: CPT

## 2021-07-23 PROCEDURE — 99233 SBSQ HOSP IP/OBS HIGH 50: CPT | Mod: GC

## 2021-07-23 RX ADMIN — ISOSORBIDE DINITRATE 20 MILLIGRAM(S): 5 TABLET ORAL at 11:32

## 2021-07-23 RX ADMIN — Medication 2: at 12:38

## 2021-07-23 RX ADMIN — Medication 50 MILLIGRAM(S): at 14:54

## 2021-07-23 RX ADMIN — Medication 81 MILLIGRAM(S): at 11:31

## 2021-07-23 RX ADMIN — LATANOPROST 1 DROP(S): 0.05 SOLUTION/ DROPS OPHTHALMIC; TOPICAL at 22:41

## 2021-07-23 RX ADMIN — HEPARIN SODIUM 5000 UNIT(S): 5000 INJECTION INTRAVENOUS; SUBCUTANEOUS at 14:54

## 2021-07-23 RX ADMIN — ATORVASTATIN CALCIUM 40 MILLIGRAM(S): 80 TABLET, FILM COATED ORAL at 22:35

## 2021-07-23 RX ADMIN — Medication 3: at 17:42

## 2021-07-23 RX ADMIN — HEPARIN SODIUM 5000 UNIT(S): 5000 INJECTION INTRAVENOUS; SUBCUTANEOUS at 06:08

## 2021-07-23 RX ADMIN — Medication 50 MILLIGRAM(S): at 06:08

## 2021-07-23 RX ADMIN — Medication 1: at 08:50

## 2021-07-23 RX ADMIN — Medication 50 MILLIGRAM(S): at 22:34

## 2021-07-23 RX ADMIN — BUMETANIDE 132 MILLIGRAM(S): 0.25 INJECTION INTRAMUSCULAR; INTRAVENOUS at 17:01

## 2021-07-23 RX ADMIN — ISOSORBIDE DINITRATE 20 MILLIGRAM(S): 5 TABLET ORAL at 06:08

## 2021-07-23 RX ADMIN — Medication 30 MILLIGRAM(S): at 06:08

## 2021-07-23 RX ADMIN — CARVEDILOL PHOSPHATE 6.25 MILLIGRAM(S): 80 CAPSULE, EXTENDED RELEASE ORAL at 17:01

## 2021-07-23 RX ADMIN — ERTAPENEM SODIUM 100 MILLIGRAM(S): 1 INJECTION, POWDER, LYOPHILIZED, FOR SOLUTION INTRAMUSCULAR; INTRAVENOUS at 17:01

## 2021-07-23 RX ADMIN — BUMETANIDE 132 MILLIGRAM(S): 0.25 INJECTION INTRAMUSCULAR; INTRAVENOUS at 06:08

## 2021-07-23 RX ADMIN — ISOSORBIDE DINITRATE 20 MILLIGRAM(S): 5 TABLET ORAL at 17:01

## 2021-07-23 RX ADMIN — Medication 3 MILLIGRAM(S): at 22:35

## 2021-07-23 RX ADMIN — CARVEDILOL PHOSPHATE 6.25 MILLIGRAM(S): 80 CAPSULE, EXTENDED RELEASE ORAL at 06:08

## 2021-07-23 RX ADMIN — HEPARIN SODIUM 5000 UNIT(S): 5000 INJECTION INTRAVENOUS; SUBCUTANEOUS at 22:34

## 2021-07-23 RX ADMIN — POLYETHYLENE GLYCOL 3350 17 GRAM(S): 17 POWDER, FOR SOLUTION ORAL at 11:32

## 2021-07-23 RX ADMIN — SENNA PLUS 2 TABLET(S): 8.6 TABLET ORAL at 22:34

## 2021-07-23 RX ADMIN — INSULIN GLARGINE 30 UNIT(S): 100 INJECTION, SOLUTION SUBCUTANEOUS at 22:33

## 2021-07-23 NOTE — PROGRESS NOTE ADULT - PROBLEM SELECTOR PLAN 2
improving creatinine. On CKD3, h/o recent ACOSTA d/t AIN , responded to prednisone. likely prerenal vs intrinsic, renal u/s without hydronephrosis. repeat ua bland, FeUrea reflects prerenal  - neph following appreciate assistance  - strict I+O  - trend Cr  - hydrate as tolerated given pulmonary edema, holding for now  - stop vanc  -dose meds per renal fxn, avoid nephrotoxins, avoid ACEi and ARBs, Maintain MAP >65, renal diet improving creatinine. On CKD3, h/o recent ACOSTA d/t AIN , responded to prednisone. likely prerenal vs intrinsic, renal u/s without hydronephrosis. repeat ua bland, FeUrea reflects prerenal  - neph following appreciate assistance  - strict I+O  - trend Cr  - continue hydral  - hydrate as tolerated given pulmonary edema, holding for now  -dose meds per renal fxn, avoid nephrotoxins, avoid ACEi and ARBs, Maintain MAP >65, renal diet

## 2021-07-23 NOTE — PROGRESS NOTE ADULT - PROBLEM SELECTOR PLAN 1
Pt. with ACOSTA on CKD in the setting of  Right foot infection. pt. hx of AIN treated with steroids. Scr on admission, was 1.77. It increases to 3.93 on 7/21/21. Renal sonogram done on 7/21/21 showed no HDN. Urine electrolytes collected on 7/21 showed Lupillo < 20 with urine osmol of 304. Pt clinically in volume overload, BNP done on 7/21/21 elevated to [address]. Received Diuretics (lasix on 7/18-7/19) and then bumex on 7/21/21. Scr has decreased to 3.28 today. Pt. is non-oliguric, non uremic. Recommend continuing Bumex 4 mg BID. Monitor labs and urine output. Avoid any potential nephrotoxins. Dose medications as per eGFR.       If you have any questions, please feel free to contact me  Supa Brantley  Nephrology Fellow  [phone]  (After 5pm or on weekends please page the on-call fellow).

## 2021-07-23 NOTE — PROGRESS NOTE ADULT - SUBJECTIVE AND OBJECTIVE BOX
Follow Up:      Inverval History/ROS:Patient is a 61y old  Male who presents with a chief complaint of right foot 2nd/3rd toe infection (23 Jul 2021 18:07)    No fever  No events  Allergies    cefepime (Other)    Intolerances        ANTIMICROBIALS:  ertapenem  IVPB 500 every 24 hours  levoFLOXacin IVPB 250 every 24 hours  levoFLOXacin IVPB        OTHER MEDS:  acetaminophen   Tablet .. 650 milliGRAM(s) Oral every 6 hours PRN  aspirin enteric coated 81 milliGRAM(s) Oral daily  atorvastatin 40 milliGRAM(s) Oral at bedtime  buMETAnide IVPB 4 milliGRAM(s) IV Intermittent two times a day  carvedilol 6.25 milliGRAM(s) Oral every 12 hours  dextrose 40% Gel 15 Gram(s) Oral once  dextrose 5%. 1000 milliLiter(s) IV Continuous <Continuous>  dextrose 5%. 1000 milliLiter(s) IV Continuous <Continuous>  dextrose 50% Injectable 25 Gram(s) IV Push once  dextrose 50% Injectable 12.5 Gram(s) IV Push once  dextrose 50% Injectable 25 Gram(s) IV Push once  glucagon  Injectable 1 milliGRAM(s) IntraMuscular once  heparin   Injectable 5000 Unit(s) SubCutaneous every 8 hours  hydrALAZINE 50 milliGRAM(s) Oral three times a day  insulin glargine Injectable (LANTUS) 30 Unit(s) SubCutaneous at bedtime  insulin lispro (ADMELOG) corrective regimen sliding scale   SubCutaneous three times a day before meals  isosorbide   dinitrate Tablet (ISORDIL) 20 milliGRAM(s) Oral three times a day  latanoprost 0.005% Ophthalmic Solution 1 Drop(s) Both EYES at bedtime  melatonin 3 milliGRAM(s) Oral at bedtime  NIFEdipine XL 30 milliGRAM(s) Oral daily  polyethylene glycol 3350 17 Gram(s) Oral daily  senna 2 Tablet(s) Oral at bedtime      Vital Signs Last 24 Hrs  T(C): 36.6 (23 Jul 2021 17:00), Max: 36.7 (22 Jul 2021 21:30)  T(F): 97.8 (23 Jul 2021 17:00), Max: 98.1 (22 Jul 2021 21:30)  HR: 82 (23 Jul 2021 19:35) (70 - 89)  BP: 132/70 (23 Jul 2021 17:00) (129/67 - 142/68)  BP(mean): --  RR: 18 (23 Jul 2021 17:00) (18 - 18)  SpO2: 96% (23 Jul 2021 19:35) (92% - 96%)    PHYSICAL EXAM:  General: [x ] non-toxic  HEAD/EYES: [ ] PERRL [ x] white sclera [ ] icterus  ENT:  [ ] normal [ ] supple [ ] thrush [x ] pharyngeal exudate  Cardiovascular:   [ ] murmur [x ] normal [ ] PPM/AICD  Respiratory:  [x ] clear to ausculation bilaterally  GI:  [x ] soft, non-tender, normal bowel sounds  :  [ ] nixon [ x] no CVA tenderness   Musculoskeletal:  [ ] no synovitis  Neurologic:  [ ] non-focal exam   Skin:  [ x] no rash  Lymph: [ ] no lymphadenopathy  Psychiatric:  [ ] appropriate affect [ x] alert & oriented  Lines:  [ ]x no phlebitis [ ] central line                                9.9    15.75 )-----------( 478      ( 23 Jul 2021 07:11 )             29.9       07-23    130<L>  |  94<L>  |  82<H>  ----------------------------<  216<H>  4.1   |  20<L>  |  3.28<H>    Ca    8.9      23 Jul 2021 16:21  Phos  5.1     07-23  Mg     2.40     07-23    TPro  7.0  /  Alb  3.1<L>  /  TBili  0.5  /  DBili  x   /  AST  21  /  ALT  20  /  AlkPhos  219<H>  07-22          MICROBIOLOGY:Culture Results:   Numerous Enterobacter aerogenes  Few Stenotrophomonas maltophilia  Moderate Streptococcus agalactiae (Group B) isolated  Group B streptococci are susceptible to ampicillin,  penicillin and cefazolin, but may be resistant to  erythromycin and clindamycin.  Recommendations for intrapartum prophylaxis for Group B  streptococci are penicillin or ampicillin. (07-18-21 @ 09:34)  Culture Results:   No Growth Final (07-18-21 @ 03:12)  Culture Results:   No Growth Final (07-18-21 @ 03:12)      RADIOLOGY:

## 2021-07-23 NOTE — PROGRESS NOTE ADULT - SUBJECTIVE AND OBJECTIVE BOX
Patient is a 61y old  Male who presents with a chief complaint of right foot 2nd/3rd toe infection (2021 10:59)       INTERVAL HPI/OVERNIGHT EVENTS:  Patient seen and evaluated at bedside.  Pt is resting comfortable in NAD. Denies N/V/F/C.    Allergies    cefepime (Other)    Intolerances        Vital Signs Last 24 Hrs  T(C): 36.6 (2021 11:16), Max: 36.7 (2021 21:30)  T(F): 97.8 (2021 11:16), Max: 98.1 (2021 21:30)  HR: 76 (2021 11:16) (70 - 122)  BP: 130/68 (2021 11:16) (129/67 - 142/68)  BP(mean): --  RR: 18 (2021 11:16) (16 - 18)  SpO2: 92% (2021 11:16) (92% - 100%)    LABS:                        9.9    15.75 )-----------( 478      ( 2021 07:11 )             29.9     07-23    132<L>  |  94<L>  |  81<H>  ----------------------------<  182<H>  3.9   |  19<L>  |  3.44<H>    Ca    9.2      2021 07:11  Phos  5.4     07-23  Mg     2.50     07-23    TPro  7.0  /  Alb  3.1<L>  /  TBili  0.5  /  DBili  x   /  AST  21  /  ALT  20  /  AlkPhos  219<H>  07-22      Urinalysis Basic - ( 2021 17:25 )    Color: Yellow / Appearance: Clear / S.016 / pH: x  Gluc: x / Ketone: Negative  / Bili: Negative / Urobili: <2 mg/dL   Blood: x / Protein: Trace / Nitrite: Negative   Leuk Esterase: Negative / RBC: 6 /HPF / WBC 2 /HPF   Sq Epi: x / Non Sq Epi: 1 /HPF / Bacteria: Negative      CAPILLARY BLOOD GLUCOSE      POCT Blood Glucose.: 210 mg/dL (2021 12:24)  POCT Blood Glucose.: 155 mg/dL (2021 08:29)  POCT Blood Glucose.: 229 mg/dL (2021 21:51)  POCT Blood Glucose.: 227 mg/dL (2021 18:39)      Lower Extremity Physical Exam:  Vascular: DP 1/4, B/L, PT non-palpable B/L, CFT <3 seconds B/L, Temperature gradient cool to warm on R, warm to cool on L    Neuro: Epicritic sensation intact to the level of ankle, B/L.  Musculoskeletal/Ortho: pain on palpation surrounding the 2nd digit   Skin: gangrenous right foot 2nd and 3rd digits, RF 3rd interspace wound probes to bone, increased tracking proximally to 2nd and 3rd MPJ, mild wet conversion of gangrenous digits, scant pus expressed from interdigital wound, erythema consistent     s/p I&D of R 2nd interspace with erythema, no pus expressed, tracks 1cm dorsal and proximal, no malodor,  resolving erythema to the dorsolateral foot up until the midfoot, diffuse edema of the R forefoot, gangrene of the 2nd toe stump, ischemic changes to the R 3rd digit, cool to touch     RADIOLOGY & ADDITIONAL TESTS:

## 2021-07-23 NOTE — PROGRESS NOTE ADULT - ASSESSMENT
62 y/o male with h/o HTN, insulin dependent DM-2, hld, glaucoma, CKD3 with recent interstitial nephritis treated with steroid, HFrEF, recent NSTEMI, PAD s/p RLE angioplasty, s/p right 2nd toe partial amputation at [facility] in April 2021, who presents with 2 week history of worsening right foot pain, associated with right 2nd residual toe stump/3rd toe infection/discoloration to blue/black, xray c/f osteomyelitis, plan for surgical intervention this admission per podiatry 60 y/o male with h/o HTN, insulin dependent DM-2, hld, glaucoma, CKD3 with recent interstitial nephritis treated with steroid, HFrEF, recent NSTEMI, PAD s/p RLE angioplasty, s/p right 2nd toe partial amputation at [facility] in April 2021, who presents with 2 week history of worsening right foot pain, associated with right 2nd residual toe stump/3rd toe infection/discoloration to blue/black, xray c/f osteomyelitis, plan for surgical intervention this admission per podiatry. Hospital course has been complicated by flash pulmonary edema 2/2 to ACOSTA on CKD. 62 y/o male with h/o HTN, insulin dependent DM-2, hld, glaucoma, CKD3 with recent interstitial nephritis treated with steroid, HFrEF, recent NSTEMI, PAD s/p RLE angioplasty, s/p right 2nd toe partial amputation at [facility] in April 2021, who presents with 2 week history of worsening right foot pain, associated with right 2nd residual toe stump/3rd toe infection/discoloration to blue/black, xray c/f osteomyelitis, plan for surgical intervention this admission per podiatry. Hospital course has been complicated by flash pulmonary edema 2/2 to ACOSTA on CKD. Now he is improving with diuresis and having decreased oxygen requirement.

## 2021-07-23 NOTE — PROGRESS NOTE ADULT - SUBJECTIVE AND OBJECTIVE BOX
Patient seen at the bedside this AM. No events overnight. No new complaints.     Vital Signs Last 24 Hrs    T(C): 36.4 (23 Jul 2021 06:00), Max: 36.7 (22 Jul 2021 21:30)  T(F): 97.6 (23 Jul 2021 06:00), Max: 98.1 (22 Jul 2021 21:30)  HR: 70 (23 Jul 2021 06:00) (70 - 122)  BP: 129/67 (23 Jul 2021 06:00) (129/67 - 142/68)  BP(mean): --  RR: 18 (23 Jul 2021 06:00) (16 - 18)  SpO2: 95% (23 Jul 2021 06:00) (94% - 100%)    I&O's Summary    22 Jul 2021 07:01  -  23 Jul 2021 07:00  --------------------------------------------------------  IN: 994 mL / OUT: 950 mL / NET: 44 mL      Physical Exam  Gen: NAD  HEENT: normocephalic, atraumatic, no scleral icterus  Pulm: B/L chest wall rise, difficulty breathing  Abd: Soft, ND, NTP, no rebound, no guarding, no palpable organomegaly/masses  Ext: warm, no edema, sensorimotor intact, DP/PT/AT signals present      Labs:                                    9.9    15.75 )-----------( 478      ( 23 Jul 2021 07:11 )             29.9     07-23    132<L>  |  94<L>  |  81<H>  ----------------------------<  182<H>  3.9   |  19<L>  |  3.44<H>    Ca    9.2      23 Jul 2021 07:11  Phos  5.4     07-23  Mg     2.50     07-23

## 2021-07-23 NOTE — PROGRESS NOTE ADULT - PROBLEM SELECTOR PLAN 1
worsening with concern for acute decompensated heart failure vs PE. stable. patient desatted to low 80s 7/19 afternoon. put on 5L NC with 93-95% sats. patient reports feeling short of breath and describes retrosternal chest pressure. slightly worse with breathing. bedside focused ultrasound remarkable for b lines and mitral regurg. likely flash pulmonary edema 2/2 fluid bolus from ACOSTA treatment w/ hx of HFREF.   - VQ scan and DVT ultrasound  - bumex 4 mg bid  - BMP and BNP BID  - IVC ultrasound  - continuous pulse ox with tele  - ctm worsening with concern for acute decompensated heart failure vs PE. stable. patient desatted to low 80s 7/19 afternoon. put on 5L NC with 93-95% sats. patient reports feeling short of breath and describes retrosternal chest pressure. slightly worse with breathing. bedside focused ultrasound remarkable for b lines and mitral regurg. likely flash pulmonary edema 2/2 fluid bolus from ACOSTA treatment. repeat echo reflecting improvement in cardiac function. VQ scan and DVT ultrasound: neg  - bumex 4 mg bid  - BMP and BNP BID  - IVC ultrasound  - continuous pulse ox with tele  - ctm Improving. patient desatted to low 80s 7/19 afternoon. put on 5L NC with 93-95% sats. patient reports feeling short of breath and describes retrosternal chest pressure. slightly worse with breathing.  bedside focused ultrasound remarkable for b lines and mitral regurg. likely flash pulmonary edema 2/2 fluid bolus from ACOSTA treatment. repeat echo reflecting improvement in cardiac function. VQ scan and DVT ultrasound: neg. oxygen requirement is now improing with diuresis.  - bumex 4 mg bid, consider transition to 2mg po over the weekend  - BMP and BNP BID  - IVC ultrasound  - continuous pulse ox with tele  - ctm

## 2021-07-23 NOTE — PROGRESS NOTE ADULT - PROBLEM SELECTOR PLAN 3
-pt with h/o right foot 2nd toe partial amputation in April at [facility], h/o severe RLE PVD with RLE angioplasty in past, no stent, now p/w 2nd residual toe/stump and 3rd toe infection, xray concerning for osteomyelitis, elevated inflammatory markers ESR 96, CRP 80.6 c/w acute OM. rec'd IV vanc 1 g bid (7/17 pm - 7/20), zoxyn (7/18-7/20)  -7/18 blood cultures x2  -ID c/s start erta and levaquin (7/20 - )  -MRI right foot: osteomyelitis in 2nd toe, periostitis w/o osteo on 3rd toe  -podiatry consulted, s/p I&D 7/17. plan for RF P2RR, P3RR pending MICHEL/PVR vasc recs  - vascular consult: angiogram with co2 angio  -needs cardiology clearance, RCRI 3, high risk for periop MACE -pt with h/o right foot 2nd toe partial amputation in April at [facility], h/o severe RLE PVD with RLE angioplasty in past, no stent, now p/w 2nd residual toe/stump and 3rd toe infection, xray concerning for osteomyelitis, elevated inflammatory markers ESR 96, CRP 80.6 c/w acute OM. rec'd IV vanc 1 g bid (7/17 pm - 7/20), zoxyn (7/18-7/20)  -7/18 blood cultures x2  -ID c/s start erta and levaquin (7/20 - )  -MRI right foot: osteomyelitis in 2nd toe, periostitis w/o osteo on 3rd toe  -podiatry consulted, s/p I&D 7/17. plan for RF P2RR, P3RR pending MICHEL/PVR vasc recs, next week  - vascular consult: angiogram with co2 angio next thursday 7/29  -needs cardiology clearance, RCRI 3, high risk for periop MACE

## 2021-07-23 NOTE — PROGRESS NOTE ADULT - SUBJECTIVE AND OBJECTIVE BOX
For all Cardiology service contact information, go to [email or web address] and use "[email or web address]" to login.    SUBJECTIVE:   No events overnight. Denies CP, SOB or Dyspnea.   -------------------------------------------------------------------------------------------  ROS:  CV: chest pain (-), palpitation (-), orthopnea (-), PND (-), edema (-)  PULM: SOB (-), cough (-), wheezing (-), hemoptysis (-).   CONST: fever (-), chills (-) or fatigue (-)  GI: abdominal distension (-), abdominal pain (-) , nausea/vomiting (-), hematemesis, (-), melena (-), hematochezia (-)  : dysuria (-), frequency (-), hematuria (-).   NEURO: numbness (-), weakness (-), dizziness (-)  SKIN: itching (-), rash (-)  HEENT:  visual changes (-); vertigo or throat pain (-);  neck stiffness (-)     All other review of systems is negative unless indicated above.   -------------------------------------------------------------------------------------------  VS:  T(F): 97.6 (07-23), Max: 98.1 (07-22)  HR: 70 (07-23) (70 - 122)  BP: 129/67 (07-23) (129/67 - 142/68)  RR: 18 (07-23)  SpO2: 95% (07-23)  I&O's Summary    22 Jul 2021 07:01  -  23 Jul 2021 07:00  --------------------------------------------------------  IN: 994 mL / OUT: 950 mL / NET: 44 mL      ------------------------------------------------------------------------------------------  PHYSICAL EXAM:  GENERAL: NAD  HEAD:  Atraumatic, Normocephalic.  EYES: EOMI, PERRLA, conjunctiva and sclera clear.  ENT: Moist mucous membranes.  NECK: Supple, No JVD.  CHEST/LUNG: Clear to auscultation bilaterally; No rales, rhonchi, wheezing, or rubs. Unlabored respirations.  HEART: Regular rate and rhythm; No murmurs, rubs, or gallops.  ABDOMEN: Bowel sounds present; Soft, Nontender, Nondistended.   EXTREMITIES:  2+ Peripheral Pulses, brisk capillary refill. No clubbing, cyanosis, or edema.  PSYCH: Normal affect.  SKIN: No rashes or lesions.  -------------------------------------------------------------------------------------------  LABS:                          9.9    15.75 )-----------( 478      ( 23 Jul 2021 07:11 )             29.9     07-23    132<L>  |  94<L>  |  81<H>  ----------------------------<  182<H>  3.9   |  19<L>  |  3.44<H>    Ca    9.2      23 Jul 2021 07:11  Phos  5.4     07-23  Mg     2.50     07-23    TPro  7.0  /  Alb  3.1<L>  /  TBili  0.5  /  DBili  x   /  AST  21  /  ALT  20  /  AlkPhos  219<H>  07-22                -------------------------------------------------------------------------------------------  Meds:  acetaminophen   Tablet .. 650 milliGRAM(s) Oral every 6 hours PRN  aspirin enteric coated 81 milliGRAM(s) Oral daily  atorvastatin 40 milliGRAM(s) Oral at bedtime  buMETAnide IVPB 4 milliGRAM(s) IV Intermittent two times a day  carvedilol 6.25 milliGRAM(s) Oral every 12 hours  dextrose 40% Gel 15 Gram(s) Oral once  dextrose 5%. 1000 milliLiter(s) IV Continuous <Continuous>  dextrose 5%. 1000 milliLiter(s) IV Continuous <Continuous>  dextrose 50% Injectable 25 Gram(s) IV Push once  dextrose 50% Injectable 12.5 Gram(s) IV Push once  dextrose 50% Injectable 25 Gram(s) IV Push once  ertapenem  IVPB 500 milliGRAM(s) IV Intermittent every 24 hours  glucagon  Injectable 1 milliGRAM(s) IntraMuscular once  heparin   Injectable 5000 Unit(s) SubCutaneous every 8 hours  hydrALAZINE 50 milliGRAM(s) Oral three times a day  insulin glargine Injectable (LANTUS) 30 Unit(s) SubCutaneous at bedtime  insulin lispro (ADMELOG) corrective regimen sliding scale   SubCutaneous three times a day before meals  isosorbide   dinitrate Tablet (ISORDIL) 20 milliGRAM(s) Oral three times a day  latanoprost 0.005% Ophthalmic Solution 1 Drop(s) Both EYES at bedtime  levoFLOXacin IVPB 250 milliGRAM(s) IV Intermittent every 24 hours  levoFLOXacin IVPB      melatonin 3 milliGRAM(s) Oral at bedtime  NIFEdipine XL 30 milliGRAM(s) Oral daily  polyethylene glycol 3350 17 Gram(s) Oral daily  senna 2 Tablet(s) Oral at bedtime    -------------------------------------------------------------------------------------------     For all Cardiology service contact information, go to [email or web address] and use "[employer]" to login.    SUBJECTIVE:   No events overnight. Denies CP, SOB or Dyspnea.   ----------------------------------------------------------------.---------------------------  ROS:  CV: chest pain (-), palpitation (-), orthopnea (-), PND (-), edema (-)  PULM: SOB (-), cough (-), wheezing (-), hemoptysis (-).   CONST: fever (-), chills (-) or fatigue (-)  GI: abdominal distension (-), abdominal pain (-) , nausea/vomiting (-), hematemesis, (-), melena (-), hematochezia (-)  : dysuria (-), frequency (-), hematuria (-).   NEURO: numbness (-), weakness (-), dizziness (-)  SKIN: itching (-), rash (-)  HEENT:  visual changes (-); vertigo or throat pain (-);  neck stiffness (-)     All other review of systems is negative unless indicated above.   -------------------------------------------------------------------------------------------  VS:  T(F): 97.6 (07-23), Max: 98.1 (07-22)  HR: 70 (07-23) (70 - 122)  BP: 129/67 (07-23) (129/67 - 142/68)  RR: 18 (07-23)  SpO2: 95% (07-23)  I&O's Summary    22 Jul 2021 07:01  -  23 Jul 2021 07:00  --------------------------------------------------------  IN: 994 mL / OUT: 950 mL / NET: 44 mL      ------------------------------------------------------------------------------------------  PHYSICAL EXAM:  GENERAL: NAD  HEAD:  Atraumatic, Normocephalic.  EYES: EOMI, PERRLA, conjunctiva and sclera clear.  ENT: Moist mucous membranes.  NECK: Supple  CHEST/LUNG: Clear to auscultation bilaterally; No rales, rhonchi, wheezing, or rubs. Unlabored respirations.  HEART: Regular rate and rhythm; No murmurs, rubs, or gallops.  ABDOMEN: Bowel sounds present; Soft, Nontender, Nondistended.   EXTREMITIES:  2+ Peripheral Pulses, brisk capillary refill. No clubbing, cyanosis, or edema.  PSYCH: Normal affect.  SKIN: No rashes or lesions.  -------------------------------------------------------------------------------------------  LABS:                          9.9    15.75 )-----------( 478      ( 23 Jul 2021 07:11 )             29.9     07-23    132<L>  |  94<L>  |  81<H>  ----------------------------<  182<H>  3.9   |  19<L>  |  3.44<H>    Ca    9.2      23 Jul 2021 07:11  Phos  5.4     07-23  Mg     2.50     07-23    TPro  7.0  /  Alb  3.1<L>  /  TBili  0.5  /  DBili  x   /  AST  21  /  ALT  20  /  AlkPhos  219<H>  07-22                -------------------------------------------------------------------------------------------  Meds:  acetaminophen   Tablet .. 650 milliGRAM(s) Oral every 6 hours PRN  aspirin enteric coated 81 milliGRAM(s) Oral daily  atorvastatin 40 milliGRAM(s) Oral at bedtime  buMETAnide IVPB 4 milliGRAM(s) IV Intermittent two times a day  carvedilol 6.25 milliGRAM(s) Oral every 12 hours  dextrose 40% Gel 15 Gram(s) Oral once  dextrose 5%. 1000 milliLiter(s) IV Continuous <Continuous>  dextrose 5%. 1000 milliLiter(s) IV Continuous <Continuous>  dextrose 50% Injectable 25 Gram(s) IV Push once  dextrose 50% Injectable 12.5 Gram(s) IV Push once  dextrose 50% Injectable 25 Gram(s) IV Push once  ertapenem  IVPB 500 milliGRAM(s) IV Intermittent every 24 hours  glucagon  Injectable 1 milliGRAM(s) IntraMuscular once  heparin   Injectable 5000 Unit(s) SubCutaneous every 8 hours  hydrALAZINE 50 milliGRAM(s) Oral three times a day  insulin glargine Injectable (LANTUS) 30 Unit(s) SubCutaneous at bedtime  insulin lispro (ADMELOG) corrective regimen sliding scale   SubCutaneous three times a day before meals  isosorbide   dinitrate Tablet (ISORDIL) 20 milliGRAM(s) Oral three times a day  latanoprost 0.005% Ophthalmic Solution 1 Drop(s) Both EYES at bedtime  levoFLOXacin IVPB 250 milliGRAM(s) IV Intermittent every 24 hours  levoFLOXacin IVPB      melatonin 3 milliGRAM(s) Oral at bedtime  NIFEdipine XL 30 milliGRAM(s) Oral daily  polyethylene glycol 3350 17 Gram(s) Oral daily  senna 2 Tablet(s) Oral at bedtime    -------------------------------------------------------------------------------------------

## 2021-07-23 NOTE — PROGRESS NOTE ADULT - SUBJECTIVE AND OBJECTIVE BOX
[facility] DIVISION OF KIDNEY DISEASES AND HYPERTENSION -- FOLLOW UP NOTE  --------------------------------------------------------------------------------  HPI: 61M with history of HTN, T2DM, HLD, glaucoma, CKD with recent AIN treated with steroid, HFrEF, recent NSTEMI, PAD s/p RLE angioplasty, s/p right 2nd toe patient amputation at [facility] presents to [facility] with worsening foot pain and associated right 203 toe infection. Recent RLE angiogram revealed severe atherosclerosis. Nephrology consulted for ACOSTA amidst rising creatinine levels and continued malaise.      Pt. seen and examined at bedside. Denies any complaints.    PAST HISTORY  --------------------------------------------------------------------------------  No significant changes to PMH, PSH, FHx, SHx, unless otherwise noted    ALLERGIES & MEDICATIONS  --------------------------------------------------------------------------------  Allergies    cefepime (Other)    Intolerances    Standing Inpatient Medications  aspirin enteric coated 81 milliGRAM(s) Oral daily  atorvastatin 40 milliGRAM(s) Oral at bedtime  buMETAnide IVPB 4 milliGRAM(s) IV Intermittent two times a day  carvedilol 6.25 milliGRAM(s) Oral every 12 hours  dextrose 40% Gel 15 Gram(s) Oral once  dextrose 5%. 1000 milliLiter(s) IV Continuous <Continuous>  dextrose 5%. 1000 milliLiter(s) IV Continuous <Continuous>  dextrose 50% Injectable 25 Gram(s) IV Push once  dextrose 50% Injectable 12.5 Gram(s) IV Push once  dextrose 50% Injectable 25 Gram(s) IV Push once  ertapenem  IVPB 500 milliGRAM(s) IV Intermittent every 24 hours  glucagon  Injectable 1 milliGRAM(s) IntraMuscular once  heparin   Injectable 5000 Unit(s) SubCutaneous every 8 hours  hydrALAZINE 50 milliGRAM(s) Oral three times a day  insulin glargine Injectable (LANTUS) 30 Unit(s) SubCutaneous at bedtime  insulin lispro (ADMELOG) corrective regimen sliding scale   SubCutaneous three times a day before meals  isosorbide   dinitrate Tablet (ISORDIL) 20 milliGRAM(s) Oral three times a day  latanoprost 0.005% Ophthalmic Solution 1 Drop(s) Both EYES at bedtime  levoFLOXacin IVPB 250 milliGRAM(s) IV Intermittent every 24 hours  levoFLOXacin IVPB      melatonin 3 milliGRAM(s) Oral at bedtime  NIFEdipine XL 30 milliGRAM(s) Oral daily  polyethylene glycol 3350 17 Gram(s) Oral daily  senna 2 Tablet(s) Oral at bedtime    PRN Inpatient Medications  acetaminophen   Tablet .. 650 milliGRAM(s) Oral every 6 hours PRN    REVIEW OF SYSTEMS  --------------------------------------------------------------------------------  Gen: No fevers   Respiratory: No dyspnea  CV: No chest pain  GI: No abdominal pain  : No dysuria  MSK: No  edema  Neuro: no dizziness     All other systems were reviewed and are negative, except as noted.    VITALS/PHYSICAL EXAM  --------------------------------------------------------------------------------  T(C): 36.6 (07-23-21 @ 17:00), Max: 36.7 (07-22-21 @ 21:30)  HR: 78 (07-23-21 @ 17:00) (70 - 122)  BP: 132/70 (07-23-21 @ 17:00) (129/67 - 142/68)  RR: 18 (07-23-21 @ 17:00) (18 - 18)  SpO2: 94% (07-23-21 @ 17:00) (92% - 100%)  Wt(kg): --    07-22-21 @ 07:01  -  07-23-21 @ 07:00  --------------------------------------------------------  IN: 994 mL / OUT: 950 mL / NET: 44 mL    07-23-21 @ 07:01  -  07-23-21 @ 18:08  --------------------------------------------------------  IN: 250 mL / OUT: 1250 mL / NET: -1000 mL    Physical Exam:  	Gen: NAD, appears calm  	HEENT: MMM, anicteric  	Pulm: CTA B/L  	CV: S1S2+  	Abd: Soft, +BS   	Ext: R foot dressing seen+  	Neuro: Awake  	Skin: Warm and dry  	Vascular access: peripheral IV canula    LABS/STUDIES  --------------------------------------------------------------------------------              9.9    15.75 >-----------<  478      [07-23-21 @ 07:11]              29.9     130  |  94  |  82  ----------------------------<  216      [07-23-21 @ 16:21]  4.1   |  20  |  3.28        Ca     8.9     [07-23-21 @ 16:21]      Mg     2.40     [07-23-21 @ 16:21]      Phos  5.1     [07-23-21 @ 16:21]    TPro  7.0  /  Alb  3.1  /  TBili  0.5  /  DBili  x   /  AST  21  /  ALT  20  /  AlkPhos  219  [07-22-21 @ 06:32]    Creatinine Trend:  SCr 3.28 [07-23 @ 16:21]  SCr 3.44 [07-23 @ 07:11]  SCr 3.57 [07-22 @ 14:32]  SCr 3.75 [07-22 @ 06:32]  SCr 3.93 [07-21 @ 18:45]

## 2021-07-23 NOTE — PROGRESS NOTE ADULT - ASSESSMENT
61 male with h/o HTN, HFpEF, insulin dependent DM-2, hld, glaucoma, CKD3 with recent interstitial nephritis treated with steroids, recent NSTEMI tx w/ heparin and dual antiplatelets, PAD s/p RLE angioplasty, s/p right 2nd toe partial amputation at [facility] in April 2021 presents with osteomyelitis also with ADHF.     Recommendations:  -Pt kidney function improved from yesterday (Cr 3.93->3.75). Pt also putting out significant amount of urine. Continue with Bumex 4mg IV. Will continue to monitor volume status.  -Pt still not optimized for surgery from cardiac standpoint     61 male with h/o HTN, HFpEF, insulin dependent DM-2, hld, glaucoma, CKD3 with recent interstitial nephritis treated with steroids, recent NSTEMI tx w/ heparin and dual antiplatelets, PAD s/p RLE angioplasty, s/p right 2nd toe partial amputation at [facility] in April 2021 presents with osteomyelitis also with ADHF.     Rec:  Continue with Bumex 4mg IV.   Will continue to monitor volume status.   61 male with h/o HTN, HFpEF, insulin dependent DM-2, hld, glaucoma, CKD3 with recent interstitial nephritis treated with steroids, recent NSTEMI tx w/ heparin and dual antiplatelets, PAD s/p RLE angioplasty, s/p right 2nd toe partial amputation at [facility] in April 2021 presents with osteomyelitis also with ADHF.     Rec:  Continue with Bumex 4mg IV. Transition to Bumex 2 mg daily starting 7/24  Will continue to monitor volume status.

## 2021-07-23 NOTE — PROGRESS NOTE ADULT - ASSESSMENT
60 y/o M patient presents with R foot toe infection  - Patient seen and evaluated   - gangrenous right foot 2nd and 3rd digits, RF 3rd interspace wound probes to bone, increased tracking proximally to 2nd and 3rd MPJ, mild wet conversion of gangrenous digits, no pus expressed from interdigital wound, erythema consistent  - R foot MRI: early osteomyelitis within the second proximal phalangeal, periostitis to the proximal phalanx of 3rd digit without vinnie osteomyelitis.  - MICHEL/PVR showing noncompressible vessels b/l, RTBI 0.07, LTBI 0.22, waveforms flat at digits  - Vasc planning RLE angio 7/29  - Pod plan for R foot partial 2nd and 3rd ray resection following Vascular Angiogram 7/29 and cardiac clearance  - Please document medical and cardiac clearance for right foot surgery under light sedation with local anesthesia  - Discussed with attending

## 2021-07-23 NOTE — PROGRESS NOTE ADULT - ASSESSMENT
62 y/o male with h/o HTN, insulin dependent DM-2, hld, glaucoma, CKD3 with recent interstitial nephritis treated with steroid, HFrEF, recent NSTEMI, PAD s/p RLE angioplasty, s/p right 2nd toe partial amputation at [facility] in April 2021, who presents with 2 week history of worsening right foot pain with plans for 2nd and 3rd toe ray amps.        Patient will need Angiogram prior to any podiatry intervention  Will use CO2 angio to spare kidneys from contrast load  Medical clearance will be required as patient was short of breath and desatting during exam  Nephrology consult for worsening ACOSTA  Vascular Surgery will follow      C team  [id number]   62 y/o male with h/o HTN, insulin dependent DM-2, hld, glaucoma, CKD3 with recent interstitial nephritis treated with steroid, HFrEF, recent NSTEMI, PAD s/p RLE angioplasty, s/p right 2nd toe partial amputation at [facility] in April 2021, who presents with 2 week history of worsening right foot pain with plans for 2nd and 3rd toe ray amps.      Will plan for angiogram  will need nephro and cards clearance for procedure    C team  [id number]

## 2021-07-23 NOTE — PROGRESS NOTE ADULT - SUBJECTIVE AND OBJECTIVE BOX
PROGRESS NOTE:   Authored by Marguerite Whitfield MD  Internal Medicine  Pager PIA [phone]  Pager [phone]    Patient is a 61y old  Male who presents with a chief complaint of right foot 2nd/3rd toe infection (2021 14:35)      SUBJECTIVE / OVERNIGHT EVENTS: NAEO.    MEDICATIONS  (STANDING):  aspirin enteric coated 81 milliGRAM(s) Oral daily  atorvastatin 40 milliGRAM(s) Oral at bedtime  buMETAnide IVPB 4 milliGRAM(s) IV Intermittent two times a day  carvedilol 6.25 milliGRAM(s) Oral every 12 hours  dextrose 40% Gel 15 Gram(s) Oral once  dextrose 5%. 1000 milliLiter(s) (50 mL/Hr) IV Continuous <Continuous>  dextrose 5%. 1000 milliLiter(s) (100 mL/Hr) IV Continuous <Continuous>  dextrose 50% Injectable 25 Gram(s) IV Push once  dextrose 50% Injectable 12.5 Gram(s) IV Push once  dextrose 50% Injectable 25 Gram(s) IV Push once  ertapenem  IVPB 500 milliGRAM(s) IV Intermittent every 24 hours  glucagon  Injectable 1 milliGRAM(s) IntraMuscular once  heparin   Injectable 5000 Unit(s) SubCutaneous every 8 hours  hydrALAZINE 50 milliGRAM(s) Oral three times a day  insulin glargine Injectable (LANTUS) 30 Unit(s) SubCutaneous at bedtime  insulin lispro (ADMELOG) corrective regimen sliding scale   SubCutaneous three times a day before meals  isosorbide   dinitrate Tablet (ISORDIL) 20 milliGRAM(s) Oral three times a day  latanoprost 0.005% Ophthalmic Solution 1 Drop(s) Both EYES at bedtime  levoFLOXacin IVPB 250 milliGRAM(s) IV Intermittent every 24 hours  levoFLOXacin IVPB      melatonin 3 milliGRAM(s) Oral at bedtime  NIFEdipine XL 30 milliGRAM(s) Oral daily  polyethylene glycol 3350 17 Gram(s) Oral daily  senna 2 Tablet(s) Oral at bedtime    MEDICATIONS  (PRN):  acetaminophen   Tablet .. 650 milliGRAM(s) Oral every 6 hours PRN Temp greater or equal to 38.5C (101.3F), Mild Pain (1 - 3)      CAPILLARY BLOOD GLUCOSE      POCT Blood Glucose.: 229 mg/dL (2021 21:51)  POCT Blood Glucose.: 227 mg/dL (2021 18:39)  POCT Blood Glucose.: 240 mg/dL (2021 12:11)  POCT Blood Glucose.: 111 mg/dL (2021 08:36)    I&O's Summary    2021 07:01  -  2021 07:00  --------------------------------------------------------  IN: 994 mL / OUT: 950 mL / NET: 44 mL        PHYSICAL EXAM:  Vital Signs Last 24 Hrs  T(C): 36.4 (2021 06:00), Max: 36.7 (2021 21:30)  T(F): 97.6 (2021 06:00), Max: 98.1 (2021 21:30)  HR: 70 (2021 06:00) (70 - 122)  BP: 129/67 (2021 06:00) (129/67 - 142/68)  BP(mean): --  RR: 18 (2021 06:00) (16 - 18)  SpO2: 95% (2021 06:00) (94% - 100%)    GENERAL: No acute distress, well-developed  HEAD:  Atraumatic, Normocephalic  EYES: EOMI, PERRLA, conjunctiva and sclera clear  NECK: Supple, no lymphadenopathy, no JVD  CHEST/LUNG: CTAB; No wheezes, rales, or rhonchi  HEART: Regular rate and rhythm; No murmurs, rubs, or gallops  ABDOMEN: Soft, non-tender, non-distended; normal bowel sounds, no organomegaly  EXTREMITIES:  2+ peripheral pulses b/l, No clubbing, cyanosis, or edema  NEUROLOGY: A&O x 3, no focal deficits  SKIN: No rashes or lesions    LABS:                        9.9    15.75 )-----------( 478      ( 2021 07:11 )             29.9     07-22    131<L>  |  94<L>  |  73<H>  ----------------------------<  222<H>  3.8   |  18<L>  |  3.57<H>    Ca    9.0      2021 14:32  Phos  5.6       Mg     2.20         TPro  7.0  /  Alb  3.1<L>  /  TBili  0.5  /  DBili  x   /  AST  21  /  ALT  20  /  AlkPhos  219<H>            Urinalysis Basic - ( 2021 17:25 )    Color: Yellow / Appearance: Clear / S.016 / pH: x  Gluc: x / Ketone: Negative  / Bili: Negative / Urobili: <2 mg/dL   Blood: x / Protein: Trace / Nitrite: Negative   Leuk Esterase: Negative / RBC: 6 /HPF / WBC 2 /HPF   Sq Epi: x / Non Sq Epi: 1 /HPF / Bacteria: Negative          RADIOLOGY & ADDITIONAL TESTS:  Results Reviewed:   Imaging Personally Reviewed:  Electrocardiogram Personally Reviewed:    COORDINATION OF CARE:  Care Discussed with Consultants/Other Providers [Y/N]:  Prior or Outpatient Records Reviewed [Y/N]:   PROGRESS NOTE:   Authored by Marguerite Whitfield MD  Internal Medicine  Pager PIA [phone]  Pager [phone]    Patient is a 61y old  Male who presents with a chief complaint of right foot 2nd/3rd toe infection (2021 14:35)      SUBJECTIVE / OVERNIGHT EVENTS: NAEO. Has a bloody nose this morning. feeling a little tired, but shortness of breath is better. doesn't feel any swelling. can still pee well    MEDICATIONS  (STANDING):  aspirin enteric coated 81 milliGRAM(s) Oral daily  atorvastatin 40 milliGRAM(s) Oral at bedtime  buMETAnide IVPB 4 milliGRAM(s) IV Intermittent two times a day  carvedilol 6.25 milliGRAM(s) Oral every 12 hours  dextrose 40% Gel 15 Gram(s) Oral once  dextrose 5%. 1000 milliLiter(s) (50 mL/Hr) IV Continuous <Continuous>  dextrose 5%. 1000 milliLiter(s) (100 mL/Hr) IV Continuous <Continuous>  dextrose 50% Injectable 25 Gram(s) IV Push once  dextrose 50% Injectable 12.5 Gram(s) IV Push once  dextrose 50% Injectable 25 Gram(s) IV Push once  ertapenem  IVPB 500 milliGRAM(s) IV Intermittent every 24 hours  glucagon  Injectable 1 milliGRAM(s) IntraMuscular once  heparin   Injectable 5000 Unit(s) SubCutaneous every 8 hours  hydrALAZINE 50 milliGRAM(s) Oral three times a day  insulin glargine Injectable (LANTUS) 30 Unit(s) SubCutaneous at bedtime  insulin lispro (ADMELOG) corrective regimen sliding scale   SubCutaneous three times a day before meals  isosorbide   dinitrate Tablet (ISORDIL) 20 milliGRAM(s) Oral three times a day  latanoprost 0.005% Ophthalmic Solution 1 Drop(s) Both EYES at bedtime  levoFLOXacin IVPB 250 milliGRAM(s) IV Intermittent every 24 hours  levoFLOXacin IVPB      melatonin 3 milliGRAM(s) Oral at bedtime  NIFEdipine XL 30 milliGRAM(s) Oral daily  polyethylene glycol 3350 17 Gram(s) Oral daily  senna 2 Tablet(s) Oral at bedtime    MEDICATIONS  (PRN):  acetaminophen   Tablet .. 650 milliGRAM(s) Oral every 6 hours PRN Temp greater or equal to 38.5C (101.3F), Mild Pain (1 - 3)      CAPILLARY BLOOD GLUCOSE      POCT Blood Glucose.: 229 mg/dL (2021 21:51)  POCT Blood Glucose.: 227 mg/dL (2021 18:39)  POCT Blood Glucose.: 240 mg/dL (2021 12:11)  POCT Blood Glucose.: 111 mg/dL (2021 08:36)    I&O's Summary    2021 07:01  -  2021 07:00  --------------------------------------------------------  IN: 994 mL / OUT: 950 mL / NET: 44 mL        PHYSICAL EXAM:  Vital Signs Last 24 Hrs  T(C): 36.4 (2021 06:00), Max: 36.7 (2021 21:30)  T(F): 97.6 (2021 06:00), Max: 98.1 (2021 21:30)  HR: 70 (2021 06:00) (70 - 122)  BP: 129/67 (2021 06:00) (129/67 - 142/68)  BP(mean): --  RR: 18 (2021 06:00) (16 - 18)  SpO2: 95% (2021 06:00) (94% - 100%)    GENERAL: No acute distress, fatigued, on nasal cannula  HEAD:  Atraumatic, Normocephalic  EYES: conjunctiva and sclera clear  NECK:  no JVD  CHEST/LUNG: interval improving in wob, mild crackles bilaterally at bases  HEART: Regular rate and rhythm; No murmurs, rubs, or gallops  ABDOMEN: Soft, non-tender, non-distended; normal bowel sounds, no organomegaly  EXTREMITIES:  2+ peripheral pulses b/l, No clubbing, cyanosis, or edema  NEUROLOGY: A&O x 3, no focal deficits  SKIN: No rashes or lesions    LABS:                        9.9    15.75 )-----------( 478      ( 2021 07:11 )             29.9     07-22    131<L>  |  94<L>  |  73<H>  ----------------------------<  222<H>  3.8   |  18<L>  |  3.57<H>    Ca    9.0      2021 14:32  Phos  5.6       Mg     2.20         TPro  7.0  /  Alb  3.1<L>  /  TBili  0.5  /  DBili  x   /  AST  21  /  ALT  20  /  AlkPhos  219<H>            Urinalysis Basic - ( 2021 17:25 )    Color: Yellow / Appearance: Clear / S.016 / pH: x  Gluc: x / Ketone: Negative  / Bili: Negative / Urobili: <2 mg/dL   Blood: x / Protein: Trace / Nitrite: Negative   Leuk Esterase: Negative / RBC: 6 /HPF / WBC 2 /HPF   Sq Epi: x / Non Sq Epi: 1 /HPF / Bacteria: Negative          RADIOLOGY & ADDITIONAL TESTS:  Results Reviewed:   Imaging Personally Reviewed:  Electrocardiogram Personally Reviewed:    COORDINATION OF CARE:  Care Discussed with Consultants/Other Providers [Y/N]:  Prior or Outpatient Records Reviewed [Y/N]:

## 2021-07-24 LAB
ANION GAP SERPL CALC-SCNC: 17 MMOL/L — HIGH (ref 7–14)
BASOPHILS # BLD AUTO: 0.13 K/UL — SIGNIFICANT CHANGE UP (ref 0–0.2)
BASOPHILS NFR BLD AUTO: 0.7 % — SIGNIFICANT CHANGE UP (ref 0–2)
BUN SERPL-MCNC: 80 MG/DL — HIGH (ref 7–23)
CALCIUM SERPL-MCNC: 9 MG/DL — SIGNIFICANT CHANGE UP (ref 8.4–10.5)
CHLORIDE SERPL-SCNC: 96 MMOL/L — LOW (ref 98–107)
CO2 SERPL-SCNC: 18 MMOL/L — LOW (ref 22–31)
CREAT SERPL-MCNC: 2.9 MG/DL — HIGH (ref 0.5–1.3)
EOSINOPHIL # BLD AUTO: 0.66 K/UL — HIGH (ref 0–0.5)
EOSINOPHIL NFR BLD AUTO: 3.7 % — SIGNIFICANT CHANGE UP (ref 0–6)
GLUCOSE BLDC GLUCOMTR-MCNC: 146 MG/DL — HIGH (ref 70–99)
GLUCOSE BLDC GLUCOMTR-MCNC: 198 MG/DL — HIGH (ref 70–99)
GLUCOSE BLDC GLUCOMTR-MCNC: 217 MG/DL — HIGH (ref 70–99)
GLUCOSE BLDC GLUCOMTR-MCNC: 240 MG/DL — HIGH (ref 70–99)
GLUCOSE SERPL-MCNC: 164 MG/DL — HIGH (ref 70–99)
HCT VFR BLD CALC: 28.2 % — LOW (ref 39–50)
HGB BLD-MCNC: 9.4 G/DL — LOW (ref 13–17)
IANC: 13.4 K/UL — HIGH (ref 1.5–8.5)
IMM GRANULOCYTES NFR BLD AUTO: 2.5 % — HIGH (ref 0–1.5)
LYMPHOCYTES # BLD AUTO: 12.3 % — LOW (ref 13–44)
LYMPHOCYTES # BLD AUTO: 2.18 K/UL — SIGNIFICANT CHANGE UP (ref 1–3.3)
MAGNESIUM SERPL-MCNC: 2.5 MG/DL — SIGNIFICANT CHANGE UP (ref 1.6–2.6)
MCHC RBC-ENTMCNC: 27.2 PG — SIGNIFICANT CHANGE UP (ref 27–34)
MCHC RBC-ENTMCNC: 33.3 GM/DL — SIGNIFICANT CHANGE UP (ref 32–36)
MCV RBC AUTO: 81.5 FL — SIGNIFICANT CHANGE UP (ref 80–100)
MONOCYTES # BLD AUTO: 0.92 K/UL — HIGH (ref 0–0.9)
MONOCYTES NFR BLD AUTO: 5.2 % — SIGNIFICANT CHANGE UP (ref 2–14)
NEUTROPHILS # BLD AUTO: 13.4 K/UL — HIGH (ref 1.8–7.4)
NEUTROPHILS NFR BLD AUTO: 75.6 % — SIGNIFICANT CHANGE UP (ref 43–77)
NRBC # BLD: 0 /100 WBCS — SIGNIFICANT CHANGE UP
NRBC # FLD: 0 K/UL — SIGNIFICANT CHANGE UP
PHOSPHATE SERPL-MCNC: 4.7 MG/DL — HIGH (ref 2.5–4.5)
PLATELET # BLD AUTO: 517 K/UL — HIGH (ref 150–400)
POTASSIUM SERPL-MCNC: 3.8 MMOL/L — SIGNIFICANT CHANGE UP (ref 3.5–5.3)
POTASSIUM SERPL-SCNC: 3.8 MMOL/L — SIGNIFICANT CHANGE UP (ref 3.5–5.3)
RBC # BLD: 3.46 M/UL — LOW (ref 4.2–5.8)
RBC # FLD: 12.9 % — SIGNIFICANT CHANGE UP (ref 10.3–14.5)
SODIUM SERPL-SCNC: 131 MMOL/L — LOW (ref 135–145)
WBC # BLD: 17.56 K/UL — HIGH (ref 3.8–10.5)
WBC # FLD AUTO: 17.56 K/UL — HIGH (ref 3.8–10.5)

## 2021-07-24 PROCEDURE — 99233 SBSQ HOSP IP/OBS HIGH 50: CPT

## 2021-07-24 RX ADMIN — HEPARIN SODIUM 5000 UNIT(S): 5000 INJECTION INTRAVENOUS; SUBCUTANEOUS at 14:48

## 2021-07-24 RX ADMIN — Medication 50 MILLIGRAM(S): at 06:25

## 2021-07-24 RX ADMIN — BUMETANIDE 132 MILLIGRAM(S): 0.25 INJECTION INTRAMUSCULAR; INTRAVENOUS at 18:21

## 2021-07-24 RX ADMIN — ISOSORBIDE DINITRATE 20 MILLIGRAM(S): 5 TABLET ORAL at 14:47

## 2021-07-24 RX ADMIN — Medication 81 MILLIGRAM(S): at 12:51

## 2021-07-24 RX ADMIN — ERTAPENEM SODIUM 100 MILLIGRAM(S): 1 INJECTION, POWDER, LYOPHILIZED, FOR SOLUTION INTRAMUSCULAR; INTRAVENOUS at 15:56

## 2021-07-24 RX ADMIN — ISOSORBIDE DINITRATE 20 MILLIGRAM(S): 5 TABLET ORAL at 22:09

## 2021-07-24 RX ADMIN — Medication 3 MILLIGRAM(S): at 22:00

## 2021-07-24 RX ADMIN — HEPARIN SODIUM 5000 UNIT(S): 5000 INJECTION INTRAVENOUS; SUBCUTANEOUS at 22:08

## 2021-07-24 RX ADMIN — ATORVASTATIN CALCIUM 40 MILLIGRAM(S): 80 TABLET, FILM COATED ORAL at 22:09

## 2021-07-24 RX ADMIN — CARVEDILOL PHOSPHATE 6.25 MILLIGRAM(S): 80 CAPSULE, EXTENDED RELEASE ORAL at 18:21

## 2021-07-24 RX ADMIN — Medication 50 MILLIGRAM(S): at 22:09

## 2021-07-24 RX ADMIN — CARVEDILOL PHOSPHATE 6.25 MILLIGRAM(S): 80 CAPSULE, EXTENDED RELEASE ORAL at 06:25

## 2021-07-24 RX ADMIN — POLYETHYLENE GLYCOL 3350 17 GRAM(S): 17 POWDER, FOR SOLUTION ORAL at 12:51

## 2021-07-24 RX ADMIN — BUMETANIDE 132 MILLIGRAM(S): 0.25 INJECTION INTRAMUSCULAR; INTRAVENOUS at 06:58

## 2021-07-24 RX ADMIN — INSULIN GLARGINE 30 UNIT(S): 100 INJECTION, SOLUTION SUBCUTANEOUS at 22:15

## 2021-07-24 RX ADMIN — SENNA PLUS 2 TABLET(S): 8.6 TABLET ORAL at 22:09

## 2021-07-24 RX ADMIN — Medication 1: at 10:33

## 2021-07-24 RX ADMIN — Medication 50 MILLIGRAM(S): at 14:49

## 2021-07-24 RX ADMIN — HEPARIN SODIUM 5000 UNIT(S): 5000 INJECTION INTRAVENOUS; SUBCUTANEOUS at 06:27

## 2021-07-24 RX ADMIN — ISOSORBIDE DINITRATE 20 MILLIGRAM(S): 5 TABLET ORAL at 06:25

## 2021-07-24 RX ADMIN — Medication 2: at 18:20

## 2021-07-24 RX ADMIN — LATANOPROST 1 DROP(S): 0.05 SOLUTION/ DROPS OPHTHALMIC; TOPICAL at 21:12

## 2021-07-24 RX ADMIN — Medication 30 MILLIGRAM(S): at 06:31

## 2021-07-24 RX ADMIN — Medication 2: at 21:46

## 2021-07-24 NOTE — PROGRESS NOTE ADULT - ASSESSMENT
62 y/o M patient presents with R foot toe infection  - Patient seen and evaluated   - gangrenous right foot 2nd and 3rd digits, RF 3rd interspace wound probes to bone, increased tracking proximally to 2nd and 3rd MPJ, mild wet conversion of gangrenous digits, no pus expressed from interdigital wound, erythema consistent  - R foot MRI: early osteomyelitis within the second proximal phalangeal, periostitis to the proximal phalanx of 3rd digit without vinnie osteomyelitis.  - MICHEL/PVR showing noncompressible vessels b/l, RTBI 0.07, LTBI 0.22, waveforms flat at digits  - Vasc planning RLE angio 7/29  - Pod plan for R foot partial 2nd and 3rd ray resection following Vascular Angiogram 7/29 and cardiac clearance  - Please document medical and cardiac clearance for right foot surgery under light sedation with local anesthesia  - Discussed with attending

## 2021-07-24 NOTE — PROGRESS NOTE ADULT - SUBJECTIVE AND OBJECTIVE BOX
Subjective:   Patient seen and examined at bedside. Doing well with no acute events overnight.     Objective:   Vital Signs Last 24 Hrs  T(C): 36.7 (24 Jul 2021 06:19), Max: 36.7 (24 Jul 2021 06:19)  T(F): 98 (24 Jul 2021 06:19), Max: 98 (24 Jul 2021 06:19)  HR: 70 (24 Jul 2021 06:51) (70 - 85)  BP: 150/67 (24 Jul 2021 06:19) (130/68 - 150/67)  BP(mean): --  RR: 17 (24 Jul 2021 06:19) (17 - 18)  SpO2: 99% (24 Jul 2021 06:51) (92% - 99%)  I&O's Summary    23 Jul 2021 07:01  -  24 Jul 2021 07:00  --------------------------------------------------------  IN: 250 mL / OUT: 1250 mL / NET: -1000 mL        Physical Exam:  General: NAD, resting comfortably in bed  Pulmonary: Nonlabored breathing, no respiratory distress  Cardiovascular: NSR  Abdominal: soft, NT/ND  Extremities: WWP  Pulses:  DP/PT signals present in the lower extremities.         LABS:                        9.4    17.56 )-----------( 517      ( 24 Jul 2021 07:00 )             28.2     07-24    131<L>  |  96<L>  |  80<H>  ----------------------------<  164<H>  3.8   |  18<L>  |  2.90<H>    Ca    9.0      24 Jul 2021 07:00  Phos  4.7     07-24  Mg     2.50     07-24

## 2021-07-24 NOTE — PROGRESS NOTE ADULT - PROBLEM SELECTOR PLAN 1
Improving. patient desatted to low 80s 7/19 afternoon. put on 5L NC with 93-95% sats. patient reports feeling short of breath and describes retrosternal chest pressure. slightly worse with breathing.  bedside focused ultrasound remarkable for b lines and mitral regurg. likely flash pulmonary edema 2/2 fluid bolus from ACOSTA treatment. repeat echo reflecting improvement in cardiac function. VQ scan and DVT ultrasound: neg. oxygen requirement is now improing with diuresis.  - bumex 4 mg bid, consider transition to 2mg po over the weekend  - BMP and BNP BID  - IVC ultrasound  - continuous pulse ox with tele  - ctm Improving. patient desatted to low 80s 7/19 afternoon. put on 5L NC with 93-95% sats. patient reports feeling short of breath and describes retrosternal chest pressure. slightly worse with breathing.  bedside focused ultrasound remarkable for b lines and mitral regurg. likely flash pulmonary edema 2/2 fluid bolus from ACOSTA treatment. repeat echo reflecting improvement in cardiac function. VQ scan and DVT ultrasound: neg. oxygen requirement is now improing with diuresis.  - bumex 4 mg bid, consider transition to 2mg po today  - BMP and BNP BID  - continuous pulse ox with tele  - wean oxygen  - ctm Improving. patient desatted to low 80s 7/19 afternoon. put on 5L NC with 93-95% sats. patient reports feeling short of breath and describes retrosternal chest pressure. slightly worse with breathing.  bedside focused ultrasound remarkable for b lines and mitral regurg. likely flash pulmonary edema 2/2 fluid bolus from ACOSTA treatment. repeat echo reflecting improvement in cardiac function. VQ scan and DVT ultrasound: neg. oxygen requirement is now improing with diuresis.  - bumex 4 mg bid, consider transition to 2mg po this weekend (7/25).  - daily bmp and bnp trend  - continuous pulse ox with tele  - wean oxygen  - ctm

## 2021-07-24 NOTE — PROGRESS NOTE ADULT - PROBLEM SELECTOR PLAN 1
Pt. with ACOSTA on CKD in the setting of venous congestion. pt. hx of AIN treated with steroids. Scr on admission, was 1.77. It increases to 3.93 on 7/21/21. Renal sonogram done on 7/21/21 showed no HDN. Urine electrolytes collected on 7/21 showed Lupillo < 20 with urine osmol of 304. Pt clinically in volume overload, BNP done on 7/21/21 elevated to [address]. Received Diuretics (lasix on 7/18-7/19) and then bumex on 7/21/21. Scr has decreased to 2.9 today. Pt. is non-oliguric, non uremic. Recommend continuing Bumex 4 mg BID. Monitor labs and urine output. Avoid any potential nephrotoxins. Dose medications as per eGFR.

## 2021-07-24 NOTE — PROGRESS NOTE ADULT - PROBLEM SELECTOR PLAN 3
-pt with h/o right foot 2nd toe partial amputation in April at [facility], h/o severe RLE PVD with RLE angioplasty in past, no stent, now p/w 2nd residual toe/stump and 3rd toe infection, xray concerning for osteomyelitis, elevated inflammatory markers ESR 96, CRP 80.6 c/w acute OM. rec'd IV vanc 1 g bid (7/17 pm - 7/20), zoxyn (7/18-7/20)  -7/18 blood cultures x2  -ID c/s start erta and levaquin (7/20 - )  -MRI right foot: osteomyelitis in 2nd toe, periostitis w/o osteo on 3rd toe  -podiatry consulted, s/p I&D 7/17. plan for RF P2RR, P3RR pending MICHEL/PVR vasc recs, next week  - vascular consult: angiogram with co2 angio next thursday 7/29  -needs cardiology clearance, RCRI 3, high risk for periop MACE

## 2021-07-24 NOTE — PROGRESS NOTE ADULT - PROBLEM SELECTOR PLAN 2
improving creatinine. On CKD3, h/o recent ACOSTA d/t AIN , responded to prednisone. likely prerenal vs intrinsic, renal u/s without hydronephrosis. repeat ua bland, FeUrea reflects prerenal  - neph following appreciate assistance  - strict I+O  - trend Cr  - continue hydral  - hydrate as tolerated given pulmonary edema, holding for now  -dose meds per renal fxn, avoid nephrotoxins, avoid ACEi and ARBs, Maintain MAP >65, renal diet

## 2021-07-24 NOTE — PROGRESS NOTE ADULT - SUBJECTIVE AND OBJECTIVE BOX
Vascular: DP 1/4, B/L, PT non-palpable B/L, CFT <3 seconds B/L, Temperature gradient cool to warm on R, warm to cool on L    Neuro: Epicritic sensation intact to the level of ankle, B/L.  Musculoskeletal/Ortho: pain on palpation surrounding the 2nd digit   Skin: gangrenous right foot 2nd and 3rd digits, RF 3rd interspace wound probes to bone, consistent tracking proximally to 2nd and 3rd MPJ, mild wet conversion of gangrenous digits, scant pus expressed from interdigital wound, erythema consistent    7/17 s/p I&D of R 2nd interspace with erythema, no pus expressed, tracks 1cm dorsal and proximal, no malodor,  resolving erythema to the dorsolateral foot up until the midfoot, diffuse edema of the R forefoot, gangrene of the 2nd toe stump, ischemic changes to the R 3rd digit, cool to touch

## 2021-07-24 NOTE — PROGRESS NOTE ADULT - SUBJECTIVE AND OBJECTIVE BOX
PROGRESS NOTE:   Authored by Marguerite Whitfield MD  Internal Medicine  Pager PIA [phone]  Pager [phone]    Patient is a 61y old  Male who presents with a chief complaint of right foot 2nd/3rd toe infection (23 Jul 2021 19:52)      SUBJECTIVE / OVERNIGHT EVENTS: NAEO.    MEDICATIONS  (STANDING):  aspirin enteric coated 81 milliGRAM(s) Oral daily  atorvastatin 40 milliGRAM(s) Oral at bedtime  buMETAnide IVPB 4 milliGRAM(s) IV Intermittent two times a day  carvedilol 6.25 milliGRAM(s) Oral every 12 hours  dextrose 40% Gel 15 Gram(s) Oral once  dextrose 5%. 1000 milliLiter(s) (50 mL/Hr) IV Continuous <Continuous>  dextrose 5%. 1000 milliLiter(s) (100 mL/Hr) IV Continuous <Continuous>  dextrose 50% Injectable 25 Gram(s) IV Push once  dextrose 50% Injectable 12.5 Gram(s) IV Push once  dextrose 50% Injectable 25 Gram(s) IV Push once  ertapenem  IVPB 500 milliGRAM(s) IV Intermittent every 24 hours  glucagon  Injectable 1 milliGRAM(s) IntraMuscular once  heparin   Injectable 5000 Unit(s) SubCutaneous every 8 hours  hydrALAZINE 50 milliGRAM(s) Oral three times a day  insulin glargine Injectable (LANTUS) 30 Unit(s) SubCutaneous at bedtime  insulin lispro (ADMELOG) corrective regimen sliding scale   SubCutaneous three times a day before meals  isosorbide   dinitrate Tablet (ISORDIL) 20 milliGRAM(s) Oral three times a day  latanoprost 0.005% Ophthalmic Solution 1 Drop(s) Both EYES at bedtime  levoFLOXacin IVPB 250 milliGRAM(s) IV Intermittent every 24 hours  levoFLOXacin IVPB      melatonin 3 milliGRAM(s) Oral at bedtime  NIFEdipine XL 30 milliGRAM(s) Oral daily  polyethylene glycol 3350 17 Gram(s) Oral daily  senna 2 Tablet(s) Oral at bedtime    MEDICATIONS  (PRN):  acetaminophen   Tablet .. 650 milliGRAM(s) Oral every 6 hours PRN Temp greater or equal to 38.5C (101.3F), Mild Pain (1 - 3)      CAPILLARY BLOOD GLUCOSE      POCT Blood Glucose.: 240 mg/dL (23 Jul 2021 21:35)  POCT Blood Glucose.: 276 mg/dL (23 Jul 2021 17:39)  POCT Blood Glucose.: 210 mg/dL (23 Jul 2021 12:24)  POCT Blood Glucose.: 155 mg/dL (23 Jul 2021 08:29)    I&O's Summary    23 Jul 2021 07:01  -  24 Jul 2021 07:00  --------------------------------------------------------  IN: 250 mL / OUT: 1250 mL / NET: -1000 mL        PHYSICAL EXAM:  Vital Signs Last 24 Hrs  T(C): 36.7 (24 Jul 2021 06:19), Max: 36.7 (24 Jul 2021 06:19)  T(F): 98 (24 Jul 2021 06:19), Max: 98 (24 Jul 2021 06:19)  HR: 70 (24 Jul 2021 06:51) (70 - 85)  BP: 150/67 (24 Jul 2021 06:19) (130/68 - 150/67)  BP(mean): --  RR: 17 (24 Jul 2021 06:19) (17 - 18)  SpO2: 99% (24 Jul 2021 06:51) (92% - 99%)    GENERAL: No acute distress, well-developed  HEAD:  Atraumatic, Normocephalic  EYES: EOMI, PERRLA, conjunctiva and sclera clear  NECK: Supple, no lymphadenopathy, no JVD  CHEST/LUNG: CTAB; No wheezes, rales, or rhonchi  HEART: Regular rate and rhythm; No murmurs, rubs, or gallops  ABDOMEN: Soft, non-tender, non-distended; normal bowel sounds, no organomegaly  EXTREMITIES:  2+ peripheral pulses b/l, No clubbing, cyanosis, or edema  NEUROLOGY: A&O x 3, no focal deficits  SKIN: No rashes or lesions    LABS:                        9.9    15.75 )-----------( 478      ( 23 Jul 2021 07:11 )             29.9     07-23    130<L>  |  94<L>  |  82<H>  ----------------------------<  216<H>  4.1   |  20<L>  |  3.28<H>    Ca    8.9      23 Jul 2021 16:21  Phos  5.1     07-23  Mg     2.40     07-23                  RADIOLOGY & ADDITIONAL TESTS:  Results Reviewed:   Imaging Personally Reviewed:  Electrocardiogram Personally Reviewed:    COORDINATION OF CARE:  Care Discussed with Consultants/Other Providers [Y/N]:  Prior or Outpatient Records Reviewed [Y/N]:   PROGRESS NOTE:   Authored by Marguerite Whitfield MD  Internal Medicine  Pager PIA [phone]  Pager [phone]    Patient is a 61y old  Male who presents with a chief complaint of right foot 2nd/3rd toe infection (23 Jul 2021 19:52)      SUBJECTIVE / OVERNIGHT EVENTS: NAEO. had a     MEDICATIONS  (STANDING):  aspirin enteric coated 81 milliGRAM(s) Oral daily  atorvastatin 40 milliGRAM(s) Oral at bedtime  buMETAnide IVPB 4 milliGRAM(s) IV Intermittent two times a day  carvedilol 6.25 milliGRAM(s) Oral every 12 hours  dextrose 40% Gel 15 Gram(s) Oral once  dextrose 5%. 1000 milliLiter(s) (50 mL/Hr) IV Continuous <Continuous>  dextrose 5%. 1000 milliLiter(s) (100 mL/Hr) IV Continuous <Continuous>  dextrose 50% Injectable 25 Gram(s) IV Push once  dextrose 50% Injectable 12.5 Gram(s) IV Push once  dextrose 50% Injectable 25 Gram(s) IV Push once  ertapenem  IVPB 500 milliGRAM(s) IV Intermittent every 24 hours  glucagon  Injectable 1 milliGRAM(s) IntraMuscular once  heparin   Injectable 5000 Unit(s) SubCutaneous every 8 hours  hydrALAZINE 50 milliGRAM(s) Oral three times a day  insulin glargine Injectable (LANTUS) 30 Unit(s) SubCutaneous at bedtime  insulin lispro (ADMELOG) corrective regimen sliding scale   SubCutaneous three times a day before meals  isosorbide   dinitrate Tablet (ISORDIL) 20 milliGRAM(s) Oral three times a day  latanoprost 0.005% Ophthalmic Solution 1 Drop(s) Both EYES at bedtime  levoFLOXacin IVPB 250 milliGRAM(s) IV Intermittent every 24 hours  levoFLOXacin IVPB      melatonin 3 milliGRAM(s) Oral at bedtime  NIFEdipine XL 30 milliGRAM(s) Oral daily  polyethylene glycol 3350 17 Gram(s) Oral daily  senna 2 Tablet(s) Oral at bedtime    MEDICATIONS  (PRN):  acetaminophen   Tablet .. 650 milliGRAM(s) Oral every 6 hours PRN Temp greater or equal to 38.5C (101.3F), Mild Pain (1 - 3)      CAPILLARY BLOOD GLUCOSE      POCT Blood Glucose.: 240 mg/dL (23 Jul 2021 21:35)  POCT Blood Glucose.: 276 mg/dL (23 Jul 2021 17:39)  POCT Blood Glucose.: 210 mg/dL (23 Jul 2021 12:24)  POCT Blood Glucose.: 155 mg/dL (23 Jul 2021 08:29)    I&O's Summary    23 Jul 2021 07:01  -  24 Jul 2021 07:00  --------------------------------------------------------  IN: 250 mL / OUT: 1250 mL / NET: -1000 mL        PHYSICAL EXAM:  Vital Signs Last 24 Hrs  T(C): 36.7 (24 Jul 2021 06:19), Max: 36.7 (24 Jul 2021 06:19)  T(F): 98 (24 Jul 2021 06:19), Max: 98 (24 Jul 2021 06:19)  HR: 70 (24 Jul 2021 06:51) (70 - 85)  BP: 150/67 (24 Jul 2021 06:19) (130/68 - 150/67)  BP(mean): --  RR: 17 (24 Jul 2021 06:19) (17 - 18)  SpO2: 99% (24 Jul 2021 06:51) (92% - 99%)    GENERAL: No acute distress, well-developed  HEAD:  Atraumatic, Normocephalic  EYES: EOMI, PERRLA, conjunctiva and sclera clear  NECK: Supple, no lymphadenopathy, no JVD  CHEST/LUNG: CTAB; No wheezes, rales, or rhonchi  HEART: Regular rate and rhythm; No murmurs, rubs, or gallops  ABDOMEN: Soft, non-tender, non-distended; normal bowel sounds, no organomegaly  EXTREMITIES:  2+ peripheral pulses b/l, No clubbing, cyanosis, or edema  NEUROLOGY: A&O x 3, no focal deficits  SKIN: No rashes or lesions    LABS:                        9.9    15.75 )-----------( 478      ( 23 Jul 2021 07:11 )             29.9     07-23    130<L>  |  94<L>  |  82<H>  ----------------------------<  216<H>  4.1   |  20<L>  |  3.28<H>    Ca    8.9      23 Jul 2021 16:21  Phos  5.1     07-23  Mg     2.40     07-23                  RADIOLOGY & ADDITIONAL TESTS:  Results Reviewed:   Imaging Personally Reviewed:  Electrocardiogram Personally Reviewed:    COORDINATION OF CARE:  Care Discussed with Consultants/Other Providers [Y/N]:  Prior or Outpatient Records Reviewed [Y/N]:   PROGRESS NOTE:   Authored by Marguerite Whitfield MD  Internal Medicine  Pager PIA [phone]  Pager [phone]    Patient is a 61y old  Male who presents with a chief complaint of right foot 2nd/3rd toe infection (23 Jul 2021 19:52)      SUBJECTIVE / OVERNIGHT EVENTS: NAEO. persistently 88% around 4-5 AM this morning on 2LNC. This    MEDICATIONS  (STANDING):  aspirin enteric coated 81 milliGRAM(s) Oral daily  atorvastatin 40 milliGRAM(s) Oral at bedtime  buMETAnide IVPB 4 milliGRAM(s) IV Intermittent two times a day  carvedilol 6.25 milliGRAM(s) Oral every 12 hours  dextrose 40% Gel 15 Gram(s) Oral once  dextrose 5%. 1000 milliLiter(s) (50 mL/Hr) IV Continuous <Continuous>  dextrose 5%. 1000 milliLiter(s) (100 mL/Hr) IV Continuous <Continuous>  dextrose 50% Injectable 25 Gram(s) IV Push once  dextrose 50% Injectable 12.5 Gram(s) IV Push once  dextrose 50% Injectable 25 Gram(s) IV Push once  ertapenem  IVPB 500 milliGRAM(s) IV Intermittent every 24 hours  glucagon  Injectable 1 milliGRAM(s) IntraMuscular once  heparin   Injectable 5000 Unit(s) SubCutaneous every 8 hours  hydrALAZINE 50 milliGRAM(s) Oral three times a day  insulin glargine Injectable (LANTUS) 30 Unit(s) SubCutaneous at bedtime  insulin lispro (ADMELOG) corrective regimen sliding scale   SubCutaneous three times a day before meals  isosorbide   dinitrate Tablet (ISORDIL) 20 milliGRAM(s) Oral three times a day  latanoprost 0.005% Ophthalmic Solution 1 Drop(s) Both EYES at bedtime  levoFLOXacin IVPB 250 milliGRAM(s) IV Intermittent every 24 hours  levoFLOXacin IVPB      melatonin 3 milliGRAM(s) Oral at bedtime  NIFEdipine XL 30 milliGRAM(s) Oral daily  polyethylene glycol 3350 17 Gram(s) Oral daily  senna 2 Tablet(s) Oral at bedtime    MEDICATIONS  (PRN):  acetaminophen   Tablet .. 650 milliGRAM(s) Oral every 6 hours PRN Temp greater or equal to 38.5C (101.3F), Mild Pain (1 - 3)      CAPILLARY BLOOD GLUCOSE      POCT Blood Glucose.: 240 mg/dL (23 Jul 2021 21:35)  POCT Blood Glucose.: 276 mg/dL (23 Jul 2021 17:39)  POCT Blood Glucose.: 210 mg/dL (23 Jul 2021 12:24)  POCT Blood Glucose.: 155 mg/dL (23 Jul 2021 08:29)    I&O's Summary    23 Jul 2021 07:01  -  24 Jul 2021 07:00  --------------------------------------------------------  IN: 250 mL / OUT: 1250 mL / NET: -1000 mL        PHYSICAL EXAM:  Vital Signs Last 24 Hrs  T(C): 36.7 (24 Jul 2021 06:19), Max: 36.7 (24 Jul 2021 06:19)  T(F): 98 (24 Jul 2021 06:19), Max: 98 (24 Jul 2021 06:19)  HR: 70 (24 Jul 2021 06:51) (70 - 85)  BP: 150/67 (24 Jul 2021 06:19) (130/68 - 150/67)  BP(mean): --  RR: 17 (24 Jul 2021 06:19) (17 - 18)  SpO2: 99% (24 Jul 2021 06:51) (92% - 99%)    GENERAL: No acute distress, well-developed  HEAD:  Atraumatic, Normocephalic  EYES: EOMI, PERRLA, conjunctiva and sclera clear  NECK: Supple, no lymphadenopathy, no JVD  CHEST/LUNG: CTAB; No wheezes, rales, or rhonchi  HEART: Regular rate and rhythm; No murmurs, rubs, or gallops  ABDOMEN: Soft, non-tender, non-distended; normal bowel sounds, no organomegaly  EXTREMITIES:  2+ peripheral pulses b/l, No clubbing, cyanosis, or edema  NEUROLOGY: A&O x 3, no focal deficits  SKIN: No rashes or lesions    LABS:                        9.9    15.75 )-----------( 478      ( 23 Jul 2021 07:11 )             29.9     07-23    130<L>  |  94<L>  |  82<H>  ----------------------------<  216<H>  4.1   |  20<L>  |  3.28<H>    Ca    8.9      23 Jul 2021 16:21  Phos  5.1     07-23  Mg     2.40     07-23                  RADIOLOGY & ADDITIONAL TESTS:  Results Reviewed:   Imaging Personally Reviewed:  Electrocardiogram Personally Reviewed:    COORDINATION OF CARE:  Care Discussed with Consultants/Other Providers [Y/N]:  Prior or Outpatient Records Reviewed [Y/N]:   PROGRESS NOTE:   Authored by Marguerite Whitfield MD  Internal Medicine  Pager PIA [phone]  Pager [phone]    Patient is a 61y old  Male who presents with a chief complaint of right foot 2nd/3rd toe infection (23 Jul 2021 19:52)      SUBJECTIVE / OVERNIGHT EVENTS: NAEO. persistently 88% around 4-5 AM this morning on 2LNC. This morning reports feeling much better, breathing is much better. peeing okay. No belly pain, no foot pain, no chest pain. no n/v/d, no constipation. bloody nose is much better compared to yesterday only happens when blowing nose too hard.    MEDICATIONS  (STANDING):  aspirin enteric coated 81 milliGRAM(s) Oral daily  atorvastatin 40 milliGRAM(s) Oral at bedtime  buMETAnide IVPB 4 milliGRAM(s) IV Intermittent two times a day  carvedilol 6.25 milliGRAM(s) Oral every 12 hours  dextrose 40% Gel 15 Gram(s) Oral once  dextrose 5%. 1000 milliLiter(s) (50 mL/Hr) IV Continuous <Continuous>  dextrose 5%. 1000 milliLiter(s) (100 mL/Hr) IV Continuous <Continuous>  dextrose 50% Injectable 25 Gram(s) IV Push once  dextrose 50% Injectable 12.5 Gram(s) IV Push once  dextrose 50% Injectable 25 Gram(s) IV Push once  ertapenem  IVPB 500 milliGRAM(s) IV Intermittent every 24 hours  glucagon  Injectable 1 milliGRAM(s) IntraMuscular once  heparin   Injectable 5000 Unit(s) SubCutaneous every 8 hours  hydrALAZINE 50 milliGRAM(s) Oral three times a day  insulin glargine Injectable (LANTUS) 30 Unit(s) SubCutaneous at bedtime  insulin lispro (ADMELOG) corrective regimen sliding scale   SubCutaneous three times a day before meals  isosorbide   dinitrate Tablet (ISORDIL) 20 milliGRAM(s) Oral three times a day  latanoprost 0.005% Ophthalmic Solution 1 Drop(s) Both EYES at bedtime  levoFLOXacin IVPB 250 milliGRAM(s) IV Intermittent every 24 hours  levoFLOXacin IVPB      melatonin 3 milliGRAM(s) Oral at bedtime  NIFEdipine XL 30 milliGRAM(s) Oral daily  polyethylene glycol 3350 17 Gram(s) Oral daily  senna 2 Tablet(s) Oral at bedtime    MEDICATIONS  (PRN):  acetaminophen   Tablet .. 650 milliGRAM(s) Oral every 6 hours PRN Temp greater or equal to 38.5C (101.3F), Mild Pain (1 - 3)      CAPILLARY BLOOD GLUCOSE      POCT Blood Glucose.: 240 mg/dL (23 Jul 2021 21:35)  POCT Blood Glucose.: 276 mg/dL (23 Jul 2021 17:39)  POCT Blood Glucose.: 210 mg/dL (23 Jul 2021 12:24)  POCT Blood Glucose.: 155 mg/dL (23 Jul 2021 08:29)    I&O's Summary    23 Jul 2021 07:01  -  24 Jul 2021 07:00  --------------------------------------------------------  IN: 250 mL / OUT: 1250 mL / NET: -1000 mL        PHYSICAL EXAM:  Vital Signs Last 24 Hrs  T(C): 36.7 (24 Jul 2021 06:19), Max: 36.7 (24 Jul 2021 06:19)  T(F): 98 (24 Jul 2021 06:19), Max: 98 (24 Jul 2021 06:19)  HR: 70 (24 Jul 2021 06:51) (70 - 85)  BP: 150/67 (24 Jul 2021 06:19) (130/68 - 150/67)  BP(mean): --  RR: 17 (24 Jul 2021 06:19) (17 - 18)  SpO2: 99% (24 Jul 2021 06:51) (92% - 99%)    GENERAL: No acute distress, well-developed  HEAD:  Atraumatic, Normocephalic  EYES: conjunctiva and sclera clear  NECK: no JVD  CHEST/LUNG: mild crackles lower lung bases, much improved compared to yesterday  HEART: Regular rate and rhythm; No murmurs, rubs, or gallops  ABDOMEN: Soft, non-tender, non-distended; normal bowel sounds, no organomegaly  EXTREMITIES:  2+ peripheral pulses b/l, No BETTY edema. right foot wrapped in gauze without shadowing  NEUROLOGY: A&O x 3, no focal deficits    LABS:                        9.9    15.75 )-----------( 478      ( 23 Jul 2021 07:11 )             29.9     07-23    130<L>  |  94<L>  |  82<H>  ----------------------------<  216<H>  4.1   |  20<L>  |  3.28<H>    Ca    8.9      23 Jul 2021 16:21  Phos  5.1     07-23  Mg     2.40     07-23                  RADIOLOGY & ADDITIONAL TESTS:  Results Reviewed:   Imaging Personally Reviewed:  Electrocardiogram Personally Reviewed:    COORDINATION OF CARE:  Care Discussed with Consultants/Other Providers [Y/N]:  Prior or Outpatient Records Reviewed [Y/N]:

## 2021-07-24 NOTE — PROGRESS NOTE ADULT - SUBJECTIVE AND OBJECTIVE BOX
[facility] Division of Kidney Diseases & Hypertension  FOLLOW UP NOTE  --------------------------------------------------------------------------------  HPI: 61M with history of HTN, T2DM, HLD, glaucoma, CKD with recent AIN (follows with Dr. Zambrano) treated with steroid, HFrEF, recent NSTEMI, PAD s/p RLE angioplasty, s/p right 2nd toe patient amputation at [facility] presents to [facility] with worsening foot pain and associated right 203 toe infection. Recent RLE angiogram revealed severe atherosclerosis. Nephrology consulted for ACOSTA amidst rising creatinine levels and continued malaise.      Pt. seen and examined at bedside. reports that he has been urinating.  no rash.  feels sob but improved significantly from yesterday.      PAST HISTORY  --------------------------------------------------------------------------------  No significant changes to PMH, PSH, FHx, SHx, unless otherwise noted    ALLERGIES & MEDICATIONS  --------------------------------------------------------------------------------  Allergies    cefepime (Other)    Intolerances      Standing Inpatient Medications  aspirin enteric coated 81 milliGRAM(s) Oral daily  atorvastatin 40 milliGRAM(s) Oral at bedtime  buMETAnide IVPB 4 milliGRAM(s) IV Intermittent two times a day  carvedilol 6.25 milliGRAM(s) Oral every 12 hours  dextrose 40% Gel 15 Gram(s) Oral once  dextrose 5%. 1000 milliLiter(s) IV Continuous <Continuous>  dextrose 5%. 1000 milliLiter(s) IV Continuous <Continuous>  dextrose 50% Injectable 25 Gram(s) IV Push once  dextrose 50% Injectable 12.5 Gram(s) IV Push once  dextrose 50% Injectable 25 Gram(s) IV Push once  ertapenem  IVPB 500 milliGRAM(s) IV Intermittent every 24 hours  glucagon  Injectable 1 milliGRAM(s) IntraMuscular once  heparin   Injectable 5000 Unit(s) SubCutaneous every 8 hours  hydrALAZINE 50 milliGRAM(s) Oral three times a day  insulin glargine Injectable (LANTUS) 30 Unit(s) SubCutaneous at bedtime  insulin lispro (ADMELOG) corrective regimen sliding scale   SubCutaneous three times a day before meals  isosorbide   dinitrate Tablet (ISORDIL) 20 milliGRAM(s) Oral three times a day  latanoprost 0.005% Ophthalmic Solution 1 Drop(s) Both EYES at bedtime  levoFLOXacin IVPB 250 milliGRAM(s) IV Intermittent every 24 hours  levoFLOXacin IVPB      melatonin 3 milliGRAM(s) Oral at bedtime  NIFEdipine XL 30 milliGRAM(s) Oral daily  polyethylene glycol 3350 17 Gram(s) Oral daily  senna 2 Tablet(s) Oral at bedtime    PRN Inpatient Medications  acetaminophen   Tablet .. 650 milliGRAM(s) Oral every 6 hours PRN      REVIEW OF SYSTEMS  --------------------------------------------------------------------------------  Gen: no fever  Respiratory: sob  CV: no cp  GI: no ab pain  : as above  MSK: toe pain    VITALS/PHYSICAL EXAM  --------------------------------------------------------------------------------  T(C): 36.7 (07-24-21 @ 06:19), Max: 36.7 (07-24-21 @ 06:19)  HR: 70 (07-24-21 @ 06:51) (70 - 85)  BP: 150/67 (07-24-21 @ 06:19) (130/68 - 150/67)  ABP: --  ABP(mean): --  RR: 17 (07-24-21 @ 06:19) (17 - 18)  SpO2: 99% (07-24-21 @ 06:51) (92% - 99%)  CVP(mm Hg): --        07-23-21 @ 07:01  -  07-24-21 @ 07:00  --------------------------------------------------------  IN: 250 mL / OUT: 1250 mL / NET: -1000 mL      Physical Exam:  	Gen: NAD, appears calm  	HEENT: MMM, anicteric  	Pulm: rales at bilateral bases  	CV: S1S2+  	Abd: Soft, +BS   	Ext: R foot dressing seen+  	Neuro: Awake  	Skin: Warm and dry  	Vascular access: peripheral IV canula    LABS/STUDIES  --------------------------------------------------------------------------------              9.4    17.56 >-----------<  517      [07-24-21 @ 07:00]              28.2     131  |  96  |  80  ----------------------------<  164      [07-24-21 @ 07:00]  3.8   |  18  |  2.90        Ca     9.0     [07-24-21 @ 07:00]      Mg     2.50     [07-24-21 @ 07:00]      Phos  4.7     [07-24-21 @ 07:00]            Creatinine Trend:  SCr 2.90 [07-24 @ 07:00]  SCr 3.28 [07-23 @ 16:21]  SCr 3.44 [07-23 @ 07:11]  SCr 3.57 [07-22 @ 14:32]  SCr 3.75 [07-22 @ 06:32]    Urinalysis - [07-21-21 @ 17:25]      Color Yellow / Appearance Clear / SG 1.016 / pH 5.5      Gluc Negative / Ketone Negative  / Bili Negative / Urobili <2 mg/dL       Blood Negative / Protein Trace / Leuk Est Negative / Nitrite Negative      RBC 6 / WBC 2 / Hyaline 2 / Gran  / Sq Epi  / Non Sq Epi 1 / Bacteria Negative    Urine Creatinine 149      [07-21-21 @ 17:25]  Urine Protein 23      [07-20-21 @ 16:48]  Urine Sodium <20      [07-21-21 @ 17:25]  Urine Urea Nitrogen 397.0      [07-21-21 @ 17:25]  Urine Potassium 44.1      [07-19-21 @ 12:01]  Urine Chloride <20      [07-19-21 @ 12:01]  Urine Osmolality 304      [07-21-21 @ 17:25]    Lipid: chol 116, , HDL 26, LDL --      [07-19-21 @ 06:42]    HCV 0.12, Nonreact      [07-19-21 @ 09:49]    C3 Complement 160      [07-21-21 @ 07:42]  C4 Complement 58      [07-21-21 @ 07:42]

## 2021-07-24 NOTE — PROGRESS NOTE ADULT - ASSESSMENT
Assessment   60 y/o male with h/o HTN, insulin dependent DM-2, hld, glaucoma, CKD3 with recent interstitial nephritis, HFrEF, recent NSTEMI, PAD s/p RLE angioplasty, s/p right 2nd toe partial amputation at [facility] in April 2021, who presents with 2 week history of worsening right foot pain with plans for 2nd and 3rd toe ray amps.      Plan:   - Plan for Angiogram next week   - Nephro and Cardiology optimizing patient for procedure  - F/u nephro and cardiology clearances on Monday once completed Bumex.     C team  [id number]

## 2021-07-24 NOTE — PROGRESS NOTE ADULT - ASSESSMENT
60 y/o male with h/o HTN, insulin dependent DM-2, hld, glaucoma, CKD3 with recent interstitial nephritis treated with steroid, HFrEF, recent NSTEMI, PAD s/p RLE angioplasty, s/p right 2nd toe partial amputation at [facility] in April 2021, who presents with 2 week history of worsening right foot pain, associated with right 2nd residual toe stump/3rd toe infection/discoloration to blue/black, xray c/f osteomyelitis, plan for surgical intervention this admission per podiatry. Hospital course has been complicated by flash pulmonary edema 2/2 to ACOSTA on CKD. Now he is improving with diuresis and having decreased oxygen requirement.

## 2021-07-25 ENCOUNTER — TRANSCRIPTION ENCOUNTER (OUTPATIENT)
Age: 61
End: 2021-07-25

## 2021-07-25 LAB
ALBUMIN SERPL ELPH-MCNC: 3.3 G/DL — SIGNIFICANT CHANGE UP (ref 3.3–5)
ALP SERPL-CCNC: 282 U/L — HIGH (ref 40–120)
ALT FLD-CCNC: 28 U/L — SIGNIFICANT CHANGE UP (ref 4–41)
ANION GAP SERPL CALC-SCNC: 16 MMOL/L — HIGH (ref 7–14)
AST SERPL-CCNC: 30 U/L — SIGNIFICANT CHANGE UP (ref 4–40)
BASOPHILS # BLD AUTO: 0.12 K/UL — SIGNIFICANT CHANGE UP (ref 0–0.2)
BASOPHILS NFR BLD AUTO: 0.7 % — SIGNIFICANT CHANGE UP (ref 0–2)
BILIRUB SERPL-MCNC: 0.3 MG/DL — SIGNIFICANT CHANGE UP (ref 0.2–1.2)
BUN SERPL-MCNC: 76 MG/DL — HIGH (ref 7–23)
CALCIUM SERPL-MCNC: 9.1 MG/DL — SIGNIFICANT CHANGE UP (ref 8.4–10.5)
CHLORIDE SERPL-SCNC: 99 MMOL/L — SIGNIFICANT CHANGE UP (ref 98–107)
CO2 SERPL-SCNC: 22 MMOL/L — SIGNIFICANT CHANGE UP (ref 22–31)
CREAT SERPL-MCNC: 2.83 MG/DL — HIGH (ref 0.5–1.3)
EOSINOPHIL # BLD AUTO: 0.57 K/UL — HIGH (ref 0–0.5)
EOSINOPHIL NFR BLD AUTO: 3.5 % — SIGNIFICANT CHANGE UP (ref 0–6)
GLUCOSE BLDC GLUCOMTR-MCNC: 225 MG/DL — HIGH (ref 70–99)
GLUCOSE BLDC GLUCOMTR-MCNC: 253 MG/DL — HIGH (ref 70–99)
GLUCOSE BLDC GLUCOMTR-MCNC: 259 MG/DL — HIGH (ref 70–99)
GLUCOSE BLDC GLUCOMTR-MCNC: 264 MG/DL — HIGH (ref 70–99)
GLUCOSE SERPL-MCNC: 168 MG/DL — HIGH (ref 70–99)
HCT VFR BLD CALC: 29.2 % — LOW (ref 39–50)
HGB BLD-MCNC: 9.6 G/DL — LOW (ref 13–17)
IANC: 11.49 K/UL — HIGH (ref 1.5–8.5)
IMM GRANULOCYTES NFR BLD AUTO: 2.5 % — HIGH (ref 0–1.5)
LYMPHOCYTES # BLD AUTO: 16.2 % — SIGNIFICANT CHANGE UP (ref 13–44)
LYMPHOCYTES # BLD AUTO: 2.63 K/UL — SIGNIFICANT CHANGE UP (ref 1–3.3)
MAGNESIUM SERPL-MCNC: 2.3 MG/DL — SIGNIFICANT CHANGE UP (ref 1.6–2.6)
MCHC RBC-ENTMCNC: 26.7 PG — LOW (ref 27–34)
MCHC RBC-ENTMCNC: 32.9 GM/DL — SIGNIFICANT CHANGE UP (ref 32–36)
MCV RBC AUTO: 81.3 FL — SIGNIFICANT CHANGE UP (ref 80–100)
MONOCYTES # BLD AUTO: 1.03 K/UL — HIGH (ref 0–0.9)
MONOCYTES NFR BLD AUTO: 6.3 % — SIGNIFICANT CHANGE UP (ref 2–14)
NEUTROPHILS # BLD AUTO: 11.49 K/UL — HIGH (ref 1.8–7.4)
NEUTROPHILS NFR BLD AUTO: 70.8 % — SIGNIFICANT CHANGE UP (ref 43–77)
NRBC # BLD: 0 /100 WBCS — SIGNIFICANT CHANGE UP
NRBC # FLD: 0 K/UL — SIGNIFICANT CHANGE UP
PHOSPHATE SERPL-MCNC: 5 MG/DL — HIGH (ref 2.5–4.5)
PLATELET # BLD AUTO: 511 K/UL — HIGH (ref 150–400)
POTASSIUM SERPL-MCNC: 4.1 MMOL/L — SIGNIFICANT CHANGE UP (ref 3.5–5.3)
POTASSIUM SERPL-SCNC: 4.1 MMOL/L — SIGNIFICANT CHANGE UP (ref 3.5–5.3)
PROT SERPL-MCNC: 7.3 G/DL — SIGNIFICANT CHANGE UP (ref 6–8.3)
RBC # BLD: 3.59 M/UL — LOW (ref 4.2–5.8)
RBC # FLD: 13 % — SIGNIFICANT CHANGE UP (ref 10.3–14.5)
SODIUM SERPL-SCNC: 137 MMOL/L — SIGNIFICANT CHANGE UP (ref 135–145)
WBC # BLD: 16.25 K/UL — HIGH (ref 3.8–10.5)
WBC # FLD AUTO: 16.25 K/UL — HIGH (ref 3.8–10.5)

## 2021-07-25 PROCEDURE — 99233 SBSQ HOSP IP/OBS HIGH 50: CPT

## 2021-07-25 RX ORDER — BUMETANIDE 0.25 MG/ML
4 INJECTION INTRAMUSCULAR; INTRAVENOUS EVERY 12 HOURS
Refills: 0 | Status: DISCONTINUED | OUTPATIENT
Start: 2021-07-25 | End: 2021-08-04

## 2021-07-25 RX ORDER — INSULIN LISPRO 100/ML
3 VIAL (ML) SUBCUTANEOUS ONCE
Refills: 0 | Status: COMPLETED | OUTPATIENT
Start: 2021-07-25 | End: 2021-07-25

## 2021-07-25 RX ADMIN — HEPARIN SODIUM 5000 UNIT(S): 5000 INJECTION INTRAVENOUS; SUBCUTANEOUS at 05:27

## 2021-07-25 RX ADMIN — ISOSORBIDE DINITRATE 20 MILLIGRAM(S): 5 TABLET ORAL at 05:27

## 2021-07-25 RX ADMIN — ATORVASTATIN CALCIUM 40 MILLIGRAM(S): 80 TABLET, FILM COATED ORAL at 22:05

## 2021-07-25 RX ADMIN — ERTAPENEM SODIUM 100 MILLIGRAM(S): 1 INJECTION, POWDER, LYOPHILIZED, FOR SOLUTION INTRAMUSCULAR; INTRAVENOUS at 17:14

## 2021-07-25 RX ADMIN — Medication 81 MILLIGRAM(S): at 12:46

## 2021-07-25 RX ADMIN — BUMETANIDE 4 MILLIGRAM(S): 0.25 INJECTION INTRAMUSCULAR; INTRAVENOUS at 18:47

## 2021-07-25 RX ADMIN — CARVEDILOL PHOSPHATE 6.25 MILLIGRAM(S): 80 CAPSULE, EXTENDED RELEASE ORAL at 18:49

## 2021-07-25 RX ADMIN — BUMETANIDE 132 MILLIGRAM(S): 0.25 INJECTION INTRAMUSCULAR; INTRAVENOUS at 08:25

## 2021-07-25 RX ADMIN — Medication 50 MILLIGRAM(S): at 05:27

## 2021-07-25 RX ADMIN — LATANOPROST 1 DROP(S): 0.05 SOLUTION/ DROPS OPHTHALMIC; TOPICAL at 22:06

## 2021-07-25 RX ADMIN — SENNA PLUS 2 TABLET(S): 8.6 TABLET ORAL at 22:05

## 2021-07-25 RX ADMIN — Medication 3 MILLIGRAM(S): at 22:04

## 2021-07-25 RX ADMIN — ISOSORBIDE DINITRATE 20 MILLIGRAM(S): 5 TABLET ORAL at 14:32

## 2021-07-25 RX ADMIN — HEPARIN SODIUM 5000 UNIT(S): 5000 INJECTION INTRAVENOUS; SUBCUTANEOUS at 14:32

## 2021-07-25 RX ADMIN — Medication 50 MILLIGRAM(S): at 14:31

## 2021-07-25 RX ADMIN — HEPARIN SODIUM 5000 UNIT(S): 5000 INJECTION INTRAVENOUS; SUBCUTANEOUS at 22:04

## 2021-07-25 RX ADMIN — CARVEDILOL PHOSPHATE 6.25 MILLIGRAM(S): 80 CAPSULE, EXTENDED RELEASE ORAL at 05:27

## 2021-07-25 RX ADMIN — Medication 3 UNIT(S): at 09:55

## 2021-07-25 RX ADMIN — ISOSORBIDE DINITRATE 20 MILLIGRAM(S): 5 TABLET ORAL at 22:05

## 2021-07-25 RX ADMIN — Medication 3: at 12:46

## 2021-07-25 RX ADMIN — Medication 2: at 18:48

## 2021-07-25 RX ADMIN — INSULIN GLARGINE 30 UNIT(S): 100 INJECTION, SOLUTION SUBCUTANEOUS at 22:03

## 2021-07-25 RX ADMIN — Medication 30 MILLIGRAM(S): at 06:39

## 2021-07-25 RX ADMIN — Medication 50 MILLIGRAM(S): at 22:04

## 2021-07-25 NOTE — PROGRESS NOTE ADULT - PROBLEM SELECTOR PLAN 4
- A1c 8.5  - have been having fluctuating glucose checks, suspect impaired insulin filtration in the setting of worsening ACOSTA on CKD  -at home takes lantus 60u hs and premeal insulin 28-30u   -will give lantus 30u hs and SSI. stopping premeal insulin   -monitor FS - A1c 8.5  - have been having fluctuating glucose checks, suspect impaired insulin filtration in the setting of worsening ACOSTA on CKD  -at home takes lantus 60u hs and premeal insulin 28-30u   -c/w lantus 30u hs and SSI. premeal insulin d/c'd   -monitor FS

## 2021-07-25 NOTE — PROGRESS NOTE ADULT - ASSESSMENT
62 y/o male with h/o HTN, insulin dependent DM-2, hld, glaucoma, CKD3 with recent interstitial nephritis treated with steroid, HFrEF, recent NSTEMI, PAD s/p RLE angioplasty, s/p right 2nd toe partial amputation at [facility] in April 2021, who presents with 2 week history of worsening right foot pain, associated with right 2nd residual toe stump/3rd toe infection/discoloration to blue/black, xray c/f osteomyelitis, plan for surgical intervention this admission per podiatry. Hospital course has been complicated by flash pulmonary edema 2/2 to ACOSTA on CKD. Now he is improving with diuresis and having decreased oxygen requirement.

## 2021-07-25 NOTE — DISCHARGE NOTE PROVIDER - NSDCCPCAREPLAN_GEN_ALL_CORE_FT
PRINCIPAL DISCHARGE DIAGNOSIS  Diagnosis: Diabetic foot infection  Assessment and Plan of Treatment: You have an infection in your foot bones. This can happen to people with diabetes. You were seen by podiatry, who cleaned the wound and performed an amputation. You were seen by the vascular surgeons who looked at how open the blood vessels in your legs were. The blood vessels in your legs were very small and hard, which can happen in long term diabetes. Please continue to follow up with your podiatrist, and your primary care doctor or endocrinologist to manage your diabetes and blood vessel disease. Please return to the hospital if you have worsening pain in your feet, an infection in your feet, inability to walk, fevers, chills.      SECONDARY DISCHARGE DIAGNOSES  Diagnosis: Sepsis with acute hypoxic respiratory failure  Assessment and Plan of Treatment:     Diagnosis: Acute renal failure, unspecified acute renal failure type  Assessment and Plan of Treatment:      PRINCIPAL DISCHARGE DIAGNOSIS  Diagnosis: Acute osteomyelitis  Assessment and Plan of Treatment: You have osteomyelitis of the bones in the toes of your RIGHT FOOT. This is a serious infections requiring IV antibiotics. Your foot cultures grew 3 bacteria: Enterobacter, Stenotrophomonas, Group Beta Strep. Your blood cultures were negative (no infection in blood stream). Osteomyelitis requires IV antibiotics- you were being treated with IV ertapenem and IV Levaquin and will require a long term course of antibiotics. The podiatry team evaluated you and was planning for a right foot partial 2nd and 3rd ray resection however this was canceled due to worsening ischemic changes (decreased/lack of blood supply) to the foot. Vascular performed an angiogram this admission which revealed you have poor circulation however no intervention was performed. Podiatry is awaiting demarcation of your foot to determine further plan. You are requesting to be discharged and would like to follow up at another hospital.      SECONDARY DISCHARGE DIAGNOSES  Diagnosis: Acute renal failure, unspecified acute renal failure type  Assessment and Plan of Treatment: Your kidney function (creatinine) worsened this admission. It has now improved and you have baseline kidney disease. Avoid medications that can damage the kidneys and follow up with your primary care provider within one week of discharge.    Diagnosis: Type 2 diabetes mellitus, with long-term current use of insulin  Assessment and Plan of Treatment: Continue your medication regimen and a consistent carbohydrate diet (Meaning eating the same amount of carbohydrates at the same time each day). Monitor blood glucose levels throughout the day before meals and at bedtime. Record blood sugars and bring to outpatient providers appointment in order to be reviewed by your doctor for management modifications. If your sugars are more than 400 or less than 70 you should contact your PCP immediately. Monitor for signs/symptoms of low blood glucose, such as, dizziness, altered mental status, or cool/clammy skin. In addition, monitor for signs/symptoms of high blood glucose, such as, feeling hot, dry, fatigued, or with increased thirst/urination. Make regular podiatry appointments in order to have feet checked for wounds and uncontrolled toe nail growth to prevent infections, as well as, appointments with an ophthalmologist to monitor your vision.    Diagnosis: Sepsis with acute hypoxic respiratory failure  Assessment and Plan of Treatment: You required oxygen this admission and were treated with IV water pills for increased fluid in the body from your heart failure. You have improved and do not require further oxygen at this time. Take your oral water pill as prescribed.     PRINCIPAL DISCHARGE DIAGNOSIS  Diagnosis: Acute osteomyelitis  Assessment and Plan of Treatment: You have osteomyelitis of the bones in the toes of your RIGHT FOOT. This is a serious infections requiring IV antibiotics. Your foot cultures grew 3 bacteria: Enterobacter, Stenotrophomonas, Group Beta Strep. Your blood cultures were negative (no infection in blood stream). Osteomyelitis requires IV antibiotics- you were being treated with IV ertapenem and IV Levaquin and will require a long term course of antibiotics. The podiatry team evaluated you and was planning for a right foot partial 2nd and 3rd ray resection however this was canceled due to worsening ischemic changes (decreased/lack of blood supply) to the foot. Vascular performed an angiogram this admission which revealed you have poor circulation however no intervention was performed. Podiatry is awaiting demarcation of your foot to determine further plan. You are requesting to be discharged and would like to follow up at another hospital. Infectious disease recommends oral antibiotics with Bactrim and Augmentin. You have an unknown drug allergy to Cefepime (an antibiotic) which needs to be clarified prior to you being able to safely take augmentin thus this medication was not sent to the pharmacy. You and your family have stated you do not want the medications to be sent to the pharmacy as you will be going to the [facility] emergency department.      SECONDARY DISCHARGE DIAGNOSES  Diagnosis: Acute renal failure, unspecified acute renal failure type  Assessment and Plan of Treatment: Your kidney function (creatinine) worsened this admission. It has now improved and you have baseline kidney disease. Avoid medications that can damage the kidneys and follow up with your primary care provider within one week of discharge.    Diagnosis: Type 2 diabetes mellitus, with long-term current use of insulin  Assessment and Plan of Treatment: Continue your medication regimen and a consistent carbohydrate diet (Meaning eating the same amount of carbohydrates at the same time each day). Monitor blood glucose levels throughout the day before meals and at bedtime. Record blood sugars and bring to outpatient providers appointment in order to be reviewed by your doctor for management modifications. If your sugars are more than 400 or less than 70 you should contact your PCP immediately. Monitor for signs/symptoms of low blood glucose, such as, dizziness, altered mental status, or cool/clammy skin. In addition, monitor for signs/symptoms of high blood glucose, such as, feeling hot, dry, fatigued, or with increased thirst/urination. Make regular podiatry appointments in order to have feet checked for wounds and uncontrolled toe nail growth to prevent infections, as well as, appointments with an ophthalmologist to monitor your vision.    Diagnosis: Sepsis with acute hypoxic respiratory failure  Assessment and Plan of Treatment: You required oxygen this admission and were treated with IV water pills for increased fluid in the body from your heart failure. You have improved and do not require further oxygen at this time. Take your oral water pill as prescribed.

## 2021-07-25 NOTE — PROGRESS NOTE ADULT - SUBJECTIVE AND OBJECTIVE BOX
Patient is a 61y old  Male who presents with a chief complaint of right foot 2nd/3rd toe infection (25 Jul 2021 11:11)       INTERVAL HPI/OVERNIGHT EVENTS:  Patient seen and evaluated at bedside.  Pt is resting comfortable in NAD. Denies N/V/F/C.    Allergies    cefepime (Other)    Intolerances        Vital Signs Last 24 Hrs  T(C): 36.9 (25 Jul 2021 05:38), Max: 36.9 (25 Jul 2021 05:38)  T(F): 98.4 (25 Jul 2021 05:38), Max: 98.4 (25 Jul 2021 05:38)  HR: 66 (25 Jul 2021 05:38) (66 - 77)  BP: 149/64 (25 Jul 2021 05:38) (149/62 - 156/70)  BP(mean): --  RR: 17 (25 Jul 2021 05:38) (16 - 17)  SpO2: 98% (25 Jul 2021 05:38) (95% - 98%)    LABS:                        9.6    16.25 )-----------( 511      ( 25 Jul 2021 06:51 )             29.2     07-25    137  |  99  |  76<H>  ----------------------------<  168<H>  4.1   |  22  |  2.83<H>    Ca    9.1      25 Jul 2021 06:45  Phos  5.0     07-25  Mg     2.30     07-25    TPro  7.3  /  Alb  3.3  /  TBili  0.3  /  DBili  x   /  AST  30  /  ALT  28  /  AlkPhos  282<H>  07-25        CAPILLARY BLOOD GLUCOSE      POCT Blood Glucose.: 259 mg/dL (25 Jul 2021 08:58)  POCT Blood Glucose.: 217 mg/dL (24 Jul 2021 21:40)  POCT Blood Glucose.: 240 mg/dL (24 Jul 2021 17:40)  POCT Blood Glucose.: 146 mg/dL (24 Jul 2021 13:11)      Lower Extremity Physical Exam:  Vascular: DP 1/4, B/L, PT non-palpable B/L, CFT <3 seconds B/L, Temperature gradient cool to warm on R, warm to cool on L    Neuro: Epicritic sensation intact to the level of ankle, B/L.  Musculoskeletal/Ortho: pain on palpation surrounding the 2nd digit   Skin: gangrenous right foot 2nd and 3rd digits, RF 3rd interspace wound probes to bone, consistent tracking proximally to 2nd and 3rd MPJ, mild wet conversion of gangrenous digits, scant pus expressed from interdigital wound, erythema consistent    7/17 s/p I&D of R 2nd interspace with erythema, no pus expressed, tracks 1cm dorsal and proximal, no malodor,  resolving erythema to the dorsolateral foot up until the midfoot, diffuse edema of the R forefoot, gangrene of the 2nd toe stump, ischemic changes to the R 3rd digit, cool to touch

## 2021-07-25 NOTE — DISCHARGE NOTE PROVIDER - HOSPITAL COURSE
Mr. 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 Patient denies fever/chill/purulent drainage. H was seen by outside podiatrist and prescribed course of Augmentin 4 days ago. Patient has been taking Advil for pain without relief. Patient had recent RLE angiogram on 7/7/21at [facility] which revealed severe atherosclerosis. Right anterior tibial: There was a 100 % stenosis. Right tibio-peroneal: There was a 50 % stenosis. Right posterior tibial: Angiography showed mild atherosclerosis. Right peroneal: There was a 100 % stenosis.  In the ED, pt was seen by podiatry, plan for surgical intervention next week. He was given a dose of vanco/zosyn, blood cultures not sent yet. Lab notable for WBC 18, ESR 96, CRP 80.6, creat stable at baseline 1.64. Xray right foot showed ulceration of the soft tissue stump of the resected second digit and indistinct margins of the underlying bony cortex of the head of the second proximal phalanx, suggesting possible osteomyelitis. No subcutaneous tracking gas. Mr. 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 No fever, chills, purulent drainage. He was seen by outside podiatrist and prescribed course of Augmentin 4 days ago without pain resolution. Of note, patient had recent RLE angiogram on 7/7/21at [facility] which revealed severe atherosclerosis.  In the ED, Podiatry performed an I&D, and was started on broad spectrum antibiotics. Labs notable for WBC 18, ESR 96, CRP 80.6, sCr stable at baseline 1.64. Xray right foot showed ulceration of the soft tissue stump of the resected second digit and indistinct margins of the underlying bony cortex of the head of the second proximal phalanx, suggesting possible osteomyelitis. No subcutaneous tracking gas. MICHEL revealed severe bilateral small vessel arterial disease.    Hospital course has been complicated by   Mr. 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 No fever, chills, purulent drainage. He was seen by outside podiatrist and prescribed course of Augmentin 4 days ago without pain resolution. Of note, patient had recent RLE angiogram on 7/7/21at [facility] which revealed severe atherosclerosis.  In the ED, Podiatry performed an I&D, and was started on broad spectrum antibiotics. Labs notable for WBC 18, ESR 96, CRP 80.6, sCr stable at baseline 1.64. Xray right foot showed ulceration of the soft tissue stump of the resected second digit and indistinct margins of the underlying bony cortex of the head of the second proximal phalanx, suggesting possible osteomyelitis. No subcutaneous tracking gas. MICHEL revealed severe bilateral small vessel arterial disease.    Hospital course has been complicated by acute hypoxic respiratory failure 2/2 flash pulmonary edema in the setting of ACOSTA on CKD and sepsis. Infectious disease, nephrology, and cardiology services were consulted. He was started on antibiotics and diuresed,   with symptomatic improvement.      Due to continued hypoxia and concern for PE, in the setting of ACOSTA, a DVT ultrasound and V/Q scan was performed, which revealed no PE.   Mr. Monse Saul is 61 year old male with insulin dependent DM-2 (A1c 8.3) c/b glaucoma, PAD (s/p RLE angioplasty, s/p right 2nd toe partial amputation at [facility] in April 2021), and CKD3 (with recent interstitial nephritis treated with steroid), HFrEF, recent NSTEMI, HTN, HLD who presented with 2 week history of worsening right foot pain, associated with right 2nd residual toe stump/3rd toe infection/discoloration to blue/black, with tingling and pain. No fever, chills, purulent drainage. He was seen by outside podiatrist and prescribed course of Augmentin 4 days ago without pain resolution. Of note, patient had recent RLE angiogram on 7/7/21at [facility] which revealed severe atherosclerosis.  In the ED, Podiatry performed an I&D, and was started on broad spectrum antibiotics. Labs notable for WBC 18, ESR 96, CRP 80.6, sCr stable at baseline 1.64. Xray right foot showed ulceration of the soft tissue stump of the resected second digit and indistinct margins of the underlying bony cortex of the head of the second proximal phalanx, suggesting possible osteomyelitis. No subcutaneous tracking gas. MICHEL revealed severe bilateral small vessel arterial disease.    Hospital course has been complicated by acute hypoxic respiratory failure 2/2 flash pulmonary edema in the setting of ACOSTA on CKD and sepsis. Infectious disease, nephrology, and cardiology services were consulted. Blood and urine cultures showed no growth. Wound cultures remarkable for enterobacter aerogenes and stenotrophomonas maltophilia. He was started on antibiotics and diuresed,   with symptomatic improvement.      Due to continued hypoxia and concern for PE, in the setting of ACOSTA, a DVT ultrasound and V/Q scan was performed, which revealed no PE.   Mr. Monse Saul is 61 year old male with insulin dependent DM-2 (A1c 8.3) c/b glaucoma, PAD (s/p RLE angioplasty, s/p right 2nd toe partial amputation at [facility] in April 2021), and CKD3 (with recent interstitial nephritis treated with steroid), HFrEF, recent NSTEMI, HTN, HLD who presented with 2 week history of worsening right foot pain, associated with right 2nd residual toe stump/3rd toe infection/discoloration to blue/black, with tingling and pain. No fever, chills, purulent drainage. He was seen by outside podiatrist and prescribed course of Augmentin 4 days ago without pain resolution. Of note, patient had recent RLE angiogram on 7/7/21at [facility] which revealed severe atherosclerosis.  In the ED, Podiatry performed an I&D, and was started on broad spectrum antibiotics. Labs notable for WBC 18, ESR 96, CRP 80.6, sCr stable at baseline 1.64. Xray right foot showed ulceration of the soft tissue stump of the resected second digit and indistinct margins of the underlying bony cortex of the head of the second proximal phalanx, suggesting possible osteomyelitis. No subcutaneous tracking gas. MICHEL revealed severe bilateral small vessel arterial disease.    Hospital course has been complicated by acute hypoxic respiratory failure 2/2 flash pulmonary edema in the setting of ACOSTA on CKD and sepsis. Infectious disease, nephrology, and cardiology services were consulted. Blood and urine cultures showed no growth. Wound cultures remarkable for enterobacter aerogenes and stenotrophomonas maltophilia. TTE was grossly unremarkable. He was started on antibiotics and diuresed,   with symptomatic improvement.      Due to continued hypoxia and concern for PE, in the setting of ACOSTA, a DVT ultrasound and V/Q scan was performed, which revealed no PE.   Mr. Monse Saul is 61 year old male with insulin dependent DM-2 (A1c 8.3) c/b glaucoma, PAD (s/p RLE angioplasty, s/p right 2nd toe partial amputation at [facility] in April 2021), and CKD3 (with recent interstitial nephritis treated with steroid), HFrEF, recent NSTEMI, HTN, HLD who presented with 2 week history of worsening right foot pain, associated with right 2nd residual toe stump/3rd toe infection/discoloration to blue/black, with tingling and pain. No fever, chills, purulent drainage. He was seen by outside podiatrist and prescribed course of Augmentin 4 days ago without pain resolution. Of note, patient had recent RLE angiogram on 7/7/21at [facility] which revealed severe atherosclerosis.  In the ED, Podiatry performed an I&D, and was started on broad spectrum antibiotics. Labs notable for WBC 18, ESR 96, CRP 80.6, sCr stable at baseline 1.64. Xray right foot showed ulceration of the soft tissue stump of the resected second digit and indistinct margins of the underlying bony cortex of the head of the second proximal phalanx, suggesting possible osteomyelitis. No subcutaneous tracking gas. MICHEL revealed severe bilateral small vessel arterial disease.    Hospital course has been complicated by acute hypoxic respiratory failure 2/2 flash pulmonary edema in the setting of ACOSTA on CKD and sepsis. Infectious disease, nephrology, and cardiology services were consulted. Blood and urine cultures showed no growth. Wound cultures remarkable for enterobacter aerogenes and stenotrophomonas maltophilia. TTE was grossly unremarkable. DVT ultrasound and V/Q scan revealed no PE. He was started on antibiotics, BiPAP, and diuresed with symptomatic improvement.      Due to continued hypoxia and concern for PE, in the setting of ACOSTA, a DVT ultrasound and V/Q scan was performed, which revealed no PE.   Mr. Monse Saul is 61 year old male with insulin dependent DM-2 (A1c 8.3) c/b glaucoma, PAD (s/p RLE angioplasty, s/p right 2nd toe partial amputation at [facility] in April 2021), and CKD3 (with recent interstitial nephritis treated with steroid), HFrecEF, recent NSTEMI, HTN, HLD who presented with 2 week history of worsening right foot pain, associated with right 2nd residual toe stump/3rd toe infection/discoloration to blue/black, with tingling and pain. No fever, chills, purulent drainage. He was seen by outside podiatrist and prescribed course of Augmentin 4 days ago without pain resolution. Of note, patient had recent RLE angiogram on 7/7/21at [facility] which revealed severe atherosclerosis.  In the ED, Podiatry performed an I&D, and was started on broad spectrum antibiotics. Labs notable for WBC 18, ESR 96, CRP 80.6, sCr stable at baseline 1.64. Xray right foot showed ulceration of the soft tissue stump of the resected second digit and indistinct margins of the underlying bony cortex of the head of the second proximal phalanx, suggesting possible osteomyelitis. No subcutaneous tracking gas. MICHEL revealed severe bilateral small vessel arterial disease.    Hospital course has been complicated by acute hypoxic respiratory failure 2/2 flash pulmonary edema in the setting of acute decompensated heart failure and sepsis. Infectious disease, nephrology, and cardiology services were consulted. Blood and urine cultures showed no growth. Wound cultures remarkable for enterobacter aerogenes and stenotrophomonas maltophilia. TTE was grossly unremarkable. DVT ultrasound and V/Q scan revealed no PE. He was started on antibiotics, BiPAP, and diuresed with symptomatic improvement.   Mr. Monse Saul is 61 year old male with insulin dependent DM-2 (A1c 8.3) c/b glaucoma, PAD (s/p RLE angioplasty, s/p right 2nd toe partial amputation at [facility] in April 2021), and CKD3 (with recent interstitial nephritis treated with steroid), HFrecEF, recent NSTEMI, HTN, HLD who presented with 2 week history of worsening right foot pain, associated with right 2nd residual toe stump/3rd toe infection/discoloration to blue/black, with tingling and pain. No fever, chills, purulent drainage. He was seen by outside podiatrist and prescribed course of Augmentin 4 days ago without pain resolution. Of note, patient had recent RLE angiogram on 7/7/21at [facility] which revealed severe atherosclerosis.  In the ED, Podiatry performed an I&D, and was started on broad spectrum antibiotics. Labs notable for WBC 18, ESR 96, CRP 80.6, sCr stable at baseline 1.64. Xray right foot showed ulceration of the soft tissue stump of the resected second digit and indistinct margins of the underlying bony cortex of the head of the second proximal phalanx, suggesting possible osteomyelitis. No subcutaneous tracking gas. MICHEL revealed severe bilateral small vessel arterial disease.    Hospital course has been complicated by acute hypoxic respiratory failure 2/2 flash pulmonary edema in the setting of acute decompensated heart failure and sepsis. Infectious disease, nephrology, and cardiology services were consulted. Blood and urine cultures showed no growth. Wound cultures remarkable for enterobacter aerogenes and stenotrophomonas maltophilia. TTE was grossly unremarkable. DVT ultrasound and V/Q scan revealed no PE. He was started on antibiotics, BiPAP, and diuresed with symptomatic improvement.    At the time of discharge, he was medically stable for discharge.    LAST UPDATED: 6/25 1000 GL Mr. Monse Saul is 61 year old male with insulin dependent DM-2 (A1c 8.3) c/b glaucoma, PAD (s/p RLE angioplasty, s/p right 2nd toe partial amputation at [facility] in April 2021), and CKD3 (with recent interstitial nephritis treated with steroid), HFrecEF, recent NSTEMI, HTN, HLD who presented with 2 week history of worsening right foot pain, associated with right 2nd residual toe stump/3rd toe infection/discoloration to blue/black, with tingling and pain. No fever, chills, purulent drainage. He was seen by outside podiatrist and prescribed course of Augmentin 4 days ago without pain resolution. Of note, patient had recent RLE angiogram on 7/7/21at [facility] which revealed severe atherosclerosis.  In the ED, Podiatry performed an I&D, and was started on broad spectrum antibiotics. Labs notable for WBC 18, ESR 96, CRP 80.6, sCr stable at baseline 1.64. Xray right foot showed ulceration of the soft tissue stump of the resected second digit and indistinct margins of the underlying bony cortex of the head of the second proximal phalanx, suggesting possible osteomyelitis. No subcutaneous tracking gas. MICHEL revealed severe bilateral small vessel arterial disease.    Hospital course has been complicated by acute hypoxic respiratory failure 2/2 flash pulmonary edema in the setting of renal failure/?heart failure and sepsis. Infectious disease, nephrology, and cardiology services were consulted. Blood and urine cultures showed no growth. Wound cultures remarkable for enterobacter aerogenes and stenotrophomonas maltophilia. ID following, recommended ertapenem/levaquin given patient's prior AIN 2/2 cefepime. TTE showed no findings to explain patient's pulmonary edema. DVT ultrasound and V/Q scan revealed no PE. He was started on antibiotics, BiPAP, and diuresed with symptomatic improvement. Pt was titrated off oxygen on 7/25, and bumex 4mg IV BID was transitioned to 4mg PO BID. Pt was planned for vascular angiogram 7/29 prior to ray resection with podiatry which showed....    At the time of discharge, pt was medically stable for discharge to.....     61 year old male with PMHX Diabetes c/b glaucoma, PAD (s/p RLE angioplasty, s/p right 2nd toe partial amputation at [facility] in April 2021), and CKD3 (with recent interstitial nephritis treated with steroid), Heart Failure, recent NSTEMI, HTN, HLD who presented with 2 week history of worsening right foot pain, associated with right 2nd residual toe stump/3rd toe infection/discoloration to blue/black, with tingling and pain. He was seen by outside podiatrist and prescribed course of Augmentin 4 days ago without pain resolution. Of note, patient had recent RLE angiogram on 7/7/21 at [facility] which revealed severe atherosclerosis. In the ED, Podiatry performed an I&D, and was started on broad spectrum antibiotics. Right foot XR showed ulceration of the soft tissue stump of the resected second digit and indistinct margins of the underlying bony cortex of the head of the second proximal phalanx, suggesting possible osteomyelitis. MRI revealed OM of the R second and third to with cellulitis of the foot and consideration of possible OM of remaining R foot toes. MICHEL revealed severe bilateral small vessel arterial disease. S/p LE angiogram 7/29 with multiple attempts of failed revascularization. Podiatry followed- patient scheduled for R foot partial 2nd and 3rd ray resection however canceled as patient is with poor circulation and concern for proper healing following procedure as now ischemic changes have progressed. Podiatry waiting for wound demarcation to determine plan. ID followed and patient's wound with Enterobacter, Stenotrophomonas, GBS. ID recommended Ertapenem and Levaquin. BCx remained negative. Course complicated by acute hypoxic respiratory failure secondary to fluid overload. Now weaned down to room air. LE duplex negative for DVT and VQ scan negative for PE. Patient was diuresed and transitioned to PO diuretic when euvolemic.                         61 year old male with PMHX Diabetes c/b glaucoma, PAD (s/p RLE angioplasty, s/p right 2nd toe partial amputation at [facility] in April 2021), and CKD3 (with recent interstitial nephritis treated with steroid), Heart Failure, recent NSTEMI, HTN, HLD who presented with 2 week history of worsening right foot pain, associated with right 2nd residual toe stump/3rd toe infection/discoloration to blue/black, with tingling and pain. He was seen by outside podiatrist and prescribed course of Augmentin 4 days ago without pain resolution. Of note, patient had recent RLE angiogram on 7/7/21 at [facility] which revealed severe atherosclerosis. In the ED, Podiatry performed an I&D, and was started on broad spectrum antibiotics. Right foot XR showed ulceration of the soft tissue stump of the resected second digit and indistinct margins of the underlying bony cortex of the head of the second proximal phalanx, suggesting possible osteomyelitis. MRI revealed OM of the R second and third to with cellulitis of the foot and consideration of possible OM of remaining R foot toes. MICHEL revealed severe bilateral small vessel arterial disease. S/p LE angiogram 7/29 with multiple attempts of failed revascularization. Podiatry followed- patient scheduled for R foot partial 2nd and 3rd ray resection however canceled as patient is with poor circulation and concern for proper healing following procedure as now ischemic changes have progressed. Podiatry waiting for wound demarcation to determine plan. ID followed and patient's wound with Enterobacter, Stenotrophomonas, GBS. ID recommended Ertapenem and Levaquin. BCx remained negative. Course complicated by acute hypoxic respiratory failure secondary to fluid overload. Now weaned down to room air. LE duplex negative for DVT and VQ scan negative for PE. Patient was diuresed and transitioned to PO diuretic when euvolemic.      Patient and patient's family would like to take patient to the [facility] ER for a second opinion. They have the patient's imaging reports/ CD and will be going directly from the hospital. Sent medications to [facility] pharmacy including PO ABX with Bactrim DS and Augmentin however family state they will not be picking up. Unclear allergy to cefepime in medical record so will not be sending augmentin at this time as family reports they will be going immediately to the hospital and will not be picking up medications.     Above discussed with Dr. Sheridan on 8/4/2021.

## 2021-07-25 NOTE — DISCHARGE NOTE PROVIDER - NSDCMRMEDTOKEN_GEN_ALL_CORE_FT
Aspirin Enteric Coated 81 mg oral delayed release tablet: 1 tab(s) orally once a day  HumaLOG KwikPen 100 units/mL injectable solution: 28-30 unit(s) injectable 3 times a day (before meals) based on finger stick    insulin glargine 100 units/mL subcutaneous solution: 60 unit(s) subcutaneous once a day (at bedtime)  latanoprost 0.005% ophthalmic solution: 1 drop(s) to each eye once a day (in the evening)  Lipitor 40 mg oral tablet: 1 tab(s) orally once a day (at bedtime)  NIFEdipine 30 mg oral tablet, extended release: 1 tab(s) orally once a day   Aspirin Enteric Coated 81 mg oral delayed release tablet: 1 tab(s) orally once a day  Augmentin 875 mg-125 mg oral tablet: 1 milligram(s) orally 2 times a day   Bactrim  mg-160 mg oral tablet: 1 milligram(s) orally 2 times a day   bumetanide 2 mg oral tablet: 2 tab(s) orally every 12 hours  carvedilol 6.25 mg oral tablet: 1 tab(s) orally every 12 hours  HumaLOG KwikPen 100 units/mL injectable solution: 28-30 unit(s) injectable 3 times a day (before meals) based on finger stick    hydrALAZINE 50 mg oral tablet: 1 tab(s) orally 3 times a day  insulin glargine 100 units/mL subcutaneous solution: 60 unit(s) subcutaneous once a day (at bedtime)  isosorbide dinitrate 20 mg oral tablet: 1 tab(s) orally 3 times a day  latanoprost 0.005% ophthalmic solution: 1 drop(s) to each eye once a day (in the evening)  Lipitor 40 mg oral tablet: 1 tab(s) orally once a day (at bedtime)  NIFEdipine 30 mg oral tablet, extended release: 1 tab(s) orally once a day

## 2021-07-25 NOTE — DISCHARGE NOTE PROVIDER - NSDCCPTREATMENT_GEN_ALL_CORE_FT
PRINCIPAL PROCEDURE  Procedure: Ankle brachial index  Findings and Treatment: Right Findings: The ankle-brachial index (MICHEL) on the  right was not accurately assessed due to non-compressible  (calcific) vessels.  The toe-brachial index (TBI) on the right is severely  decreased (0.07).  The right toe pressure is 10 mmHg.  Pulse volume recording (PVR) waveforms appear biphasic  with normal amplitudes at the thigh and calf.  Waveforms  are mildly decreased in amplitude at the ankle, severely  diminished at the metatarsal, and flat at the digits.  Left Findings: The ankle-brachial index (MICHEL) on the left  was not accurately assessed due to non-compressible  (calcified) vessels.  The toe-brachial index (TBI) on the left is severely  decreased (0.22).  The left toe pressure is 31 mmHg.  Pulse volume recording (PVR) waveforms appear biphasic  with normal amplitudes at the thigh and calf.  Waveforms  are mildly decreased in amplitude at the ankle, severely  diminished at the metatarsal, and flat at the digits.  ------------------------------------------------------------------------  Summary/Impressions:  1. Right MICHEL was not accurately assessed due to  non-compressible (calcific) vessels.   Right digit  waveforms are flat.  Findings suggest the presence of  severe small vessel arterial disease in the right foot.  2. Left MICHEL was not accurately assessed due to  non-compressible (calcific) vessels.  Left digit waveforms  are flat.  Findings suggest the presence of severe small  vessel arterial disease in the left foot.      SECONDARY PROCEDURE  Procedure: CT angiogram abdomen  Findings and Treatment:     Procedure: XR foot RT 3V  Findings and Treatment:      PRINCIPAL PROCEDURE  Procedure: Ankle brachial index  Findings and Treatment: Right Findings: The ankle-brachial index (MICHEL) on the  right was not accurately assessed due to non-compressible  (calcific) vessels.  The toe-brachial index (TBI) on the right is severely  decreased (0.07).  The right toe pressure is 10 mmHg.  Pulse volume recording (PVR) waveforms appear biphasic  with normal amplitudes at the thigh and calf.  Waveforms  are mildly decreased in amplitude at the ankle, severely  diminished at the metatarsal, and flat at the digits.  Left Findings: The ankle-brachial index (MICHEL) on the left  was not accurately assessed due to non-compressible  (calcified) vessels.  The toe-brachial index (TBI) on the left is severely  decreased (0.22).  The left toe pressure is 31 mmHg.  Pulse volume recording (PVR) waveforms appear biphasic  with normal amplitudes at the thigh and calf.  Waveforms  are mildly decreased in amplitude at the ankle, severely  diminished at the metatarsal, and flat at the digits.  ------------------------------------------------------------------------  Summary/Impressions:  1. Right MICHEL was not accurately assessed due to  non-compressible (calcific) vessels.   Right digit  waveforms are flat.  Findings suggest the presence of  severe small vessel arterial disease in the right foot.  2. Left MICHEL was not accurately assessed due to  non-compressible (calcific) vessels.  Left digit waveforms  are flat.  Findings suggest the presence of severe small  vessel arterial disease in the left foot.      SECONDARY PROCEDURE  Procedure: CT angiogram abdomen  Findings and Treatment:     Procedure: XR foot RT 3V  Findings and Treatment: FINDINGS:  No acute displaced fracture or joint dislocation.  Joint spaces are preserved with smooth articular margins.  Status post resection of the second digit at the level of the head of the proximal phalanx. There is ulceration of the soft tissue stump and indistinct margins of the underlying bony cortex, suggesting possible osteomyelitis. No subcutaneous tracking gas.  Severe vascular calcifications.  IMPRESSION:  There is ulceration of the soft tissue stump of the resected second digit and indistinct margins of the underlying bony cortex of the head of the second proximal phalanx, suggesting possible osteomyelitis. No subcutaneous tracking gas.    Procedure: Transthoracic echo  Findings and Treatment: OBSERVATIONS:  Mitral Valve: Mitral annular calcification, otherwise  normal mitral valve. Mild mitral regurgitation.  Aortic Root: Normal aortic root.  Aortic Valve: Aortic valve leaflet morphology not well  visualized.  Left Atrium: Normal left atrium.  Left Ventricle: Endocardium not well visualized; grossly  normal left ventricular systolic function. Normal left  ventricular internal dimensions and wall thicknesses.  Normal left ventricular diastolic function.  Right Heart: Normal right atrium. The right ventricle is  not well visualized; grossly normal right ventricular  systolic function. Normal tricuspid valve. Minimal  tricuspid regurgitation. Normal pulmonic valve.  Pericardium/PleuraNormal pericardium with no pericardial  effusion.  ------------------------------------------------------------------------  CONCLUSIONS:  1. Mitral annular calcification, otherwise normal mitral  valve. Mild mitral regurgitation.  2. Normal left ventricular internal dimensions and wall  thicknesses.  3. Endocardium not well visualized; grossly normal left  ventricular systolic function.  4. Normal left ventricular diastolic function.  5. The right ventricle is not well visualized; grossly  normal right ventricular systolic function.  *** Compared with echocardiogram of 4/26/2021,  significantly less mitral regurgitation is seen on the  current study.

## 2021-07-25 NOTE — PROGRESS NOTE ADULT - PROBLEM SELECTOR PLAN 3
-pt with h/o right foot 2nd toe partial amputation in April at [facility], h/o severe RLE PVD with RLE angioplasty in past, no stent, now p/w 2nd residual toe/stump and 3rd toe infection, xray concerning for osteomyelitis, elevated inflammatory markers ESR 96, CRP 80.6 c/w acute OM. rec'd IV vanc 1 g bid (7/17 pm - 7/20), zoxyn (7/18-7/20)  -7/18 blood cultures x2  -ID c/s start erta and levaquin (7/20 - )  -MRI right foot: osteomyelitis in 2nd toe, periostitis w/o osteo on 3rd toe  -podiatry consulted, s/p I&D 7/17. plan for RF P2RR, P3RR pending MICHEL/PVR vasc recs, next week  - vascular consult: angiogram with co2 angio next thursday 7/29  -needs cardiology clearance, RCRI 3, high risk for periop MACE

## 2021-07-25 NOTE — PROGRESS NOTE ADULT - ASSESSMENT
Assessment   60 y/o male with h/o HTN, insulin dependent DM-2, hld, glaucoma, CKD3 with recent interstitial nephritis, HFrEF, recent NSTEMI, PAD s/p RLE angioplasty, s/p right 2nd toe partial amputation at [facility] in April 2021, who presents with 2 week history of worsening right foot pain with plans for 2nd and 3rd toe ray amps.      Plan:   - Plan for Angiogram next week   - Nephro and Cardiology optimizing patient for procedure  - F/u nephro and cardiology clearances on Monday once completed Bumex.     C team  [id number]

## 2021-07-25 NOTE — PROGRESS NOTE ADULT - PROBLEM SELECTOR PLAN 2
improving creatinine. On CKD3, h/o recent ACOSTA d/t AIN , responded to prednisone. likely prerenal vs intrinsic, renal u/s without hydronephrosis. repeat ua bland, FeUrea reflects prerenal  - neph following appreciate assistance  - strict I+O  - trend Cr  - continue hydral  - hydrate as tolerated given pulmonary edema, holding for now  -dose meds per renal fxn, avoid nephrotoxins, avoid ACEi and ARBs, Maintain MAP >65, renal diet improving creatinine. On CKD3, h/o recent ACOSTA d/t AIN , responded to prednisone. likely prerenal vs intrinsic, renal u/s without hydronephrosis. repeat ua bland, FeUrea reflects prerenal  - neph following appreciate assistance  - strict I+O  - trend Cr  - continue hydral  - bumex transitioned to PO as above  -dose meds per renal fxn, avoid nephrotoxins, avoid ACEi and ARBs, Maintain MAP >65, renal diet

## 2021-07-25 NOTE — PROGRESS NOTE ADULT - PROBLEM SELECTOR PLAN 1
Improving. patient desatted to low 80s 7/19 afternoon. put on 5L NC with 93-95% sats. patient reports feeling short of breath and describes retrosternal chest pressure. slightly worse with breathing.  bedside focused ultrasound remarkable for b lines and mitral regurg. likely flash pulmonary edema 2/2 fluid bolus from ACOSTA treatment. repeat echo reflecting improvement in cardiac function. VQ scan and DVT ultrasound: neg. oxygen requirement is now improing with diuresis.  - bumex 4 mg bid, consider transition to 2mg po this weekend (7/25).  - daily bmp and bnp trend  - continuous pulse ox with tele  - wean oxygen  - ctm Improving. patient desatted to low 80s 7/19 afternoon. put on 5L NC with 93-95% sats. patient reports feeling short of breath and describes retrosternal chest pressure. slightly worse with breathing.  bedside focused ultrasound remarkable for b lines and mitral regurg. likely flash pulmonary edema 2/2 fluid bolus from ACOSTA treatment. repeat echo reflecting improvement in cardiac function. VQ scan and DVT ultrasound: neg. oxygen requirement is now improing with diuresis, now off oxygen since 7/25.  - bumex 4 mg IV bid transitioned to 4mg PO BID 7/25 now euvolemic and off oxygen with good UOP, will continue to monitor and consider continue downtitration of diuresis  - daily bmp trend, strict Is/Os  - continuous pulse ox with tele  - wean oxygen

## 2021-07-25 NOTE — PROGRESS NOTE ADULT - SUBJECTIVE AND OBJECTIVE BOX
Subjective:   Patient seen and examined at bedside. Doing well with no acute events overnight.     Objective:   Vital Signs Last 24 Hrs  T(C): 36.9 (25 Jul 2021 05:38), Max: 36.9 (25 Jul 2021 05:38)  T(F): 98.4 (25 Jul 2021 05:38), Max: 98.4 (25 Jul 2021 05:38)  HR: 66 (25 Jul 2021 05:38) (66 - 77)  BP: 149/64 (25 Jul 2021 05:38) (149/62 - 156/70)  BP(mean): --  RR: 17 (25 Jul 2021 05:38) (16 - 17)  SpO2: 98% (25 Jul 2021 05:38) (95% - 98%)      Physical Exam:  General: NAD, resting comfortably in bed  Pulmonary: Nonlabored breathing, no respiratory distress  Cardiovascular: NSR  Abdominal: soft, NT/ND  Extremities: WWP  Pulses:  DP/PT signals present in the lower extremities.         LABS:                          9.6    16.25 )-----------( 511      ( 25 Jul 2021 06:51 )             29.2

## 2021-07-25 NOTE — PROGRESS NOTE ADULT - PROBLEM SELECTOR PLAN 6
h/o severe RLE PVD, s/p RLE angioplasty in past  c/w ASA/statin h/o severe RLE PVD, s/p RLE angioplasty in past  - c/w ASA/statin  - vascular angiogram planned for 7/29, f/u recs

## 2021-07-25 NOTE — PROGRESS NOTE ADULT - SUBJECTIVE AND OBJECTIVE BOX
Patient is a 61y old  Male who presents with a chief complaint of right foot 2nd/3rd toe infection (25 Jul 2021 05:47)      Reynaldo Sam  EM/IM PGY4    SUBJECTIVE / OVERNIGHT EVENTS:    MEDICATIONS  (STANDING):  aspirin enteric coated 81 milliGRAM(s) Oral daily  atorvastatin 40 milliGRAM(s) Oral at bedtime  buMETAnide IVPB 4 milliGRAM(s) IV Intermittent two times a day  carvedilol 6.25 milliGRAM(s) Oral every 12 hours  dextrose 40% Gel 15 Gram(s) Oral once  dextrose 5%. 1000 milliLiter(s) (50 mL/Hr) IV Continuous <Continuous>  dextrose 5%. 1000 milliLiter(s) (100 mL/Hr) IV Continuous <Continuous>  dextrose 50% Injectable 25 Gram(s) IV Push once  dextrose 50% Injectable 12.5 Gram(s) IV Push once  dextrose 50% Injectable 25 Gram(s) IV Push once  ertapenem  IVPB 500 milliGRAM(s) IV Intermittent every 24 hours  glucagon  Injectable 1 milliGRAM(s) IntraMuscular once  heparin   Injectable 5000 Unit(s) SubCutaneous every 8 hours  hydrALAZINE 50 milliGRAM(s) Oral three times a day  insulin glargine Injectable (LANTUS) 30 Unit(s) SubCutaneous at bedtime  insulin lispro (ADMELOG) corrective regimen sliding scale   SubCutaneous three times a day before meals  isosorbide   dinitrate Tablet (ISORDIL) 20 milliGRAM(s) Oral three times a day  latanoprost 0.005% Ophthalmic Solution 1 Drop(s) Both EYES at bedtime  levoFLOXacin IVPB      levoFLOXacin IVPB 250 milliGRAM(s) IV Intermittent every 24 hours  melatonin 3 milliGRAM(s) Oral at bedtime  NIFEdipine XL 30 milliGRAM(s) Oral daily  polyethylene glycol 3350 17 Gram(s) Oral daily  senna 2 Tablet(s) Oral at bedtime    MEDICATIONS  (PRN):  acetaminophen   Tablet .. 650 milliGRAM(s) Oral every 6 hours PRN Temp greater or equal to 38.5C (101.3F), Mild Pain (1 - 3)      Vital Signs Last 24 Hrs  T(C): 36.9 (25 Jul 2021 05:38), Max: 36.9 (25 Jul 2021 05:38)  T(F): 98.4 (25 Jul 2021 05:38), Max: 98.4 (25 Jul 2021 05:38)  HR: 66 (25 Jul 2021 05:38) (66 - 77)  BP: 149/64 (25 Jul 2021 05:38) (149/62 - 156/70)  BP(mean): --  RR: 17 (25 Jul 2021 05:38) (16 - 17)  SpO2: 98% (25 Jul 2021 05:38) (95% - 100%)  CAPILLARY BLOOD GLUCOSE      POCT Blood Glucose.: 217 mg/dL (24 Jul 2021 21:40)  POCT Blood Glucose.: 240 mg/dL (24 Jul 2021 17:40)  POCT Blood Glucose.: 146 mg/dL (24 Jul 2021 13:11)  POCT Blood Glucose.: 198 mg/dL (24 Jul 2021 10:29)    I&O's Summary    24 Jul 2021 07:01  -  25 Jul 2021 07:00  --------------------------------------------------------  IN: 400 mL / OUT: 3500 mL / NET: -3100 mL        PHYSICAL EXAM:  GENERAL: No acute distress, well-developed  HEAD:  Atraumatic, Normocephalic  EYES: conjunctiva and sclera clear  NECK: no JVD  CHEST/LUNG: mild crackles lower lung bases, much improved compared to yesterday  HEART: Regular rate and rhythm; No murmurs, rubs, or gallops  ABDOMEN: Soft, non-tender, non-distended; normal bowel sounds, no organomegaly  EXTREMITIES:  2+ peripheral pulses b/l, No BETTY edema. right foot wrapped in gauze without shadowing  NEUROLOGY: A&O x 3, no focal deficits    LABS:                        9.6    16.25 )-----------( 511      ( 25 Jul 2021 06:51 )             29.2     07-25    137  |  99  |  76<H>  ----------------------------<  168<H>  4.1   |  22  |  2.83<H>    Ca    9.1      25 Jul 2021 06:45  Phos  5.0     07-25  Mg     2.30     07-25    TPro  7.3  /  Alb  3.3  /  TBili  0.3  /  DBili  x   /  AST  30  /  ALT  28  /  AlkPhos  282<H>  07-25              RADIOLOGY & ADDITIONAL TESTS: Reviewed.   Patient is a 61y old  Male who presents with a chief complaint of right foot 2nd/3rd toe infection (25 Jul 2021 05:47)      Reynaldo Sam  EM/IM PGY4    SUBJECTIVE / OVERNIGHT EVENTS: No acute events overnight. Pt breathing comfortably on room air. He denies any pain or other complaints at this time.    MEDICATIONS  (STANDING):  aspirin enteric coated 81 milliGRAM(s) Oral daily  atorvastatin 40 milliGRAM(s) Oral at bedtime  buMETAnide IVPB 4 milliGRAM(s) IV Intermittent two times a day  carvedilol 6.25 milliGRAM(s) Oral every 12 hours  dextrose 40% Gel 15 Gram(s) Oral once  dextrose 5%. 1000 milliLiter(s) (50 mL/Hr) IV Continuous <Continuous>  dextrose 5%. 1000 milliLiter(s) (100 mL/Hr) IV Continuous <Continuous>  dextrose 50% Injectable 25 Gram(s) IV Push once  dextrose 50% Injectable 12.5 Gram(s) IV Push once  dextrose 50% Injectable 25 Gram(s) IV Push once  ertapenem  IVPB 500 milliGRAM(s) IV Intermittent every 24 hours  glucagon  Injectable 1 milliGRAM(s) IntraMuscular once  heparin   Injectable 5000 Unit(s) SubCutaneous every 8 hours  hydrALAZINE 50 milliGRAM(s) Oral three times a day  insulin glargine Injectable (LANTUS) 30 Unit(s) SubCutaneous at bedtime  insulin lispro (ADMELOG) corrective regimen sliding scale   SubCutaneous three times a day before meals  isosorbide   dinitrate Tablet (ISORDIL) 20 milliGRAM(s) Oral three times a day  latanoprost 0.005% Ophthalmic Solution 1 Drop(s) Both EYES at bedtime  levoFLOXacin IVPB      levoFLOXacin IVPB 250 milliGRAM(s) IV Intermittent every 24 hours  melatonin 3 milliGRAM(s) Oral at bedtime  NIFEdipine XL 30 milliGRAM(s) Oral daily  polyethylene glycol 3350 17 Gram(s) Oral daily  senna 2 Tablet(s) Oral at bedtime    MEDICATIONS  (PRN):  acetaminophen   Tablet .. 650 milliGRAM(s) Oral every 6 hours PRN Temp greater or equal to 38.5C (101.3F), Mild Pain (1 - 3)      Vital Signs Last 24 Hrs  T(C): 36.9 (25 Jul 2021 05:38), Max: 36.9 (25 Jul 2021 05:38)  T(F): 98.4 (25 Jul 2021 05:38), Max: 98.4 (25 Jul 2021 05:38)  HR: 66 (25 Jul 2021 05:38) (66 - 77)  BP: 149/64 (25 Jul 2021 05:38) (149/62 - 156/70)  BP(mean): --  RR: 17 (25 Jul 2021 05:38) (16 - 17)  SpO2: 98% (25 Jul 2021 05:38) (95% - 100%)  CAPILLARY BLOOD GLUCOSE      POCT Blood Glucose.: 217 mg/dL (24 Jul 2021 21:40)  POCT Blood Glucose.: 240 mg/dL (24 Jul 2021 17:40)  POCT Blood Glucose.: 146 mg/dL (24 Jul 2021 13:11)  POCT Blood Glucose.: 198 mg/dL (24 Jul 2021 10:29)    I&O's Summary    24 Jul 2021 07:01  -  25 Jul 2021 07:00  --------------------------------------------------------  IN: 400 mL / OUT: 3500 mL / NET: -3100 mL        PHYSICAL EXAM:  GENERAL: No acute distress, well-developed  HEAD:  Atraumatic, Normocephalic  EYES: conjunctiva and sclera clear  NECK: no JVD  CHEST/LUNG: mild crackles lower lung bases, much improved compared to yesterday  HEART: Regular rate and rhythm; No murmurs, rubs, or gallops  ABDOMEN: Soft, non-tender, non-distended; normal bowel sounds, no organomegaly  EXTREMITIES:  2+ peripheral pulses b/l, No BETTY edema. right foot wrapped in gauze without shadowing  NEUROLOGY: A&O x 3, no focal deficits    LABS:                        9.6    16.25 )-----------( 511      ( 25 Jul 2021 06:51 )             29.2     07-25    137  |  99  |  76<H>  ----------------------------<  168<H>  4.1   |  22  |  2.83<H>    Ca    9.1      25 Jul 2021 06:45  Phos  5.0     07-25  Mg     2.30     07-25    TPro  7.3  /  Alb  3.3  /  TBili  0.3  /  DBili  x   /  AST  30  /  ALT  28  /  AlkPhos  282<H>  07-25              RADIOLOGY & ADDITIONAL TESTS: Reviewed.

## 2021-07-26 LAB
ALBUMIN SERPL ELPH-MCNC: 3.3 G/DL — SIGNIFICANT CHANGE UP (ref 3.3–5)
ALP SERPL-CCNC: 275 U/L — HIGH (ref 40–120)
ALT FLD-CCNC: 29 U/L — SIGNIFICANT CHANGE UP (ref 4–41)
ANION GAP SERPL CALC-SCNC: 17 MMOL/L — HIGH (ref 7–14)
AST SERPL-CCNC: 27 U/L — SIGNIFICANT CHANGE UP (ref 4–40)
BASOPHILS # BLD AUTO: 0.1 K/UL — SIGNIFICANT CHANGE UP (ref 0–0.2)
BASOPHILS NFR BLD AUTO: 0.6 % — SIGNIFICANT CHANGE UP (ref 0–2)
BILIRUB SERPL-MCNC: 0.3 MG/DL — SIGNIFICANT CHANGE UP (ref 0.2–1.2)
BUN SERPL-MCNC: 72 MG/DL — HIGH (ref 7–23)
CALCIUM SERPL-MCNC: 9.3 MG/DL — SIGNIFICANT CHANGE UP (ref 8.4–10.5)
CHLORIDE SERPL-SCNC: 96 MMOL/L — LOW (ref 98–107)
CO2 SERPL-SCNC: 23 MMOL/L — SIGNIFICANT CHANGE UP (ref 22–31)
CREAT SERPL-MCNC: 2.39 MG/DL — HIGH (ref 0.5–1.3)
EOSINOPHIL # BLD AUTO: 0.66 K/UL — HIGH (ref 0–0.5)
EOSINOPHIL NFR BLD AUTO: 4.2 % — SIGNIFICANT CHANGE UP (ref 0–6)
GLUCOSE BLDC GLUCOMTR-MCNC: 200 MG/DL — HIGH (ref 70–99)
GLUCOSE SERPL-MCNC: 155 MG/DL — HIGH (ref 70–99)
HCT VFR BLD CALC: 29.6 % — LOW (ref 39–50)
HGB BLD-MCNC: 9.8 G/DL — LOW (ref 13–17)
IANC: 11.4 K/UL — HIGH (ref 1.5–8.5)
IMM GRANULOCYTES NFR BLD AUTO: 2.1 % — HIGH (ref 0–1.5)
LYMPHOCYTES # BLD AUTO: 15.1 % — SIGNIFICANT CHANGE UP (ref 13–44)
LYMPHOCYTES # BLD AUTO: 2.4 K/UL — SIGNIFICANT CHANGE UP (ref 1–3.3)
MAGNESIUM SERPL-MCNC: 2.2 MG/DL — SIGNIFICANT CHANGE UP (ref 1.6–2.6)
MCHC RBC-ENTMCNC: 27.1 PG — SIGNIFICANT CHANGE UP (ref 27–34)
MCHC RBC-ENTMCNC: 33.1 GM/DL — SIGNIFICANT CHANGE UP (ref 32–36)
MCV RBC AUTO: 81.8 FL — SIGNIFICANT CHANGE UP (ref 80–100)
MONOCYTES # BLD AUTO: 0.97 K/UL — HIGH (ref 0–0.9)
MONOCYTES NFR BLD AUTO: 6.1 % — SIGNIFICANT CHANGE UP (ref 2–14)
NEUTROPHILS # BLD AUTO: 11.4 K/UL — HIGH (ref 1.8–7.4)
NEUTROPHILS NFR BLD AUTO: 71.9 % — SIGNIFICANT CHANGE UP (ref 43–77)
NRBC # BLD: 0 /100 WBCS — SIGNIFICANT CHANGE UP
NRBC # FLD: 0 K/UL — SIGNIFICANT CHANGE UP
PHOSPHATE SERPL-MCNC: 5.5 MG/DL — HIGH (ref 2.5–4.5)
PLATELET # BLD AUTO: 520 K/UL — HIGH (ref 150–400)
POTASSIUM SERPL-MCNC: 3.6 MMOL/L — SIGNIFICANT CHANGE UP (ref 3.5–5.3)
POTASSIUM SERPL-SCNC: 3.6 MMOL/L — SIGNIFICANT CHANGE UP (ref 3.5–5.3)
PROT SERPL-MCNC: 7.5 G/DL — SIGNIFICANT CHANGE UP (ref 6–8.3)
RBC # BLD: 3.62 M/UL — LOW (ref 4.2–5.8)
RBC # FLD: 12.9 % — SIGNIFICANT CHANGE UP (ref 10.3–14.5)
SODIUM SERPL-SCNC: 136 MMOL/L — SIGNIFICANT CHANGE UP (ref 135–145)
WBC # BLD: 15.87 K/UL — HIGH (ref 3.8–10.5)
WBC # FLD AUTO: 15.87 K/UL — HIGH (ref 3.8–10.5)

## 2021-07-26 PROCEDURE — 99232 SBSQ HOSP IP/OBS MODERATE 35: CPT

## 2021-07-26 PROCEDURE — 99232 SBSQ HOSP IP/OBS MODERATE 35: CPT | Mod: GC

## 2021-07-26 PROCEDURE — 99233 SBSQ HOSP IP/OBS HIGH 50: CPT

## 2021-07-26 RX ORDER — POTASSIUM CHLORIDE 20 MEQ
20 PACKET (EA) ORAL ONCE
Refills: 0 | Status: COMPLETED | OUTPATIENT
Start: 2021-07-26 | End: 2021-07-26

## 2021-07-26 RX ORDER — INSULIN LISPRO 100/ML
4 VIAL (ML) SUBCUTANEOUS
Refills: 0 | Status: DISCONTINUED | OUTPATIENT
Start: 2021-07-26 | End: 2021-07-27

## 2021-07-26 RX ORDER — INSULIN GLARGINE 100 [IU]/ML
30 INJECTION, SOLUTION SUBCUTANEOUS AT BEDTIME
Refills: 0 | Status: DISCONTINUED | OUTPATIENT
Start: 2021-07-26 | End: 2021-07-27

## 2021-07-26 RX ADMIN — Medication 30 MILLIGRAM(S): at 05:05

## 2021-07-26 RX ADMIN — Medication 4 UNIT(S): at 18:58

## 2021-07-26 RX ADMIN — Medication 5: at 12:19

## 2021-07-26 RX ADMIN — Medication 50 MILLIGRAM(S): at 21:28

## 2021-07-26 RX ADMIN — BUMETANIDE 4 MILLIGRAM(S): 0.25 INJECTION INTRAMUSCULAR; INTRAVENOUS at 17:06

## 2021-07-26 RX ADMIN — HEPARIN SODIUM 5000 UNIT(S): 5000 INJECTION INTRAVENOUS; SUBCUTANEOUS at 12:18

## 2021-07-26 RX ADMIN — POLYETHYLENE GLYCOL 3350 17 GRAM(S): 17 POWDER, FOR SOLUTION ORAL at 12:18

## 2021-07-26 RX ADMIN — Medication 50 MILLIGRAM(S): at 05:06

## 2021-07-26 RX ADMIN — Medication 3 MILLIGRAM(S): at 21:30

## 2021-07-26 RX ADMIN — INSULIN GLARGINE 30 UNIT(S): 100 INJECTION, SOLUTION SUBCUTANEOUS at 22:08

## 2021-07-26 RX ADMIN — ATORVASTATIN CALCIUM 40 MILLIGRAM(S): 80 TABLET, FILM COATED ORAL at 21:28

## 2021-07-26 RX ADMIN — Medication 50 MILLIGRAM(S): at 12:18

## 2021-07-26 RX ADMIN — Medication 5: at 18:58

## 2021-07-26 RX ADMIN — CARVEDILOL PHOSPHATE 6.25 MILLIGRAM(S): 80 CAPSULE, EXTENDED RELEASE ORAL at 05:06

## 2021-07-26 RX ADMIN — HEPARIN SODIUM 5000 UNIT(S): 5000 INJECTION INTRAVENOUS; SUBCUTANEOUS at 05:16

## 2021-07-26 RX ADMIN — ISOSORBIDE DINITRATE 20 MILLIGRAM(S): 5 TABLET ORAL at 12:19

## 2021-07-26 RX ADMIN — ISOSORBIDE DINITRATE 20 MILLIGRAM(S): 5 TABLET ORAL at 21:29

## 2021-07-26 RX ADMIN — LATANOPROST 1 DROP(S): 0.05 SOLUTION/ DROPS OPHTHALMIC; TOPICAL at 21:29

## 2021-07-26 RX ADMIN — ERTAPENEM SODIUM 100 MILLIGRAM(S): 1 INJECTION, POWDER, LYOPHILIZED, FOR SOLUTION INTRAMUSCULAR; INTRAVENOUS at 18:16

## 2021-07-26 RX ADMIN — BUMETANIDE 4 MILLIGRAM(S): 0.25 INJECTION INTRAMUSCULAR; INTRAVENOUS at 05:12

## 2021-07-26 RX ADMIN — Medication 1: at 09:19

## 2021-07-26 RX ADMIN — Medication 20 MILLIEQUIVALENT(S): at 09:19

## 2021-07-26 RX ADMIN — Medication 81 MILLIGRAM(S): at 12:18

## 2021-07-26 RX ADMIN — CARVEDILOL PHOSPHATE 6.25 MILLIGRAM(S): 80 CAPSULE, EXTENDED RELEASE ORAL at 17:06

## 2021-07-26 RX ADMIN — ISOSORBIDE DINITRATE 20 MILLIGRAM(S): 5 TABLET ORAL at 05:06

## 2021-07-26 RX ADMIN — HEPARIN SODIUM 5000 UNIT(S): 5000 INJECTION INTRAVENOUS; SUBCUTANEOUS at 21:28

## 2021-07-26 NOTE — PROGRESS NOTE ADULT - PROBLEM SELECTOR PLAN 2
ACOSTA on CKD3. likely prerenal. baseline Cr ~1.8  -Cr improving   - neph following appreciate assistance  - strict I+O  - continue hydral  - bumex transitioned to PO as above  -dose meds per renal fxn, avoid nephrotoxins, avoid ACEi and ARBs, Maintain MAP >65, renal diet

## 2021-07-26 NOTE — DIETITIAN INITIAL EVALUATION ADULT. - OTHER INFO
60 y/o male with hx DM now admitted with diabetic foot infection. Visited with pt to obtain nutrition hx. Pt said that he has been eating well and denies food allergies, nausea/vomiting/diarrhea/constipation, or issues with chewing/swallowing; last BM 7/25. Food preferences taken and menu alternatives discussed. He followed a diabetic diet and limited salt in his diet PTA. He had prior diet education during his [facility] admission 4/21 with good teachback. Suggest adding Consistent Carbohydrate restriction to ordered diet to aid in improved glycemic control. He now has ACOSTA on CKD. Diet instruction provided on Low Phosphorus foods. His K+ lab values have been WNLs during this admission, therefore suggest d/cing restriction to offer additional menu variety. His weight has been stable PTA. Pt without additional nutrition related issues or concerns at this time. RDN services to remain available as needed.

## 2021-07-26 NOTE — DIETITIAN INITIAL EVALUATION ADULT. - PERTINENT MEDS FT
MEDICATIONS  (STANDING):  aspirin enteric coated 81 milliGRAM(s) Oral daily  atorvastatin 40 milliGRAM(s) Oral at bedtime  buMETAnide 4 milliGRAM(s) Oral every 12 hours  carvedilol 6.25 milliGRAM(s) Oral every 12 hours  dextrose 40% Gel 15 Gram(s) Oral once  dextrose 5%. 1000 milliLiter(s) (50 mL/Hr) IV Continuous <Continuous>  dextrose 5%. 1000 milliLiter(s) (100 mL/Hr) IV Continuous <Continuous>  dextrose 50% Injectable 25 Gram(s) IV Push once  dextrose 50% Injectable 12.5 Gram(s) IV Push once  dextrose 50% Injectable 25 Gram(s) IV Push once  ertapenem  IVPB 500 milliGRAM(s) IV Intermittent every 24 hours  glucagon  Injectable 1 milliGRAM(s) IntraMuscular once  heparin   Injectable 5000 Unit(s) SubCutaneous every 8 hours  hydrALAZINE 50 milliGRAM(s) Oral three times a day  insulin glargine Injectable (LANTUS) 30 Unit(s) SubCutaneous at bedtime  insulin lispro (ADMELOG) corrective regimen sliding scale   SubCutaneous three times a day before meals  isosorbide   dinitrate Tablet (ISORDIL) 20 milliGRAM(s) Oral three times a day  latanoprost 0.005% Ophthalmic Solution 1 Drop(s) Both EYES at bedtime  levoFLOXacin IVPB      levoFLOXacin IVPB 250 milliGRAM(s) IV Intermittent every 24 hours  melatonin 3 milliGRAM(s) Oral at bedtime  NIFEdipine XL 30 milliGRAM(s) Oral daily  polyethylene glycol 3350 17 Gram(s) Oral daily  senna 2 Tablet(s) Oral at bedtime

## 2021-07-26 NOTE — DIETITIAN INITIAL EVALUATION ADULT. - PERTINENT LABORATORY DATA
07-26 Na 136 mmol/L Glu 155 mg/dL<H> K+ 3.6 mmol/L Cr 2.39 mg/dL<H> BUN 72 mg/dL<H> Phos 5.5 mg/dL<H>  07-26 @ 08:45 POCT 200 mg/dL  07-25 @ 21:34 POCT 253 mg/dL  07-25 @ 18:19 POCT 225 mg/dL  07-25 @ 12:32 POCT 264 mg/dL

## 2021-07-26 NOTE — PROGRESS NOTE ADULT - SUBJECTIVE AND OBJECTIVE BOX
[facility] DIVISION OF KIDNEY DISEASES AND HYPERTENSION -- FOLLOW UP NOTE  --------------------------------------------------------------------------------  Chief Complaint:    24 hour events/subjective:      REVIEW OF SYSTEMS  --------------------------------------------------------------------------------  Gen: No fevers/chills, weakness  Skin: No rashes  Head/Eyes/Ears/Mouth: No headache; No hearing changes, mucous discharge, vision changes  Respiratory: No dyspnea, cough, wheezing  CV: No chest pain, palpitations  GI: No abdominal pain, diarrhea, constipation, nausea, vomiting, melena, hematochezia  : No increased frequency, dysuria, hematuria  MSK: No joint pain/swelling; no back pain; no edema  Neuro: No dizziness/lightheadedness, weakness, seizures, numbness  Heme: No easy bruising or bleeding    All other systems were reviewed and are negative, except as noted.    PAST HISTORY  --------------------------------------------------------------------------------  No significant changes to PMH, PSH, FHx, SHx, unless otherwise noted    ALLERGIES & MEDICATIONS  --------------------------------------------------------------------------------  Allergies    cefepime (Other)    Intolerances      Standing Inpatient Medications  aspirin enteric coated 81 milliGRAM(s) Oral daily  atorvastatin 40 milliGRAM(s) Oral at bedtime  buMETAnide 4 milliGRAM(s) Oral every 12 hours  carvedilol 6.25 milliGRAM(s) Oral every 12 hours  dextrose 40% Gel 15 Gram(s) Oral once  dextrose 5%. 1000 milliLiter(s) IV Continuous <Continuous>  dextrose 5%. 1000 milliLiter(s) IV Continuous <Continuous>  dextrose 50% Injectable 25 Gram(s) IV Push once  dextrose 50% Injectable 12.5 Gram(s) IV Push once  dextrose 50% Injectable 25 Gram(s) IV Push once  ertapenem  IVPB 500 milliGRAM(s) IV Intermittent every 24 hours  glucagon  Injectable 1 milliGRAM(s) IntraMuscular once  heparin   Injectable 5000 Unit(s) SubCutaneous every 8 hours  hydrALAZINE 50 milliGRAM(s) Oral three times a day  insulin glargine Injectable (LANTUS) 30 Unit(s) SubCutaneous at bedtime  insulin lispro (ADMELOG) corrective regimen sliding scale   SubCutaneous three times a day before meals  isosorbide   dinitrate Tablet (ISORDIL) 20 milliGRAM(s) Oral three times a day  latanoprost 0.005% Ophthalmic Solution 1 Drop(s) Both EYES at bedtime  levoFLOXacin IVPB      levoFLOXacin IVPB 250 milliGRAM(s) IV Intermittent every 24 hours  melatonin 3 milliGRAM(s) Oral at bedtime  NIFEdipine XL 30 milliGRAM(s) Oral daily  polyethylene glycol 3350 17 Gram(s) Oral daily  potassium chloride    Tablet ER 20 milliEquivalent(s) Oral once  senna 2 Tablet(s) Oral at bedtime    PRN Inpatient Medications  acetaminophen   Tablet .. 650 milliGRAM(s) Oral every 6 hours PRN        VITALS/PHYSICAL EXAM  --------------------------------------------------------------------------------  T(C): 36.7 (07-26-21 @ 04:26), Max: 36.8 (07-25-21 @ 14:30)  HR: 67 (07-26-21 @ 06:33) (61 - 67)  BP: 148/67 (07-26-21 @ 04:26) (148/67 - 176/66)  RR: 17 (07-26-21 @ 04:26) (17 - 20)  SpO2: 96% (07-26-21 @ 06:33) (96% - 99%)  Wt(kg): --        07-25-21 @ 07:01  -  07-26-21 @ 07:00  --------------------------------------------------------  IN: 1280 mL / OUT: 3200 mL / NET: -1920 mL      Physical Exam:  	Gen: NAD, well-appearing  	HEENT: PERRL, supple neck, clear oropharynx  	Pulm: CTA B/L  	CV: RRR, S1S2; no rub  	Back: No spinal or CVA tenderness; no sacral edema  	Abd: +BS, soft, nontender/nondistended  	: No suprapubic tenderness  	UE: Warm, FROM, no clubbing, intact strength; no edema; no asterixis  	LE: Warm, FROM, no clubbing, intact strength; no edema  	Neuro: No focal deficits, intact gait  	Psych: Normal affect and mood  	Skin: Warm, without rashes  	Vascular access:    LABS/STUDIES  --------------------------------------------------------------------------------              9.8    15.87 >-----------<  520      [07-26-21 @ 06:24]              29.6     136  |  96  |  72  ----------------------------<  155      [07-26-21 @ 06:24]  3.6   |  23  |  2.39        Ca     9.3     [07-26-21 @ 06:24]      Mg     2.20     [07-26-21 @ 06:24]      Phos  5.5     [07-26-21 @ 06:24]    TPro  7.5  /  Alb  3.3  /  TBili  0.3  /  DBili  x   /  AST  27  /  ALT  29  /  AlkPhos  275  [07-26-21 @ 06:24]          Creatinine Trend:  SCr 2.39 [07-26 @ 06:24]  SCr 2.83 [07-25 @ 06:45]  SCr 2.90 [07-24 @ 07:00]  SCr 3.28 [07-23 @ 16:21]  SCr 3.44 [07-23 @ 07:11]    Urinalysis - [07-21-21 @ 17:25]      Color Yellow / Appearance Clear / SG 1.016 / pH 5.5      Gluc Negative / Ketone Negative  / Bili Negative / Urobili <2 mg/dL       Blood Negative / Protein Trace / Leuk Est Negative / Nitrite Negative      RBC 6 / WBC 2 / Hyaline 2 / Gran  / Sq Epi  / Non Sq Epi 1 / Bacteria Negative    Urine Creatinine 149      [07-21-21 @ 17:25]  Urine Protein 23      [07-20-21 @ 16:48]  Urine Sodium <20      [07-21-21 @ 17:25]  Urine Urea Nitrogen 397.0      [07-21-21 @ 17:25]  Urine Potassium 44.1      [07-19-21 @ 12:01]  Urine Chloride <20      [07-19-21 @ 12:01]  Urine Osmolality 304      [07-21-21 @ 17:25]    TSH 1.58      [04-23-21 @ 04:36]  Lipid: chol 116, , HDL 26, LDL --      [07-19-21 @ 06:42]    HCV 0.12, Nonreact      [07-19-21 @ 09:49]    C3 Complement 160      [07-21-21 @ 07:42]  C4 Complement 58      [07-21-21 @ 07:42]   [facility] DIVISION OF KIDNEY DISEASES AND HYPERTENSION -- FOLLOW UP NOTE  --------------------------------------------------------------------------------  61M with history of HTN, T2DM, HLD, glaucoma, CKD with recent AIN (follows with Dr. Zambrano) treated with steroid, HFrEF, recent NSTEMI, PAD s/p RLE angioplasty, s/p right 2nd toe patient amputation at [facility] presents to [facility] with worsening foot pain and associated right 203 toe infection. Recent RLE angiogram revealed severe atherosclerosis. Nephrology consulted for ACOSTA amidst rising creatinine levels and continued malaise.      Pt. seen and examined at bedside. Does not report SOB and no pain in the foot.         REVIEW OF SYSTEMS  --------------------------------------------------------------------------------  Gen: no fever  Respiratory: sob  CV: no cp  GI: no ab pain  : as above  MSK: toe pain      All other systems were reviewed and are negative, except as noted.    PAST HISTORY  --------------------------------------------------------------------------------  No significant changes to PMH, PSH, FHx, SHx, unless otherwise noted    ALLERGIES & MEDICATIONS  --------------------------------------------------------------------------------  Allergies    cefepime (Other)    Intolerances      Standing Inpatient Medications  aspirin enteric coated 81 milliGRAM(s) Oral daily  atorvastatin 40 milliGRAM(s) Oral at bedtime  buMETAnide 4 milliGRAM(s) Oral every 12 hours  carvedilol 6.25 milliGRAM(s) Oral every 12 hours  dextrose 40% Gel 15 Gram(s) Oral once  dextrose 5%. 1000 milliLiter(s) IV Continuous <Continuous>  dextrose 5%. 1000 milliLiter(s) IV Continuous <Continuous>  dextrose 50% Injectable 25 Gram(s) IV Push once  dextrose 50% Injectable 12.5 Gram(s) IV Push once  dextrose 50% Injectable 25 Gram(s) IV Push once  ertapenem  IVPB 500 milliGRAM(s) IV Intermittent every 24 hours  glucagon  Injectable 1 milliGRAM(s) IntraMuscular once  heparin   Injectable 5000 Unit(s) SubCutaneous every 8 hours  hydrALAZINE 50 milliGRAM(s) Oral three times a day  insulin glargine Injectable (LANTUS) 30 Unit(s) SubCutaneous at bedtime  insulin lispro (ADMELOG) corrective regimen sliding scale   SubCutaneous three times a day before meals  isosorbide   dinitrate Tablet (ISORDIL) 20 milliGRAM(s) Oral three times a day  latanoprost 0.005% Ophthalmic Solution 1 Drop(s) Both EYES at bedtime  levoFLOXacin IVPB      levoFLOXacin IVPB 250 milliGRAM(s) IV Intermittent every 24 hours  melatonin 3 milliGRAM(s) Oral at bedtime  NIFEdipine XL 30 milliGRAM(s) Oral daily  polyethylene glycol 3350 17 Gram(s) Oral daily  potassium chloride    Tablet ER 20 milliEquivalent(s) Oral once  senna 2 Tablet(s) Oral at bedtime    PRN Inpatient Medications  acetaminophen   Tablet .. 650 milliGRAM(s) Oral every 6 hours PRN        VITALS/PHYSICAL EXAM  --------------------------------------------------------------------------------  T(C): 36.7 (07-26-21 @ 04:26), Max: 36.8 (07-25-21 @ 14:30)  HR: 67 (07-26-21 @ 06:33) (61 - 67)  BP: 148/67 (07-26-21 @ 04:26) (148/67 - 176/66)  RR: 17 (07-26-21 @ 04:26) (17 - 20)  SpO2: 96% (07-26-21 @ 06:33) (96% - 99%)  Wt(kg): --        07-25-21 @ 07:01  -  07-26-21 @ 07:00  --------------------------------------------------------  IN: 1280 mL / OUT: 3200 mL / NET: -1920 mL      Physical Exam:  	Gen: NAD, appears calm  	HEENT: MMM, anicteric  	Pulm: rales at bilateral bases  	CV: S1S2+  	Abd: Soft, +BS   	Ext: R foot dressing seen+  	Neuro: Awake  	Skin: Warm and dry  	Vascular access: peripheral IV canula      LABS/STUDIES  --------------------------------------------------------------------------------              9.8    15.87 >-----------<  520      [07-26-21 @ 06:24]              29.6     136  |  96  |  72  ----------------------------<  155      [07-26-21 @ 06:24]  3.6   |  23  |  2.39        Ca     9.3     [07-26-21 @ 06:24]      Mg     2.20     [07-26-21 @ 06:24]      Phos  5.5     [07-26-21 @ 06:24]    TPro  7.5  /  Alb  3.3  /  TBili  0.3  /  DBili  x   /  AST  27  /  ALT  29  /  AlkPhos  275  [07-26-21 @ 06:24]          Creatinine Trend:  SCr 2.39 [07-26 @ 06:24]  SCr 2.83 [07-25 @ 06:45]  SCr 2.90 [07-24 @ 07:00]  SCr 3.28 [07-23 @ 16:21]  SCr 3.44 [07-23 @ 07:11]    Urinalysis - [07-21-21 @ 17:25]      Color Yellow / Appearance Clear / SG 1.016 / pH 5.5      Gluc Negative / Ketone Negative  / Bili Negative / Urobili <2 mg/dL       Blood Negative / Protein Trace / Leuk Est Negative / Nitrite Negative      RBC 6 / WBC 2 / Hyaline 2 / Gran  / Sq Epi  / Non Sq Epi 1 / Bacteria Negative    Urine Creatinine 149      [07-21-21 @ 17:25]  Urine Protein 23      [07-20-21 @ 16:48]  Urine Sodium <20      [07-21-21 @ 17:25]  Urine Urea Nitrogen 397.0      [07-21-21 @ 17:25]  Urine Potassium 44.1      [07-19-21 @ 12:01]  Urine Chloride <20      [07-19-21 @ 12:01]  Urine Osmolality 304      [07-21-21 @ 17:25]    TSH 1.58      [04-23-21 @ 04:36]  Lipid: chol 116, , HDL 26, LDL --      [07-19-21 @ 06:42]    HCV 0.12, Nonreact      [07-19-21 @ 09:49]    C3 Complement 160      [07-21-21 @ 07:42]  C4 Complement 58      [07-21-21 @ 07:42]

## 2021-07-26 NOTE — PROGRESS NOTE ADULT - PROBLEM SELECTOR PLAN 1
2/2 fluid overload  -now weaned off O2 satting well on ra  - VQ scan and LE duplex neg for PE or DVT  - bumex 4 mg IV bid transitioned to 4mg PO BID 7/25 now euvolemic and off oxygen with good UOP, will continue to monitor

## 2021-07-26 NOTE — PROGRESS NOTE ADULT - SUBJECTIVE AND OBJECTIVE BOX
Patient seen and examined at bedside.    Overnight Events: no events, issues or complaints    Review of Systems:  REVIEW OF SYSTEMS:  CONSTITUTIONAL: No weakness, fevers or chills  EYES/ENT: No visual changes;  No dysphagia  NECK: No pain or stiffness  RESPIRATORY: No cough, wheezing, hemoptysis; No shortness of breath  CARDIOVASCULAR: No chest pain or palpitations; No lower extremity edema  GASTROINTESTINAL: No abdominal or epigastric pain. No nausea, vomiting, or hematemesis; No diarrhea or constipation. No melena or hematochezia.  BACK: No back pain  GENITOURINARY: No dysuria, frequency or hematuria  NEUROLOGICAL: No numbness or weakness  All other review of systems is negative unless indicated above.    [ x All other systems negative  [ ] Unable to assess ROS due to    Current Meds:  acetaminophen   Tablet .. 650 milliGRAM(s) Oral every 6 hours PRN  aspirin enteric coated 81 milliGRAM(s) Oral daily  atorvastatin 40 milliGRAM(s) Oral at bedtime  buMETAnide 4 milliGRAM(s) Oral every 12 hours  carvedilol 6.25 milliGRAM(s) Oral every 12 hours  dextrose 40% Gel 15 Gram(s) Oral once  dextrose 5%. 1000 milliLiter(s) IV Continuous <Continuous>  dextrose 5%. 1000 milliLiter(s) IV Continuous <Continuous>  dextrose 50% Injectable 25 Gram(s) IV Push once  dextrose 50% Injectable 12.5 Gram(s) IV Push once  dextrose 50% Injectable 25 Gram(s) IV Push once  ertapenem  IVPB 500 milliGRAM(s) IV Intermittent every 24 hours  glucagon  Injectable 1 milliGRAM(s) IntraMuscular once  heparin   Injectable 5000 Unit(s) SubCutaneous every 8 hours  hydrALAZINE 50 milliGRAM(s) Oral three times a day  insulin glargine Injectable (LANTUS) 30 Unit(s) SubCutaneous at bedtime  insulin lispro (ADMELOG) corrective regimen sliding scale   SubCutaneous three times a day before meals  isosorbide   dinitrate Tablet (ISORDIL) 20 milliGRAM(s) Oral three times a day  latanoprost 0.005% Ophthalmic Solution 1 Drop(s) Both EYES at bedtime  levoFLOXacin IVPB 250 milliGRAM(s) IV Intermittent every 24 hours  levoFLOXacin IVPB      melatonin 3 milliGRAM(s) Oral at bedtime  NIFEdipine XL 30 milliGRAM(s) Oral daily  polyethylene glycol 3350 17 Gram(s) Oral daily  senna 2 Tablet(s) Oral at bedtime      PAST MEDICAL & SURGICAL HISTORY:  PAD (peripheral artery disease)    Essential hypertension    Type 2 diabetes mellitus, with long-term current use of insulin    History of amputation of toe    S/P angioplasty  RLE        Vitals:  T(F): 98.1 (07-26), Max: 98.3 (07-25)  HR: 65 (07-26) (61 - 67)  BP: 148/67 (07-26) (148/67 - 176/66)  RR: 17 (07-26)  SpO2: 97% (07-26)  I&O's Summary    25 Jul 2021 07:01  -  26 Jul 2021 07:00  --------------------------------------------------------  IN: 1280 mL / OUT: 3200 mL / NET: -1920 mL        Physical Exam:  Appearance: No acute distress; well appearing  Eyes: PERRL, EOMI, pink conjunctiva  HENT: Normal oral mucosa  Cardiovascular: RRR, S1, S2, no murmurs, rubs, or gallops; no edema; no JVD  Respiratory: Clear to auscultation bilaterally  Gastrointestinal: soft, non-tender, non-distended with normal bowel sounds  Musculoskeletal: No clubbing; no joint deformity   Neurologic: Non-focal  Lymphatic: No lymphadenopathy  Psychiatry: AAOx3, mood & affect appropriate                            9.8    15.87 )-----------( 520      ( 26 Jul 2021 06:24 )             29.6     07-26    136  |  96<L>  |  72<H>  ----------------------------<  155<H>  3.6   |  23  |  2.39<H>    Ca    9.3      26 Jul 2021 06:24  Phos  5.5     07-26  Mg     2.20     07-26    TPro  7.5  /  Alb  3.3  /  TBili  0.3  /  DBili  x   /  AST  27  /  ALT  29  /  AlkPhos  275<H>  07-26          Serum Pro-Brain Natriuretic Peptide: 05420 pg/mL (07-23 @ 16:21)  Serum Pro-Brain Natriuretic Peptide: 53544 pg/mL (07-22 @ 14:32)  Serum Pro-Brain Natriuretic Peptide: 92146 pg/mL (07-22 @ 06:32)  Serum Pro-Brain Natriuretic Peptide: 44670 pg/mL (07-21 @ 07:54)

## 2021-07-26 NOTE — PROGRESS NOTE ADULT - SUBJECTIVE AND OBJECTIVE BOX
Follow Up:      Inverval History/ROS:Patient is a 61y old  Male who presents with a chief complaint of right foot 2nd/3rd toe infection (26 Jul 2021 15:50)    No fever  No events    Allergies    cefepime (Other)    Intolerances        ANTIMICROBIALS:  ertapenem  IVPB 500 every 24 hours  levoFLOXacin IVPB 250 every 24 hours  levoFLOXacin IVPB        OTHER MEDS:  acetaminophen   Tablet .. 650 milliGRAM(s) Oral every 6 hours PRN  aspirin enteric coated 81 milliGRAM(s) Oral daily  atorvastatin 40 milliGRAM(s) Oral at bedtime  buMETAnide 4 milliGRAM(s) Oral every 12 hours  carvedilol 6.25 milliGRAM(s) Oral every 12 hours  dextrose 40% Gel 15 Gram(s) Oral once  dextrose 5%. 1000 milliLiter(s) IV Continuous <Continuous>  dextrose 5%. 1000 milliLiter(s) IV Continuous <Continuous>  dextrose 50% Injectable 25 Gram(s) IV Push once  dextrose 50% Injectable 12.5 Gram(s) IV Push once  dextrose 50% Injectable 25 Gram(s) IV Push once  glucagon  Injectable 1 milliGRAM(s) IntraMuscular once  heparin   Injectable 5000 Unit(s) SubCutaneous every 8 hours  hydrALAZINE 50 milliGRAM(s) Oral three times a day  insulin glargine Injectable (LANTUS) 30 Unit(s) SubCutaneous at bedtime  insulin lispro (ADMELOG) corrective regimen sliding scale   SubCutaneous three times a day before meals  insulin lispro Injectable (ADMELOG) 4 Unit(s) SubCutaneous three times a day before meals  isosorbide   dinitrate Tablet (ISORDIL) 20 milliGRAM(s) Oral three times a day  latanoprost 0.005% Ophthalmic Solution 1 Drop(s) Both EYES at bedtime  melatonin 3 milliGRAM(s) Oral at bedtime  NIFEdipine XL 30 milliGRAM(s) Oral daily  polyethylene glycol 3350 17 Gram(s) Oral daily  senna 2 Tablet(s) Oral at bedtime      Vital Signs Last 24 Hrs  T(C): 36.7 (26 Jul 2021 12:25), Max: 36.7 (25 Jul 2021 22:00)  T(F): 98 (26 Jul 2021 12:25), Max: 98.1 (26 Jul 2021 04:26)  HR: 72 (26 Jul 2021 12:25) (63 - 72)  BP: 136/60 (26 Jul 2021 12:25) (136/60 - 176/66)  BP(mean): --  RR: 18 (26 Jul 2021 12:25) (17 - 19)  SpO2: 98% (26 Jul 2021 12:25) (96% - 99%)    PHYSICAL EXAM:  General: [x ] non-toxic  HEAD/EYES: [ ] PERRL [ x] white sclera [ ] icterus  ENT:  [ ] normal [ x] supple [ ] thrush [ ] pharyngeal exudate  Cardiovascular:   [ ] murmur [x ] normal [ ] PPM/AICD  Respiratory:  [ x] clear to ausculation bilaterally  GI:  [ x] soft, nonx-tender, normal bowel sounds  :  [ ] nixon [ x] no CVA tenderness   Musculoskeletal:  [ ] no synovitis  Neurologic:  [ ] non-focal exam   Skin:  [ ] no rash  Lymph: [ x] no lymphadenopathy  Psychiatric:  [x ] appropriate affect [ ] alert & oriented  Lines:  [ x] no phlebitis [ ] central line                                9.8    15.87 )-----------( 520      ( 26 Jul 2021 06:24 )             29.6       07-26    136  |  96<L>  |  72<H>  ----------------------------<  155<H>  3.6   |  23  |  2.39<H>    Ca    9.3      26 Jul 2021 06:24  Phos  5.5     07-26  Mg     2.20     07-26    TPro  7.5  /  Alb  3.3  /  TBili  0.3  /  DBili  x   /  AST  27  /  ALT  29  /  AlkPhos  275<H>  07-26          MICROBIOLOGY:    RADIOLOGY:

## 2021-07-26 NOTE — PROGRESS NOTE ADULT - SUBJECTIVE AND OBJECTIVE BOX
Podiatry pager #: [phone] ([place])/ [phone] ([facility])    Patient is a 61y old  Male who presents with a chief complaint of right foot 2nd/3rd toe infection (26 Jul 2021 08:36)       INTERVAL HPI/OVERNIGHT EVENTS:  Patient seen and evaluated at bedside.  Pt is resting comfortable in NAD. Denies N/V/F/C.     Allergies    cefepime (Other)    Intolerances        Vital Signs Last 24 Hrs  T(C): 36.7 (26 Jul 2021 04:26), Max: 36.8 (25 Jul 2021 14:30)  T(F): 98.1 (26 Jul 2021 04:26), Max: 98.3 (25 Jul 2021 14:30)  HR: 65 (26 Jul 2021 11:07) (61 - 67)  BP: 148/67 (26 Jul 2021 04:26) (148/67 - 176/66)  BP(mean): --  RR: 17 (26 Jul 2021 04:26) (17 - 20)  SpO2: 97% (26 Jul 2021 11:07) (96% - 99%)    LABS:                        9.8    15.87 )-----------( 520      ( 26 Jul 2021 06:24 )             29.6     07-26    136  |  96<L>  |  72<H>  ----------------------------<  155<H>  3.6   |  23  |  2.39<H>    Ca    9.3      26 Jul 2021 06:24  Phos  5.5     07-26  Mg     2.20     07-26    TPro  7.5  /  Alb  3.3  /  TBili  0.3  /  DBili  x   /  AST  27  /  ALT  29  /  AlkPhos  275<H>  07-26        CAPILLARY BLOOD GLUCOSE      POCT Blood Glucose.: 200 mg/dL (26 Jul 2021 08:45)  POCT Blood Glucose.: 253 mg/dL (25 Jul 2021 21:34)  POCT Blood Glucose.: 225 mg/dL (25 Jul 2021 18:19)  POCT Blood Glucose.: 264 mg/dL (25 Jul 2021 12:32)      Lower Extremity Physical Exam:  Vascular: DP 1/4, B/L, PT non-palpable B/L, CFT <3 seconds B/L, Temperature gradient cool to warm on R, warm to cool on L    Neuro: Epicritic sensation intact to the level of ankle, B/L.  Musculoskeletal/Ortho: pain on palpation surrounding the 2nd digit   Skin: gangrenous right foot 2nd and 3rd digits, RF 3rd interspace wound probes to bone, consistent tracking proximally to 2nd and 3rd MPJ, mild wet conversion of gangrenous digits, scant pus expressed from interdigital wound, erythema consistent    7/17 s/p I&D of R 2nd interspace with erythema, no pus expressed, tracks 1cm dorsal and proximal, no malodor,  resolving erythema to the dorsolateral foot up until the midfoot, diffuse edema of the R forefoot, gangrene of the 2nd toe stump, ischemic changes to the R 3rd digit, cool to touch    RADIOLOGY & ADDITIONAL TESTS:

## 2021-07-26 NOTE — PROGRESS NOTE ADULT - PROBLEM SELECTOR PLAN 1
Pt. with ACOSTA on CKD in the setting of venous congestion. pt. hx of AIN treated with steroids. Scr on admission, was 1.77. It increases to 3.93 on 7/21/21. Renal sonogram done on 7/21/21 showed no HDN. Urine electrolytes collected on 7/21 showed Lupillo < 20 with urine osmol of 304. Pt clinically in volume overload, BNP done on 7/21/21 elevated to [id number]. Received Diuretics (lasix on 7/18-7/19) and then bumex on 7/21/21. Scr has decreased to 2.9 today. Pt. is non-oliguric, non uremic. Recommend continuing Bumex 4 mg BID. Monitor labs and urine output. Avoid any potential nephrotoxins. Dose medications as per eGFR. Pt. with ACOSTA on CKD in the setting of venous congestion. pt. hx of AIN treated with steroids. Scr on admission, was 1.77. It increases to 3.93 on 7/21/21. Renal sonogram done on 7/21/21 showed no HDN. Urine electrolytes collected on 7/21 showed Lupillo < 20 with urine osmol of 304. Pt clinically in volume overload, BNP done on 7/21/21 [place] [address]. Received Diuretics (lasix on 7/18-7/19) and then bumex on 7/21/21. Scr has decreased to 2.39 today. Pt. is non-oliguric, non uremic. Recommend continuing Bumex 4 mg BID. Monitor labs and urine output. Avoid any potential nephrotoxins. Dose medications as per eGFR. Pt. with ACOSTA on CKD in the setting of venous congestion. pt. hx of AIN treated with steroids. Scr on admission, was 1.77. It increases to 3.93 on 7/21/21. Renal sonogram done on 7/21/21 showed no HDN. Urine electrolytes collected on 7/21 showed Lupillo < 20 with urine osmol of 304. Pt clinically in volume overload, BNP done on 7/21/[place] [address]. Received Diuretics (lasix on 7/18-7/19) and then bumex on 7/21/21. Scr has decreased to 2.39 today. Pt. is non-oliguric, non uremic. Recommend continuing Bumex 4 mg BID. Monitor labs and urine output. Avoid any potential nephrotoxins. Dose medications as per eGFR. Also recommend to get stool o+p due to persistent eosinophilia.

## 2021-07-26 NOTE — PROGRESS NOTE ADULT - PROBLEM SELECTOR PLAN 6
h/o severe RLE PVD, s/p RLE angioplasty in past  - c/w ASA/statin  - vascular angiogram planned for 7/29, f/u recs

## 2021-07-26 NOTE — PROGRESS NOTE ADULT - PROBLEM SELECTOR PLAN 3
-pt with h/o right foot 2nd toe partial amputation in April at [facility], h/o severe RLE PVD with RLE angioplasty in past, no stent, now p/w 2nd residual toe/stump and 3rd toe infection, xray concerning for osteomyelitis, elevated inflammatory markers ESR 96, CRP 80.6 c/w acute OM. rec'd IV vanc 1 g bid (7/17 pm - 7/20), zoxyn (7/18-7/20)  -7/18 blood cultures x2  -ID c/s start erta and levaquin (7/20 - )  -MRI right foot: osteomyelitis in 2nd toe, periostitis w/o osteo on 3rd toe  -podiatry consulted, s/p I&D 7/17. plan for RF P2RR, P3RR pending angiogram  - vascular consult: angiogram with co2 angio next thursday 7/29  -needs cardiology clearance, RCRI 3, high risk for periop MACE

## 2021-07-26 NOTE — PROGRESS NOTE ADULT - ASSESSMENT
62 y/o male with h/o HTN, insulin dependent DM-2, hld, glaucoma, CKD3 with recent interstitial nephritis treated with steroid, HFrEF, recent NSTEMI, PAD s/p RLE angioplasty, s/p right 2nd toe partial amputation at [facility] in April 2021, who presents with 2 week history of worsening right foot pain, associated with right 2nd residual toe stump/3rd toe infection/discoloration to blue/black, xray c/f osteomyelitis, plan for surgical intervention this admission per podiatry. Hospital course has been complicated by flash pulmonary edema 2/2 to ACOSTA on CKD. Now he is improving with diuresis

## 2021-07-26 NOTE — PROGRESS NOTE ADULT - ASSESSMENT
61 year old with h/o PVD and DM , with prior toe amputation presents with increased discoloration to his right second and third toe.   Appearance of dry gangrene and possible associated abscess.    1) Right foot toe ulcers with imaging suggestive of OM    Cx with enterobacter, s. maltophilia, group b strep    Continue ertapenem/ levaquin    2) PAD  Vascular surgery evaluation  Podiatry planning for possible surgery based on vascular eval     Pt had diagnositic angiogram, discussed with vascular surgery attending- pt may benefit from revascularization    2) Leukocytosis  Blood cultures without growth  Likely related to gangrene and associated abscess    3) DM  Close monitoring and control of glucose

## 2021-07-26 NOTE — PROGRESS NOTE ADULT - PROBLEM SELECTOR PLAN 4
- A1c 8.5. FS>200  -at home takes lantus 60u hs and premeal insulin 28-30u   -c/w lantus 30u hs and SSI. added premeal 4   -monitor FS

## 2021-07-26 NOTE — PROGRESS NOTE ADULT - ASSESSMENT
61 male with h/o HTN, HFpEF, insulin dependent DM-2, hld, glaucoma, CKD3 with recent interstitial nephritis treated with steroids, recent NSTEMI tx w/ heparin and dual antiplatelets, PAD s/p RLE angioplasty, s/p right 2nd toe partial amputation at [facility] in April 2021 presents with osteomyelitis and decompensated HF likely from infection. He is now s/p aggressive IV diuresis and euvolemic. Does not appear to be in ADHF currently.    Rec:  - transition to PO Bumex 2 BID to keep even  - con't home ASA, statin, coreg  - echo reviewed, LV w/ preserved EF  - no further cardiac testing indicated prior to proceeding with angiogram and currently optimized from a HF standpoint  - see prior notes for risk stratification    Denys Roberts MD  Cardiology Fellow PGY-5  [facility]    Notes are not final until signed by attending  For all consults and questions:  [email or web address]   Login: [email or web address] 61 male with h/o HTN, HFpEF, insulin dependent DM-2, hld, glaucoma, CKD3 with recent interstitial nephritis treated with steroids, recent NSTEMI tx w/ heparin and dual antiplatelets, PAD s/p RLE angioplasty, s/p right 2nd toe partial amputation at [facility] in April 2021 presents with osteomyelitis and decompensated HF likely from infection. He is now euvolemic s/p IV diuresis.    Rec:  - transition to PO Bumex 2 BID to keep even  - con't home ASA, statin, coreg  - echo reviewed, LV w/ preserved EF, MR improved from prior study  - optimized to proceed with lower extremity angiogram and toe amputation for osteomyelitis from cardiac standpoint.   - see prior notes for risk stratification    Denys Roberts MD  Cardiology Fellow PGY-5  [facility]    Notes are not final until signed by attending  For all consults and questions:  [email or web address]   Login: [email or web address]

## 2021-07-26 NOTE — PROGRESS NOTE ADULT - ASSESSMENT
60 y/o M patient presents with R foot toe infection  - Patient seen and evaluated   - gangrenous right foot 2nd and 3rd digits, RF 3rd interspace wound probes to bone, increased tracking proximally to 2nd and 3rd MPJ, mild wet conversion of gangrenous digits, no pus expressed from interdigital wound, erythema consistent  - R foot MRI: early osteomyelitis within the second proximal phalangeal, periostitis to the proximal phalanx of 3rd digit without vinnie osteomyelitis.  - MICHEL/PVR showing noncompressible vessels b/l, RTBI 0.07, LTBI 0.22, waveforms flat at digits  - Vasc planning RLE angio 7/29  - Pod plan for R foot partial 2nd and 3rd ray resection Monday 8/2 @ 3 pm after Vascular Angiogram 7/29 and cardiac clearance  - Please document medical and cardiac clearance for right foot surgery under light sedation with local anesthesia  - seen with attending

## 2021-07-26 NOTE — PROGRESS NOTE ADULT - SUBJECTIVE AND OBJECTIVE BOX
[facility] Division of [facility]  Eder Sheridan MD  Pager [phone]      Patient is a 61y old  Male who presents with a chief complaint of right foot 2nd/3rd toe infection (26 Jul 2021 11:42)      SUBJECTIVE / OVERNIGHT EVENTS:    No fever o/n. pt denies sob. no foot pain.     ADDITIONAL REVIEW OF SYSTEMS:    RESPIRATORY: No cough, wheezing, chills or hemoptysis; No shortness of breath  CARDIOVASCULAR: No chest pain, palpitations, dizziness, or leg swelling  GASTROINTESTINAL: No abdominal or epigastric pain. No nausea, vomiting, or hematemesis; No diarrhea or constipation. No melena or hematochezia.      MEDICATIONS  (STANDING):  aspirin enteric coated 81 milliGRAM(s) Oral daily  atorvastatin 40 milliGRAM(s) Oral at bedtime  buMETAnide 4 milliGRAM(s) Oral every 12 hours  carvedilol 6.25 milliGRAM(s) Oral every 12 hours  dextrose 40% Gel 15 Gram(s) Oral once  dextrose 5%. 1000 milliLiter(s) (50 mL/Hr) IV Continuous <Continuous>  dextrose 5%. 1000 milliLiter(s) (100 mL/Hr) IV Continuous <Continuous>  dextrose 50% Injectable 25 Gram(s) IV Push once  dextrose 50% Injectable 12.5 Gram(s) IV Push once  dextrose 50% Injectable 25 Gram(s) IV Push once  ertapenem  IVPB 500 milliGRAM(s) IV Intermittent every 24 hours  glucagon  Injectable 1 milliGRAM(s) IntraMuscular once  heparin   Injectable 5000 Unit(s) SubCutaneous every 8 hours  hydrALAZINE 50 milliGRAM(s) Oral three times a day  insulin glargine Injectable (LANTUS) 30 Unit(s) SubCutaneous at bedtime  insulin lispro (ADMELOG) corrective regimen sliding scale   SubCutaneous three times a day before meals  insulin lispro Injectable (ADMELOG) 4 Unit(s) SubCutaneous three times a day before meals  isosorbide   dinitrate Tablet (ISORDIL) 20 milliGRAM(s) Oral three times a day  latanoprost 0.005% Ophthalmic Solution 1 Drop(s) Both EYES at bedtime  levoFLOXacin IVPB 250 milliGRAM(s) IV Intermittent every 24 hours  levoFLOXacin IVPB      melatonin 3 milliGRAM(s) Oral at bedtime  NIFEdipine XL 30 milliGRAM(s) Oral daily  polyethylene glycol 3350 17 Gram(s) Oral daily  senna 2 Tablet(s) Oral at bedtime    MEDICATIONS  (PRN):  acetaminophen   Tablet .. 650 milliGRAM(s) Oral every 6 hours PRN Temp greater or equal to 38.5C (101.3F), Mild Pain (1 - 3)      CAPILLARY BLOOD GLUCOSE      POCT Blood Glucose.: 359 mg/dL (26 Jul 2021 12:09)  POCT Blood Glucose.: 200 mg/dL (26 Jul 2021 08:45)  POCT Blood Glucose.: 253 mg/dL (25 Jul 2021 21:34)  POCT Blood Glucose.: 225 mg/dL (25 Jul 2021 18:19)    I&O's Summary    25 Jul 2021 07:01  -  26 Jul 2021 07:00  --------------------------------------------------------  IN: 1280 mL / OUT: 3200 mL / NET: -1920 mL    26 Jul 2021 07:01  -  26 Jul 2021 15:51  --------------------------------------------------------  IN: 550 mL / OUT: 400 mL / NET: 150 mL        PHYSICAL EXAM:  Vital Signs Last 24 Hrs  T(C): 36.7 (26 Jul 2021 12:25), Max: 36.7 (25 Jul 2021 22:00)  T(F): 98 (26 Jul 2021 12:25), Max: 98.1 (26 Jul 2021 04:26)  HR: 72 (26 Jul 2021 12:25) (63 - 72)  BP: 136/60 (26 Jul 2021 12:25) (136/60 - 176/66)  BP(mean): --  RR: 18 (26 Jul 2021 12:25) (17 - 19)  SpO2: 98% (26 Jul 2021 12:25) (96% - 99%)    CONSTITUTIONAL: NAD,  EYES: PERRLA; conjunctiva and sclera clear  ENMT: Moist oral mucosa, no pharyngeal injection or exudates;   NECK: Supple, no palpable masses;  RESPIRATORY: Normal respiratory effort; lungs are clear to auscultation bilaterally  CARDIOVASCULAR: Regular rate and rhythm, normal S1 and S2, no murmur/rub/gallop; No lower extremity edema; Peripheral pulses are 2+ bilaterally  ABDOMEN: Nontender to palpation, normoactive bowel sounds, no rebound/guarding;   MUSCLOSKELETAL:   no clubbing or cyanosis of digits; no joint swelling or tenderness to palpation. R foot dressing c/d/i  PSYCH: A+O to person, place, and time; affect appropriate  NEUROLOGY: CN 2-12 are intact and symmetric; no gross sensory deficits;   SKIN: No rashes;     LABS:                        9.8    15.87 )-----------( 520      ( 26 Jul 2021 06:24 )             29.6     07-26    136  |  96<L>  |  72<H>  ----------------------------<  155<H>  3.6   |  23  |  2.39<H>    Ca    9.3      26 Jul 2021 06:24  Phos  5.5     07-26  Mg     2.20     07-26    TPro  7.5  /  Alb  3.3  /  TBili  0.3  /  DBili  x   /  AST  27  /  ALT  29  /  AlkPhos  275<H>  07-26                RADIOLOGY & ADDITIONAL TESTS:  Results Reviewed:   Imaging Personally Reviewed:  Electrocardiogram Personally Reviewed:    COORDINATION OF CARE:  Care Discussed with Consultants/Other Providers [Y/N]:  Prior or Outpatient Records Reviewed [Y/N]:

## 2021-07-27 DIAGNOSIS — D72.10 EOSINOPHILIA, UNSPECIFIED: ICD-10-CM

## 2021-07-27 LAB
ALBUMIN SERPL ELPH-MCNC: 3.3 G/DL — SIGNIFICANT CHANGE UP (ref 3.3–5)
ALP SERPL-CCNC: 270 U/L — HIGH (ref 40–120)
ALT FLD-CCNC: 33 U/L — SIGNIFICANT CHANGE UP (ref 4–41)
ANION GAP SERPL CALC-SCNC: 20 MMOL/L — HIGH (ref 7–14)
AST SERPL-CCNC: 22 U/L — SIGNIFICANT CHANGE UP (ref 4–40)
BASOPHILS # BLD AUTO: 0.11 K/UL — SIGNIFICANT CHANGE UP (ref 0–0.2)
BASOPHILS NFR BLD AUTO: 0.7 % — SIGNIFICANT CHANGE UP (ref 0–2)
BILIRUB SERPL-MCNC: 0.4 MG/DL — SIGNIFICANT CHANGE UP (ref 0.2–1.2)
BUN SERPL-MCNC: 72 MG/DL — HIGH (ref 7–23)
CALCIUM SERPL-MCNC: 9.2 MG/DL — SIGNIFICANT CHANGE UP (ref 8.4–10.5)
CHLORIDE SERPL-SCNC: 93 MMOL/L — LOW (ref 98–107)
CO2 SERPL-SCNC: 23 MMOL/L — SIGNIFICANT CHANGE UP (ref 22–31)
CREAT SERPL-MCNC: 2.29 MG/DL — HIGH (ref 0.5–1.3)
EOSINOPHIL # BLD AUTO: 0.65 K/UL — HIGH (ref 0–0.5)
EOSINOPHIL NFR BLD AUTO: 4.1 % — SIGNIFICANT CHANGE UP (ref 0–6)
GLUCOSE SERPL-MCNC: 202 MG/DL — HIGH (ref 70–99)
HCT VFR BLD CALC: 30.2 % — LOW (ref 39–50)
HGB BLD-MCNC: 9.9 G/DL — LOW (ref 13–17)
IANC: 11.27 K/UL — HIGH (ref 1.5–8.5)
IMM GRANULOCYTES NFR BLD AUTO: 1.8 % — HIGH (ref 0–1.5)
LYMPHOCYTES # BLD AUTO: 16.5 % — SIGNIFICANT CHANGE UP (ref 13–44)
LYMPHOCYTES # BLD AUTO: 2.64 K/UL — SIGNIFICANT CHANGE UP (ref 1–3.3)
MAGNESIUM SERPL-MCNC: 2.1 MG/DL — SIGNIFICANT CHANGE UP (ref 1.6–2.6)
MCHC RBC-ENTMCNC: 26.8 PG — LOW (ref 27–34)
MCHC RBC-ENTMCNC: 32.8 GM/DL — SIGNIFICANT CHANGE UP (ref 32–36)
MCV RBC AUTO: 81.8 FL — SIGNIFICANT CHANGE UP (ref 80–100)
MONOCYTES # BLD AUTO: 1.02 K/UL — HIGH (ref 0–0.9)
MONOCYTES NFR BLD AUTO: 6.4 % — SIGNIFICANT CHANGE UP (ref 2–14)
NEUTROPHILS # BLD AUTO: 11.27 K/UL — HIGH (ref 1.8–7.4)
NEUTROPHILS NFR BLD AUTO: 70.5 % — SIGNIFICANT CHANGE UP (ref 43–77)
NRBC # BLD: 0 /100 WBCS — SIGNIFICANT CHANGE UP
NRBC # FLD: 0 K/UL — SIGNIFICANT CHANGE UP
PHOSPHATE SERPL-MCNC: 5.2 MG/DL — HIGH (ref 2.5–4.5)
PLATELET # BLD AUTO: 506 K/UL — HIGH (ref 150–400)
POTASSIUM SERPL-MCNC: 3.9 MMOL/L — SIGNIFICANT CHANGE UP (ref 3.5–5.3)
POTASSIUM SERPL-SCNC: 3.9 MMOL/L — SIGNIFICANT CHANGE UP (ref 3.5–5.3)
PROT SERPL-MCNC: 7.3 G/DL — SIGNIFICANT CHANGE UP (ref 6–8.3)
RBC # BLD: 3.69 M/UL — LOW (ref 4.2–5.8)
RBC # FLD: 12.6 % — SIGNIFICANT CHANGE UP (ref 10.3–14.5)
SODIUM SERPL-SCNC: 136 MMOL/L — SIGNIFICANT CHANGE UP (ref 135–145)
WBC # BLD: 15.98 K/UL — HIGH (ref 3.8–10.5)
WBC # FLD AUTO: 15.98 K/UL — HIGH (ref 3.8–10.5)

## 2021-07-27 PROCEDURE — 99232 SBSQ HOSP IP/OBS MODERATE 35: CPT

## 2021-07-27 PROCEDURE — 99232 SBSQ HOSP IP/OBS MODERATE 35: CPT | Mod: GC

## 2021-07-27 PROCEDURE — 99233 SBSQ HOSP IP/OBS HIGH 50: CPT

## 2021-07-27 RX ORDER — INSULIN LISPRO 100/ML
8 VIAL (ML) SUBCUTANEOUS
Refills: 0 | Status: DISCONTINUED | OUTPATIENT
Start: 2021-07-27 | End: 2021-08-04

## 2021-07-27 RX ORDER — INSULIN GLARGINE 100 [IU]/ML
40 INJECTION, SOLUTION SUBCUTANEOUS AT BEDTIME
Refills: 0 | Status: DISCONTINUED | OUTPATIENT
Start: 2021-07-27 | End: 2021-08-01

## 2021-07-27 RX ADMIN — Medication 30 MILLIGRAM(S): at 05:59

## 2021-07-27 RX ADMIN — Medication 5: at 13:22

## 2021-07-27 RX ADMIN — ERTAPENEM SODIUM 100 MILLIGRAM(S): 1 INJECTION, POWDER, LYOPHILIZED, FOR SOLUTION INTRAMUSCULAR; INTRAVENOUS at 17:49

## 2021-07-27 RX ADMIN — HEPARIN SODIUM 5000 UNIT(S): 5000 INJECTION INTRAVENOUS; SUBCUTANEOUS at 22:26

## 2021-07-27 RX ADMIN — Medication 8 UNIT(S): at 17:50

## 2021-07-27 RX ADMIN — CARVEDILOL PHOSPHATE 6.25 MILLIGRAM(S): 80 CAPSULE, EXTENDED RELEASE ORAL at 05:58

## 2021-07-27 RX ADMIN — Medication 2: at 17:51

## 2021-07-27 RX ADMIN — INSULIN GLARGINE 40 UNIT(S): 100 INJECTION, SOLUTION SUBCUTANEOUS at 22:25

## 2021-07-27 RX ADMIN — BUMETANIDE 4 MILLIGRAM(S): 0.25 INJECTION INTRAMUSCULAR; INTRAVENOUS at 06:01

## 2021-07-27 RX ADMIN — BUMETANIDE 4 MILLIGRAM(S): 0.25 INJECTION INTRAMUSCULAR; INTRAVENOUS at 17:50

## 2021-07-27 RX ADMIN — ISOSORBIDE DINITRATE 20 MILLIGRAM(S): 5 TABLET ORAL at 22:26

## 2021-07-27 RX ADMIN — HEPARIN SODIUM 5000 UNIT(S): 5000 INJECTION INTRAVENOUS; SUBCUTANEOUS at 05:59

## 2021-07-27 RX ADMIN — Medication 4 UNIT(S): at 13:24

## 2021-07-27 RX ADMIN — Medication 3 MILLIGRAM(S): at 22:26

## 2021-07-27 RX ADMIN — ISOSORBIDE DINITRATE 20 MILLIGRAM(S): 5 TABLET ORAL at 05:58

## 2021-07-27 RX ADMIN — Medication 50 MILLIGRAM(S): at 05:58

## 2021-07-27 RX ADMIN — Medication 4 UNIT(S): at 09:08

## 2021-07-27 RX ADMIN — ATORVASTATIN CALCIUM 40 MILLIGRAM(S): 80 TABLET, FILM COATED ORAL at 22:26

## 2021-07-27 RX ADMIN — Medication 50 MILLIGRAM(S): at 13:21

## 2021-07-27 RX ADMIN — ISOSORBIDE DINITRATE 20 MILLIGRAM(S): 5 TABLET ORAL at 13:21

## 2021-07-27 RX ADMIN — Medication 2: at 09:08

## 2021-07-27 RX ADMIN — LATANOPROST 1 DROP(S): 0.05 SOLUTION/ DROPS OPHTHALMIC; TOPICAL at 22:27

## 2021-07-27 RX ADMIN — HEPARIN SODIUM 5000 UNIT(S): 5000 INJECTION INTRAVENOUS; SUBCUTANEOUS at 13:20

## 2021-07-27 RX ADMIN — CARVEDILOL PHOSPHATE 6.25 MILLIGRAM(S): 80 CAPSULE, EXTENDED RELEASE ORAL at 17:50

## 2021-07-27 RX ADMIN — Medication 81 MILLIGRAM(S): at 11:23

## 2021-07-27 RX ADMIN — Medication 50 MILLIGRAM(S): at 22:26

## 2021-07-27 NOTE — PROGRESS NOTE ADULT - ASSESSMENT
60 y/o male with h/o HTN, insulin dependent DM-2, hld, glaucoma, CKD3 with recent interstitial nephritis treated with steroid, HFrEF, recent NSTEMI, PAD s/p RLE angioplasty, s/p right 2nd toe partial amputation at [facility] in April 2021, who presents with 2 week history of worsening right foot pain, associated with right 2nd residual toe stump/3rd toe infection/discoloration to blue/black, xray c/f osteomyelitis, plan for surgical intervention this admission per podiatry. Hospital course has been complicated by flash pulmonary edema 2/2 to ACOSTA on CKD. Now he is improving with diuresis

## 2021-07-27 NOTE — PROGRESS NOTE ADULT - SUBJECTIVE AND OBJECTIVE BOX
SURGERY DAILY PROGRESS NOTE:     SUBJECTIVE/ROS: Patient seen and examined at bedside. Denies pain in RLE. Able to move leg. Denies nausea, vomiting, chest pain, shortness of breath. Plan for angiogram this week.      MEDICATIONS  (STANDING):  aspirin enteric coated 81 milliGRAM(s) Oral daily  atorvastatin 40 milliGRAM(s) Oral at bedtime  buMETAnide 4 milliGRAM(s) Oral every 12 hours  carvedilol 6.25 milliGRAM(s) Oral every 12 hours  dextrose 40% Gel 15 Gram(s) Oral once  dextrose 5%. 1000 milliLiter(s) (50 mL/Hr) IV Continuous <Continuous>  dextrose 5%. 1000 milliLiter(s) (100 mL/Hr) IV Continuous <Continuous>  dextrose 50% Injectable 25 Gram(s) IV Push once  dextrose 50% Injectable 12.5 Gram(s) IV Push once  dextrose 50% Injectable 25 Gram(s) IV Push once  ertapenem  IVPB 500 milliGRAM(s) IV Intermittent every 24 hours  glucagon  Injectable 1 milliGRAM(s) IntraMuscular once  heparin   Injectable 5000 Unit(s) SubCutaneous every 8 hours  hydrALAZINE 50 milliGRAM(s) Oral three times a day  insulin glargine Injectable (LANTUS) 30 Unit(s) SubCutaneous at bedtime  insulin lispro (ADMELOG) corrective regimen sliding scale   SubCutaneous three times a day before meals  insulin lispro Injectable (ADMELOG) 4 Unit(s) SubCutaneous three times a day before meals  isosorbide   dinitrate Tablet (ISORDIL) 20 milliGRAM(s) Oral three times a day  latanoprost 0.005% Ophthalmic Solution 1 Drop(s) Both EYES at bedtime  levoFLOXacin IVPB      levoFLOXacin IVPB 250 milliGRAM(s) IV Intermittent every 24 hours  melatonin 3 milliGRAM(s) Oral at bedtime  NIFEdipine XL 30 milliGRAM(s) Oral daily  polyethylene glycol 3350 17 Gram(s) Oral daily  senna 2 Tablet(s) Oral at bedtime    MEDICATIONS  (PRN):  acetaminophen   Tablet .. 650 milliGRAM(s) Oral every 6 hours PRN Temp greater or equal to 38.5C (101.3F), Mild Pain (1 - 3)      OBJECTIVE:    Vital Signs Last 24 Hrs  T(C): 36.8 (27 Jul 2021 06:00), Max: 36.8 (26 Jul 2021 17:26)  T(F): 98.2 (27 Jul 2021 06:00), Max: 98.3 (26 Jul 2021 17:26)  HR: 67 (27 Jul 2021 06:00) (59 - 78)  BP: 133/71 (27 Jul 2021 06:00) (129/75 - 137/60)  BP(mean): --  RR: 18 (27 Jul 2021 06:00) (18 - 18)  SpO2: 97% (27 Jul 2021 06:00) (97% - 99%)    I&O's Detail    26 Jul 2021 07:01  -  27 Jul 2021 07:00  --------------------------------------------------------  IN:    Oral Fluid: 1250 mL  Total IN: 1250 mL    OUT:    Voided (mL): 1800 mL  Total OUT: 1800 mL    Total NET: -550 mL          Daily     Daily     LABS:                        9.9    15.98 )-----------( 506      ( 27 Jul 2021 06:46 )             30.2     07-27    136  |  93<L>  |  72<H>  ----------------------------<  202<H>  3.9   |  23  |  2.29<H>    Ca    9.2      27 Jul 2021 06:46  Phos  5.2     07-27  Mg     2.10     07-27    TPro  7.3  /  Alb  3.3  /  TBili  0.4  /  DBili  x   /  AST  22  /  ALT  33  /  AlkPhos  270<H>  07-27    60 y/o male with h/o HTN, insulin dependent DM-2, hld, glaucoma, CKD3 with recent interstitial nephritis, HFrEF, recent NSTEMI, PAD s/p RLE angioplasty, s/p right 2nd toe partial amputation at [facility] in April 2021, who presents with 2 week history of worsening right foot pain with plans for 2nd and 3rd toe ray amps.      Plan:   - Plan for Angiogram this week, likely Thursday vs Friday  - Please make NPO at midnight, order full set of labs (CBC, BMP, T&S x2, and PT/PTT/INR on 7/28)  - Cardiology optimizing patient for procedure; cardiac clearance obtained as noted on 7/26 note: "optimized to proceed with lower extremity angiogram and toe amputation for osteomyelitis from cardiac standpoint"    C team, [id number]

## 2021-07-27 NOTE — PROGRESS NOTE ADULT - PROBLEM SELECTOR PLAN 2
ACOSTA on CKD3. likely prerenal. baseline Cr ~1.8  -Cr improving   - neph following appreciate assistance  - strict I+O  - continue hydral  - bumex transitioned to PO as above  -dose meds per renal fxn, avoid nephrotoxins, avoid ACEi and ARBs, Maintain MAP >65, renal diet  - IVF before and after angiogram as per renal

## 2021-07-27 NOTE — PROGRESS NOTE ADULT - SUBJECTIVE AND OBJECTIVE BOX
[facility] Division of Hospital Medicine  Eder Sheridan MD  Pager [phone]      Patient is a 61y old  Male who presents with a chief complaint of right foot 2nd/3rd toe infection (27 Jul 2021 15:03)      SUBJECTIVE / OVERNIGHT EVENTS:    No fever o/n. No new complaint. FS remains elevated     ADDITIONAL REVIEW OF SYSTEMS:    RESPIRATORY: No cough, wheezing, chills or hemoptysis; No shortness of breath  CARDIOVASCULAR: No chest pain, palpitations, dizziness, or leg swelling  GASTROINTESTINAL: No abdominal or epigastric pain. No nausea, vomiting, or hematemesis; No diarrhea or constipation. No melena or hematochezia.      MEDICATIONS  (STANDING):  aspirin enteric coated 81 milliGRAM(s) Oral daily  atorvastatin 40 milliGRAM(s) Oral at bedtime  buMETAnide 4 milliGRAM(s) Oral every 12 hours  carvedilol 6.25 milliGRAM(s) Oral every 12 hours  dextrose 40% Gel 15 Gram(s) Oral once  dextrose 5%. 1000 milliLiter(s) (50 mL/Hr) IV Continuous <Continuous>  dextrose 5%. 1000 milliLiter(s) (100 mL/Hr) IV Continuous <Continuous>  dextrose 50% Injectable 25 Gram(s) IV Push once  dextrose 50% Injectable 12.5 Gram(s) IV Push once  dextrose 50% Injectable 25 Gram(s) IV Push once  ertapenem  IVPB 500 milliGRAM(s) IV Intermittent every 24 hours  glucagon  Injectable 1 milliGRAM(s) IntraMuscular once  heparin   Injectable 5000 Unit(s) SubCutaneous every 8 hours  hydrALAZINE 50 milliGRAM(s) Oral three times a day  insulin glargine Injectable (LANTUS) 40 Unit(s) SubCutaneous at bedtime  insulin lispro (ADMELOG) corrective regimen sliding scale   SubCutaneous three times a day before meals  insulin lispro Injectable (ADMELOG) 8 Unit(s) SubCutaneous three times a day before meals  isosorbide   dinitrate Tablet (ISORDIL) 20 milliGRAM(s) Oral three times a day  latanoprost 0.005% Ophthalmic Solution 1 Drop(s) Both EYES at bedtime  levoFLOXacin IVPB      levoFLOXacin IVPB 250 milliGRAM(s) IV Intermittent every 24 hours  melatonin 3 milliGRAM(s) Oral at bedtime  NIFEdipine XL 30 milliGRAM(s) Oral daily  polyethylene glycol 3350 17 Gram(s) Oral daily  senna 2 Tablet(s) Oral at bedtime    MEDICATIONS  (PRN):  acetaminophen   Tablet .. 650 milliGRAM(s) Oral every 6 hours PRN Temp greater or equal to 38.5C (101.3F), Mild Pain (1 - 3)      CAPILLARY BLOOD GLUCOSE      POCT Blood Glucose.: 365 mg/dL (27 Jul 2021 12:32)  POCT Blood Glucose.: 218 mg/dL (27 Jul 2021 08:36)  POCT Blood Glucose.: 258 mg/dL (26 Jul 2021 22:01)  POCT Blood Glucose.: 395 mg/dL (26 Jul 2021 18:28)    I&O's Summary    26 Jul 2021 07:01  -  27 Jul 2021 07:00  --------------------------------------------------------  IN: 1250 mL / OUT: 1800 mL / NET: -550 mL    27 Jul 2021 07:01  -  27 Jul 2021 15:12  --------------------------------------------------------  IN: 318 mL / OUT: 600 mL / NET: -282 mL        PHYSICAL EXAM:  Vital Signs Last 24 Hrs  T(C): 36.8 (27 Jul 2021 13:20), Max: 36.8 (26 Jul 2021 17:26)  T(F): 98.2 (27 Jul 2021 13:20), Max: 98.3 (26 Jul 2021 17:26)  HR: 65 (27 Jul 2021 13:20) (59 - 78)  BP: 159/80 (27 Jul 2021 13:20) (129/75 - 159/80)  BP(mean): --  RR: 18 (27 Jul 2021 13:20) (18 - 18)  SpO2: 96% (27 Jul 2021 13:20) (96% - 99%)    CONSTITUTIONAL: NAD,  EYES: PERRLA; conjunctiva and sclera clear  ENMT: Moist oral mucosa, no pharyngeal injection or exudates;   NECK: Supple, no palpable masses;  RESPIRATORY: Normal respiratory effort; lungs are clear to auscultation bilaterally  CARDIOVASCULAR: Regular rate and rhythm, normal S1 and S2, no murmur/rub/gallop; No lower extremity edema; Peripheral pulses are 2+ bilaterally  ABDOMEN: Nontender to palpation, normoactive bowel sounds, no rebound/guarding;   MUSCLOSKELETAL:   no clubbing or cyanosis of digits; no joint swelling or tenderness to palpation. R foot dressing c/d/i  PSYCH: A+O to person, place, and time; affect appropriate  NEUROLOGY: CN 2-12 are intact and symmetric; no gross sensory deficits;   SKIN: No rashes;       LABS:                        9.9    15.98 )-----------( 506      ( 27 Jul 2021 06:46 )             30.2     07-27    136  |  93<L>  |  72<H>  ----------------------------<  202<H>  3.9   |  23  |  2.29<H>    Ca    9.2      27 Jul 2021 06:46  Phos  5.2     07-27  Mg     2.10     07-27    TPro  7.3  /  Alb  3.3  /  TBili  0.4  /  DBili  x   /  AST  22  /  ALT  33  /  AlkPhos  270<H>  07-27                RADIOLOGY & ADDITIONAL TESTS:  Results Reviewed:   Imaging Personally Reviewed:  Electrocardiogram Personally Reviewed:    COORDINATION OF CARE:  Care Discussed with Consultants/Other Providers [Y/N]:  Prior or Outpatient Records Reviewed [Y/N]:

## 2021-07-27 NOTE — PROGRESS NOTE ADULT - PROBLEM SELECTOR PLAN 7
acute on chronic systolic CHF, s/p diuresis, now euvolemic   -h/o recent admission in April-May for acute systolic chf, last TTE 4/26:  LVEF 52% , Mild segmental LV systolic dysfunction.  Hypokinesis of the basal to mid inferior wall.   -EKG reviewed: NSR no ischemic ST/T change  -h/o medication compliance, was on coreg 6.125 mg bid, hydralazine 50mg tid, isordil 20mg tid last admission, continue

## 2021-07-27 NOTE — PROGRESS NOTE ADULT - PROBLEM SELECTOR PLAN 3
-pt with h/o right foot 2nd toe partial amputation in April at [facility], h/o severe RLE PVD with RLE angioplasty in past, no stent, now p/w 2nd residual toe/stump and 3rd toe infection, xray concerning for osteomyelitis, elevated inflammatory markers ESR 96, CRP 80.6 c/w acute OM. rec'd IV vanc 1 g bid (7/17 pm - 7/20), zoxyn (7/18-7/20)  -7/18 blood cultures x2  -ID c/s start erta and levaquin (7/20 - )  -MRI right foot: osteomyelitis in 2nd toe, periostitis w/o osteo on 3rd toe  -podiatry consulted, s/p I&D 7/17. plan for RF P2RR, P3RR pending angiogram  - vascular consult: angiogram with co2 angio next thursday 7/29

## 2021-07-27 NOTE — PROGRESS NOTE ADULT - SUBJECTIVE AND OBJECTIVE BOX
[facility] DIVISION OF KIDNEY DISEASES AND HYPERTENSION -- FOLLOW UP NOTE  --------------------------------------------------------------------------------  Chief Complaint:    24 hour events/subjective:      REVIEW OF SYSTEMS  --------------------------------------------------------------------------------  Gen: No fevers/chills, weakness  Skin: No rashes  Head/Eyes/Ears/Mouth: No headache; No hearing changes, mucous discharge, vision changes  Respiratory: No dyspnea, cough, wheezing  CV: No chest pain, palpitations  GI: No abdominal pain, diarrhea, constipation, nausea, vomiting, melena, hematochezia  : No increased frequency, dysuria, hematuria  MSK: No joint pain/swelling; no back pain; no edema  Neuro: No dizziness/lightheadedness, weakness, seizures, numbness  Heme: No easy bruising or bleeding    All other systems were reviewed and are negative, except as noted.    PAST HISTORY  --------------------------------------------------------------------------------  No significant changes to PMH, PSH, FHx, SHx, unless otherwise noted    ALLERGIES & MEDICATIONS  --------------------------------------------------------------------------------  Allergies    cefepime (Other)    Intolerances      Standing Inpatient Medications  aspirin enteric coated 81 milliGRAM(s) Oral daily  atorvastatin 40 milliGRAM(s) Oral at bedtime  buMETAnide 4 milliGRAM(s) Oral every 12 hours  carvedilol 6.25 milliGRAM(s) Oral every 12 hours  dextrose 40% Gel 15 Gram(s) Oral once  dextrose 5%. 1000 milliLiter(s) IV Continuous <Continuous>  dextrose 5%. 1000 milliLiter(s) IV Continuous <Continuous>  dextrose 50% Injectable 25 Gram(s) IV Push once  dextrose 50% Injectable 12.5 Gram(s) IV Push once  dextrose 50% Injectable 25 Gram(s) IV Push once  ertapenem  IVPB 500 milliGRAM(s) IV Intermittent every 24 hours  glucagon  Injectable 1 milliGRAM(s) IntraMuscular once  heparin   Injectable 5000 Unit(s) SubCutaneous every 8 hours  hydrALAZINE 50 milliGRAM(s) Oral three times a day  insulin glargine Injectable (LANTUS) 30 Unit(s) SubCutaneous at bedtime  insulin lispro (ADMELOG) corrective regimen sliding scale   SubCutaneous three times a day before meals  insulin lispro Injectable (ADMELOG) 4 Unit(s) SubCutaneous three times a day before meals  isosorbide   dinitrate Tablet (ISORDIL) 20 milliGRAM(s) Oral three times a day  latanoprost 0.005% Ophthalmic Solution 1 Drop(s) Both EYES at bedtime  levoFLOXacin IVPB      levoFLOXacin IVPB 250 milliGRAM(s) IV Intermittent every 24 hours  melatonin 3 milliGRAM(s) Oral at bedtime  NIFEdipine XL 30 milliGRAM(s) Oral daily  polyethylene glycol 3350 17 Gram(s) Oral daily  senna 2 Tablet(s) Oral at bedtime    PRN Inpatient Medications  acetaminophen   Tablet .. 650 milliGRAM(s) Oral every 6 hours PRN        VITALS/PHYSICAL EXAM  --------------------------------------------------------------------------------  T(C): 36.8 (07-27-21 @ 06:00), Max: 36.8 (07-26-21 @ 17:26)  HR: 67 (07-27-21 @ 06:00) (59 - 78)  BP: 133/71 (07-27-21 @ 06:00) (129/75 - 137/60)  RR: 18 (07-27-21 @ 06:00) (18 - 18)  SpO2: 97% (07-27-21 @ 06:00) (97% - 99%)  Wt(kg): --        07-26-21 @ 07:01  -  07-27-21 @ 07:00  --------------------------------------------------------  IN: 1250 mL / OUT: 1800 mL / NET: -550 mL      Physical Exam:  	Gen: NAD, well-appearing  	HEENT: PERRL, supple neck, clear oropharynx  	Pulm: CTA B/L  	CV: RRR, S1S2; no rub  	Back: No spinal or CVA tenderness; no sacral edema  	Abd: +BS, soft, nontender/nondistended  	: No suprapubic tenderness  	UE: Warm, FROM, no clubbing, intact strength; no edema; no asterixis  	LE: Warm, FROM, no clubbing, intact strength; no edema  	Neuro: No focal deficits, intact gait  	Psych: Normal affect and mood  	Skin: Warm, without rashes  	Vascular access:    LABS/STUDIES  --------------------------------------------------------------------------------              9.9    15.98 >-----------<  506      [07-27-21 @ 06:46]              30.2     136  |  93  |  72  ----------------------------<  202      [07-27-21 @ 06:46]  3.9   |  23  |  2.29        Ca     9.2     [07-27-21 @ 06:46]      Mg     2.10     [07-27-21 @ 06:46]      Phos  5.2     [07-27-21 @ 06:46]    TPro  7.3  /  Alb  3.3  /  TBili  0.4  /  DBili  x   /  AST  22  /  ALT  33  /  AlkPhos  270  [07-27-21 @ 06:46]          Creatinine Trend:  SCr 2.29 [07-27 @ 06:46]  SCr 2.39 [07-26 @ 06:24]  SCr 2.83 [07-25 @ 06:45]  SCr 2.90 [07-24 @ 07:00]  SCr 3.28 [07-23 @ 16:21]    Urinalysis - [07-21-21 @ 17:25]      Color Yellow / Appearance Clear / SG 1.016 / pH 5.5      Gluc Negative / Ketone Negative  / Bili Negative / Urobili <2 mg/dL       Blood Negative / Protein Trace / Leuk Est Negative / Nitrite Negative      RBC 6 / WBC 2 / Hyaline 2 / Gran  / Sq Epi  / Non Sq Epi 1 / Bacteria Negative    Urine Creatinine 149      [07-21-21 @ 17:25]  Urine Protein 23      [07-20-21 @ 16:48]  Urine Sodium <20      [07-21-21 @ 17:25]  Urine Urea Nitrogen 397.0      [07-21-21 @ 17:25]  Urine Osmolality 304      [07-21-21 @ 17:25]    TSH 1.58      [04-23-21 @ 04:36]  Lipid: chol 116, , HDL 26, LDL --      [07-19-21 @ 06:42]    HCV 0.12, Nonreact      [07-19-21 @ 09:49]    C3 Complement 160      [07-21-21 @ 07:42]  C4 Complement 58      [07-21-21 @ 07:42]  ANCA: cANCA Negative, pANCA Negative, atypical ANCA Negative      [07-21-21 @ 10:34]   [facility] DIVISION OF KIDNEY DISEASES AND HYPERTENSION -- FOLLOW UP NOTE  --------------------------------------------------------------------------------  61M with history of HTN, T2DM, HLD, glaucoma, CKD with recent AIN (follows with Dr. Zambrano) treated with steroid, HFrEF, recent NSTEMI, PAD s/p RLE angioplasty, s/p right 2nd toe patient amputation at [facility] presents to [facility] with worsening foot pain and associated right 203 toe infection. Recent RLE angiogram revealed severe atherosclerosis. Nephrology consulted for ACOSTA amidst rising creatinine levels and continued malaise.      Pt. seen and examined at bedside. Does not report SOB and no pain in the foot.       REVIEW OF SYSTEMS  --------------------------------------------------------------------------------  Gen: No fevers/chills, weakness  Skin: No rashes  Head/Eyes/Ears/Mouth: No headache; No hearing changes, mucous discharge, vision changes  Respiratory: No dyspnea, cough, wheezing  CV: No chest pain, palpitations  GI: No abdominal pain, diarrhea, constipation, nausea, vomiting, melena, hematochezia  : No increased frequency, dysuria, hematuria  MSK: No joint pain/swelling; no back pain; no edema  Neuro: No dizziness/lightheadedness, weakness, seizures, numbness  Heme: No easy bruising or bleeding    All other systems were reviewed and are negative, except as noted.    PAST HISTORY  --------------------------------------------------------------------------------  No significant changes to PMH, PSH, FHx, SHx, unless otherwise noted    ALLERGIES & MEDICATIONS  --------------------------------------------------------------------------------  Allergies    cefepime (Other)    Intolerances      Standing Inpatient Medications  aspirin enteric coated 81 milliGRAM(s) Oral daily  atorvastatin 40 milliGRAM(s) Oral at bedtime  buMETAnide 4 milliGRAM(s) Oral every 12 hours  carvedilol 6.25 milliGRAM(s) Oral every 12 hours  dextrose 40% Gel 15 Gram(s) Oral once  dextrose 5%. 1000 milliLiter(s) IV Continuous <Continuous>  dextrose 5%. 1000 milliLiter(s) IV Continuous <Continuous>  dextrose 50% Injectable 25 Gram(s) IV Push once  dextrose 50% Injectable 12.5 Gram(s) IV Push once  dextrose 50% Injectable 25 Gram(s) IV Push once  ertapenem  IVPB 500 milliGRAM(s) IV Intermittent every 24 hours  glucagon  Injectable 1 milliGRAM(s) IntraMuscular once  heparin   Injectable 5000 Unit(s) SubCutaneous every 8 hours  hydrALAZINE 50 milliGRAM(s) Oral three times a day  insulin glargine Injectable (LANTUS) 30 Unit(s) SubCutaneous at bedtime  insulin lispro (ADMELOG) corrective regimen sliding scale   SubCutaneous three times a day before meals  insulin lispro Injectable (ADMELOG) 4 Unit(s) SubCutaneous three times a day before meals  isosorbide   dinitrate Tablet (ISORDIL) 20 milliGRAM(s) Oral three times a day  latanoprost 0.005% Ophthalmic Solution 1 Drop(s) Both EYES at bedtime  levoFLOXacin IVPB      levoFLOXacin IVPB 250 milliGRAM(s) IV Intermittent every 24 hours  melatonin 3 milliGRAM(s) Oral at bedtime  NIFEdipine XL 30 milliGRAM(s) Oral daily  polyethylene glycol 3350 17 Gram(s) Oral daily  senna 2 Tablet(s) Oral at bedtime    PRN Inpatient Medications  acetaminophen   Tablet .. 650 milliGRAM(s) Oral every 6 hours PRN        VITALS/PHYSICAL EXAM  --------------------------------------------------------------------------------  T(C): 36.8 (07-27-21 @ 06:00), Max: 36.8 (07-26-21 @ 17:26)  HR: 67 (07-27-21 @ 06:00) (59 - 78)  BP: 133/71 (07-27-21 @ 06:00) (129/75 - 137/60)  RR: 18 (07-27-21 @ 06:00) (18 - 18)  SpO2: 97% (07-27-21 @ 06:00) (97% - 99%)  Wt(kg): --        07-26-21 @ 07:01  -  07-27-21 @ 07:00  --------------------------------------------------------  IN: 1250 mL / OUT: 1800 mL / NET: -550 mL      Physical Exam:  	Gen: NAD, well-appearing  	HEENT: PERRL, supple neck, clear oropharynx  	Pulm: rales at bilateral bases   	CV: RRR, S1S2; no rub  	Back: No spinal or CVA tenderness; no sacral edema  	Abd: +BS, soft, nontender/nondistended  	: No suprapubic tenderness  	UE: Warm, FROM, no clubbing, intact strength; no edema; no asterixis  	LE: R foot dressing, Warm, FROM, no clubbing, intact strength; no edema  	Neuro: No focal deficits, intact gait  	Psych: Normal affect and mood  	Skin: Warm, without rashes  	Vascular access: peripheral IV canula     LABS/STUDIES  --------------------------------------------------------------------------------              9.9    15.98 >-----------<  506      [07-27-21 @ 06:46]              30.2     136  |  93  |  72  ----------------------------<  202      [07-27-21 @ 06:46]  3.9   |  23  |  2.29        Ca     9.2     [07-27-21 @ 06:46]      Mg     2.10     [07-27-21 @ 06:46]      Phos  5.2     [07-27-21 @ 06:46]    TPro  7.3  /  Alb  3.3  /  TBili  0.4  /  DBili  x   /  AST  22  /  ALT  33  /  AlkPhos  270  [07-27-21 @ 06:46]          Creatinine Trend:  SCr 2.29 [07-27 @ 06:46]  SCr 2.39 [07-26 @ 06:24]  SCr 2.83 [07-25 @ 06:45]  SCr 2.90 [07-24 @ 07:00]  SCr 3.28 [07-23 @ 16:21]    Urinalysis - [07-21-21 @ 17:25]      Color Yellow / Appearance Clear / SG 1.016 / pH 5.5      Gluc Negative / Ketone Negative  / Bili Negative / Urobili <2 mg/dL       Blood Negative / Protein Trace / Leuk Est Negative / Nitrite Negative      RBC 6 / WBC 2 / Hyaline 2 / Gran  / Sq Epi  / Non Sq Epi 1 / Bacteria Negative    Urine Creatinine 149      [07-21-21 @ 17:25]  Urine Protein 23      [07-20-21 @ 16:48]  Urine Sodium <20      [07-21-21 @ 17:25]  Urine Urea Nitrogen 397.0      [07-21-21 @ 17:25]  Urine Osmolality 304      [07-21-21 @ 17:25]    TSH 1.58      [04-23-21 @ 04:36]  Lipid: chol 116, , HDL 26, LDL --      [07-19-21 @ 06:42]    HCV 0.12, Nonreact      [07-19-21 @ 09:49]    C3 Complement 160      [07-21-21 @ 07:42]  C4 Complement 58      [07-21-21 @ 07:42]  ANCA: cANCA Negative, pANCA Negative, atypical ANCA Negative      [07-21-21 @ 10:34]

## 2021-07-27 NOTE — PROGRESS NOTE ADULT - SUBJECTIVE AND OBJECTIVE BOX
Follow Up:      Inverval History/ROS: Patient is a 61y old  Male who presents with a chief complaint of right foot 2nd/3rd toe infection (27 Jul 2021 08:44)    No fever  No events    Allergies    cefepime (Other)    Intolerances        ANTIMICROBIALS:  ertapenem  IVPB 500 every 24 hours  levoFLOXacin IVPB    levoFLOXacin IVPB 250 every 24 hours      OTHER MEDS:  acetaminophen   Tablet .. 650 milliGRAM(s) Oral every 6 hours PRN  aspirin enteric coated 81 milliGRAM(s) Oral daily  atorvastatin 40 milliGRAM(s) Oral at bedtime  buMETAnide 4 milliGRAM(s) Oral every 12 hours  carvedilol 6.25 milliGRAM(s) Oral every 12 hours  dextrose 40% Gel 15 Gram(s) Oral once  dextrose 5%. 1000 milliLiter(s) IV Continuous <Continuous>  dextrose 5%. 1000 milliLiter(s) IV Continuous <Continuous>  dextrose 50% Injectable 25 Gram(s) IV Push once  dextrose 50% Injectable 12.5 Gram(s) IV Push once  dextrose 50% Injectable 25 Gram(s) IV Push once  glucagon  Injectable 1 milliGRAM(s) IntraMuscular once  heparin   Injectable 5000 Unit(s) SubCutaneous every 8 hours  hydrALAZINE 50 milliGRAM(s) Oral three times a day  insulin glargine Injectable (LANTUS) 40 Unit(s) SubCutaneous at bedtime  insulin lispro (ADMELOG) corrective regimen sliding scale   SubCutaneous three times a day before meals  insulin lispro Injectable (ADMELOG) 8 Unit(s) SubCutaneous three times a day before meals  isosorbide   dinitrate Tablet (ISORDIL) 20 milliGRAM(s) Oral three times a day  latanoprost 0.005% Ophthalmic Solution 1 Drop(s) Both EYES at bedtime  melatonin 3 milliGRAM(s) Oral at bedtime  NIFEdipine XL 30 milliGRAM(s) Oral daily  polyethylene glycol 3350 17 Gram(s) Oral daily  senna 2 Tablet(s) Oral at bedtime      Vital Signs Last 24 Hrs  T(C): 36.8 (27 Jul 2021 13:20), Max: 36.8 (26 Jul 2021 17:26)  T(F): 98.2 (27 Jul 2021 13:20), Max: 98.3 (26 Jul 2021 17:26)  HR: 65 (27 Jul 2021 13:20) (59 - 78)  BP: 159/80 (27 Jul 2021 13:20) (129/75 - 159/80)  BP(mean): --  RR: 18 (27 Jul 2021 13:20) (18 - 18)  SpO2: 96% (27 Jul 2021 13:20) (96% - 99%)    PHYSICAL EXAM:  General: [x ] non-toxic  HEAD/EYES: [ ] PERRL [x ] white sclera [ ] icterus  ENT:  [ ] normal [x ] supple [ ] thrush [ ] pharyngeal exudate  Cardiovascular:   [ ] murmur [x ] normal [ ] PPM/AICD  Respiratory:  [x ] clear to ausculation bilaterally  GI:  [x ] soft, non-tender, normal bowel sounds  :  [ ] nixon [x ] no CVA tenderness   Musculoskeletal:  [ x] no synovitis  Neurologic:  [ ] non-focal exam   Skin:  [ ] no rash  Lymph: [xx ] no lymphadenopathy  Psychiatric:  [ ] appropriate affect [ ] alert & oriented  Lines:  [x ] no phlebitis [ ] central line                                9.9    15.98 )-----------( 506      ( 27 Jul 2021 06:46 )             30.2       07-27    136  |  93<L>  |  72<H>  ----------------------------<  202<H>  3.9   |  23  |  2.29<H>    Ca    9.2      27 Jul 2021 06:46  Phos  5.2     07-27  Mg     2.10     07-27    TPro  7.3  /  Alb  3.3  /  TBili  0.4  /  DBili  x   /  AST  22  /  ALT  33  /  AlkPhos  270<H>  07-27          MICROBIOLOGY:    RADIOLOGY:

## 2021-07-27 NOTE — PROGRESS NOTE ADULT - PROBLEM SELECTOR PLAN 4
- A1c 8.5. FS>200  -at home takes lantus 60u hs and premeal insulin 28-30u   -increased lantus to 40u hs and SSI. increased premeal to 8  -monitor FS

## 2021-07-27 NOTE — PROGRESS NOTE ADULT - PROBLEM SELECTOR PLAN 6
h/o severe RLE PVD, s/p RLE angioplasty in past  - c/w ASA/statin  - vascular angiogram planned for 7/29, f/u recs  - medically optimized for planned procedure. cardiology risk assessment as per cads note

## 2021-07-28 LAB
ALBUMIN SERPL ELPH-MCNC: 3.3 G/DL — SIGNIFICANT CHANGE UP (ref 3.3–5)
ALP SERPL-CCNC: 267 U/L — HIGH (ref 40–120)
ALT FLD-CCNC: 30 U/L — SIGNIFICANT CHANGE UP (ref 4–41)
ANION GAP SERPL CALC-SCNC: 19 MMOL/L — HIGH (ref 7–14)
APTT BLD: 31.5 SEC — SIGNIFICANT CHANGE UP (ref 27–36.3)
AST SERPL-CCNC: 22 U/L — SIGNIFICANT CHANGE UP (ref 4–40)
BASOPHILS # BLD AUTO: 0.15 K/UL — SIGNIFICANT CHANGE UP (ref 0–0.2)
BASOPHILS NFR BLD AUTO: 1 % — SIGNIFICANT CHANGE UP (ref 0–2)
BILIRUB SERPL-MCNC: 0.3 MG/DL — SIGNIFICANT CHANGE UP (ref 0.2–1.2)
BUN SERPL-MCNC: 70 MG/DL — HIGH (ref 7–23)
CALCIUM SERPL-MCNC: 9.8 MG/DL — SIGNIFICANT CHANGE UP (ref 8.4–10.5)
CHLORIDE SERPL-SCNC: 93 MMOL/L — LOW (ref 98–107)
CO2 SERPL-SCNC: 23 MMOL/L — SIGNIFICANT CHANGE UP (ref 22–31)
CREAT SERPL-MCNC: 2.34 MG/DL — HIGH (ref 0.5–1.3)
EOSINOPHIL # BLD AUTO: 0.55 K/UL — HIGH (ref 0–0.5)
EOSINOPHIL NFR BLD AUTO: 3.6 % — SIGNIFICANT CHANGE UP (ref 0–6)
GLUCOSE SERPL-MCNC: 173 MG/DL — HIGH (ref 70–99)
HCT VFR BLD CALC: 32.8 % — LOW (ref 39–50)
HGB BLD-MCNC: 10.8 G/DL — LOW (ref 13–17)
IANC: 10.85 K/UL — HIGH (ref 1.5–8.5)
IMM GRANULOCYTES NFR BLD AUTO: 1.4 % — SIGNIFICANT CHANGE UP (ref 0–1.5)
INR BLD: 1.26 RATIO — HIGH (ref 0.88–1.16)
LYMPHOCYTES # BLD AUTO: 16.6 % — SIGNIFICANT CHANGE UP (ref 13–44)
LYMPHOCYTES # BLD AUTO: 2.53 K/UL — SIGNIFICANT CHANGE UP (ref 1–3.3)
MAGNESIUM SERPL-MCNC: 2.3 MG/DL — SIGNIFICANT CHANGE UP (ref 1.6–2.6)
MCHC RBC-ENTMCNC: 26.9 PG — LOW (ref 27–34)
MCHC RBC-ENTMCNC: 32.9 GM/DL — SIGNIFICANT CHANGE UP (ref 32–36)
MCV RBC AUTO: 81.6 FL — SIGNIFICANT CHANGE UP (ref 80–100)
MONOCYTES # BLD AUTO: 0.96 K/UL — HIGH (ref 0–0.9)
MONOCYTES NFR BLD AUTO: 6.3 % — SIGNIFICANT CHANGE UP (ref 2–14)
NEUTROPHILS # BLD AUTO: 10.85 K/UL — HIGH (ref 1.8–7.4)
NEUTROPHILS NFR BLD AUTO: 71.1 % — SIGNIFICANT CHANGE UP (ref 43–77)
NRBC # BLD: 0 /100 WBCS — SIGNIFICANT CHANGE UP
NRBC # FLD: 0 K/UL — SIGNIFICANT CHANGE UP
PHOSPHATE SERPL-MCNC: 5 MG/DL — HIGH (ref 2.5–4.5)
PLATELET # BLD AUTO: 533 K/UL — HIGH (ref 150–400)
POTASSIUM SERPL-MCNC: 3.6 MMOL/L — SIGNIFICANT CHANGE UP (ref 3.5–5.3)
POTASSIUM SERPL-SCNC: 3.6 MMOL/L — SIGNIFICANT CHANGE UP (ref 3.5–5.3)
PROT SERPL-MCNC: 7.8 G/DL — SIGNIFICANT CHANGE UP (ref 6–8.3)
PROTHROM AB SERPL-ACNC: 14.2 SEC — HIGH (ref 10.6–13.6)
RBC # BLD: 4.02 M/UL — LOW (ref 4.2–5.8)
RBC # FLD: 12.8 % — SIGNIFICANT CHANGE UP (ref 10.3–14.5)
SODIUM SERPL-SCNC: 135 MMOL/L — SIGNIFICANT CHANGE UP (ref 135–145)
WBC # BLD: 15.25 K/UL — HIGH (ref 3.8–10.5)
WBC # FLD AUTO: 15.25 K/UL — HIGH (ref 3.8–10.5)

## 2021-07-28 PROCEDURE — 99232 SBSQ HOSP IP/OBS MODERATE 35: CPT

## 2021-07-28 PROCEDURE — 99233 SBSQ HOSP IP/OBS HIGH 50: CPT

## 2021-07-28 PROCEDURE — 99232 SBSQ HOSP IP/OBS MODERATE 35: CPT | Mod: GC

## 2021-07-28 RX ORDER — POTASSIUM CHLORIDE 20 MEQ
20 PACKET (EA) ORAL ONCE
Refills: 0 | Status: COMPLETED | OUTPATIENT
Start: 2021-07-28 | End: 2021-07-28

## 2021-07-28 RX ORDER — SODIUM CHLORIDE 9 MG/ML
1000 INJECTION INTRAMUSCULAR; INTRAVENOUS; SUBCUTANEOUS
Refills: 0 | Status: DISCONTINUED | OUTPATIENT
Start: 2021-07-28 | End: 2021-07-28

## 2021-07-28 RX ORDER — SODIUM CHLORIDE 9 MG/ML
1000 INJECTION INTRAMUSCULAR; INTRAVENOUS; SUBCUTANEOUS
Refills: 0 | Status: COMPLETED | OUTPATIENT
Start: 2021-07-28 | End: 2022-06-26

## 2021-07-28 RX ADMIN — ATORVASTATIN CALCIUM 40 MILLIGRAM(S): 80 TABLET, FILM COATED ORAL at 23:11

## 2021-07-28 RX ADMIN — ISOSORBIDE DINITRATE 20 MILLIGRAM(S): 5 TABLET ORAL at 23:10

## 2021-07-28 RX ADMIN — HEPARIN SODIUM 5000 UNIT(S): 5000 INJECTION INTRAVENOUS; SUBCUTANEOUS at 06:21

## 2021-07-28 RX ADMIN — Medication 8 UNIT(S): at 17:25

## 2021-07-28 RX ADMIN — Medication 50 MILLIGRAM(S): at 13:11

## 2021-07-28 RX ADMIN — HEPARIN SODIUM 5000 UNIT(S): 5000 INJECTION INTRAVENOUS; SUBCUTANEOUS at 13:10

## 2021-07-28 RX ADMIN — CARVEDILOL PHOSPHATE 6.25 MILLIGRAM(S): 80 CAPSULE, EXTENDED RELEASE ORAL at 06:21

## 2021-07-28 RX ADMIN — Medication 50 MILLIGRAM(S): at 23:10

## 2021-07-28 RX ADMIN — Medication 30 MILLIGRAM(S): at 06:20

## 2021-07-28 RX ADMIN — SENNA PLUS 2 TABLET(S): 8.6 TABLET ORAL at 23:11

## 2021-07-28 RX ADMIN — Medication 8 UNIT(S): at 13:10

## 2021-07-28 RX ADMIN — ERTAPENEM SODIUM 100 MILLIGRAM(S): 1 INJECTION, POWDER, LYOPHILIZED, FOR SOLUTION INTRAMUSCULAR; INTRAVENOUS at 17:25

## 2021-07-28 RX ADMIN — Medication 1: at 13:10

## 2021-07-28 RX ADMIN — BUMETANIDE 4 MILLIGRAM(S): 0.25 INJECTION INTRAMUSCULAR; INTRAVENOUS at 17:27

## 2021-07-28 RX ADMIN — BUMETANIDE 4 MILLIGRAM(S): 0.25 INJECTION INTRAMUSCULAR; INTRAVENOUS at 06:25

## 2021-07-28 RX ADMIN — INSULIN GLARGINE 40 UNIT(S): 100 INJECTION, SOLUTION SUBCUTANEOUS at 23:06

## 2021-07-28 RX ADMIN — ISOSORBIDE DINITRATE 20 MILLIGRAM(S): 5 TABLET ORAL at 13:11

## 2021-07-28 RX ADMIN — Medication 50 MILLIGRAM(S): at 06:21

## 2021-07-28 RX ADMIN — Medication 1: at 09:25

## 2021-07-28 RX ADMIN — LATANOPROST 1 DROP(S): 0.05 SOLUTION/ DROPS OPHTHALMIC; TOPICAL at 23:09

## 2021-07-28 RX ADMIN — Medication 8 UNIT(S): at 09:24

## 2021-07-28 RX ADMIN — Medication 20 MILLIEQUIVALENT(S): at 09:25

## 2021-07-28 RX ADMIN — ISOSORBIDE DINITRATE 20 MILLIGRAM(S): 5 TABLET ORAL at 06:21

## 2021-07-28 RX ADMIN — Medication 81 MILLIGRAM(S): at 13:11

## 2021-07-28 RX ADMIN — Medication 3 MILLIGRAM(S): at 23:10

## 2021-07-28 RX ADMIN — CARVEDILOL PHOSPHATE 6.25 MILLIGRAM(S): 80 CAPSULE, EXTENDED RELEASE ORAL at 17:26

## 2021-07-28 RX ADMIN — Medication 2: at 17:25

## 2021-07-28 NOTE — PROGRESS NOTE ADULT - PROBLEM SELECTOR PLAN 1
Pt. with ACOSTA on CKD in the setting of venous congestion. pt. hx of AIN treated with steroids.   - Scr on admission 1.77.   - Scr increases to 3.93 on 7/21/21.   - Renal sonogram done on 7/21/21 showed no HDN.   - Lupillo < 20, Urine osmol 304  - BNP: [id number]  - Currently on Bumex 4mg BID  - In preparation for angio with contrast start Isotonic IV fluids 70 cc/hr 6 hours prior and continue to 6 hours post procedure.   - Monitor I/O   - Dose medications per eGFR

## 2021-07-28 NOTE — PROGRESS NOTE ADULT - ASSESSMENT
60 y/o M s/p R foot 2nd interspace I&D    - Patient seen and evaluated   - mummified residual 2nd digit stump and 3rd digits, fibrotic/liquefactive tissue noted, mild malodor, no wet conversion, no purulence, no erythema/ cellulitis  - R foot MRI: early osteomyelitis within the second proximal phalangeal, periostitis to the proximal phalanx of 3rd digit without vinnie osteomyelitis.  - MICHEL/PVR- noncompressible vessels b/l, RTBI 0.07, LTBI 0.22, waveforms flat at digits  - Vasc appreciated, planning RLE angio tomorrow 7/29  - Pod plan for R foot partial 2nd and 3rd ray resection Monday 8/2 @ 3 pm after Vascular Angiogram/ final recommendations  - Please document medical clearance for right foot surgery under light sedation with local anesthesia  - cardiac clearance appreciated  - discussed with attending

## 2021-07-28 NOTE — PROGRESS NOTE ADULT - SUBJECTIVE AND OBJECTIVE BOX
[facility] Division of Hospital Medicine  Eder Sheridan MD  Pager [phone]      Patient is a 61y old  Male who presents with a chief complaint of right foot 2nd/3rd toe infection (28 Jul 2021 12:57)      SUBJECTIVE / OVERNIGHT EVENTS:    No fever o/n. Pt denies sob, chest pain, foot pain. No new complaint     ADDITIONAL REVIEW OF SYSTEMS:    RESPIRATORY: No cough, wheezing, chills or hemoptysis; No shortness of breath  CARDIOVASCULAR: No chest pain, palpitations, dizziness, or leg swelling  GASTROINTESTINAL: No abdominal or epigastric pain. No nausea, vomiting, or hematemesis; No diarrhea or constipation. No melena or hematochezia.      MEDICATIONS  (STANDING):  aspirin enteric coated 81 milliGRAM(s) Oral daily  atorvastatin 40 milliGRAM(s) Oral at bedtime  buMETAnide 4 milliGRAM(s) Oral every 12 hours  carvedilol 6.25 milliGRAM(s) Oral every 12 hours  dextrose 40% Gel 15 Gram(s) Oral once  dextrose 5%. 1000 milliLiter(s) (50 mL/Hr) IV Continuous <Continuous>  dextrose 5%. 1000 milliLiter(s) (100 mL/Hr) IV Continuous <Continuous>  dextrose 50% Injectable 25 Gram(s) IV Push once  dextrose 50% Injectable 12.5 Gram(s) IV Push once  dextrose 50% Injectable 25 Gram(s) IV Push once  ertapenem  IVPB 500 milliGRAM(s) IV Intermittent every 24 hours  glucagon  Injectable 1 milliGRAM(s) IntraMuscular once  heparin   Injectable 5000 Unit(s) SubCutaneous every 8 hours  hydrALAZINE 50 milliGRAM(s) Oral three times a day  insulin glargine Injectable (LANTUS) 40 Unit(s) SubCutaneous at bedtime  insulin lispro (ADMELOG) corrective regimen sliding scale   SubCutaneous three times a day before meals  insulin lispro Injectable (ADMELOG) 8 Unit(s) SubCutaneous three times a day before meals  isosorbide   dinitrate Tablet (ISORDIL) 20 milliGRAM(s) Oral three times a day  latanoprost 0.005% Ophthalmic Solution 1 Drop(s) Both EYES at bedtime  levoFLOXacin IVPB 250 milliGRAM(s) IV Intermittent every 24 hours  levoFLOXacin IVPB      melatonin 3 milliGRAM(s) Oral at bedtime  NIFEdipine XL 30 milliGRAM(s) Oral daily  polyethylene glycol 3350 17 Gram(s) Oral daily  senna 2 Tablet(s) Oral at bedtime  sodium chloride 0.9%. 1000 milliLiter(s) (75 mL/Hr) IV Continuous <Continuous>    MEDICATIONS  (PRN):  acetaminophen   Tablet .. 650 milliGRAM(s) Oral every 6 hours PRN Temp greater or equal to 38.5C (101.3F), Mild Pain (1 - 3)      CAPILLARY BLOOD GLUCOSE      POCT Blood Glucose.: 177 mg/dL (28 Jul 2021 12:15)  POCT Blood Glucose.: 178 mg/dL (28 Jul 2021 08:47)  POCT Blood Glucose.: 158 mg/dL (27 Jul 2021 21:43)  POCT Blood Glucose.: 214 mg/dL (27 Jul 2021 17:44)    I&O's Summary    27 Jul 2021 07:01  -  28 Jul 2021 07:00  --------------------------------------------------------  IN: 686 mL / OUT: 2500 mL / NET: -1814 mL    28 Jul 2021 07:01  -  28 Jul 2021 14:53  --------------------------------------------------------  IN: 228 mL / OUT: 1010 mL / NET: -782 mL        PHYSICAL EXAM:  Vital Signs Last 24 Hrs  T(C): 36.5 (28 Jul 2021 13:10), Max: 36.9 (27 Jul 2021 17:42)  T(F): 97.7 (28 Jul 2021 13:10), Max: 98.5 (27 Jul 2021 22:20)  HR: 65 (28 Jul 2021 13:10) (65 - 75)  BP: 134/65 (28 Jul 2021 13:10) (134/65 - 143/73)  BP(mean): --  RR: 17 (28 Jul 2021 13:10) (16 - 18)  SpO2: 99% (28 Jul 2021 13:10) (97% - 100%)    CONSTITUTIONAL: NAD,  EYES: PERRLA; conjunctiva and sclera clear  ENMT: Moist oral mucosa, no pharyngeal injection or exudates;   NECK: Supple, no palpable masses;  RESPIRATORY: Normal respiratory effort; lungs are clear to auscultation bilaterally  CARDIOVASCULAR: Regular rate and rhythm, normal S1 and S2, no murmur/rub/gallop; No lower extremity edema; Peripheral pulses are 2+ bilaterally  ABDOMEN: Nontender to palpation, normoactive bowel sounds, no rebound/guarding;   MUSCLOSKELETAL:   no clubbing or cyanosis of digits; no joint swelling or tenderness to palpation. R foot dressing c/d/i  PSYCH: A+O to person, place, and time; affect appropriate  NEUROLOGY: CN 2-12 are intact and symmetric; no gross sensory deficits;   SKIN: No rashes;       LABS:                        10.8   15.25 )-----------( 533      ( 28 Jul 2021 06:36 )             32.8     07-28    135  |  93<L>  |  70<H>  ----------------------------<  173<H>  3.6   |  23  |  2.34<H>    Ca    9.8      28 Jul 2021 06:36  Phos  5.0     07-28  Mg     2.30     07-28    TPro  7.8  /  Alb  3.3  /  TBili  0.3  /  DBili  x   /  AST  22  /  ALT  30  /  AlkPhos  267<H>  07-28    PT/INR - ( 28 Jul 2021 06:36 )   PT: 14.2 sec;   INR: 1.26 ratio         PTT - ( 28 Jul 2021 06:36 )  PTT:31.5 sec            RADIOLOGY & ADDITIONAL TESTS:  Results Reviewed:   Imaging Personally Reviewed:  Electrocardiogram Personally Reviewed:    COORDINATION OF CARE:  Care Discussed with Consultants/Other Providers [Y/N]:  Prior or Outpatient Records Reviewed [Y/N]:

## 2021-07-28 NOTE — PROGRESS NOTE ADULT - PROBLEM SELECTOR PLAN 4
- A1c 8.5. -200  -at home takes lantus 60u hs and premeal insulin 28-30u   -increased lantus to 40u hs and SSI. c.w  premeal to 8  -monitor FS

## 2021-07-28 NOTE — PROGRESS NOTE ADULT - SUBJECTIVE AND OBJECTIVE BOX
Follow Up:      [place] History/ROS: Patient is a 61y old  Male who presents with a chief complaint of right foot 2nd/3rd toe infection (28 Jul 2021 09:04)    No fever  No event    Allergies    cefepime (Other)    Intolerances        ANTIMICROBIALS:  ertapenem  IVPB 500 every 24 hours  levoFLOXacin IVPB 250 every 24 hours  levoFLOXacin IVPB        OTHER MEDS:  acetaminophen   Tablet .. 650 milliGRAM(s) Oral every 6 hours PRN  aspirin enteric coated 81 milliGRAM(s) Oral daily  atorvastatin 40 milliGRAM(s) Oral at bedtime  buMETAnide 4 milliGRAM(s) Oral every 12 hours  carvedilol 6.25 milliGRAM(s) Oral every 12 hours  dextrose 40% Gel 15 Gram(s) Oral once  dextrose 5%. 1000 milliLiter(s) IV Continuous <Continuous>  dextrose 5%. 1000 milliLiter(s) IV Continuous <Continuous>  dextrose 50% Injectable 25 Gram(s) IV Push once  dextrose 50% Injectable 12.5 Gram(s) IV Push once  dextrose 50% Injectable 25 Gram(s) IV Push once  glucagon  Injectable 1 milliGRAM(s) IntraMuscular once  heparin   Injectable 5000 Unit(s) SubCutaneous every 8 hours  hydrALAZINE 50 milliGRAM(s) Oral three times a day  insulin glargine Injectable (LANTUS) 40 Unit(s) SubCutaneous at bedtime  insulin lispro (ADMELOG) corrective regimen sliding scale   SubCutaneous three times a day before meals  insulin lispro Injectable (ADMELOG) 8 Unit(s) SubCutaneous three times a day before meals  isosorbide   dinitrate Tablet (ISORDIL) 20 milliGRAM(s) Oral three times a day  latanoprost 0.005% Ophthalmic Solution 1 Drop(s) Both EYES at bedtime  melatonin 3 milliGRAM(s) Oral at bedtime  NIFEdipine XL 30 milliGRAM(s) Oral daily  polyethylene glycol 3350 17 Gram(s) Oral daily  senna 2 Tablet(s) Oral at bedtime  sodium chloride 0.9%. 1000 milliLiter(s) IV Continuous <Continuous>      Vital Signs Last 24 Hrs  T(C): 36.7 (28 Jul 2021 06:15), Max: 36.9 (27 Jul 2021 17:42)  T(F): 98 (28 Jul 2021 06:15), Max: 98.5 (27 Jul 2021 22:20)  HR: 65 (28 Jul 2021 06:15) (65 - 75)  BP: 143/73 (28 Jul 2021 06:15) (136/70 - 159/80)  BP(mean): --  RR: 16 (28 Jul 2021 06:15) (16 - 18)  SpO2: 100% (28 Jul 2021 06:15) (96% - 100%)    PHYSICAL EXAM:  General: [x ] non-toxic  HEAD/EYES: [ ] PERRL [x ] white sclera [ ] icterus  ENT:  [ ] normal [x ] supple [ ] thrush [ ] pharyngeal exudate  Cardiovascular:   [ ] murmur [ ] normal [ ] PPM/AICD  Respiratory:  [ x] clear to ausculation bilaterally  GI:  x[ ] soft, non-tender, normal bowel sounds  :  [ ] nixon [ ] no CVA tenderness   Musculoskeletal:  [ ] no synovitis  Neurologic:  [ ] non-focal exam   Skin:  [x ] no rash  Lymph: [x ] no lymphadenopathy  Psychiatric:  [ ] appropriate affect [ ] alert & oriented  Lines:  [x ] no phlebitis [ ] central line                                10.8   15.25 )-----------( 533      ( 28 Jul 2021 06:36 )             32.8       07-28    135  |  93<L>  |  70<H>  ----------------------------<  173<H>  3.6   |  23  |  2.34<H>    Ca    9.8      28 Jul 2021 06:36  Phos  5.0     07-28  Mg     2.30     07-28    TPro  7.8  /  Alb  3.3  /  TBili  0.3  /  DBili  x   /  AST  22  /  ALT  30  /  AlkPhos  267<H>  07-28          MICROBIOLOGY:    RADIOLOGY:

## 2021-07-28 NOTE — PROGRESS NOTE ADULT - SUBJECTIVE AND OBJECTIVE BOX
Podiatry pager #: [phone] ([facility])/ [phone] ([facility])    Patient is a 61y old  Male who presents with a chief complaint of right foot 2nd/3rd toe infection (26 Jul 2021 08:36)       INTERVAL HPI/OVERNIGHT EVENTS:  Patient seen and evaluated at bedside.  Pt is resting comfortable in NAD. Denies N/V/F/C.     Allergies    cefepime (Other)    Intolerances        Vital Signs Last 24 Hrs  T(C): 36.7 (26 Jul 2021 04:26), Max: 36.8 (25 Jul 2021 14:30)  T(F): 98.1 (26 Jul 2021 04:26), Max: 98.3 (25 Jul 2021 14:30)  HR: 65 (26 Jul 2021 11:07) (61 - 67)  BP: 148/67 (26 Jul 2021 04:26) (148/67 - 176/66)  BP(mean): --  RR: 17 (26 Jul 2021 04:26) (17 - 20)  SpO2: 97% (26 Jul 2021 11:07) (96% - 99%)    LABS:                        9.8    15.87 )-----------( 520      ( 26 Jul 2021 06:24 )             29.6     07-26    136  |  96<L>  |  72<H>  ----------------------------<  155<H>  3.6   |  23  |  2.39<H>    Ca    9.3      26 Jul 2021 06:24  Phos  5.5     07-26  Mg     2.20     07-26    TPro  7.5  /  Alb  3.3  /  TBili  0.3  /  DBili  x   /  AST  27  /  ALT  29  /  AlkPhos  275<H>  07-26        CAPILLARY BLOOD GLUCOSE      POCT Blood Glucose.: 200 mg/dL (26 Jul 2021 08:45)  POCT Blood Glucose.: 253 mg/dL (25 Jul 2021 21:34)  POCT Blood Glucose.: 225 mg/dL (25 Jul 2021 18:19)  POCT Blood Glucose.: 264 mg/dL (25 Jul 2021 12:32)      Lower Extremity Physical Exam:  Vascular: DP 1/4, B/L, PT non-palpable B/L, CFT <3 seconds B/L, Temperature gradient cool to warm on R, warm to cool on L    Neuro: Epicritic sensation intact to the level of ankle, B/L.  Musculoskeletal/Ortho: pain on palpation surrounding the 2nd digit   Skin: gangrenous right foot 2nd and 3rd digits, RF 3rd interspace wound probes to bone, consistent tracking proximally to 2nd and 3rd MPJ, mild wet conversion of gangrenous digits, scant pus expressed from interdigital wound, erythema consistent    7/17 s/p I&D of R 2nd interspace  - mummified residual 2nd digit stump and 3rd digits, fibrotic/liquefactive tissue noted, mild malodor, no wet conversion, no purulence, no erythema/ cellulitis    RADIOLOGY & ADDITIONAL TESTS:    < from: MR Foot w/ IV Cont, Right (07.19.21 @ 18:14) >  EXAM:  MR FOOT IC RT        PROCEDURE DATE:  Jul 19 2021         INTERPRETATION:  Clinical indications: Peripheral vascular disease. Right second and third toe infection/osteomyelitis.    Multiplanar multisequence MRI of the right foot was performed with and without intravenous contrast.    7.5 cc of Gadavist was administered intravenously. 0 cc was discarded.    FINDINGS:    Patient is status post partial resection of the second toe at the level of the proximal phalanx. There is a soft tissue wound at the distal amputation margin. Subjacent to this there is osseous edema and enhancement within the second proximal phalangeal remnant with slightly decreased T1 marrow signal suggestive of early osteomyelitis.    There is patchy increased STIRsignal within the proximal, middle, and distal phalanges of the fourth and fifth toes and within the proximal and distal phalanges of the first toe with grossly preserved T1 marrow signal. This is nonspecific but may be reflective of vascular insufficiency. Correlate for overlying wound at these sites to exclude the possibility of osteomyelitis.    There is periosseous edema about the proximal phalanx of the third toe. Findings are consistent with periostitis. The marrow signal is grossly preserved without evidence of vinnie osteomyelitis.    There is cellulitis throughout the forefoot. There is no peripherally enhancing fluid collection to suggest abscess.    Visualized joint spaces are preserved. There is no evidence of tenosynovitis. There is patchy edema throughout the visualized musculature.    IMPRESSION:    Status post second toe partial amputation. Findings suggestive of early osteomyelitis within the second proximal phalangeal remnant.    Findings consistent with periostitis aboutthe proximal phalanx of the third toe without vinnie osteomyelitis.    Nonspecific patchy increased STIR signal within the proximal, middle, and distal phalanges of the fourth and fifth toes and within the proximal and distal phalanges of the first toe with grossly preserved T1 marrow signal. This may be reflective of vascular insufficiency. Correlate for overlying wound at these sites to exclude the possibility of developing osteolysis.    Cellulitis throughout the forefoot.    --- End of Report ---              GERBER LEWIS MD; Attending Radiologist  This document has been electronically signed. Jul 19 2021  7:28PM    < end of copied text >

## 2021-07-28 NOTE — PROGRESS NOTE ADULT - PROBLEM SELECTOR PLAN 3
-pt with h/o right foot 2nd toe partial amputation in April at [facility], h/o severe RLE PVD with RLE angioplasty in past, no stent, now p/w 2nd residual toe/stump and 3rd toe infection, xray concerning for osteomyelitis, elevated inflammatory markers ESR 96, CRP 80.6 c/w acute OM. rec'd IV vanc 1 g bid (7/17 pm - 7/20), zoxyn (7/18-7/20)  -7/18 blood cultures x2  -ID c/s start erta and levaquin (7/20 - )  -MRI right foot: osteomyelitis in 2nd toe, periostitis w/o osteo on 3rd toe  -podiatry consulted, s/p I&D 7/17. plan for RF P2RR, P3RR pending angiogram. pt is medically optimized for the procedure  - vascular consult: angiogram with co2 angio next thursday 7/29

## 2021-07-28 NOTE — PROGRESS NOTE ADULT - SUBJECTIVE AND OBJECTIVE BOX
[facility] DIVISION OF KIDNEY DISEASES AND HYPERTENSION -- FOLLOW UP NOTE  --------------------------------------------------------------------------------  Chief Complaint:    24 hour events/subjective:      REVIEW OF SYSTEMS  --------------------------------------------------------------------------------  Gen: No fevers/chills, weakness  Skin: No rashes  Head/Eyes/Ears/Mouth: No headache; No hearing changes, mucous discharge, vision changes  Respiratory: No dyspnea, cough, wheezing  CV: No chest pain, palpitations  GI: No abdominal pain, diarrhea, constipation, nausea, vomiting, melena, hematochezia  : No increased frequency, dysuria, hematuria  MSK: No joint pain/swelling; no back pain; no edema  Neuro: No dizziness/lightheadedness, weakness, seizures, numbness  Heme: No easy bruising or bleeding    All other systems were reviewed and are negative, except as noted.    PAST HISTORY  --------------------------------------------------------------------------------  No significant changes to PMH, PSH, FHx, SHx, unless otherwise noted    ALLERGIES & MEDICATIONS  --------------------------------------------------------------------------------  Allergies    cefepime (Other)    Intolerances      Standing Inpatient Medications  aspirin enteric coated 81 milliGRAM(s) Oral daily  atorvastatin 40 milliGRAM(s) Oral at bedtime  buMETAnide 4 milliGRAM(s) Oral every 12 hours  carvedilol 6.25 milliGRAM(s) Oral every 12 hours  dextrose 40% Gel 15 Gram(s) Oral once  dextrose 5%. 1000 milliLiter(s) IV Continuous <Continuous>  dextrose 5%. 1000 milliLiter(s) IV Continuous <Continuous>  dextrose 50% Injectable 25 Gram(s) IV Push once  dextrose 50% Injectable 12.5 Gram(s) IV Push once  dextrose 50% Injectable 25 Gram(s) IV Push once  ertapenem  IVPB 500 milliGRAM(s) IV Intermittent every 24 hours  glucagon  Injectable 1 milliGRAM(s) IntraMuscular once  heparin   Injectable 5000 Unit(s) SubCutaneous every 8 hours  hydrALAZINE 50 milliGRAM(s) Oral three times a day  insulin glargine Injectable (LANTUS) 40 Unit(s) SubCutaneous at bedtime  insulin lispro (ADMELOG) corrective regimen sliding scale   SubCutaneous three times a day before meals  insulin lispro Injectable (ADMELOG) 8 Unit(s) SubCutaneous three times a day before meals  isosorbide   dinitrate Tablet (ISORDIL) 20 milliGRAM(s) Oral three times a day  latanoprost 0.005% Ophthalmic Solution 1 Drop(s) Both EYES at bedtime  levoFLOXacin IVPB      levoFLOXacin IVPB 250 milliGRAM(s) IV Intermittent every 24 hours  melatonin 3 milliGRAM(s) Oral at bedtime  NIFEdipine XL 30 milliGRAM(s) Oral daily  polyethylene glycol 3350 17 Gram(s) Oral daily  potassium chloride    Tablet ER 20 milliEquivalent(s) Oral once  senna 2 Tablet(s) Oral at bedtime  sodium chloride 0.9%. 1000 milliLiter(s) IV Continuous <Continuous>    PRN Inpatient Medications  acetaminophen   Tablet .. 650 milliGRAM(s) Oral every 6 hours PRN        VITALS/PHYSICAL EXAM  --------------------------------------------------------------------------------  T(C): 36.7 (07-28-21 @ 06:15), Max: 36.9 (07-27-21 @ 17:42)  HR: 65 (07-28-21 @ 06:15) (65 - 75)  BP: 143/73 (07-28-21 @ 06:15) (136/70 - 159/80)  RR: 16 (07-28-21 @ 06:15) (16 - 18)  SpO2: 100% (07-28-21 @ 06:15) (96% - 100%)  Wt(kg): --        07-27-21 @ 07:01  -  07-28-21 @ 07:00  --------------------------------------------------------  IN: 686 mL / OUT: 2500 mL / NET: -1814 mL    07-28-21 @ 07:01  -  07-28-21 @ 09:06  --------------------------------------------------------  IN: 0 mL / OUT: 100 mL / NET: -100 mL      Physical Exam:  	Gen: NAD, well-appearing  	HEENT: PERRL, supple neck, clear oropharynx  	Pulm: CTA B/L  	CV: RRR, S1S2; no rub  	Back: No spinal or CVA tenderness; no sacral edema  	Abd: +BS, soft, nontender/nondistended  	: No suprapubic tenderness  	UE: Warm, FROM, no clubbing, intact strength; no edema; no asterixis  	LE: Warm, FROM, no clubbing, intact strength; no edema  	Neuro: No focal deficits, intact gait  	Psych: Normal affect and mood  	Skin: Warm, without rashes  	Vascular access:    LABS/STUDIES  --------------------------------------------------------------------------------              10.8   15.25 >-----------<  533      [07-28-21 @ 06:36]              32.8     135  |  93  |  70  ----------------------------<  173      [07-28-21 @ 06:36]  3.6   |  23  |  2.34        Ca     9.8     [07-28-21 @ 06:36]      Mg     2.30     [07-28-21 @ 06:36]      Phos  5.0     [07-28-21 @ 06:36]    TPro  7.8  /  Alb  3.3  /  TBili  0.3  /  DBili  x   /  AST  22  /  ALT  30  /  AlkPhos  267  [07-28-21 @ 06:36]    PT/INR: PT 14.2 , INR 1.26       [07-28-21 @ 06:36]  PTT: 31.5       [07-28-21 @ 06:36]      Creatinine Trend:  SCr 2.34 [07-28 @ 06:36]  SCr 2.29 [07-27 @ 06:46]  SCr 2.39 [07-26 @ 06:24]  SCr 2.83 [07-25 @ 06:45]  SCr 2.90 [07-24 @ 07:00]    Urinalysis - [07-21-21 @ 17:25]      Color Yellow / Appearance Clear / SG 1.016 / pH 5.5      Gluc Negative / Ketone Negative  / Bili Negative / Urobili <2 mg/dL       Blood Negative / Protein Trace / Leuk Est Negative / Nitrite Negative      RBC 6 / WBC 2 / Hyaline 2 / Gran  / Sq Epi  / Non Sq Epi 1 / Bacteria Negative    Urine Creatinine 149      [07-21-21 @ 17:25]  Urine Sodium <20      [07-21-21 @ 17:25]  Urine Urea Nitrogen 397.0      [07-21-21 @ 17:25]  Urine Osmolality 304      [07-21-21 @ 17:25]    TSH 1.58      [04-23-21 @ 04:36]  Lipid: chol 116, , HDL 26, LDL --      [07-19-21 @ 06:42]    HCV 0.12, Nonreact      [07-19-21 @ 09:49]    C3 Complement 160      [07-21-21 @ 07:42]  C4 Complement 58      [07-21-21 @ 07:42]  ANCA: cANCA Negative, pANCA Negative, atypical ANCA Negative      [07-21-21 @ 10:34]   [facility] DIVISION OF KIDNEY DISEASES AND HYPERTENSION -- FOLLOW UP NOTE  --------------------------------------------------------------------------------  61M with history of HTN, T2DM, HLD, glaucoma, CKD with recent AIN (follows with Dr. Zambrano) treated with steroid, HFrEF, recent NSTEMI, PAD s/p RLE angioplasty, s/p right 2nd toe patient amputation at [facility] presents to [facility] with worsening foot pain and associated right 203 toe infection. Recent RLE angiogram revealed severe atherosclerosis. Nephrology consulted for ACOSTA amidst rising creatinine levels and continued malaise.      Pt. seen and examined at bedside. OFELIA and looking forward to his procedure.     REVIEW OF SYSTEMS  --------------------------------------------------------------------------------  Gen: No fevers/chills, weakness  Skin: No rashes  Head/Eyes/Ears/Mouth: No headache; No hearing changes, mucous discharge, vision changes  Respiratory: No dyspnea, cough, wheezing  CV: No chest pain, palpitations  GI: No abdominal pain, diarrhea, constipation, nausea, vomiting, melena, hematochezia  : No increased frequency, dysuria, hematuria  MSK: No joint pain/swelling; no back pain; no edema  Neuro: No dizziness/lightheadedness, weakness, seizures, numbness  Heme: No easy bruising or bleeding    All other systems were reviewed and are negative, except as noted.    PAST HISTORY  --------------------------------------------------------------------------------  No significant changes to PMH, PSH, FHx, SHx, unless otherwise noted    ALLERGIES & MEDICATIONS  --------------------------------------------------------------------------------  Allergies    cefepime (Other)    Intolerances      Standing Inpatient Medications  aspirin enteric coated 81 milliGRAM(s) Oral daily  atorvastatin 40 milliGRAM(s) Oral at bedtime  buMETAnide 4 milliGRAM(s) Oral every 12 hours  carvedilol 6.25 milliGRAM(s) Oral every 12 hours  dextrose 40% Gel 15 Gram(s) Oral once  dextrose 5%. 1000 milliLiter(s) IV Continuous <Continuous>  dextrose 5%. 1000 milliLiter(s) IV Continuous <Continuous>  dextrose 50% Injectable 25 Gram(s) IV Push once  dextrose 50% Injectable 12.5 Gram(s) IV Push once  dextrose 50% Injectable 25 Gram(s) IV Push once  ertapenem  IVPB 500 milliGRAM(s) IV Intermittent every 24 hours  glucagon  Injectable 1 milliGRAM(s) IntraMuscular once  heparin   Injectable 5000 Unit(s) SubCutaneous every 8 hours  hydrALAZINE 50 milliGRAM(s) Oral three times a day  insulin glargine Injectable (LANTUS) 40 Unit(s) SubCutaneous at bedtime  insulin lispro (ADMELOG) corrective regimen sliding scale   SubCutaneous three times a day before meals  insulin lispro Injectable (ADMELOG) 8 Unit(s) SubCutaneous three times a day before meals  isosorbide   dinitrate Tablet (ISORDIL) 20 milliGRAM(s) Oral three times a day  latanoprost 0.005% Ophthalmic Solution 1 Drop(s) Both EYES at bedtime  levoFLOXacin IVPB      levoFLOXacin IVPB 250 milliGRAM(s) IV Intermittent every 24 hours  melatonin 3 milliGRAM(s) Oral at bedtime  NIFEdipine XL 30 milliGRAM(s) Oral daily  polyethylene glycol 3350 17 Gram(s) Oral daily  potassium chloride    Tablet ER 20 milliEquivalent(s) Oral once  senna 2 Tablet(s) Oral at bedtime  sodium chloride 0.9%. 1000 milliLiter(s) IV Continuous <Continuous>    PRN Inpatient Medications  acetaminophen   Tablet .. 650 milliGRAM(s) Oral every 6 hours PRN        VITALS/PHYSICAL EXAM  --------------------------------------------------------------------------------  T(C): 36.7 (07-28-21 @ 06:15), Max: 36.9 (07-27-21 @ 17:42)  HR: 65 (07-28-21 @ 06:15) (65 - 75)  BP: 143/73 (07-28-21 @ 06:15) (136/70 - 159/80)  RR: 16 (07-28-21 @ 06:15) (16 - 18)  SpO2: 100% (07-28-21 @ 06:15) (96% - 100%)  Wt(kg): --        07-27-21 @ 07:01  -  07-28-21 @ 07:00  --------------------------------------------------------  IN: 686 mL / OUT: 2500 mL / NET: -1814 mL    07-28-21 @ 07:01  -  07-28-21 @ 09:06  --------------------------------------------------------  IN: 0 mL / OUT: 100 mL / NET: -100 mL      Physical Exam:  	Gen: NAD, well-appearing  	HEENT: PERRL, supple neck, clear oropharynx  	Pulm: rales at bilateral bases   	CV: RRR, S1S2; no rub  	Back: No spinal or CVA tenderness; no sacral edema  	Abd: +BS, soft, nontender/nondistended  	: No suprapubic tenderness  	UE: Warm, FROM, no clubbing, intact strength; no edema; no asterixis  	LE: R foot dressing, Warm, FROM, no clubbing, intact strength; no edema  	Neuro: No focal deficits, intact gait  	Psych: Normal affect and mood  	Skin: Warm, without rashes  	Vascular access: peripheral IV canula     LABS/STUDIES  --------------------------------------------------------------------------------              10.8   15.25 >-----------<  533      [07-28-21 @ 06:36]              32.8     135  |  93  |  70  ----------------------------<  173      [07-28-21 @ 06:36]  3.6   |  23  |  2.34        Ca     9.8     [07-28-21 @ 06:36]      Mg     2.30     [07-28-21 @ 06:36]      Phos  5.0     [07-28-21 @ 06:36]    TPro  7.8  /  Alb  3.3  /  TBili  0.3  /  DBili  x   /  AST  22  /  ALT  30  /  AlkPhos  267  [07-28-21 @ 06:36]    PT/INR: PT 14.2 , INR 1.26       [07-28-21 @ 06:36]  PTT: 31.5       [07-28-21 @ 06:36]      Creatinine Trend:  SCr 2.34 [07-28 @ 06:36]  SCr 2.29 [07-27 @ 06:46]  SCr 2.39 [07-26 @ 06:24]  SCr 2.83 [07-25 @ 06:45]  SCr 2.90 [07-24 @ 07:00]    Urinalysis - [07-21-21 @ 17:25]      Color Yellow / Appearance Clear / SG 1.016 / pH 5.5      Gluc Negative / Ketone Negative  / Bili Negative / Urobili <2 mg/dL       Blood Negative / Protein Trace / Leuk Est Negative / Nitrite Negative      RBC 6 / WBC 2 / Hyaline 2 / Gran  / Sq Epi  / Non Sq Epi 1 / Bacteria Negative    Urine Creatinine 149      [07-21-21 @ 17:25]  Urine Sodium <20      [07-21-21 @ 17:25]  Urine Urea Nitrogen 397.0      [07-21-21 @ 17:25]  Urine Osmolality 304      [07-21-21 @ 17:25]    TSH 1.58      [04-23-21 @ 04:36]  Lipid: chol 116, , HDL 26, LDL --      [07-19-21 @ 06:42]    HCV 0.12, Nonreact      [07-19-21 @ 09:49]    C3 Complement 160      [07-21-21 @ 07:42]  C4 Complement 58      [07-21-21 @ 07:42]  ANCA: cANCA Negative, pANCA Negative, atypical ANCA Negative      [07-21-21 @ 10:34]   [facility] DIVISION OF KIDNEY DISEASES AND HYPERTENSION -- FOLLOW UP NOTE  --------------------------------------------------------------------------------  61M with history of HTN, T2DM, HLD, glaucoma, CKD with recent AIN (follows with Dr. Zambrano) treated with steroid, HFrEF, recent NSTEMI, PAD s/p RLE angioplasty, s/p right 2nd toe patient amputation at [facility] presents to [facility] with worsening foot pain and associated right 203 toe infection. Recent RLE angiogram revealed severe atherosclerosis. Nephrology consulted for ACOSTA amidst rising creatinine levels and continued malaise.      Pt. seen and examined at bedside. OFELIA and looking forward to his procedure on 7/29.     REVIEW OF SYSTEMS  --------------------------------------------------------------------------------  Gen: No fevers/chills, weakness  Skin: No rashes  Head/Eyes/Ears/Mouth: No headache; No hearing changes, mucous discharge, vision changes  Respiratory: No dyspnea, cough, wheezing  CV: No chest pain, palpitations  GI: No abdominal pain, diarrhea, constipation, nausea, vomiting, melena, hematochezia  : No increased frequency, dysuria, hematuria  MSK: No joint pain/swelling; no back pain; no edema  Neuro: No dizziness/lightheadedness, weakness, seizures, numbness  Heme: No easy bruising or bleeding    All other systems were reviewed and are negative, except as noted.    PAST HISTORY  --------------------------------------------------------------------------------  No significant changes to PMH, PSH, FHx, SHx, unless otherwise noted    ALLERGIES & MEDICATIONS  --------------------------------------------------------------------------------  Allergies    cefepime (Other)    Intolerances      Standing Inpatient Medications  aspirin enteric coated 81 milliGRAM(s) Oral daily  atorvastatin 40 milliGRAM(s) Oral at bedtime  buMETAnide 4 milliGRAM(s) Oral every 12 hours  carvedilol 6.25 milliGRAM(s) Oral every 12 hours  dextrose 40% Gel 15 Gram(s) Oral once  dextrose 5%. 1000 milliLiter(s) IV Continuous <Continuous>  dextrose 5%. 1000 milliLiter(s) IV Continuous <Continuous>  dextrose 50% Injectable 25 Gram(s) IV Push once  dextrose 50% Injectable 12.5 Gram(s) IV Push once  dextrose 50% Injectable 25 Gram(s) IV Push once  ertapenem  IVPB 500 milliGRAM(s) IV Intermittent every 24 hours  glucagon  Injectable 1 milliGRAM(s) IntraMuscular once  heparin   Injectable 5000 Unit(s) SubCutaneous every 8 hours  hydrALAZINE 50 milliGRAM(s) Oral three times a day  insulin glargine Injectable (LANTUS) 40 Unit(s) SubCutaneous at bedtime  insulin lispro (ADMELOG) corrective regimen sliding scale   SubCutaneous three times a day before meals  insulin lispro Injectable (ADMELOG) 8 Unit(s) SubCutaneous three times a day before meals  isosorbide   dinitrate Tablet (ISORDIL) 20 milliGRAM(s) Oral three times a day  latanoprost 0.005% Ophthalmic Solution 1 Drop(s) Both EYES at bedtime  levoFLOXacin IVPB      levoFLOXacin IVPB 250 milliGRAM(s) IV Intermittent every 24 hours  melatonin 3 milliGRAM(s) Oral at bedtime  NIFEdipine XL 30 milliGRAM(s) Oral daily  polyethylene glycol 3350 17 Gram(s) Oral daily  potassium chloride    Tablet ER 20 milliEquivalent(s) Oral once  senna 2 Tablet(s) Oral at bedtime  sodium chloride 0.9%. 1000 milliLiter(s) IV Continuous <Continuous>    PRN Inpatient Medications  acetaminophen   Tablet .. 650 milliGRAM(s) Oral every 6 hours PRN        VITALS/PHYSICAL EXAM  --------------------------------------------------------------------------------  T(C): 36.7 (07-28-21 @ 06:15), Max: 36.9 (07-27-21 @ 17:42)  HR: 65 (07-28-21 @ 06:15) (65 - 75)  BP: 143/73 (07-28-21 @ 06:15) (136/70 - 159/80)  RR: 16 (07-28-21 @ 06:15) (16 - 18)  SpO2: 100% (07-28-21 @ 06:15) (96% - 100%)  Wt(kg): --        07-27-21 @ 07:01  -  07-28-21 @ 07:00  --------------------------------------------------------  IN: 686 mL / OUT: 2500 mL / NET: -1814 mL    07-28-21 @ 07:01  -  07-28-21 @ 09:06  --------------------------------------------------------  IN: 0 mL / OUT: 100 mL / NET: -100 mL      Physical Exam:  	Gen: NAD, well-appearing  	HEENT: PERRL, supple neck, clear oropharynx  	Pulm: rales at bilateral bases   	CV: RRR, S1S2; no rub  	Back: No spinal or CVA tenderness; no sacral edema  	Abd: +BS, soft, nontender/nondistended  	: No suprapubic tenderness  	UE: Warm, FROM, no clubbing, intact strength; no edema; no asterixis  	LE: R foot dressing, Warm, FROM, no clubbing, intact strength; no edema  	Neuro: No focal deficits, intact gait  	Psych: Normal affect and mood  	Skin: Warm, without rashes  	Vascular access: peripheral IV canula     LABS/STUDIES  --------------------------------------------------------------------------------              10.8   15.25 >-----------<  533      [07-28-21 @ 06:36]              32.8     135  |  93  |  70  ----------------------------<  173      [07-28-21 @ 06:36]  3.6   |  23  |  2.34        Ca     9.8     [07-28-21 @ 06:36]      Mg     2.30     [07-28-21 @ 06:36]      Phos  5.0     [07-28-21 @ 06:36]    TPro  7.8  /  Alb  3.3  /  TBili  0.3  /  DBili  x   /  AST  22  /  ALT  30  /  AlkPhos  267  [07-28-21 @ 06:36]    PT/INR: PT 14.2 , INR 1.26       [07-28-21 @ 06:36]  PTT: 31.5       [07-28-21 @ 06:36]      Creatinine Trend:  SCr 2.34 [07-28 @ 06:36]  SCr 2.29 [07-27 @ 06:46]  SCr 2.39 [07-26 @ 06:24]  SCr 2.83 [07-25 @ 06:45]  SCr 2.90 [07-24 @ 07:00]    Urinalysis - [07-21-21 @ 17:25]      Color Yellow / Appearance Clear / SG 1.016 / pH 5.5      Gluc Negative / Ketone Negative  / Bili Negative / Urobili <2 mg/dL       Blood Negative / Protein Trace / Leuk Est Negative / Nitrite Negative      RBC 6 / WBC 2 / Hyaline 2 / Gran  / Sq Epi  / Non Sq Epi 1 / Bacteria Negative    Urine Creatinine 149      [07-21-21 @ 17:25]  Urine Sodium <20      [07-21-21 @ 17:25]  Urine Urea Nitrogen 397.0      [07-21-21 @ 17:25]  Urine Osmolality 304      [07-21-21 @ 17:25]    TSH 1.58      [04-23-21 @ 04:36]  Lipid: chol 116, , HDL 26, LDL --      [07-19-21 @ 06:42]    HCV 0.12, Nonreact      [07-19-21 @ 09:49]    C3 Complement 160      [07-21-21 @ 07:42]  C4 Complement 58      [07-21-21 @ 07:42]  ANCA: cANCA Negative, pANCA Negative, atypical ANCA Negative      [07-21-21 @ 10:34]

## 2021-07-28 NOTE — PROGRESS NOTE ADULT - ASSESSMENT
62 y/o male with h/o HTN, insulin dependent DM-2, hld, glaucoma, CKD3 with recent interstitial nephritis treated with steroid, HFrEF, recent NSTEMI, PAD s/p RLE angioplasty, s/p right 2nd toe partial amputation at [facility] in April 2021, who presents with 2 week history of worsening right foot pain, associated with right 2nd residual toe stump/3rd toe infection/discoloration to blue/black, xray c/f osteomyelitis, plan for surgical intervention this admission per podiatry. Hospital course has been complicated by flash pulmonary edema 2/2 to ACOSTA on CKD. Now he is improving with diuresis

## 2021-07-28 NOTE — PROGRESS NOTE ADULT - SUBJECTIVE AND OBJECTIVE BOX
SURGERY DAILY PROGRESS NOTE:     SUBJECTIVE/ROS: Patient seen and examined at bedside. Denies pain. Able to move both b/l LE with sensation. Plan for angiogram on 7/29     MEDICATIONS  (STANDING):  aspirin enteric coated 81 milliGRAM(s) Oral daily  atorvastatin 40 milliGRAM(s) Oral at bedtime  buMETAnide 4 milliGRAM(s) Oral every 12 hours  carvedilol 6.25 milliGRAM(s) Oral every 12 hours  dextrose 40% Gel 15 Gram(s) Oral once  dextrose 5%. 1000 milliLiter(s) (100 mL/Hr) IV Continuous <Continuous>  dextrose 5%. 1000 milliLiter(s) (50 mL/Hr) IV Continuous <Continuous>  dextrose 50% Injectable 25 Gram(s) IV Push once  dextrose 50% Injectable 12.5 Gram(s) IV Push once  dextrose 50% Injectable 25 Gram(s) IV Push once  ertapenem  IVPB 500 milliGRAM(s) IV Intermittent every 24 hours  glucagon  Injectable 1 milliGRAM(s) IntraMuscular once  heparin   Injectable 5000 Unit(s) SubCutaneous every 8 hours  hydrALAZINE 50 milliGRAM(s) Oral three times a day  insulin glargine Injectable (LANTUS) 40 Unit(s) SubCutaneous at bedtime  insulin lispro (ADMELOG) corrective regimen sliding scale   SubCutaneous three times a day before meals  insulin lispro Injectable (ADMELOG) 8 Unit(s) SubCutaneous three times a day before meals  isosorbide   dinitrate Tablet (ISORDIL) 20 milliGRAM(s) Oral three times a day  latanoprost 0.005% Ophthalmic Solution 1 Drop(s) Both EYES at bedtime  levoFLOXacin IVPB      levoFLOXacin IVPB 250 milliGRAM(s) IV Intermittent every 24 hours  melatonin 3 milliGRAM(s) Oral at bedtime  NIFEdipine XL 30 milliGRAM(s) Oral daily  polyethylene glycol 3350 17 Gram(s) Oral daily  potassium chloride    Tablet ER 20 milliEquivalent(s) Oral once  senna 2 Tablet(s) Oral at bedtime    MEDICATIONS  (PRN):  acetaminophen   Tablet .. 650 milliGRAM(s) Oral every 6 hours PRN Temp greater or equal to 38.5C (101.3F), Mild Pain (1 - 3)      OBJECTIVE:    Vital Signs Last 24 Hrs  T(C): 36.7 (28 Jul 2021 06:15), Max: 36.9 (27 Jul 2021 17:42)  T(F): 98 (28 Jul 2021 06:15), Max: 98.5 (27 Jul 2021 22:20)  HR: 65 (28 Jul 2021 06:15) (65 - 75)  BP: 143/73 (28 Jul 2021 06:15) (136/70 - 159/80)  BP(mean): --  RR: 16 (28 Jul 2021 06:15) (16 - 18)  SpO2: 100% (28 Jul 2021 06:15) (96% - 100%)    I&O's Detail    27 Jul 2021 07:01  -  28 Jul 2021 07:00  --------------------------------------------------------  IN:    Oral Fluid: 686 mL  Total IN: 686 mL    OUT:    Voided (mL): 2500 mL  Total OUT: 2500 mL    Total NET: -1814 mL          Daily     Daily     LABS:                        10.8   15.25 )-----------( 533      ( 28 Jul 2021 06:36 )             32.8     07-28    135  |  93<L>  |  70<H>  ----------------------------<  173<H>  3.6   |  23  |  2.34<H>    Ca    9.8      28 Jul 2021 06:36  Phos  5.0     07-28  Mg     2.30     07-28    TPro  7.8  /  Alb  3.3  /  TBili  0.3  /  DBili  x   /  AST  22  /  ALT  30  /  AlkPhos  267<H>  07-28    PT/INR - ( 28 Jul 2021 06:36 )   PT: 14.2 sec;   INR: 1.26 ratio         PTT - ( 28 Jul 2021 06:36 )  PTT:31.5 sec      Physical Exam:  General: NAD, resting comfortably in bed  Pulmonary: Nonlabored breathing, no respiratory distress  Cardiovascular: NSR  Abdominal: soft, NT/ND  Extremities: WWP, RLE wrapper, palpable DP on LLE    62 y/o male with h/o HTN, insulin dependent DM-2, hld, glaucoma, CKD3 with recent interstitial nephritis, HFrEF, recent NSTEMI, PAD s/p RLE angioplasty, s/p right 2nd toe partial amputation at [facility] in April 2021, who presents with 2 week history of worsening right foot pain with plans for 2nd and 3rd toe ray amps.      Plan:   - Plan for Angiogram on Thursday 7/28  - Please make NPO at midnight, order full set of labs (CBC, BMP, T&S x2, and PT/PTT/INR on 7/28)  - Cardiology optimizing patient for procedure; cardiac clearance obtained as noted on 7/26 note: "optimized to proceed with lower extremity angiogram and toe amputation for osteomyelitis from cardiac standpoint"  - please appreciate nephrology recommendations: "In preparation for angio with contrast start Isotonic IV fluids 70 cc/hr 6 hours prior and continue to 6 hours post procedure."  - consent to be obtained from patient    C team, [id number]

## 2021-07-29 LAB
ANION GAP SERPL CALC-SCNC: 19 MMOL/L — HIGH (ref 7–14)
APTT BLD: 32.5 SEC — SIGNIFICANT CHANGE UP (ref 27–36.3)
BLD GP AB SCN SERPL QL: NEGATIVE — SIGNIFICANT CHANGE UP
BUN SERPL-MCNC: 74 MG/DL — HIGH (ref 7–23)
CALCIUM SERPL-MCNC: 9.5 MG/DL — SIGNIFICANT CHANGE UP (ref 8.4–10.5)
CHLORIDE SERPL-SCNC: 92 MMOL/L — LOW (ref 98–107)
CO2 SERPL-SCNC: 22 MMOL/L — SIGNIFICANT CHANGE UP (ref 22–31)
CREAT SERPL-MCNC: 2.43 MG/DL — HIGH (ref 0.5–1.3)
GLUCOSE SERPL-MCNC: 277 MG/DL — HIGH (ref 70–99)
HCT VFR BLD CALC: 32.4 % — LOW (ref 39–50)
HGB BLD-MCNC: 10.5 G/DL — LOW (ref 13–17)
INR BLD: 1.24 RATIO — HIGH (ref 0.88–1.16)
MAGNESIUM SERPL-MCNC: 2.2 MG/DL — SIGNIFICANT CHANGE UP (ref 1.6–2.6)
MCHC RBC-ENTMCNC: 26.3 PG — LOW (ref 27–34)
MCHC RBC-ENTMCNC: 32.4 GM/DL — SIGNIFICANT CHANGE UP (ref 32–36)
MCV RBC AUTO: 81.2 FL — SIGNIFICANT CHANGE UP (ref 80–100)
NRBC # BLD: 0 /100 WBCS — SIGNIFICANT CHANGE UP
NRBC # FLD: 0 K/UL — SIGNIFICANT CHANGE UP
PHOSPHATE SERPL-MCNC: 5.1 MG/DL — HIGH (ref 2.5–4.5)
PLATELET # BLD AUTO: 490 K/UL — HIGH (ref 150–400)
POTASSIUM SERPL-MCNC: 4.4 MMOL/L — SIGNIFICANT CHANGE UP (ref 3.5–5.3)
POTASSIUM SERPL-SCNC: 4.4 MMOL/L — SIGNIFICANT CHANGE UP (ref 3.5–5.3)
PROTHROM AB SERPL-ACNC: 14.1 SEC — HIGH (ref 10.6–13.6)
RBC # BLD: 3.99 M/UL — LOW (ref 4.2–5.8)
RBC # FLD: 12.7 % — SIGNIFICANT CHANGE UP (ref 10.3–14.5)
RH IG SCN BLD-IMP: POSITIVE — SIGNIFICANT CHANGE UP
SODIUM SERPL-SCNC: 133 MMOL/L — LOW (ref 135–145)
WBC # BLD: 13.17 K/UL — HIGH (ref 3.8–10.5)
WBC # FLD AUTO: 13.17 K/UL — HIGH (ref 3.8–10.5)

## 2021-07-29 PROCEDURE — 37228: CPT

## 2021-07-29 PROCEDURE — 99152 MOD SED SAME PHYS/QHP 5/>YRS: CPT

## 2021-07-29 PROCEDURE — 99232 SBSQ HOSP IP/OBS MODERATE 35: CPT

## 2021-07-29 PROCEDURE — 76937 US GUIDE VASCULAR ACCESS: CPT | Mod: 26

## 2021-07-29 PROCEDURE — 99233 SBSQ HOSP IP/OBS HIGH 50: CPT

## 2021-07-29 PROCEDURE — 99232 SBSQ HOSP IP/OBS MODERATE 35: CPT | Mod: GC

## 2021-07-29 RX ORDER — SODIUM CHLORIDE 9 MG/ML
1000 INJECTION INTRAMUSCULAR; INTRAVENOUS; SUBCUTANEOUS
Refills: 0 | Status: DISCONTINUED | OUTPATIENT
Start: 2021-07-29 | End: 2021-07-29

## 2021-07-29 RX ORDER — SODIUM CHLORIDE 9 MG/ML
1000 INJECTION INTRAMUSCULAR; INTRAVENOUS; SUBCUTANEOUS
Refills: 0 | Status: DISCONTINUED | OUTPATIENT
Start: 2021-07-29 | End: 2021-08-04

## 2021-07-29 RX ORDER — TRAMADOL HYDROCHLORIDE 50 MG/1
25 TABLET ORAL EVERY 6 HOURS
Refills: 0 | Status: DISCONTINUED | OUTPATIENT
Start: 2021-07-29 | End: 2021-08-01

## 2021-07-29 RX ORDER — HEPARIN SODIUM 5000 [USP'U]/ML
5000 INJECTION INTRAVENOUS; SUBCUTANEOUS EVERY 8 HOURS
Refills: 0 | Status: DISCONTINUED | OUTPATIENT
Start: 2021-07-29 | End: 2021-08-02

## 2021-07-29 RX ADMIN — Medication 50 MILLIGRAM(S): at 05:11

## 2021-07-29 RX ADMIN — ISOSORBIDE DINITRATE 20 MILLIGRAM(S): 5 TABLET ORAL at 21:01

## 2021-07-29 RX ADMIN — Medication 3: at 17:57

## 2021-07-29 RX ADMIN — LATANOPROST 1 DROP(S): 0.05 SOLUTION/ DROPS OPHTHALMIC; TOPICAL at 21:05

## 2021-07-29 RX ADMIN — ISOSORBIDE DINITRATE 20 MILLIGRAM(S): 5 TABLET ORAL at 14:46

## 2021-07-29 RX ADMIN — SODIUM CHLORIDE 75 MILLILITER(S): 9 INJECTION INTRAMUSCULAR; INTRAVENOUS; SUBCUTANEOUS at 01:34

## 2021-07-29 RX ADMIN — Medication 81 MILLIGRAM(S): at 14:47

## 2021-07-29 RX ADMIN — Medication 50 MILLIGRAM(S): at 14:47

## 2021-07-29 RX ADMIN — TRAMADOL HYDROCHLORIDE 25 MILLIGRAM(S): 50 TABLET ORAL at 20:55

## 2021-07-29 RX ADMIN — Medication 650 MILLIGRAM(S): at 18:55

## 2021-07-29 RX ADMIN — Medication 50 MILLIGRAM(S): at 21:01

## 2021-07-29 RX ADMIN — BUMETANIDE 4 MILLIGRAM(S): 0.25 INJECTION INTRAMUSCULAR; INTRAVENOUS at 05:11

## 2021-07-29 RX ADMIN — Medication 3: at 13:05

## 2021-07-29 RX ADMIN — TRAMADOL HYDROCHLORIDE 25 MILLIGRAM(S): 50 TABLET ORAL at 21:55

## 2021-07-29 RX ADMIN — INSULIN GLARGINE 40 UNIT(S): 100 INJECTION, SOLUTION SUBCUTANEOUS at 21:05

## 2021-07-29 RX ADMIN — Medication 3: at 08:56

## 2021-07-29 RX ADMIN — ATORVASTATIN CALCIUM 40 MILLIGRAM(S): 80 TABLET, FILM COATED ORAL at 21:00

## 2021-07-29 RX ADMIN — Medication 650 MILLIGRAM(S): at 17:55

## 2021-07-29 RX ADMIN — ERTAPENEM SODIUM 100 MILLIGRAM(S): 1 INJECTION, POWDER, LYOPHILIZED, FOR SOLUTION INTRAMUSCULAR; INTRAVENOUS at 17:53

## 2021-07-29 RX ADMIN — Medication 8 UNIT(S): at 17:56

## 2021-07-29 RX ADMIN — Medication 30 MILLIGRAM(S): at 05:10

## 2021-07-29 RX ADMIN — BUMETANIDE 4 MILLIGRAM(S): 0.25 INJECTION INTRAMUSCULAR; INTRAVENOUS at 17:52

## 2021-07-29 RX ADMIN — SENNA PLUS 2 TABLET(S): 8.6 TABLET ORAL at 21:01

## 2021-07-29 RX ADMIN — SODIUM CHLORIDE 75 MILLILITER(S): 9 INJECTION INTRAMUSCULAR; INTRAVENOUS; SUBCUTANEOUS at 13:01

## 2021-07-29 RX ADMIN — CARVEDILOL PHOSPHATE 6.25 MILLIGRAM(S): 80 CAPSULE, EXTENDED RELEASE ORAL at 05:11

## 2021-07-29 RX ADMIN — CARVEDILOL PHOSPHATE 6.25 MILLIGRAM(S): 80 CAPSULE, EXTENDED RELEASE ORAL at 17:52

## 2021-07-29 RX ADMIN — Medication 3 MILLIGRAM(S): at 21:01

## 2021-07-29 RX ADMIN — ISOSORBIDE DINITRATE 20 MILLIGRAM(S): 5 TABLET ORAL at 05:15

## 2021-07-29 NOTE — PROGRESS NOTE ADULT - ASSESSMENT
61 year old with h/o PVD and DM , with prior toe amputation presents with increased discoloration to his right second and third toe.   Appearance of dry gangrene and possible associated abscess.    1) Right foot toe ulcers with imaging suggestive of OM    Cx with enterobacter, s. maltophilia, group b strep    Continue ertapenem/ levaquin    2) PAD  Angiogram  Podiatry planning for possible surgery based on vascular eval     2) Leukocytosis  Blood cultures without growth  Likely related to gangrene and associated abscess    3) DM  Close monitoring and control of glucose    I will be away 7/30 through 8/8.   My collegaues will follow. Please call  [phone] with questions

## 2021-07-29 NOTE — PROGRESS NOTE ADULT - SUBJECTIVE AND OBJECTIVE BOX
SURGERY DAILY PROGRESS NOTE:     SUBJECTIVE/ROS: Patient feels well. Plan for RLE angio today.      MEDICATIONS  (STANDING):  aspirin enteric coated 81 milliGRAM(s) Oral daily  atorvastatin 40 milliGRAM(s) Oral at bedtime  buMETAnide 4 milliGRAM(s) Oral every 12 hours  carvedilol 6.25 milliGRAM(s) Oral every 12 hours  dextrose 40% Gel 15 Gram(s) Oral once  dextrose 5%. 1000 milliLiter(s) (50 mL/Hr) IV Continuous <Continuous>  dextrose 5%. 1000 milliLiter(s) (100 mL/Hr) IV Continuous <Continuous>  dextrose 50% Injectable 25 Gram(s) IV Push once  dextrose 50% Injectable 12.5 Gram(s) IV Push once  dextrose 50% Injectable 25 Gram(s) IV Push once  ertapenem  IVPB 500 milliGRAM(s) IV Intermittent every 24 hours  glucagon  Injectable 1 milliGRAM(s) IntraMuscular once  hydrALAZINE 50 milliGRAM(s) Oral three times a day  insulin glargine Injectable (LANTUS) 40 Unit(s) SubCutaneous at bedtime  insulin lispro (ADMELOG) corrective regimen sliding scale   SubCutaneous three times a day before meals  insulin lispro Injectable (ADMELOG) 8 Unit(s) SubCutaneous three times a day before meals  isosorbide   dinitrate Tablet (ISORDIL) 20 milliGRAM(s) Oral three times a day  latanoprost 0.005% Ophthalmic Solution 1 Drop(s) Both EYES at bedtime  levoFLOXacin IVPB 250 milliGRAM(s) IV Intermittent every 24 hours  levoFLOXacin IVPB      melatonin 3 milliGRAM(s) Oral at bedtime  NIFEdipine XL 30 milliGRAM(s) Oral daily  polyethylene glycol 3350 17 Gram(s) Oral daily  senna 2 Tablet(s) Oral at bedtime  sodium chloride 0.9%. 1000 milliLiter(s) (75 mL/Hr) IV Continuous <Continuous>    MEDICATIONS  (PRN):  acetaminophen   Tablet .. 650 milliGRAM(s) Oral every 6 hours PRN Temp greater or equal to 38.5C (101.3F), Mild Pain (1 - 3)      OBJECTIVE:    Vital Signs Last 24 Hrs  T(C): 36.8 (29 Jul 2021 05:00), Max: 37 (28 Jul 2021 22:50)  T(F): 98.3 (29 Jul 2021 05:00), Max: 98.6 (28 Jul 2021 22:50)  HR: 65 (29 Jul 2021 05:00) (65 - 72)  BP: 138/69 (29 Jul 2021 05:00) (134/65 - 146/67)  BP(mean): --  RR: 18 (29 Jul 2021 05:00) (16 - 18)  SpO2: 98% (29 Jul 2021 05:00) (98% - 100%)    I&O's Detail    28 Jul 2021 07:01  -  29 Jul 2021 07:00  --------------------------------------------------------  IN:    Oral Fluid: 748 mL  Total IN: 748 mL    OUT:    Voided (mL): 3360 mL  Total OUT: 3360 mL    Total NET: -2612 mL          Daily     Daily     LABS:                        10.5   13.17 )-----------( 490      ( 29 Jul 2021 05:12 )             32.4     07-29    133<L>  |  92<L>  |  74<H>  ----------------------------<  277<H>  4.4   |  22  |  2.43<H>    Ca    9.5      29 Jul 2021 05:12  Phos  5.1     07-29  Mg     2.20     07-29    TPro  7.8  /  Alb  3.3  /  TBili  0.3  /  DBili  x   /  AST  22  /  ALT  30  /  AlkPhos  267<H>  07-28    PT/INR - ( 29 Jul 2021 05:12 )   PT: 14.1 sec;   INR: 1.24 ratio         PTT - ( 29 Jul 2021 05:12 )  PTT:32.5 sec      Physical Exam:  General: NAD, resting comfortably in bed  Pulmonary: Nonlabored breathing, no respiratory distress  Cardiovascular: NSR  Abdominal: soft, NT/ND  Extremities: WWP, RLE wrapper, palpable DP on LLE    62 y/o male with h/o HTN, insulin dependent DM-2, hld, glaucoma, CKD3 with recent interstitial nephritis, HFrEF, recent NSTEMI, PAD s/p RLE angioplasty, s/p right 2nd toe partial amputation at [facility] in April 2021, who presents with 2 week history of worsening right foot pain with plans for 2nd and 3rd toe ray amps.      Plan:   - OR today  - consent in chart  - nephrology recommendations appreciated  - medically cleared    C team, [id number] SURGERY DAILY PROGRESS NOTE:     SUBJECTIVE/ROS: Patient feels well. Plan for RLE angio today.      MEDICATIONS  (STANDING):  aspirin enteric coated 81 milliGRAM(s) Oral daily  atorvastatin 40 milliGRAM(s) Oral at bedtime  buMETAnide 4 milliGRAM(s) Oral every 12 hours  carvedilol 6.25 milliGRAM(s) Oral every 12 hours  dextrose 40% Gel 15 Gram(s) Oral once  dextrose 5%. 1000 milliLiter(s) (50 mL/Hr) IV Continuous <Continuous>  dextrose 5%. 1000 milliLiter(s) (100 mL/Hr) IV Continuous <Continuous>  dextrose 50% Injectable 25 Gram(s) IV Push once  dextrose 50% Injectable 12.5 Gram(s) IV Push once  dextrose 50% Injectable 25 Gram(s) IV Push once  ertapenem  IVPB 500 milliGRAM(s) IV Intermittent every 24 hours  glucagon  Injectable 1 milliGRAM(s) IntraMuscular once  hydrALAZINE 50 milliGRAM(s) Oral three times a day  insulin glargine Injectable (LANTUS) 40 Unit(s) SubCutaneous at bedtime  insulin lispro (ADMELOG) corrective regimen sliding scale   SubCutaneous three times a day before meals  insulin lispro Injectable (ADMELOG) 8 Unit(s) SubCutaneous three times a day before meals  isosorbide   dinitrate Tablet (ISORDIL) 20 milliGRAM(s) Oral three times a day  latanoprost 0.005% Ophthalmic Solution 1 Drop(s) Both EYES at bedtime  levoFLOXacin IVPB 250 milliGRAM(s) IV Intermittent every 24 hours  levoFLOXacin IVPB      melatonin 3 milliGRAM(s) Oral at bedtime  NIFEdipine XL 30 milliGRAM(s) Oral daily  polyethylene glycol 3350 17 Gram(s) Oral daily  senna 2 Tablet(s) Oral at bedtime  sodium chloride 0.9%. 1000 milliLiter(s) (75 mL/Hr) IV Continuous <Continuous>    MEDICATIONS  (PRN):  acetaminophen   Tablet .. 650 milliGRAM(s) Oral every 6 hours PRN Temp greater or equal to 38.5C (101.3F), Mild Pain (1 - 3)      OBJECTIVE:    Vital Signs Last 24 Hrs  T(C): 36.8 (29 Jul 2021 05:00), Max: 37 (28 Jul 2021 22:50)  T(F): 98.3 (29 Jul 2021 05:00), Max: 98.6 (28 Jul 2021 22:50)  HR: 65 (29 Jul 2021 05:00) (65 - 72)  BP: 138/69 (29 Jul 2021 05:00) (134/65 - 146/67)  BP(mean): --  RR: 18 (29 Jul 2021 05:00) (16 - 18)  SpO2: 98% (29 Jul 2021 05:00) (98% - 100%)    I&O's Detail    28 Jul 2021 07:01  -  29 Jul 2021 07:00  --------------------------------------------------------  IN:    Oral Fluid: 748 mL  Total IN: 748 mL    OUT:    Voided (mL): 3360 mL  Total OUT: 3360 mL    Total NET: -2612 mL          Daily     Daily     LABS:                        10.5   13.17 )-----------( 490      ( 29 Jul 2021 05:12 )             32.4     07-29    133<L>  |  92<L>  |  74<H>  ----------------------------<  277<H>  4.4   |  22  |  2.43<H>    Ca    9.5      29 Jul 2021 05:12  Phos  5.1     07-29  Mg     2.20     07-29    TPro  7.8  /  Alb  3.3  /  TBili  0.3  /  DBili  x   /  AST  22  /  ALT  30  /  AlkPhos  267<H>  07-28    PT/INR - ( 29 Jul 2021 05:12 )   PT: 14.1 sec;   INR: 1.24 ratio         PTT - ( 29 Jul 2021 05:12 )  PTT:32.5 sec      Physical Exam:  General: NAD, resting comfortably in bed  Pulmonary: Nonlabored breathing, no respiratory distress  Cardiovascular: NSR  Abdominal: soft, NT/ND  Extremities: WWP, RLE wrapper, palpable DP on LLE    62 y/o male with h/o HTN, insulin dependent DM-2, hld, glaucoma, CKD3 with recent interstitial nephritis, HFrEF, recent NSTEMI, PAD s/p RLE angioplasty, s/p right 2nd toe partial amputation at [facility] in April 2021, who presents with 2 week history of worsening right foot pain with plans for 2nd and 3rd toe ray amps.      Plan:   - OR today  - consent in chart  - nephrology recommendations appreciated  - medically cleared    C team, [id number]

## 2021-07-29 NOTE — PROGRESS NOTE ADULT - PROBLEM SELECTOR PLAN 1
Pt. with ACOSTA on CKD in the setting of venous congestion. pt. hx of AIN treated with steroids.   - Scr on admission 1.77.   - Scr increases to 3.93 on 7/21/21.   - Renal sonogram done on 7/21/21 showed no HDN.   - Lupillo < 20, Urine osmol 304  - BNP: 18284  - Currently on Bumex 4mg BID  - In preparation for angio with contrast start Isotonic IV fluids 70 cc/hr 6 hours prior and continue to 6 hours post procedure.   - Monitor I/O   - Dose medications per eGFR Pt. with ACOSTA on CKD in the setting of venous congestion. pt. hx of AIN treated with steroids.   - Scr on admission 1.77.   - Scr increases to 3.93 on 7/21/21.   - Renal sonogram done on 7/21/21 showed no HDN.   - Lupillo < 20, Urine osmol 304  - BNP: 63814  - Currently on Bumex 4mg BID  - Monitor I/O   - Dose medications per eGFR

## 2021-07-29 NOTE — PROGRESS NOTE ADULT - PROBLEM SELECTOR PLAN 3
-pt with h/o right foot 2nd toe partial amputation in April at [facility], h/o severe RLE PVD with RLE angioplasty in past, no stent, now p/w 2nd residual toe/stump and 3rd toe infection, xray concerning for osteomyelitis, elevated inflammatory markers ESR 96, CRP 80.6 c/w acute OM. rec'd IV vanc 1 g bid (7/17 pm - 7/20), zoxyn (7/18-7/20)  -7/18 blood cultures x2  -ID c/s start erta and levaquin (7/20 - )  -MRI right foot: osteomyelitis in 2nd toe, periostitis w/o osteo on 3rd toe  -podiatry consulted, s/p I&D 7/17. plan for RF P2RR, P3RR . pt is medically optimized for the procedure  - vascular consult: s/p angiogram today- no sig stenosis

## 2021-07-29 NOTE — PROGRESS NOTE ADULT - SUBJECTIVE AND OBJECTIVE BOX
[facility] DIVISION OF KIDNEY DISEASES AND HYPERTENSION -- FOLLOW UP NOTE  --------------------------------------------------------------------------------  Chief Complaint:    24 hour events/subjective:      REVIEW OF SYSTEMS  --------------------------------------------------------------------------------  Gen: No fevers/chills, weakness  Skin: No rashes  Head/Eyes/Ears/Mouth: No headache; No hearing changes, mucous discharge, vision changes  Respiratory: No dyspnea, cough, wheezing  CV: No chest pain, palpitations  GI: No abdominal pain, diarrhea, constipation, nausea, vomiting, melena, hematochezia  : No increased frequency, dysuria, hematuria  MSK: No joint pain/swelling; no back pain; no edema  Neuro: No dizziness/lightheadedness, weakness, seizures, numbness  Heme: No easy bruising or bleeding    All other systems were reviewed and are negative, except as noted.    PAST HISTORY  --------------------------------------------------------------------------------  No significant changes to PMH, PSH, FHx, SHx, unless otherwise noted    ALLERGIES & MEDICATIONS  --------------------------------------------------------------------------------  Allergies    cefepime (Other)    Intolerances      Standing Inpatient Medications  aspirin enteric coated 81 milliGRAM(s) Oral daily  atorvastatin 40 milliGRAM(s) Oral at bedtime  buMETAnide 4 milliGRAM(s) Oral every 12 hours  carvedilol 6.25 milliGRAM(s) Oral every 12 hours  dextrose 40% Gel 15 Gram(s) Oral once  dextrose 5%. 1000 milliLiter(s) IV Continuous <Continuous>  dextrose 5%. 1000 milliLiter(s) IV Continuous <Continuous>  dextrose 50% Injectable 25 Gram(s) IV Push once  dextrose 50% Injectable 12.5 Gram(s) IV Push once  dextrose 50% Injectable 25 Gram(s) IV Push once  ertapenem  IVPB 500 milliGRAM(s) IV Intermittent every 24 hours  glucagon  Injectable 1 milliGRAM(s) IntraMuscular once  hydrALAZINE 50 milliGRAM(s) Oral three times a day  insulin glargine Injectable (LANTUS) 40 Unit(s) SubCutaneous at bedtime  insulin lispro (ADMELOG) corrective regimen sliding scale   SubCutaneous three times a day before meals  insulin lispro Injectable (ADMELOG) 8 Unit(s) SubCutaneous three times a day before meals  isosorbide   dinitrate Tablet (ISORDIL) 20 milliGRAM(s) Oral three times a day  latanoprost 0.005% Ophthalmic Solution 1 Drop(s) Both EYES at bedtime  levoFLOXacin IVPB 250 milliGRAM(s) IV Intermittent every 24 hours  levoFLOXacin IVPB      melatonin 3 milliGRAM(s) Oral at bedtime  NIFEdipine XL 30 milliGRAM(s) Oral daily  polyethylene glycol 3350 17 Gram(s) Oral daily  senna 2 Tablet(s) Oral at bedtime  sodium chloride 0.9%. 1000 milliLiter(s) IV Continuous <Continuous>    PRN Inpatient Medications  acetaminophen   Tablet .. 650 milliGRAM(s) Oral every 6 hours PRN        VITALS/PHYSICAL EXAM  --------------------------------------------------------------------------------  T(C): 36.8 (07-29-21 @ 05:00), Max: 37 (07-28-21 @ 22:50)  HR: 65 (07-29-21 @ 05:00) (65 - 72)  BP: 138/69 (07-29-21 @ 05:00) (134/65 - 146/67)  RR: 18 (07-29-21 @ 05:00) (16 - 18)  SpO2: 98% (07-29-21 @ 05:00) (98% - 100%)  Wt(kg): --        07-28-21 @ 07:01  -  07-29-21 @ 07:00  --------------------------------------------------------  IN: 748 mL / OUT: 3360 mL / NET: -2612 mL      Physical Exam:  	Gen: NAD, well-appearing  	HEENT: PERRL, supple neck, clear oropharynx  	Pulm: CTA B/L  	CV: RRR, S1S2; no rub  	Back: No spinal or CVA tenderness; no sacral edema  	Abd: +BS, soft, nontender/nondistended  	: No suprapubic tenderness  	UE: Warm, FROM, no clubbing, intact strength; no edema; no asterixis  	LE: Warm, FROM, no clubbing, intact strength; no edema  	Neuro: No focal deficits, intact gait  	Psych: Normal affect and mood  	Skin: Warm, without rashes  	Vascular access:    LABS/STUDIES  --------------------------------------------------------------------------------              10.5   13.17 >-----------<  490      [07-29-21 @ 05:12]              32.4     133  |  92  |  74  ----------------------------<  277      [07-29-21 @ 05:12]  4.4   |  22  |  2.43        Ca     9.5     [07-29-21 @ 05:12]      Mg     2.20     [07-29-21 @ 05:12]      Phos  5.1     [07-29-21 @ 05:12]    TPro  7.8  /  Alb  3.3  /  TBili  0.3  /  DBili  x   /  AST  22  /  ALT  30  /  AlkPhos  267  [07-28-21 @ 06:36]    PT/INR: PT 14.1 , INR 1.24       [07-29-21 @ 05:12]  PTT: 32.5       [07-29-21 @ 05:12]      Creatinine Trend:  SCr 2.43 [07-29 @ 05:12]  SCr 2.34 [07-28 @ 06:36]  SCr 2.29 [07-27 @ 06:46]  SCr 2.39 [07-26 @ 06:24]  SCr 2.83 [07-25 @ 06:45]    Urinalysis - [07-21-21 @ 17:25]      Color Yellow / Appearance Clear / SG 1.016 / pH 5.5      Gluc Negative / Ketone Negative  / Bili Negative / Urobili <2 mg/dL       Blood Negative / Protein Trace / Leuk Est Negative / Nitrite Negative      RBC 6 / WBC 2 / Hyaline 2 / Gran  / Sq Epi  / Non Sq Epi 1 / Bacteria Negative      TSH 1.58      [04-23-21 @ 04:36]  Lipid: chol 116, , HDL 26, LDL --      [07-19-21 @ 06:42]    HCV 0.12, Nonreact      [07-19-21 @ 09:49]    C3 Complement 160      [07-21-21 @ 07:42]  C4 Complement 58      [07-21-21 @ 07:42]  ANCA: cANCA Negative, pANCA Negative, atypical ANCA Negative      [07-21-21 @ 10:34]   [facility] DIVISION OF KIDNEY DISEASES AND HYPERTENSION -- FOLLOW UP NOTE  --------------------------------------------------------------------------------  61M with history of HTN, T2DM, HLD, glaucoma, CKD with recent AIN (follows with Dr. Zambrano) treated with steroid, HFrEF, recent NSTEMI, PAD s/p RLE angioplasty, s/p right 2nd toe patient amputation at [facility] presents to [facility] with worsening foot pain and associated right 203 toe infection. Recent RLE angiogram revealed severe atherosclerosis. Nephrology consulted for ACOSTA amidst rising creatinine levels and continued malaise.      Pt. seen and examined at bedside. Just had angiogram and does not endorse SOB, CP, n/v/d.     REVIEW OF SYSTEMS  --------------------------------------------------------------------------------  Gen: No fevers/chills, weakness  Skin: No rashes  Head/Eyes/Ears/Mouth: No headache; No hearing changes, mucous discharge, vision changes  Respiratory: No dyspnea, cough, wheezing  CV: No chest pain, palpitations  GI: No abdominal pain, diarrhea, constipation, nausea, vomiting, melena, hematochezia  : No increased frequency, dysuria, hematuria  MSK: No joint pain/swelling; no back pain; no edema  Neuro: No dizziness/lightheadedness, weakness, seizures, numbness  Heme: No easy bruising or bleeding    All other systems were reviewed and are negative, except as noted.    PAST HISTORY  --------------------------------------------------------------------------------  No significant changes to PMH, PSH, FHx, SHx, unless otherwise noted    ALLERGIES & MEDICATIONS  --------------------------------------------------------------------------------  Allergies    cefepime (Other)    Intolerances      Standing Inpatient Medications  aspirin enteric coated 81 milliGRAM(s) Oral daily  atorvastatin 40 milliGRAM(s) Oral at bedtime  buMETAnide 4 milliGRAM(s) Oral every 12 hours  carvedilol 6.25 milliGRAM(s) Oral every 12 hours  dextrose 40% Gel 15 Gram(s) Oral once  dextrose 5%. 1000 milliLiter(s) IV Continuous <Continuous>  dextrose 5%. 1000 milliLiter(s) IV Continuous <Continuous>  dextrose 50% Injectable 25 Gram(s) IV Push once  dextrose 50% Injectable 12.5 Gram(s) IV Push once  dextrose 50% Injectable 25 Gram(s) IV Push once  ertapenem  IVPB 500 milliGRAM(s) IV Intermittent every 24 hours  glucagon  Injectable 1 milliGRAM(s) IntraMuscular once  hydrALAZINE 50 milliGRAM(s) Oral three times a day  insulin glargine Injectable (LANTUS) 40 Unit(s) SubCutaneous at bedtime  insulin lispro (ADMELOG) corrective regimen sliding scale   SubCutaneous three times a day before meals  insulin lispro Injectable (ADMELOG) 8 Unit(s) SubCutaneous three times a day before meals  isosorbide   dinitrate Tablet (ISORDIL) 20 milliGRAM(s) Oral three times a day  latanoprost 0.005% Ophthalmic Solution 1 Drop(s) Both EYES at bedtime  levoFLOXacin IVPB 250 milliGRAM(s) IV Intermittent every 24 hours  levoFLOXacin IVPB      melatonin 3 milliGRAM(s) Oral at bedtime  NIFEdipine XL 30 milliGRAM(s) Oral daily  polyethylene glycol 3350 17 Gram(s) Oral daily  senna 2 Tablet(s) Oral at bedtime  sodium chloride 0.9%. 1000 milliLiter(s) IV Continuous <Continuous>    PRN Inpatient Medications  acetaminophen   Tablet .. 650 milliGRAM(s) Oral every 6 hours PRN        VITALS/PHYSICAL EXAM  --------------------------------------------------------------------------------  T(C): 36.8 (07-29-21 @ 05:00), Max: 37 (07-28-21 @ 22:50)  HR: 65 (07-29-21 @ 05:00) (65 - 72)  BP: 138/69 (07-29-21 @ 05:00) (134/65 - 146/67)  RR: 18 (07-29-21 @ 05:00) (16 - 18)  SpO2: 98% (07-29-21 @ 05:00) (98% - 100%)  Wt(kg): --        07-28-21 @ 07:01  -  07-29-21 @ 07:00  --------------------------------------------------------  IN: 748 mL / OUT: 3360 mL / NET: -2612 mL      Physical Exam:  	Gen: NAD, well-appearing  	HEENT: PERRL, supple neck, clear oropharynx  	Pulm: rales at bilateral bases   	CV: RRR, S1S2; no rub  	Back: No spinal or CVA tenderness; no sacral edema  	Abd: +BS, soft, nontender/nondistended  	: No suprapubic tenderness  	UE: Warm, FROM, no clubbing, intact strength; no edema; no asterixis  	LE: R foot dressing, Warm, FROM, no clubbing, intact strength; no edema  	Neuro: No focal deficits, intact gait  	Psych: Normal affect and mood  	Skin: Warm, without rashes  	Vascular access: peripheral IV canula     LABS/STUDIES  --------------------------------------------------------------------------------              10.5   13.17 >-----------<  490      [07-29-21 @ 05:12]              32.4     133  |  92  |  74  ----------------------------<  277      [07-29-21 @ 05:12]  4.4   |  22  |  2.43        Ca     9.5     [07-29-21 @ 05:12]      Mg     2.20     [07-29-21 @ 05:12]      Phos  5.1     [07-29-21 @ 05:12]    TPro  7.8  /  Alb  3.3  /  TBili  0.3  /  DBili  x   /  AST  22  /  ALT  30  /  AlkPhos  267  [07-28-21 @ 06:36]    PT/INR: PT 14.1 , INR 1.24       [07-29-21 @ 05:12]  PTT: 32.5       [07-29-21 @ 05:12]      Creatinine Trend:  SCr 2.43 [07-29 @ 05:12]  SCr 2.34 [07-28 @ 06:36]  SCr 2.29 [07-27 @ 06:46]  SCr 2.39 [07-26 @ 06:24]  SCr 2.83 [07-25 @ 06:45]    Urinalysis - [07-21-21 @ 17:25]      Color Yellow / Appearance Clear / SG 1.016 / pH 5.5      Gluc Negative / Ketone Negative  / Bili Negative / Urobili <2 mg/dL       Blood Negative / Protein Trace / Leuk Est Negative / Nitrite Negative      RBC 6 / WBC 2 / Hyaline 2 / Gran  / Sq Epi  / Non Sq Epi 1 / Bacteria Negative      TSH 1.58      [04-23-21 @ 04:36]  Lipid: chol 116, , HDL 26, LDL --      [07-19-21 @ 06:42]    HCV 0.12, Nonreact      [07-19-21 @ 09:49]    C3 Complement 160      [07-21-21 @ 07:42]  C4 Complement 58      [07-21-21 @ 07:42]  ANCA: cANCA Negative, pANCA Negative, atypical ANCA Negative      [07-21-21 @ 10:34]

## 2021-07-29 NOTE — PROGRESS NOTE ADULT - SUBJECTIVE AND OBJECTIVE BOX
Follow Up:      Inverval History/ROS:Patient is a 61y old  Male who presents with a chief complaint of right foot 2nd/3rd toe infection (29 Jul 2021 08:45)    Plan for RLE angiogram today.  Eventual plan for podiatry intervention    No fever  No events    Allergies    cefepime (Other)    Intolerances        ANTIMICROBIALS:  ertapenem  IVPB 500 every 24 hours  levoFLOXacin IVPB    levoFLOXacin IVPB 250 every 24 hours      OTHER MEDS:  acetaminophen   Tablet .. 650 milliGRAM(s) Oral every 6 hours PRN  aspirin enteric coated 81 milliGRAM(s) Oral daily  atorvastatin 40 milliGRAM(s) Oral at bedtime  buMETAnide 4 milliGRAM(s) Oral every 12 hours  carvedilol 6.25 milliGRAM(s) Oral every 12 hours  dextrose 40% Gel 15 Gram(s) Oral once  dextrose 5%. 1000 milliLiter(s) IV Continuous <Continuous>  dextrose 5%. 1000 milliLiter(s) IV Continuous <Continuous>  dextrose 50% Injectable 25 Gram(s) IV Push once  dextrose 50% Injectable 12.5 Gram(s) IV Push once  dextrose 50% Injectable 25 Gram(s) IV Push once  glucagon  Injectable 1 milliGRAM(s) IntraMuscular once  hydrALAZINE 50 milliGRAM(s) Oral three times a day  insulin glargine Injectable (LANTUS) 40 Unit(s) SubCutaneous at bedtime  insulin lispro (ADMELOG) corrective regimen sliding scale   SubCutaneous three times a day before meals  insulin lispro Injectable (ADMELOG) 8 Unit(s) SubCutaneous three times a day before meals  isosorbide   dinitrate Tablet (ISORDIL) 20 milliGRAM(s) Oral three times a day  latanoprost 0.005% Ophthalmic Solution 1 Drop(s) Both EYES at bedtime  melatonin 3 milliGRAM(s) Oral at bedtime  NIFEdipine XL 30 milliGRAM(s) Oral daily  polyethylene glycol 3350 17 Gram(s) Oral daily  senna 2 Tablet(s) Oral at bedtime  sodium chloride 0.9%. 1000 milliLiter(s) IV Continuous <Continuous>      Vital Signs Last 24 Hrs  T(C): 36.8 (29 Jul 2021 05:00), Max: 37 (28 Jul 2021 22:50)  T(F): 98.3 (29 Jul 2021 05:00), Max: 98.6 (28 Jul 2021 22:50)  HR: 65 (29 Jul 2021 05:00) (65 - 72)  BP: 138/69 (29 Jul 2021 05:00) (134/65 - 146/67)  BP(mean): --  RR: 18 (29 Jul 2021 05:00) (16 - 18)  SpO2: 98% (29 Jul 2021 05:00) (98% - 100%)    PHYSICAL EXAM:  General: [x ] non-toxic  HEAD/EYES: [ ] PERRL [x ] white sclera [ ] icterus  ENT:  [ ] normal [ ] supple [ ] thrush [ ] pharyngeal exudate  Cardiovascular:   [ ] murmur x[ ] normal [ ] PPM/AICD  Respiratory:  [x ] clear to ausculation bilaterally  GI:  [x ] soft, non-tender, normal bowel sounds  :  [ ] nixon x[ ] no CVA tenderness   Musculoskeletal:  [ ] no synovitis  Neurologic:  [ ] non-focal exam   Skin:  [x ] right second and third toe with discoloration cw gangrene  Lymph: [ ]x no lymphadenopathy  Psychiatric:  [ ] appropriate affect [x ] alert & oriented  Lines:  [ x] no phlebitis [ ] central line                                10.5   13.17 )-----------( 490      ( 29 Jul 2021 05:12 )             32.4       07-29    133<L>  |  92<L>  |  74<H>  ----------------------------<  277<H>  4.4   |  22  |  2.43<H>    Ca    9.5      29 Jul 2021 05:12  Phos  5.1     07-29  Mg     2.20     07-29    TPro  7.8  /  Alb  3.3  /  TBili  0.3  /  DBili  x   /  AST  22  /  ALT  30  /  AlkPhos  267<H>  07-28          MICROBIOLOGY:    RADIOLOGY:

## 2021-07-29 NOTE — PROGRESS NOTE ADULT - SUBJECTIVE AND OBJECTIVE BOX
[facility] Division of Hospital Medicine  Eder Sheridan MD  Pager [phone]      Patient is a 61y old  Male who presents with a chief complaint of right foot 2nd/3rd toe infection (29 Jul 2021 10:11)      SUBJECTIVE / OVERNIGHT EVENTS:    No acute event o/n. Pt denies chest pain, sob     ADDITIONAL REVIEW OF SYSTEMS:    RESPIRATORY: No cough, wheezing, chills or hemoptysis; No shortness of breath  CARDIOVASCULAR: No chest pain, palpitations, dizziness, or leg swelling  GASTROINTESTINAL: No abdominal or epigastric pain. No nausea, vomiting, or hematemesis; No diarrhea or constipation. No melena or hematochezia.      MEDICATIONS  (STANDING):  aspirin enteric coated 81 milliGRAM(s) Oral daily  atorvastatin 40 milliGRAM(s) Oral at bedtime  buMETAnide 4 milliGRAM(s) Oral every 12 hours  carvedilol 6.25 milliGRAM(s) Oral every 12 hours  dextrose 40% Gel 15 Gram(s) Oral once  dextrose 5%. 1000 milliLiter(s) (50 mL/Hr) IV Continuous <Continuous>  dextrose 5%. 1000 milliLiter(s) (100 mL/Hr) IV Continuous <Continuous>  dextrose 50% Injectable 25 Gram(s) IV Push once  dextrose 50% Injectable 12.5 Gram(s) IV Push once  dextrose 50% Injectable 25 Gram(s) IV Push once  ertapenem  IVPB 500 milliGRAM(s) IV Intermittent every 24 hours  glucagon  Injectable 1 milliGRAM(s) IntraMuscular once  hydrALAZINE 50 milliGRAM(s) Oral three times a day  insulin glargine Injectable (LANTUS) 40 Unit(s) SubCutaneous at bedtime  insulin lispro (ADMELOG) corrective regimen sliding scale   SubCutaneous three times a day before meals  insulin lispro Injectable (ADMELOG) 8 Unit(s) SubCutaneous three times a day before meals  isosorbide   dinitrate Tablet (ISORDIL) 20 milliGRAM(s) Oral three times a day  latanoprost 0.005% Ophthalmic Solution 1 Drop(s) Both EYES at bedtime  levoFLOXacin IVPB 250 milliGRAM(s) IV Intermittent every 24 hours  levoFLOXacin IVPB      melatonin 3 milliGRAM(s) Oral at bedtime  NIFEdipine XL 30 milliGRAM(s) Oral daily  polyethylene glycol 3350 17 Gram(s) Oral daily  senna 2 Tablet(s) Oral at bedtime  sodium chloride 0.9%. 1000 milliLiter(s) (75 mL/Hr) IV Continuous <Continuous>  sodium chloride 0.9%. 1000 milliLiter(s) (75 mL/Hr) IV Continuous <Continuous>    MEDICATIONS  (PRN):  acetaminophen   Tablet .. 650 milliGRAM(s) Oral every 6 hours PRN Temp greater or equal to 38.5C (101.3F), Mild Pain (1 - 3)      CAPILLARY BLOOD GLUCOSE      POCT Blood Glucose.: 273 mg/dL (29 Jul 2021 12:59)  POCT Blood Glucose.: 255 mg/dL (29 Jul 2021 10:09)  POCT Blood Glucose.: 255 mg/dL (29 Jul 2021 08:35)  POCT Blood Glucose.: 271 mg/dL (29 Jul 2021 05:08)  POCT Blood Glucose.: 231 mg/dL (28 Jul 2021 22:14)  POCT Blood Glucose.: 201 mg/dL (28 Jul 2021 17:18)    I&O's Summary    28 Jul 2021 07:01  -  29 Jul 2021 07:00  --------------------------------------------------------  IN: 748 mL / OUT: 3360 mL / NET: -2612 mL    29 Jul 2021 07:01  -  29 Jul 2021 15:11  --------------------------------------------------------  IN: 0 mL / OUT: 0 mL / NET: 0 mL        PHYSICAL EXAM:  Vital Signs Last 24 Hrs  T(C): 36.8 (29 Jul 2021 05:00), Max: 37 (28 Jul 2021 22:50)  T(F): 98.3 (29 Jul 2021 05:00), Max: 98.6 (28 Jul 2021 22:50)  HR: 65 (29 Jul 2021 05:00) (65 - 72)  BP: 138/69 (29 Jul 2021 05:00) (138/66 - 146/67)  BP(mean): --  RR: 18 (29 Jul 2021 05:00) (16 - 18)  SpO2: 98% (29 Jul 2021 05:00) (98% - 100%)    CONSTITUTIONAL: NAD,  EYES: PERRLA; conjunctiva and sclera clear  ENMT: Moist oral mucosa, no pharyngeal injection or exudates;   NECK: Supple, no palpable masses;  RESPIRATORY: Normal respiratory effort; lungs are clear to auscultation bilaterally  CARDIOVASCULAR: Regular rate and rhythm, normal S1 and S2, no murmur/rub/gallop; No lower extremity edema; Peripheral pulses are 2+ bilaterally  ABDOMEN: Nontender to palpation, normoactive bowel sounds, no rebound/guarding;   MUSCLOSKELETAL:   no clubbing or cyanosis of digits; no joint swelling or tenderness to palpation. R foot dressing c/d/i  PSYCH: A+O to person, place, and time; affect appropriate  NEUROLOGY: CN 2-12 are intact and symmetric; no gross sensory deficits;   SKIN: No rashes;     LABS:                        10.5   13.17 )-----------( 490      ( 29 Jul 2021 05:12 )             32.4     07-29    133<L>  |  92<L>  |  74<H>  ----------------------------<  277<H>  4.4   |  22  |  2.43<H>    Ca    9.5      29 Jul 2021 05:12  Phos  5.1     07-29  Mg     2.20     07-29    TPro  7.8  /  Alb  3.3  /  TBili  0.3  /  DBili  x   /  AST  22  /  ALT  30  /  AlkPhos  267<H>  07-28    PT/INR - ( 29 Jul 2021 05:12 )   PT: 14.1 sec;   INR: 1.24 ratio         PTT - ( 29 Jul 2021 05:12 )  PTT:32.5 sec            RADIOLOGY & ADDITIONAL TESTS:  Results Reviewed:   Imaging Personally Reviewed:  Electrocardiogram Personally Reviewed:    COORDINATION OF CARE:  Care Discussed with Consultants/Other Providers [Y/N]:  Prior or Outpatient Records Reviewed [Y/N]:

## 2021-07-29 NOTE — PROGRESS NOTE ADULT - ASSESSMENT
60 y/o male with h/o HTN, insulin dependent DM-2, hld, glaucoma, CKD3 with recent interstitial nephritis treated with steroid, HFrEF, recent NSTEMI, PAD s/p RLE angioplasty, s/p right 2nd toe partial amputation at [facility] in April 2021, who presents with 2 week history of worsening right foot pain, associated with right 2nd residual toe stump/3rd toe infection/discoloration to blue/black, xray c/f osteomyelitis, plan for surgical intervention this admission per podiatry. Hospital course has been complicated by flash pulmonary edema 2/2 to ACOSTA on CKD. Now he is improving with diuresis

## 2021-07-30 LAB
ALBUMIN SERPL ELPH-MCNC: 3.4 G/DL — SIGNIFICANT CHANGE UP (ref 3.3–5)
ALP SERPL-CCNC: 245 U/L — HIGH (ref 40–120)
ALT FLD-CCNC: 30 U/L — SIGNIFICANT CHANGE UP (ref 4–41)
ANION GAP SERPL CALC-SCNC: 15 MMOL/L — HIGH (ref 7–14)
AST SERPL-CCNC: 22 U/L — SIGNIFICANT CHANGE UP (ref 4–40)
BILIRUB SERPL-MCNC: 0.4 MG/DL — SIGNIFICANT CHANGE UP (ref 0.2–1.2)
BUN SERPL-MCNC: 69 MG/DL — HIGH (ref 7–23)
CALCIUM SERPL-MCNC: 9.7 MG/DL — SIGNIFICANT CHANGE UP (ref 8.4–10.5)
CHLORIDE SERPL-SCNC: 94 MMOL/L — LOW (ref 98–107)
CO2 SERPL-SCNC: 24 MMOL/L — SIGNIFICANT CHANGE UP (ref 22–31)
CREAT SERPL-MCNC: 2.2 MG/DL — HIGH (ref 0.5–1.3)
GLUCOSE SERPL-MCNC: 211 MG/DL — HIGH (ref 70–99)
HCT VFR BLD CALC: 33 % — LOW (ref 39–50)
HGB BLD-MCNC: 10.8 G/DL — LOW (ref 13–17)
MAGNESIUM SERPL-MCNC: 2.1 MG/DL — SIGNIFICANT CHANGE UP (ref 1.6–2.6)
MCHC RBC-ENTMCNC: 26.7 PG — LOW (ref 27–34)
MCHC RBC-ENTMCNC: 32.7 GM/DL — SIGNIFICANT CHANGE UP (ref 32–36)
MCV RBC AUTO: 81.5 FL — SIGNIFICANT CHANGE UP (ref 80–100)
NRBC # BLD: 0 /100 WBCS — SIGNIFICANT CHANGE UP
NRBC # FLD: 0 K/UL — SIGNIFICANT CHANGE UP
PHOSPHATE SERPL-MCNC: 4.8 MG/DL — HIGH (ref 2.5–4.5)
PLATELET # BLD AUTO: 480 K/UL — HIGH (ref 150–400)
POTASSIUM SERPL-MCNC: 3.4 MMOL/L — LOW (ref 3.5–5.3)
POTASSIUM SERPL-SCNC: 3.4 MMOL/L — LOW (ref 3.5–5.3)
PROT SERPL-MCNC: 7.9 G/DL — SIGNIFICANT CHANGE UP (ref 6–8.3)
RBC # BLD: 4.05 M/UL — LOW (ref 4.2–5.8)
RBC # FLD: 12.6 % — SIGNIFICANT CHANGE UP (ref 10.3–14.5)
SODIUM SERPL-SCNC: 133 MMOL/L — LOW (ref 135–145)
WBC # BLD: 12.27 K/UL — HIGH (ref 3.8–10.5)
WBC # FLD AUTO: 12.27 K/UL — HIGH (ref 3.8–10.5)

## 2021-07-30 PROCEDURE — 99233 SBSQ HOSP IP/OBS HIGH 50: CPT

## 2021-07-30 PROCEDURE — 99232 SBSQ HOSP IP/OBS MODERATE 35: CPT | Mod: GC

## 2021-07-30 RX ADMIN — Medication 3 MILLIGRAM(S): at 22:21

## 2021-07-30 RX ADMIN — ISOSORBIDE DINITRATE 20 MILLIGRAM(S): 5 TABLET ORAL at 22:21

## 2021-07-30 RX ADMIN — Medication 81 MILLIGRAM(S): at 13:39

## 2021-07-30 RX ADMIN — ATORVASTATIN CALCIUM 40 MILLIGRAM(S): 80 TABLET, FILM COATED ORAL at 22:21

## 2021-07-30 RX ADMIN — Medication 8 UNIT(S): at 12:53

## 2021-07-30 RX ADMIN — LATANOPROST 1 DROP(S): 0.05 SOLUTION/ DROPS OPHTHALMIC; TOPICAL at 22:21

## 2021-07-30 RX ADMIN — BUMETANIDE 4 MILLIGRAM(S): 0.25 INJECTION INTRAMUSCULAR; INTRAVENOUS at 17:28

## 2021-07-30 RX ADMIN — ERTAPENEM SODIUM 100 MILLIGRAM(S): 1 INJECTION, POWDER, LYOPHILIZED, FOR SOLUTION INTRAMUSCULAR; INTRAVENOUS at 22:22

## 2021-07-30 RX ADMIN — BUMETANIDE 4 MILLIGRAM(S): 0.25 INJECTION INTRAMUSCULAR; INTRAVENOUS at 05:54

## 2021-07-30 RX ADMIN — INSULIN GLARGINE 40 UNIT(S): 100 INJECTION, SOLUTION SUBCUTANEOUS at 22:22

## 2021-07-30 RX ADMIN — POLYETHYLENE GLYCOL 3350 17 GRAM(S): 17 POWDER, FOR SOLUTION ORAL at 13:40

## 2021-07-30 RX ADMIN — Medication 50 MILLIGRAM(S): at 05:53

## 2021-07-30 RX ADMIN — Medication 1: at 09:07

## 2021-07-30 RX ADMIN — TRAMADOL HYDROCHLORIDE 25 MILLIGRAM(S): 50 TABLET ORAL at 03:49

## 2021-07-30 RX ADMIN — TRAMADOL HYDROCHLORIDE 25 MILLIGRAM(S): 50 TABLET ORAL at 10:29

## 2021-07-30 RX ADMIN — Medication 8 UNIT(S): at 09:07

## 2021-07-30 RX ADMIN — TRAMADOL HYDROCHLORIDE 25 MILLIGRAM(S): 50 TABLET ORAL at 04:49

## 2021-07-30 RX ADMIN — TRAMADOL HYDROCHLORIDE 25 MILLIGRAM(S): 50 TABLET ORAL at 11:29

## 2021-07-30 RX ADMIN — CARVEDILOL PHOSPHATE 6.25 MILLIGRAM(S): 80 CAPSULE, EXTENDED RELEASE ORAL at 05:53

## 2021-07-30 RX ADMIN — SENNA PLUS 2 TABLET(S): 8.6 TABLET ORAL at 22:21

## 2021-07-30 RX ADMIN — HEPARIN SODIUM 5000 UNIT(S): 5000 INJECTION INTRAVENOUS; SUBCUTANEOUS at 05:59

## 2021-07-30 RX ADMIN — Medication 8 UNIT(S): at 18:41

## 2021-07-30 RX ADMIN — TRAMADOL HYDROCHLORIDE 25 MILLIGRAM(S): 50 TABLET ORAL at 23:29

## 2021-07-30 RX ADMIN — CARVEDILOL PHOSPHATE 6.25 MILLIGRAM(S): 80 CAPSULE, EXTENDED RELEASE ORAL at 17:27

## 2021-07-30 RX ADMIN — HEPARIN SODIUM 5000 UNIT(S): 5000 INJECTION INTRAVENOUS; SUBCUTANEOUS at 22:21

## 2021-07-30 RX ADMIN — Medication 2: at 12:53

## 2021-07-30 RX ADMIN — ISOSORBIDE DINITRATE 20 MILLIGRAM(S): 5 TABLET ORAL at 13:39

## 2021-07-30 RX ADMIN — Medication 30 MILLIGRAM(S): at 05:53

## 2021-07-30 RX ADMIN — TRAMADOL HYDROCHLORIDE 25 MILLIGRAM(S): 50 TABLET ORAL at 17:27

## 2021-07-30 RX ADMIN — TRAMADOL HYDROCHLORIDE 25 MILLIGRAM(S): 50 TABLET ORAL at 18:27

## 2021-07-30 RX ADMIN — ISOSORBIDE DINITRATE 20 MILLIGRAM(S): 5 TABLET ORAL at 05:54

## 2021-07-30 RX ADMIN — HEPARIN SODIUM 5000 UNIT(S): 5000 INJECTION INTRAVENOUS; SUBCUTANEOUS at 13:39

## 2021-07-30 RX ADMIN — Medication 50 MILLIGRAM(S): at 13:39

## 2021-07-30 RX ADMIN — Medication 50 MILLIGRAM(S): at 22:20

## 2021-07-30 NOTE — PROGRESS NOTE ADULT - ATTENDING COMMENTS
plan angiogram tmw
preop for angio, assuming resolution of yoselyn
Remains volume overloaded  responding to IV bumex  cont bblocker, afterload reducing agents    when volume status improved and without orthopnea he will then likely be optimized from a volume standpoint to undergo his procedure.    He has significant cardiac risk factors and a suspected ACS event in the past year without PCI, however he is currently without symptoms of active cardiac ischemia and is at elevated risk of decompensation without adequate management of his osteomyelitis.
Euvolemic after diuresis. LVEF normal. The patient is at increased risk of complications, but optimized to proceed with angiogram and toe amputation from the cardiac standpoint.
volume status is improving  cont bblocker, afterload reducing agents  likely transition to 2mg PO bumex over the weekend and will then be optimized from a cardiac standpoint to undergo his procedure    He has significant cardiac risk factors and a suspected ACS event in the past year without PCI, however he is currently without symptoms of active cardiac ischemia and is at elevated risk of decompensation without adequate management of his osteomyelitis.
No new complaints  1.  ARF on CKD--non oliguric, improving.  Volume optimization and radiocontrast protection protocol as outline tatiana procedure  2.  Eosinophilia--w/u in progress even as numbers decline  discussed with med team
Remains volume overloaded with significant JVP elevation. Still on supplemental O2  initial brisk UOP to lasix but appears to have slowed down this AM  cont bblocker, afterload reducing agents  change diuresis to bumex 4mg BID for now    when volume status improved and without orthopnea he will then likely be optimized from a volume standpoint to undergo his procedure.    He has significant cardiac risk factors and a suspected ACS event in the past year without PCI, however he is currently without symptoms of active cardiac ischemia and is at elevated risk of decompensation without adequate management of his osteomyelitis.
Less SOB  1.  ARF on CKD-- meds, toxin, infection and cardiorenal component.  NON oliguric, no hD  2.  Volume overload--elevated BNP.  Loop diuretic associated with 1 improvement    discussed with med team
Less SOB and foot discomfort  1.  ARF on CKD--multifactorial acute component.  Resolving radiocontrast, med .  DOUBT substantial AIN with improvement despite lack of steroid use  and persistence of 2  2.  Eosinophilia--check O&P.  Antibiotics ususal suspect and would limit as condition warrants.  Sulfur moiety in diuretic another concern    discussed with med team
No new complaint  1.  ARF on CKD--close to baseline 1.5-2.  NON oliguric, no HD..  Volume, hemodynamic optimize with contrast protection as needed  discussed with med team
No new complaints  1.  SRF on CKD--no HD indications  2.  Eosinophilia--persistent with improved renal function makes AIN less likely.  W/U in progress    discussed with med team
Feeling better.  PVD, s/p amputation  1.  ARF on CKD3 --non oliguric, no HD.  Multifactorial including meds, toxin, infection and 2. Cr ? plateau  2.  Volume overload--associated with pulmonary symptoms.  Diuretic responsive, trend BNP levels and avoid overdiuresis  discussed with med team    discussed with med team
No new complaints.  NO SOB  1.  ARF on CKD--acute component largely resolved.  NON oliguric, no HD.  H/O AIN but renal function improving without steroid therapy.  Major concern would be antibiotics followed by sulfa containing loop diuretics  2.  Eosinophilia-- etiology unclear.  Low yield but worthwhile O&P.      discussed with med team
Seen in am.  NO SOB, on NC O2  1.  ARF--less likely AIN with contrast, med, other considerations.  Volume optimization, check BNP  2.  Eosinophilia--concern for atheroembolic disease although normal complements makes that concern less likely    discussed with med team
61M with DM2, servere PAD presented with R toe infection with concern for OM. MRI pending. vascular/ID cs. Podiatry plan for amputation. ACOSTA this morning, h/o AIN with cefepime. monitor renal function. bladder scan, urine study suggestive of pre-renal. IVF, monitor cr. DM, c/w home insulin regimen, A1c acceptable.
Patient seen and examined by myself , case discussed  with resident ,agree with the above finding and plan  60 y/o male with h/o HTN, insulin dependent DM-2, hld, glaucoma, CKD3 ,  recent interstitial nephritis treated with steroid, HFrEF, recent NSTEMI, PAD s/p RLE angioplasty, s/p right 2nd toe partial amputation at [facility] in April 2021,  presented  with 2 week history of worsening right foot pain, associated with right 2nd residual toe stump/3rd toe infection/discoloration to blue/black, xray c/f osteomyelitis,  s/p I&D by podiatry on 7/17   podiatry eval appreciated, MICHEL/PVR noted, shows   severe small vessel arterial disease in the left and right foot.  c/w Iv vano/zosyn, monitor vanco trough, f/u MRI of rt foot, vascular eval ,f/u  podiatry plan for OR , will request ID eval in am   DM type2 with nephropathy : c/y basal and bolus insulin, ISS, f/u HBa1c   avoid nephrotoxics,  will closely monitor Renal fn   plan of care d/w pt
61M who presented with toe/foot OM, course complicated by acute hypoxic respiratory failure, pulm edema and worsening renal failure. clinical picture suggestive of acute decompensated HF, echo performed yesterday which showed EF60% w/o WMA or significant valvular dz. c/w diuresis and monitor UOP. still have crackles on exam today and require 5L NC. doppler and VQ to rule-out DVT/PE as attributing factor to his acute hypoxia. c/t monitor O2 and tele. cardiology/nephro following. Podiatry and vascular plan for angiogram and amputation next week, pending cardiac optimization. At this time, patient is high RCRI with high risk for cardiac decompensation. cw abx for now. ID following for abx recs.
61M who presented with right foot infection, now complicated by acute hypoxic respiratory failure and acute renal failure (ATN) iso acute decompensated HF. Resp status stable. Cr. continue to uptrend. decreased UOP with increasing diuretics. proBNP still elevated and crackles on lung exam more promenent today. nephrology following. ischemic and surgical treatment of R foot OM on hold given acute cardiac decompensation. appreciated Cardiology input, agree with increase lasix. c/t monitor clinically. surgical intervention pending cardiac optimization.
61M with IDDM, CKD, PAD, CHF, presented with toe infection. MR concerning for OM. vascular and podiatry following, plan for angiogram then determine amputation intervention. now complicated by acute hypoxia required BiPAP overnight. worsening renal function. concern for decompensated HF. respiratory improved with diuretics. c/w diuresis, monitor renal function. UOP. transfer to tele floor for closer monitoring. cardiology following, renal cs. will need further optimization prior to vascular/podiatry procedure.
61M with right foot OM require amputation and vascular intervention. His course is complicated by acute hypoxic respiratory failure and acute ACOSTA on CKD. He remain fluid overloaded with crackles on lung exam. UOP is low despite on high dose Bumex IVP. Cr is plateaued. nephrology and cards following with recs. His echo showed nl EF w/o WMA or significant valvular dz, VQ and doppler was negative for VTE. c/w IV Bumex, monitor UOP. surgical plan pending cardiopulmonary optimization.
61M with right foot OM require amputation and vascular intervention. His course is complicated by acute hypoxic respiratory failure and acute ACOSTA on CKD. Diuresing well, appreciate Cards and Renal f/u. Cr impoved today. Will c/w Bumex 4mg IV BID today, will plan to transition to PO in next day or so.
61M with right foot OM requiring amputation and vascular intervention. His course is complicated by acute hypoxic respiratory failure and acute ACOSTA on CKD. Diuresing well, appreciate Cards and Renal f/u. Cr continues to improve. Will change Bumex to PO today. Now off O2.

## 2021-07-30 NOTE — PROGRESS NOTE ADULT - ASSESSMENT
62 y/o M s/p R foot 2nd interspace I&D    - Patient seen and evaluated   - mummified residual 2nd digit stump and 3rd digits, fibrotic/liquefactive tissue noted, mild malodor, no wet conversion, no purulence, no erythema/ cellulitis  - R foot MRI: early osteomyelitis within the second proximal phalangeal, periostitis to the proximal phalanx of 3rd digit without vinnie osteomyelitis.  - MICHEL/PVR- noncompressible vessels b/l, RTBI 0.07, LTBI 0.22, waveforms flat at digits  - 7/29 angio could not restore RLE flow, will follow potential further vascular interventioin  - Pod plan for R foot partial 2nd and 3rd ray resection Monday 8/2 @ 3 pm after final Vacular  recommendations  - Please document medical clearance for right foot surgery under light sedation with local anesthesia  - cardiac clearance appreciated  - discussed with attending  60 y/o M s/p R foot 2nd interspace I&D    - Patient seen and evaluated   - mummified residual 2nd digit stump and 3rd digits, fibrotic/liquefactive tissue noted, mild malodor, no wet conversion, no purulence, no erythema/ cellulitis  - R foot MRI: early osteomyelitis within the second proximal phalangeal, periostitis to the proximal phalanx of 3rd digit without vinnie osteomyelitis.  - MICHEL/PVR- noncompressible vessels b/l, RTBI 0.07, LTBI 0.22, waveforms flat at digits  - 7/29 angio could not restore RLE flow, no further vascular intervention planned at this time  - Pod plan tentatively R foot partial 2nd and 3rd ray resection Monday 8/2 @ 3 pm  - Please document medical clearance for right foot surgery under light sedation with local anesthesia  - cardiac clearance appreciated  - discussed with attending

## 2021-07-30 NOTE — PROGRESS NOTE ADULT - SUBJECTIVE AND OBJECTIVE BOX
[facility] DIVISION OF KIDNEY DISEASES AND HYPERTENSION -- FOLLOW UP NOTE  --------------------------------------------------------------------------------  61M with history of HTN, T2DM, HLD, glaucoma, CKD with recent AIN (follows with Dr. Zambrano) treated with steroid, HFrEF, recent NSTEMI, PAD s/p RLE angioplasty, s/p right 2nd toe patient amputation at [facility] presents to [facility] with worsening foot pain and associated right 203 toe infection. Recent RLE angiogram revealed severe atherosclerosis. Nephrology consulted for ACOSTA amidst rising creatinine levels and continued malaise. Patient has been mentating well and has no complaints. Has been tolerating antibiotics well.       Pt. seen and examined at bedside. PODx1 from angio and understands that he will undergo ray resection on Monday. OFELIA and does not endorse SOB, CP, n/v/d.       REVIEW OF SYSTEMS  --------------------------------------------------------------------------------  Gen: No fevers/chills, weakness  Skin: No rashes  Head/Eyes/Ears/Mouth: No headache; No hearing changes, mucous discharge, vision changes  Respiratory: No dyspnea, cough, wheezing  CV: No chest pain, palpitations  GI: No abdominal pain, diarrhea, constipation, nausea, vomiting, melena, hematochezia  : No increased frequency, dysuria, hematuria  MSK: No joint pain/swelling; no back pain; no edema  Neuro: No dizziness/lightheadedness, weakness, seizures, numbness  Heme: No easy bruising or bleeding    All other systems were reviewed and are negative, except as noted.      PAST HISTORY  --------------------------------------------------------------------------------  No significant changes to PMH, PSH, FHx, SHx, unless otherwise noted    ALLERGIES & MEDICATIONS  --------------------------------------------------------------------------------  Allergies    cefepime (Other)    Intolerances      Standing Inpatient Medications  aspirin enteric coated 81 milliGRAM(s) Oral daily  atorvastatin 40 milliGRAM(s) Oral at bedtime  buMETAnide 4 milliGRAM(s) Oral every 12 hours  carvedilol 6.25 milliGRAM(s) Oral every 12 hours  dextrose 40% Gel 15 Gram(s) Oral once  dextrose 5%. 1000 milliLiter(s) IV Continuous <Continuous>  dextrose 5%. 1000 milliLiter(s) IV Continuous <Continuous>  dextrose 50% Injectable 12.5 Gram(s) IV Push once  dextrose 50% Injectable 25 Gram(s) IV Push once  dextrose 50% Injectable 25 Gram(s) IV Push once  ertapenem  IVPB 500 milliGRAM(s) IV Intermittent every 24 hours  glucagon  Injectable 1 milliGRAM(s) IntraMuscular once  heparin   Injectable 5000 Unit(s) SubCutaneous every 8 hours  hydrALAZINE 50 milliGRAM(s) Oral three times a day  insulin glargine Injectable (LANTUS) 40 Unit(s) SubCutaneous at bedtime  insulin lispro (ADMELOG) corrective regimen sliding scale   SubCutaneous three times a day before meals  insulin lispro Injectable (ADMELOG) 8 Unit(s) SubCutaneous three times a day before meals  isosorbide   dinitrate Tablet (ISORDIL) 20 milliGRAM(s) Oral three times a day  latanoprost 0.005% Ophthalmic Solution 1 Drop(s) Both EYES at bedtime  levoFLOXacin IVPB 250 milliGRAM(s) IV Intermittent every 24 hours  levoFLOXacin IVPB      melatonin 3 milliGRAM(s) Oral at bedtime  NIFEdipine XL 30 milliGRAM(s) Oral daily  polyethylene glycol 3350 17 Gram(s) Oral daily  senna 2 Tablet(s) Oral at bedtime  sodium chloride 0.9%. 1000 milliLiter(s) IV Continuous <Continuous>    PRN Inpatient Medications  acetaminophen   Tablet .. 650 milliGRAM(s) Oral every 6 hours PRN  traMADol 25 milliGRAM(s) Oral every 6 hours PRN        VITALS/PHYSICAL EXAM  --------------------------------------------------------------------------------  T(C): 36.6 (07-30-21 @ 05:56), Max: 36.7 (07-29-21 @ 17:50)  HR: 75 (07-30-21 @ 05:56) (67 - 77)  BP: 143/72 (07-30-21 @ 05:56) (126/66 - 143/72)  RR: 18 (07-30-21 @ 05:56) (18 - 19)  SpO2: 99% (07-30-21 @ 05:56) (97% - 99%)  Wt(kg): --        07-29-21 @ 07:01  -  07-30-21 @ 07:00  --------------------------------------------------------  IN: 680 mL / OUT: 2500 mL / NET: -1820 mL      Physical Exam:  	Gen: NAD, appears calm  	HEENT: MMM, anicteric  	Pulm: rales at bilateral bases  	CV: S1S2+  	Abd: Soft, +BS   	Ext: R foot dressing seen+  	Neuro: Awake  	Skin: Warm and dry  	Vascular access: peripheral IV canula      LABS/STUDIES  --------------------------------------------------------------------------------              10.8   12.27 >-----------<  480      [07-30-21 @ 06:58]              33.0     133  |  94  |  69  ----------------------------<  211      [07-30-21 @ 06:58]  3.4   |  24  |  2.20        Ca     9.7     [07-30-21 @ 06:58]      Mg     2.10     [07-30-21 @ 06:58]      Phos  4.8     [07-30-21 @ 06:58]    TPro  7.9  /  Alb  3.4  /  TBili  0.4  /  DBili  x   /  AST  22  /  ALT  30  /  AlkPhos  245  [07-30-21 @ 06:58]    PT/INR: PT 14.1 , INR 1.24       [07-29-21 @ 05:12]  PTT: 32.5       [07-29-21 @ 05:12]      Creatinine Trend:  SCr 2.20 [07-30 @ 06:58]  SCr 2.43 [07-29 @ 05:12]  SCr 2.34 [07-28 @ 06:36]  SCr 2.29 [07-27 @ 06:46]  SCr 2.39 [07-26 @ 06:24]    Urinalysis - [07-21-21 @ 17:25]      Color Yellow / Appearance Clear / SG 1.016 / pH 5.5      Gluc Negative / Ketone Negative  / Bili Negative / Urobili <2 mg/dL       Blood Negative / Protein Trace / Leuk Est Negative / Nitrite Negative      RBC 6 / WBC 2 / Hyaline 2 / Gran  / Sq Epi  / Non Sq Epi 1 / Bacteria Negative      TSH 1.58      [04-23-21 @ 04:36]  Lipid: chol 116, , HDL 26, LDL --      [07-19-21 @ 06:42]    HCV 0.12, Nonreact      [07-19-21 @ 09:49]    C3 Complement 160      [07-21-21 @ 07:42]  C4 Complement 58      [07-21-21 @ 07:42]  ANCA: cANCA Negative, pANCA Negative, atypical ANCA Negative      [07-21-21 @ 10:34]

## 2021-07-30 NOTE — PROGRESS NOTE ADULT - PROBLEM SELECTOR PLAN 6
h/o severe RLE PVD, s/p RLE angioplasty in past  - c/w ASA/statin  - vascular angiogram 7/29 no revascularized required

## 2021-07-30 NOTE — PROCEDURE NOTE - ADDITIONAL PROCEDURE DETAILS
14Fr 20 cm Shiley catheter was placed in the L femoral vein. 14Fr 20 cm Shiley catheter was inserted into the L femoral vein. 14Fr 20 cm Shiley catheter was inserted into the L femoral vein for emergency dialysis access.

## 2021-07-30 NOTE — PROGRESS NOTE ADULT - PROBLEM SELECTOR PLAN 1
Pt. with ACOSTA on CKD in the setting of venous congestion. pt. hx of AIN treated with steroids.   - Scr on admission 1.77.   - Scr currently downtrending   - Renal sonogram done on 7/21/21 showed no HDN.   - Lupillo < 20, Urine osmol 304  - BNP: 75229  - Currently on Bumex 4mg BID  - In preparation for ray resection on Monday, start Isotonic IV fluids 70 cc/hr 6 hours prior and continue to 6 hours post procedure.   - Monitor I/O   - Dose medications per eGFR

## 2021-07-30 NOTE — PROGRESS NOTE ADULT - PROBLEM SELECTOR PLAN 3
-pt with h/o right foot 2nd toe partial amputation in April at [facility], h/o severe RLE PVD with RLE angioplasty in past, no stent, now p/w 2nd residual toe/stump and 3rd toe infection, xray concerning for osteomyelitis, elevated inflammatory markers ESR 96, CRP 80.6 c/w acute OM. rec'd IV vanc 1 g bid (7/17 pm - 7/20), zoxyn (7/18-7/20)  -7/18 blood cultures x2  -ID c/s start erta and levaquin (7/20 - )  -MRI right foot: osteomyelitis in 2nd toe, periostitis w/o osteo on 3rd toe  -podiatry consulted, s/p I&D 7/17. plan for R foot partial 2nd and 3rd ray resection on Mon.  pt is medically optimized for the procedure  - vascular consult: s/p angiogram today- no sig stenosis

## 2021-07-30 NOTE — PROGRESS NOTE ADULT - SUBJECTIVE AND OBJECTIVE BOX
SURGERY DAILY PROGRESS NOTE:     SUBJECTIVE/ROS: Patient feels well. Sen and examined at bedside. Denies pain.        MEDICATIONS  (STANDING):  aspirin enteric coated 81 milliGRAM(s) Oral daily  atorvastatin 40 milliGRAM(s) Oral at bedtime  buMETAnide 4 milliGRAM(s) Oral every 12 hours  carvedilol 6.25 milliGRAM(s) Oral every 12 hours  dextrose 40% Gel 15 Gram(s) Oral once  dextrose 5%. 1000 milliLiter(s) (50 mL/Hr) IV Continuous <Continuous>  dextrose 5%. 1000 milliLiter(s) (100 mL/Hr) IV Continuous <Continuous>  dextrose 50% Injectable 12.5 Gram(s) IV Push once  dextrose 50% Injectable 25 Gram(s) IV Push once  dextrose 50% Injectable 25 Gram(s) IV Push once  ertapenem  IVPB 500 milliGRAM(s) IV Intermittent every 24 hours  glucagon  Injectable 1 milliGRAM(s) IntraMuscular once  heparin   Injectable 5000 Unit(s) SubCutaneous every 8 hours  hydrALAZINE 50 milliGRAM(s) Oral three times a day  insulin glargine Injectable (LANTUS) 40 Unit(s) SubCutaneous at bedtime  insulin lispro (ADMELOG) corrective regimen sliding scale   SubCutaneous three times a day before meals  insulin lispro Injectable (ADMELOG) 8 Unit(s) SubCutaneous three times a day before meals  isosorbide   dinitrate Tablet (ISORDIL) 20 milliGRAM(s) Oral three times a day  latanoprost 0.005% Ophthalmic Solution 1 Drop(s) Both EYES at bedtime  levoFLOXacin IVPB 250 milliGRAM(s) IV Intermittent every 24 hours  levoFLOXacin IVPB      melatonin 3 milliGRAM(s) Oral at bedtime  NIFEdipine XL 30 milliGRAM(s) Oral daily  polyethylene glycol 3350 17 Gram(s) Oral daily  senna 2 Tablet(s) Oral at bedtime  sodium chloride 0.9%. 1000 milliLiter(s) (75 mL/Hr) IV Continuous <Continuous>    MEDICATIONS  (PRN):  acetaminophen   Tablet .. 650 milliGRAM(s) Oral every 6 hours PRN Temp greater or equal to 38.5C (101.3F), Mild Pain (1 - 3)  traMADol 25 milliGRAM(s) Oral every 6 hours PRN Severe Pain (7 - 10)      OBJECTIVE:    Vital Signs Last 24 Hrs  T(C): 36.6 (30 Jul 2021 05:56), Max: 36.7 (29 Jul 2021 17:50)  T(F): 97.9 (30 Jul 2021 05:56), Max: 98 (29 Jul 2021 17:50)  HR: 75 (30 Jul 2021 05:56) (67 - 77)  BP: 143/72 (30 Jul 2021 05:56) (126/66 - 143/72)  BP(mean): --  RR: 18 (30 Jul 2021 05:56) (18 - 19)  SpO2: 99% (30 Jul 2021 05:56) (97% - 99%)    I&O's Detail    29 Jul 2021 07:01  -  30 Jul 2021 07:00  --------------------------------------------------------  IN:    Oral Fluid: 680 mL  Total IN: 680 mL    OUT:    Voided (mL): 2500 mL  Total OUT: 2500 mL    Total NET: -1820 mL          Daily     Daily     LABS:                        10.8   12.27 )-----------( 480      ( 30 Jul 2021 06:58 )             33.0     07-30    133<L>  |  94<L>  |  69<H>  ----------------------------<  211<H>  3.4<L>   |  24  |  2.20<H>    Ca    9.7      30 Jul 2021 06:58  Phos  4.8     07-30  Mg     2.10     07-30    TPro  7.9  /  Alb  3.4  /  TBili  0.4  /  DBili  x   /  AST  22  /  ALT  30  /  AlkPhos  245<H>  07-30    PT/INR - ( 29 Jul 2021 05:12 )   PT: 14.1 sec;   INR: 1.24 ratio         PTT - ( 29 Jul 2021 05:12 )  PTT:32.5 sec      Physical Exam:  General: NAD, resting comfortably in bed  Pulmonary: Nonlabored breathing, no respiratory distress  Cardiovascular: NSR  Abdominal: soft, NT/ND  Extremities: WWP, RLE wrapper, palpable DP on LLE    60 y/o male with h/o HTN, insulin dependent DM-2, hld, glaucoma, CKD3 with recent interstitial nephritis, HFrEF, recent NSTEMI, PAD s/p RLE angioplasty, s/p right 2nd toe partial amputation at [facility] in April 2021, who presents with 2 week history of worsening right foot pain with plans for 2nd and 3rd toe ray amps.  Now s/p diagnostic RLE angio, posterior tibial run off.     Plan:   - can follow up with Dr. Mcginnis in office in two weeks  - please keep on ASA  - appreciate podiatry recommendation  - no acute vascular interventions at this time.   - please call back with questions    C Team Surgery [id number]

## 2021-07-30 NOTE — PROGRESS NOTE ADULT - ASSESSMENT
62 y/o male with h/o HTN, insulin dependent DM-2, hld, glaucoma, CKD3 with recent interstitial nephritis treated with steroid, HFrEF, recent NSTEMI, PAD s/p RLE angioplasty, s/p right 2nd toe partial amputation at [facility] in April 2021, who presents with 2 week history of worsening right foot pain, associated with right 2nd residual toe stump/3rd toe infection/discoloration to blue/black, xray c/f osteomyelitis, plan for surgical intervention this admission per podiatry. Hospital course has been complicated by flash pulmonary edema 2/2 to ACOSTA on CKD. Now he is improving with diuresis

## 2021-07-30 NOTE — PROGRESS NOTE ADULT - SUBJECTIVE AND OBJECTIVE BOX
Patient is a 61y old  Male who presents with a chief complaint of right foot 2nd/3rd toe infection (30 Jul 2021 07:52)       INTERVAL HPI/OVERNIGHT EVENTS:  Patient seen and evaluated at bedside.  Pt is resting comfortable in NAD. Denies N/V/F/C.     Allergies    cefepime (Other)    Intolerances        Vital Signs Last 24 Hrs  T(C): 36.6 (30 Jul 2021 05:56), Max: 36.7 (29 Jul 2021 17:50)  T(F): 97.9 (30 Jul 2021 05:56), Max: 98 (29 Jul 2021 17:50)  HR: 75 (30 Jul 2021 05:56) (67 - 77)  BP: 143/72 (30 Jul 2021 05:56) (126/66 - 143/72)  BP(mean): --  RR: 18 (30 Jul 2021 05:56) (18 - 19)  SpO2: 99% (30 Jul 2021 05:56) (97% - 99%)    LABS:                        10.8   12.27 )-----------( 480      ( 30 Jul 2021 06:58 )             33.0     07-30    133<L>  |  94<L>  |  69<H>  ----------------------------<  211<H>  3.4<L>   |  24  |  2.20<H>    Ca    9.7      30 Jul 2021 06:58  Phos  4.8     07-30  Mg     2.10     07-30    TPro  7.9  /  Alb  3.4  /  TBili  0.4  /  DBili  x   /  AST  22  /  ALT  30  /  AlkPhos  245<H>  07-30    PT/INR - ( 29 Jul 2021 05:12 )   PT: 14.1 sec;   INR: 1.24 ratio         PTT - ( 29 Jul 2021 05:12 )  PTT:32.5 sec    CAPILLARY BLOOD GLUCOSE      POCT Blood Glucose.: 303 mg/dL (29 Jul 2021 21:04)  POCT Blood Glucose.: 288 mg/dL (29 Jul 2021 17:49)  POCT Blood Glucose.: 273 mg/dL (29 Jul 2021 12:59)  POCT Blood Glucose.: 255 mg/dL (29 Jul 2021 10:09)  POCT Blood Glucose.: 255 mg/dL (29 Jul 2021 08:35)      Lower Extremity Physical Exam:  Vascular: DP 1/4, B/L, PT non-palpable B/L, CFT <3 seconds B/L, Temperature gradient cool to warm on R, warm to cool on L    Neuro: Epicritic sensation intact to the level of ankle, B/L.  Musculoskeletal/Ortho: pain on palpation surrounding the 2nd digit   Skin: gangrenous right foot 2nd and 3rd digits, RF 3rd interspace wound probes to bone, consistent tracking proximally to 2nd and 3rd MPJ, less wet conversion of 2nd and 3rd digits, no pus expressed, erythema consistent    7/17 s/p I&D of R 2nd interspace  - mummified residual 2nd digit stump and 3rd digits, fibrotic/liquifactive  tissue noted, mild malodor, no wet conversion, no purulence, no erythema/ cellulitis    RADIOLOGY & ADDITIONAL TESTS:

## 2021-07-30 NOTE — PROGRESS NOTE ADULT - ATTENDING SUPERVISION STATEMENT
Resident
Resident
Fellow
Resident
Fellow
Resident
Fellow
Resident

## 2021-07-30 NOTE — PROGRESS NOTE ADULT - PROBLEM SELECTOR PLAN 2
ACOSTA on CKD3. likely prerenal. baseline Cr ~1.8  -Cr improving   - neph following appreciate assistance  - strict I+O  - continue hydral  - bumex transitioned to PO as above  -dose meds per renal fxn, avoid nephrotoxins, avoid ACEi and ARBs, Maintain MAP >65, renal diet  - IVF before and after podiatry procedure on Mon as per renal

## 2021-07-30 NOTE — PROGRESS NOTE ADULT - SUBJECTIVE AND OBJECTIVE BOX
[facility] Division of Hospital Medicine  Eder Sheridan MD  Pager [phone]      Patient is a 61y old  Male who presents with a chief complaint of right foot 2nd/3rd toe infection (30 Jul 2021 08:42)      SUBJECTIVE / OVERNIGHT EVENTS:    No acute event. Pt denies chest pain, sob, foot pain.     ADDITIONAL REVIEW OF SYSTEMS:    RESPIRATORY: No cough, wheezing, chills or hemoptysis; No shortness of breath  CARDIOVASCULAR: No chest pain, palpitations, dizziness, or leg swelling  GASTROINTESTINAL: No abdominal or epigastric pain. No nausea, vomiting, or hematemesis; No diarrhea or constipation. No melena or hematochezia.    MEDICATIONS  (STANDING):  aspirin enteric coated 81 milliGRAM(s) Oral daily  atorvastatin 40 milliGRAM(s) Oral at bedtime  buMETAnide 4 milliGRAM(s) Oral every 12 hours  carvedilol 6.25 milliGRAM(s) Oral every 12 hours  dextrose 40% Gel 15 Gram(s) Oral once  dextrose 5%. 1000 milliLiter(s) (100 mL/Hr) IV Continuous <Continuous>  dextrose 5%. 1000 milliLiter(s) (50 mL/Hr) IV Continuous <Continuous>  dextrose 50% Injectable 25 Gram(s) IV Push once  dextrose 50% Injectable 12.5 Gram(s) IV Push once  dextrose 50% Injectable 25 Gram(s) IV Push once  ertapenem  IVPB 500 milliGRAM(s) IV Intermittent every 24 hours  glucagon  Injectable 1 milliGRAM(s) IntraMuscular once  heparin   Injectable 5000 Unit(s) SubCutaneous every 8 hours  hydrALAZINE 50 milliGRAM(s) Oral three times a day  insulin glargine Injectable (LANTUS) 40 Unit(s) SubCutaneous at bedtime  insulin lispro (ADMELOG) corrective regimen sliding scale   SubCutaneous three times a day before meals  insulin lispro Injectable (ADMELOG) 8 Unit(s) SubCutaneous three times a day before meals  isosorbide   dinitrate Tablet (ISORDIL) 20 milliGRAM(s) Oral three times a day  latanoprost 0.005% Ophthalmic Solution 1 Drop(s) Both EYES at bedtime  levoFLOXacin IVPB      levoFLOXacin IVPB 250 milliGRAM(s) IV Intermittent every 24 hours  melatonin 3 milliGRAM(s) Oral at bedtime  NIFEdipine XL 30 milliGRAM(s) Oral daily  polyethylene glycol 3350 17 Gram(s) Oral daily  senna 2 Tablet(s) Oral at bedtime  sodium chloride 0.9%. 1000 milliLiter(s) (75 mL/Hr) IV Continuous <Continuous>    MEDICATIONS  (PRN):  acetaminophen   Tablet .. 650 milliGRAM(s) Oral every 6 hours PRN Temp greater or equal to 38.5C (101.3F), Mild Pain (1 - 3)  traMADol 25 milliGRAM(s) Oral every 6 hours PRN Severe Pain (7 - 10)      CAPILLARY BLOOD GLUCOSE      POCT Blood Glucose.: 222 mg/dL (30 Jul 2021 12:38)  POCT Blood Glucose.: 172 mg/dL (30 Jul 2021 08:58)  POCT Blood Glucose.: 303 mg/dL (29 Jul 2021 21:04)  POCT Blood Glucose.: 288 mg/dL (29 Jul 2021 17:49)    I&O's Summary    29 Jul 2021 07:01  -  30 Jul 2021 07:00  --------------------------------------------------------  IN: 680 mL / OUT: 2500 mL / NET: -1820 mL        PHYSICAL EXAM:  Vital Signs Last 24 Hrs  T(C): 36.7 (30 Jul 2021 13:35), Max: 36.7 (29 Jul 2021 17:50)  T(F): 98 (30 Jul 2021 13:35), Max: 98 (29 Jul 2021 17:50)  HR: 63 (30 Jul 2021 13:35) (63 - 75)  BP: 149/71 (30 Jul 2021 13:35) (126/66 - 149/71)  BP(mean): --  RR: 17 (30 Jul 2021 13:35) (17 - 18)  SpO2: 99% (30 Jul 2021 13:35) (98% - 100%)    CONSTITUTIONAL: NAD,  EYES: PERRLA; conjunctiva and sclera clear  ENMT: Moist oral mucosa, no pharyngeal injection or exudates;   NECK: Supple, no palpable masses;  RESPIRATORY: Normal respiratory effort; lungs are clear to auscultation bilaterally  CARDIOVASCULAR: Regular rate and rhythm, normal S1 and S2, no murmur/rub/gallop; No lower extremity edema; Peripheral pulses are 2+ bilaterally  ABDOMEN: Nontender to palpation, normoactive bowel sounds, no rebound/guarding;   MUSCLOSKELETAL:   no clubbing or cyanosis of digits; no joint swelling or tenderness to palpation. R foot dressing c/d/i  PSYCH: A+O to person, place, and time; affect appropriate  NEUROLOGY: CN 2-12 are intact and symmetric; no gross sensory deficits;   SKIN: No rashes;       LABS:                        10.8   12.27 )-----------( 480      ( 30 Jul 2021 06:58 )             33.0     07-30    133<L>  |  94<L>  |  69<H>  ----------------------------<  211<H>  3.4<L>   |  24  |  2.20<H>    Ca    9.7      30 Jul 2021 06:58  Phos  4.8     07-30  Mg     2.10     07-30    TPro  7.9  /  Alb  3.4  /  TBili  0.4  /  DBili  x   /  AST  22  /  ALT  30  /  AlkPhos  245<H>  07-30    PT/INR - ( 29 Jul 2021 05:12 )   PT: 14.1 sec;   INR: 1.24 ratio         PTT - ( 29 Jul 2021 05:12 )  PTT:32.5 sec            RADIOLOGY & ADDITIONAL TESTS:  Results Reviewed:   Imaging Personally Reviewed:  Electrocardiogram Personally Reviewed:    COORDINATION OF CARE:  Care Discussed with Consultants/Other Providers [Y/N]:  Prior or Outpatient Records Reviewed [Y/N]:

## 2021-07-31 LAB
ALBUMIN SERPL ELPH-MCNC: 3.6 G/DL — SIGNIFICANT CHANGE UP (ref 3.3–5)
ALP SERPL-CCNC: 246 U/L — HIGH (ref 40–120)
ALT FLD-CCNC: 25 U/L — SIGNIFICANT CHANGE UP (ref 4–41)
ANION GAP SERPL CALC-SCNC: 18 MMOL/L — HIGH (ref 7–14)
AST SERPL-CCNC: 19 U/L — SIGNIFICANT CHANGE UP (ref 4–40)
BILIRUB SERPL-MCNC: 0.3 MG/DL — SIGNIFICANT CHANGE UP (ref 0.2–1.2)
BUN SERPL-MCNC: 66 MG/DL — HIGH (ref 7–23)
CALCIUM SERPL-MCNC: 9.8 MG/DL — SIGNIFICANT CHANGE UP (ref 8.4–10.5)
CHLORIDE SERPL-SCNC: 95 MMOL/L — LOW (ref 98–107)
CO2 SERPL-SCNC: 24 MMOL/L — SIGNIFICANT CHANGE UP (ref 22–31)
CREAT SERPL-MCNC: 2.36 MG/DL — HIGH (ref 0.5–1.3)
GLUCOSE SERPL-MCNC: 80 MG/DL — SIGNIFICANT CHANGE UP (ref 70–99)
HCT VFR BLD CALC: 33.4 % — LOW (ref 39–50)
HGB BLD-MCNC: 11 G/DL — LOW (ref 13–17)
MAGNESIUM SERPL-MCNC: 2.2 MG/DL — SIGNIFICANT CHANGE UP (ref 1.6–2.6)
MCHC RBC-ENTMCNC: 26.8 PG — LOW (ref 27–34)
MCHC RBC-ENTMCNC: 32.9 GM/DL — SIGNIFICANT CHANGE UP (ref 32–36)
MCV RBC AUTO: 81.5 FL — SIGNIFICANT CHANGE UP (ref 80–100)
NRBC # BLD: 0 /100 WBCS — SIGNIFICANT CHANGE UP
NRBC # FLD: 0 K/UL — SIGNIFICANT CHANGE UP
PHOSPHATE SERPL-MCNC: 5 MG/DL — HIGH (ref 2.5–4.5)
PLATELET # BLD AUTO: 494 K/UL — HIGH (ref 150–400)
POTASSIUM SERPL-MCNC: 3.3 MMOL/L — LOW (ref 3.5–5.3)
POTASSIUM SERPL-SCNC: 3.3 MMOL/L — LOW (ref 3.5–5.3)
PROT SERPL-MCNC: 7.7 G/DL — SIGNIFICANT CHANGE UP (ref 6–8.3)
RBC # BLD: 4.1 M/UL — LOW (ref 4.2–5.8)
RBC # FLD: 12.8 % — SIGNIFICANT CHANGE UP (ref 10.3–14.5)
SODIUM SERPL-SCNC: 137 MMOL/L — SIGNIFICANT CHANGE UP (ref 135–145)
WBC # BLD: 16.26 K/UL — HIGH (ref 3.8–10.5)
WBC # FLD AUTO: 16.26 K/UL — HIGH (ref 3.8–10.5)

## 2021-07-31 PROCEDURE — 99232 SBSQ HOSP IP/OBS MODERATE 35: CPT

## 2021-07-31 RX ORDER — POTASSIUM CHLORIDE 20 MEQ
40 PACKET (EA) ORAL ONCE
Refills: 0 | Status: COMPLETED | OUTPATIENT
Start: 2021-07-31 | End: 2021-07-31

## 2021-07-31 RX ADMIN — Medication 1: at 18:15

## 2021-07-31 RX ADMIN — HEPARIN SODIUM 5000 UNIT(S): 5000 INJECTION INTRAVENOUS; SUBCUTANEOUS at 22:08

## 2021-07-31 RX ADMIN — Medication 81 MILLIGRAM(S): at 13:05

## 2021-07-31 RX ADMIN — Medication 30 MILLIGRAM(S): at 05:52

## 2021-07-31 RX ADMIN — Medication 50 MILLIGRAM(S): at 22:08

## 2021-07-31 RX ADMIN — SENNA PLUS 2 TABLET(S): 8.6 TABLET ORAL at 22:09

## 2021-07-31 RX ADMIN — Medication 40 MILLIEQUIVALENT(S): at 13:11

## 2021-07-31 RX ADMIN — Medication 50 MILLIGRAM(S): at 05:52

## 2021-07-31 RX ADMIN — BUMETANIDE 4 MILLIGRAM(S): 0.25 INJECTION INTRAMUSCULAR; INTRAVENOUS at 17:24

## 2021-07-31 RX ADMIN — ATORVASTATIN CALCIUM 40 MILLIGRAM(S): 80 TABLET, FILM COATED ORAL at 22:09

## 2021-07-31 RX ADMIN — TRAMADOL HYDROCHLORIDE 25 MILLIGRAM(S): 50 TABLET ORAL at 00:29

## 2021-07-31 RX ADMIN — TRAMADOL HYDROCHLORIDE 25 MILLIGRAM(S): 50 TABLET ORAL at 17:34

## 2021-07-31 RX ADMIN — POLYETHYLENE GLYCOL 3350 17 GRAM(S): 17 POWDER, FOR SOLUTION ORAL at 13:05

## 2021-07-31 RX ADMIN — HEPARIN SODIUM 5000 UNIT(S): 5000 INJECTION INTRAVENOUS; SUBCUTANEOUS at 13:05

## 2021-07-31 RX ADMIN — ISOSORBIDE DINITRATE 20 MILLIGRAM(S): 5 TABLET ORAL at 05:52

## 2021-07-31 RX ADMIN — BUMETANIDE 4 MILLIGRAM(S): 0.25 INJECTION INTRAMUSCULAR; INTRAVENOUS at 05:54

## 2021-07-31 RX ADMIN — INSULIN GLARGINE 40 UNIT(S): 100 INJECTION, SOLUTION SUBCUTANEOUS at 22:11

## 2021-07-31 RX ADMIN — TRAMADOL HYDROCHLORIDE 25 MILLIGRAM(S): 50 TABLET ORAL at 18:30

## 2021-07-31 RX ADMIN — TRAMADOL HYDROCHLORIDE 25 MILLIGRAM(S): 50 TABLET ORAL at 23:35

## 2021-07-31 RX ADMIN — TRAMADOL HYDROCHLORIDE 25 MILLIGRAM(S): 50 TABLET ORAL at 06:24

## 2021-07-31 RX ADMIN — ISOSORBIDE DINITRATE 20 MILLIGRAM(S): 5 TABLET ORAL at 13:04

## 2021-07-31 RX ADMIN — ISOSORBIDE DINITRATE 20 MILLIGRAM(S): 5 TABLET ORAL at 22:08

## 2021-07-31 RX ADMIN — TRAMADOL HYDROCHLORIDE 25 MILLIGRAM(S): 50 TABLET ORAL at 00:35

## 2021-07-31 RX ADMIN — Medication 8 UNIT(S): at 13:03

## 2021-07-31 RX ADMIN — CARVEDILOL PHOSPHATE 6.25 MILLIGRAM(S): 80 CAPSULE, EXTENDED RELEASE ORAL at 17:24

## 2021-07-31 RX ADMIN — Medication 8 UNIT(S): at 08:59

## 2021-07-31 RX ADMIN — Medication 3 MILLIGRAM(S): at 22:09

## 2021-07-31 RX ADMIN — ERTAPENEM SODIUM 100 MILLIGRAM(S): 1 INJECTION, POWDER, LYOPHILIZED, FOR SOLUTION INTRAMUSCULAR; INTRAVENOUS at 17:23

## 2021-07-31 RX ADMIN — HEPARIN SODIUM 5000 UNIT(S): 5000 INJECTION INTRAVENOUS; SUBCUTANEOUS at 05:53

## 2021-07-31 RX ADMIN — LATANOPROST 1 DROP(S): 0.05 SOLUTION/ DROPS OPHTHALMIC; TOPICAL at 22:12

## 2021-07-31 RX ADMIN — Medication 8 UNIT(S): at 18:16

## 2021-07-31 RX ADMIN — TRAMADOL HYDROCHLORIDE 25 MILLIGRAM(S): 50 TABLET ORAL at 05:52

## 2021-07-31 RX ADMIN — Medication 50 MILLIGRAM(S): at 13:04

## 2021-07-31 RX ADMIN — CARVEDILOL PHOSPHATE 6.25 MILLIGRAM(S): 80 CAPSULE, EXTENDED RELEASE ORAL at 05:53

## 2021-07-31 NOTE — PROGRESS NOTE ADULT - SUBJECTIVE AND OBJECTIVE BOX
PROGRESS NOTE:     Patient is a 61y old  Male who presents with a chief complaint of right foot 2nd/3rd toe infection (30 Jul 2021 14:46)      SUBJECTIVE / OVERNIGHT EVENTS: No new complaints.     ADDITIONAL REVIEW OF SYSTEMS:    MEDICATIONS  (STANDING):  aspirin enteric coated 81 milliGRAM(s) Oral daily  atorvastatin 40 milliGRAM(s) Oral at bedtime  buMETAnide 4 milliGRAM(s) Oral every 12 hours  carvedilol 6.25 milliGRAM(s) Oral every 12 hours  dextrose 40% Gel 15 Gram(s) Oral once  dextrose 5%. 1000 milliLiter(s) (100 mL/Hr) IV Continuous <Continuous>  dextrose 5%. 1000 milliLiter(s) (50 mL/Hr) IV Continuous <Continuous>  dextrose 50% Injectable 25 Gram(s) IV Push once  dextrose 50% Injectable 12.5 Gram(s) IV Push once  dextrose 50% Injectable 25 Gram(s) IV Push once  ertapenem  IVPB 500 milliGRAM(s) IV Intermittent every 24 hours  glucagon  Injectable 1 milliGRAM(s) IntraMuscular once  heparin   Injectable 5000 Unit(s) SubCutaneous every 8 hours  hydrALAZINE 50 milliGRAM(s) Oral three times a day  insulin glargine Injectable (LANTUS) 40 Unit(s) SubCutaneous at bedtime  insulin lispro (ADMELOG) corrective regimen sliding scale   SubCutaneous three times a day before meals  insulin lispro Injectable (ADMELOG) 8 Unit(s) SubCutaneous three times a day before meals  isosorbide   dinitrate Tablet (ISORDIL) 20 milliGRAM(s) Oral three times a day  latanoprost 0.005% Ophthalmic Solution 1 Drop(s) Both EYES at bedtime  levoFLOXacin IVPB      levoFLOXacin IVPB 250 milliGRAM(s) IV Intermittent every 24 hours  melatonin 3 milliGRAM(s) Oral at bedtime  NIFEdipine XL 30 milliGRAM(s) Oral daily  polyethylene glycol 3350 17 Gram(s) Oral daily  senna 2 Tablet(s) Oral at bedtime  sodium chloride 0.9%. 1000 milliLiter(s) (75 mL/Hr) IV Continuous <Continuous>    MEDICATIONS  (PRN):  acetaminophen   Tablet .. 650 milliGRAM(s) Oral every 6 hours PRN Temp greater or equal to 38.5C (101.3F), Mild Pain (1 - 3)  traMADol 25 milliGRAM(s) Oral every 6 hours PRN Severe Pain (7 - 10)      CAPILLARY BLOOD GLUCOSE      POCT Blood Glucose.: 123 mg/dL (31 Jul 2021 12:15)  POCT Blood Glucose.: 112 mg/dL (31 Jul 2021 08:30)  POCT Blood Glucose.: 141 mg/dL (30 Jul 2021 22:15)  POCT Blood Glucose.: 136 mg/dL (30 Jul 2021 18:09)    I&O's Summary    30 Jul 2021 07:01  -  31 Jul 2021 07:00  --------------------------------------------------------  IN: 280 mL / OUT: 1000 mL / NET: -720 mL    31 Jul 2021 07:01  -  31 Jul 2021 16:01  --------------------------------------------------------  IN: 240 mL / OUT: 1200 mL / NET: -960 mL        PHYSICAL EXAM:  Vital Signs Last 24 Hrs  T(C): 36.8 (31 Jul 2021 12:27), Max: 36.8 (31 Jul 2021 12:27)  T(F): 98.3 (31 Jul 2021 12:27), Max: 98.3 (31 Jul 2021 12:27)  HR: 63 (31 Jul 2021 12:27) (63 - 68)  BP: 138/64 (31 Jul 2021 12:27) (135/61 - 149/70)  BP(mean): --  RR: 18 (31 Jul 2021 12:27) (17 - 18)  SpO2: 100% (31 Jul 2021 12:27) (99% - 100%)    CONSTITUTIONAL: NAD,  EYES: PERRLA; conjunctiva and sclera clear  ENMT: Moist oral mucosa, no pharyngeal injection or exudates;   NECK: Supple, no palpable masses;  RESPIRATORY: Normal respiratory effort; lungs are clear to auscultation bilaterally  CARDIOVASCULAR: Regular rate and rhythm, normal S1 and S2, no murmur/rub/gallop; No lower extremity edema; Peripheral pulses are 2+ bilaterally  ABDOMEN: Nontender to palpation, normoactive bowel sounds, no rebound/guarding;   MUSCLOSKELETAL:   no clubbing or cyanosis of digits; no joint swelling or tenderness to palpation. R foot dressing c/d/i  PSYCH: A+O to person, place, and time; affect appropriate  NEUROLOGY: CN 2-12 are intact and symmetric; no gross sensory deficits;   SKIN: No rashes    LABS:                        11.0   16.26 )-----------( 494      ( 31 Jul 2021 06:45 )             33.4     07-31    137  |  95<L>  |  66<H>  ----------------------------<  80  3.3<L>   |  24  |  2.36<H>    Ca    9.8      31 Jul 2021 06:45  Phos  5.0     07-31  Mg     2.20     07-31    TPro  7.7  /  Alb  3.6  /  TBili  0.3  /  DBili  x   /  AST  19  /  ALT  25  /  AlkPhos  246<H>  07-31                RADIOLOGY & ADDITIONAL TESTS:  Results Reviewed:   Imaging Personally Reviewed:  Electrocardiogram Personally Reviewed:    COORDINATION OF CARE:  Care Discussed with Consultants/Other Providers [Y/N]:  Prior or Outpatient Records Reviewed [Y/N]:

## 2021-07-31 NOTE — PROGRESS NOTE ADULT - PROBLEM SELECTOR PLAN 3
-pt with h/o right foot 2nd toe partial amputation in April at [facility], h/o severe RLE PVD with RLE angioplasty in past, no stent, now p/w 2nd residual toe/stump and 3rd toe infection, xray concerning for osteomyelitis, elevated inflammatory markers ESR 96, CRP 80.6 c/w acute OM. rec'd IV vanc 1 g bid (7/17 pm - 7/20), zosyn (7/18-7/20)  -7/18 blood cultures x2  -ID c/s start erta and levaquin (7/20 - )  -MRI right foot: osteomyelitis in 2nd toe, periostitis w/o osteo on 3rd toe  -podiatry consulted, s/p I&D 7/17. plan for R foot partial 2nd and 3rd ray resection on Mon.  pt is medically optimized for the procedure  - vascular consult: s/p angiogram 7/29 - no sig stenosis

## 2021-07-31 NOTE — PROGRESS NOTE ADULT - PROBLEM SELECTOR PLAN 5
-bp initially uncontrolled, systolic 170-180's, likely d/t medication noncompliance, was on hydralazine, isordil and beta blocker last admission  -BP now better controlled   -c/w nifedipine 30mg qd, coreg 6.25mg bid, hydralazine 50mg tid, isordil 20mg tid  -monitor bp

## 2021-07-31 NOTE — PROGRESS NOTE ADULT - ASSESSMENT
62 y/o male with h/o HTN, insulin dependent DM-2, hld, glaucoma, CKD3 with recent interstitial nephritis treated with steroid, HFrEF, recent NSTEMI, PAD s/p RLE angioplasty, s/p right 2nd toe partial amputation at [facility] in April 2021, who presents with 2 week history of worsening right foot pain, associated with right 2nd residual toe stump/3rd toe infection/discoloration to blue/black, xray c/f osteomyelitis, plan for surgical intervention this admission per podiatry. Hospital course has been complicated by flash pulmonary edema 2/2 to ACOSTA on CKD. Now he is improving with diuresis

## 2021-07-31 NOTE — PROGRESS NOTE ADULT - PROBLEM SELECTOR PLAN 4
- A1c 8.5. -200  -at home takes lantus 60u hs and premeal insulin 28-30u   -c/w Lantus 40u hs and Admelog 8 units premeal   -ISS  -monitor FS

## 2021-07-31 NOTE — PROGRESS NOTE ADULT - PROBLEM SELECTOR PLAN 1
2/2 fluid overload  - now weaned off O2 satting well on ra  - VQ scan and LE duplex neg for PE or DVT  - bumex 4 mg IV bid transitioned to 4mg PO BID 7/25 now euvolemic and off oxygen with good UOP, will continue to monitor

## 2021-08-01 DIAGNOSIS — I50.23 ACUTE ON CHRONIC SYSTOLIC (CONGESTIVE) HEART FAILURE: ICD-10-CM

## 2021-08-01 LAB
ALBUMIN SERPL ELPH-MCNC: 3.7 G/DL — SIGNIFICANT CHANGE UP (ref 3.3–5)
ALP SERPL-CCNC: 266 U/L — HIGH (ref 40–120)
ALT FLD-CCNC: 32 U/L — SIGNIFICANT CHANGE UP (ref 4–41)
ANION GAP SERPL CALC-SCNC: 15 MMOL/L — HIGH (ref 7–14)
AST SERPL-CCNC: 33 U/L — SIGNIFICANT CHANGE UP (ref 4–40)
BILIRUB SERPL-MCNC: 0.4 MG/DL — SIGNIFICANT CHANGE UP (ref 0.2–1.2)
BUN SERPL-MCNC: 73 MG/DL — HIGH (ref 7–23)
CALCIUM SERPL-MCNC: 9.8 MG/DL — SIGNIFICANT CHANGE UP (ref 8.4–10.5)
CHLORIDE SERPL-SCNC: 93 MMOL/L — LOW (ref 98–107)
CO2 SERPL-SCNC: 26 MMOL/L — SIGNIFICANT CHANGE UP (ref 22–31)
CREAT SERPL-MCNC: 2.62 MG/DL — HIGH (ref 0.5–1.3)
GLUCOSE SERPL-MCNC: 115 MG/DL — HIGH (ref 70–99)
HCT VFR BLD CALC: 32.5 % — LOW (ref 39–50)
HGB BLD-MCNC: 10.7 G/DL — LOW (ref 13–17)
MAGNESIUM SERPL-MCNC: 2.2 MG/DL — SIGNIFICANT CHANGE UP (ref 1.6–2.6)
MCHC RBC-ENTMCNC: 26.7 PG — LOW (ref 27–34)
MCHC RBC-ENTMCNC: 32.9 GM/DL — SIGNIFICANT CHANGE UP (ref 32–36)
MCV RBC AUTO: 81 FL — SIGNIFICANT CHANGE UP (ref 80–100)
NRBC # BLD: 0 /100 WBCS — SIGNIFICANT CHANGE UP
NRBC # FLD: 0 K/UL — SIGNIFICANT CHANGE UP
PHOSPHATE SERPL-MCNC: 4.8 MG/DL — HIGH (ref 2.5–4.5)
PLATELET # BLD AUTO: 469 K/UL — HIGH (ref 150–400)
POTASSIUM SERPL-MCNC: 4.2 MMOL/L — SIGNIFICANT CHANGE UP (ref 3.5–5.3)
POTASSIUM SERPL-SCNC: 4.2 MMOL/L — SIGNIFICANT CHANGE UP (ref 3.5–5.3)
PROT SERPL-MCNC: 8.5 G/DL — HIGH (ref 6–8.3)
RBC # BLD: 4.01 M/UL — LOW (ref 4.2–5.8)
RBC # FLD: 12.7 % — SIGNIFICANT CHANGE UP (ref 10.3–14.5)
SODIUM SERPL-SCNC: 134 MMOL/L — LOW (ref 135–145)
WBC # BLD: 17.05 K/UL — HIGH (ref 3.8–10.5)
WBC # FLD AUTO: 17.05 K/UL — HIGH (ref 3.8–10.5)

## 2021-08-01 PROCEDURE — 99232 SBSQ HOSP IP/OBS MODERATE 35: CPT

## 2021-08-01 RX ORDER — SODIUM CHLORIDE 9 MG/ML
1000 INJECTION INTRAMUSCULAR; INTRAVENOUS; SUBCUTANEOUS
Refills: 0 | Status: DISCONTINUED | OUTPATIENT
Start: 2021-08-01 | End: 2021-08-04

## 2021-08-01 RX ORDER — SODIUM CHLORIDE 9 MG/ML
1000 INJECTION INTRAMUSCULAR; INTRAVENOUS; SUBCUTANEOUS
Refills: 0 | Status: DISCONTINUED | OUTPATIENT
Start: 2021-08-01 | End: 2021-08-01

## 2021-08-01 RX ORDER — INSULIN GLARGINE 100 [IU]/ML
40 INJECTION, SOLUTION SUBCUTANEOUS AT BEDTIME
Refills: 0 | Status: DISCONTINUED | OUTPATIENT
Start: 2021-08-02 | End: 2021-08-04

## 2021-08-01 RX ORDER — INSULIN GLARGINE 100 [IU]/ML
32 INJECTION, SOLUTION SUBCUTANEOUS ONCE
Refills: 0 | Status: COMPLETED | OUTPATIENT
Start: 2021-08-01 | End: 2021-08-01

## 2021-08-01 RX ADMIN — Medication 8 UNIT(S): at 12:42

## 2021-08-01 RX ADMIN — ISOSORBIDE DINITRATE 20 MILLIGRAM(S): 5 TABLET ORAL at 15:22

## 2021-08-01 RX ADMIN — Medication 50 MILLIGRAM(S): at 15:21

## 2021-08-01 RX ADMIN — Medication 50 MILLIGRAM(S): at 23:00

## 2021-08-01 RX ADMIN — Medication 50 MILLIGRAM(S): at 05:01

## 2021-08-01 RX ADMIN — TRAMADOL HYDROCHLORIDE 25 MILLIGRAM(S): 50 TABLET ORAL at 16:30

## 2021-08-01 RX ADMIN — TRAMADOL HYDROCHLORIDE 25 MILLIGRAM(S): 50 TABLET ORAL at 15:30

## 2021-08-01 RX ADMIN — BUMETANIDE 4 MILLIGRAM(S): 0.25 INJECTION INTRAMUSCULAR; INTRAVENOUS at 17:41

## 2021-08-01 RX ADMIN — ATORVASTATIN CALCIUM 40 MILLIGRAM(S): 80 TABLET, FILM COATED ORAL at 23:01

## 2021-08-01 RX ADMIN — LATANOPROST 1 DROP(S): 0.05 SOLUTION/ DROPS OPHTHALMIC; TOPICAL at 23:02

## 2021-08-01 RX ADMIN — Medication 8 UNIT(S): at 17:44

## 2021-08-01 RX ADMIN — ISOSORBIDE DINITRATE 20 MILLIGRAM(S): 5 TABLET ORAL at 05:00

## 2021-08-01 RX ADMIN — HEPARIN SODIUM 5000 UNIT(S): 5000 INJECTION INTRAVENOUS; SUBCUTANEOUS at 15:21

## 2021-08-01 RX ADMIN — CARVEDILOL PHOSPHATE 6.25 MILLIGRAM(S): 80 CAPSULE, EXTENDED RELEASE ORAL at 05:01

## 2021-08-01 RX ADMIN — CARVEDILOL PHOSPHATE 6.25 MILLIGRAM(S): 80 CAPSULE, EXTENDED RELEASE ORAL at 17:40

## 2021-08-01 RX ADMIN — TRAMADOL HYDROCHLORIDE 25 MILLIGRAM(S): 50 TABLET ORAL at 06:54

## 2021-08-01 RX ADMIN — Medication 30 MILLIGRAM(S): at 05:01

## 2021-08-01 RX ADMIN — BUMETANIDE 4 MILLIGRAM(S): 0.25 INJECTION INTRAMUSCULAR; INTRAVENOUS at 05:01

## 2021-08-01 RX ADMIN — HEPARIN SODIUM 5000 UNIT(S): 5000 INJECTION INTRAVENOUS; SUBCUTANEOUS at 05:00

## 2021-08-01 RX ADMIN — ERTAPENEM SODIUM 100 MILLIGRAM(S): 1 INJECTION, POWDER, LYOPHILIZED, FOR SOLUTION INTRAMUSCULAR; INTRAVENOUS at 17:40

## 2021-08-01 RX ADMIN — TRAMADOL HYDROCHLORIDE 25 MILLIGRAM(S): 50 TABLET ORAL at 06:07

## 2021-08-01 RX ADMIN — Medication 81 MILLIGRAM(S): at 12:42

## 2021-08-01 RX ADMIN — INSULIN GLARGINE 32 UNIT(S): 100 INJECTION, SOLUTION SUBCUTANEOUS at 23:02

## 2021-08-01 RX ADMIN — HEPARIN SODIUM 5000 UNIT(S): 5000 INJECTION INTRAVENOUS; SUBCUTANEOUS at 23:01

## 2021-08-01 RX ADMIN — ISOSORBIDE DINITRATE 20 MILLIGRAM(S): 5 TABLET ORAL at 23:01

## 2021-08-01 RX ADMIN — Medication 8 UNIT(S): at 09:08

## 2021-08-01 RX ADMIN — Medication 3 MILLIGRAM(S): at 23:01

## 2021-08-01 NOTE — PROGRESS NOTE ADULT - ASSESSMENT
60 y/o M s/p R foot 2nd interspace I&D  - Patient seen and evaluated   - Afebrile WBC 17  - mummified residual 2nd digit stump and 3rd digits, fibrotic/liquefactive tissue noted, 1cc of pus expressed  mild malodor, no wet conversion, no purulence, no erythema/ cellulitis  - R foot MRI: early osteomyelitis within the second proximal phalangeal, periostitis to the proximal phalanx of 3rd digit without vinnie osteomyelitis.  - MICHEL/PVR- noncompressible vessels b/l, RTBI 0.07, LTBI 0.22, waveforms flat at digits  - 7/29 angio could not restore RLE flow, no further vascular intervention planned at this time  - Pod plan tentatively R foot partial 2nd and 3rd ray resection Monday 8/2 @ 3 pm  - discussed with attending    60 y/o M s/p R foot 2nd interspace I&D  - Patient seen and evaluated   - Afebrile WBC 17  - mummified residual 2nd digit stump and 3rd digits, fibrotic/liquefactive tissue noted, 1cc of pus expressed  mild malodor, no wet conversion, no purulence, no erythema/ cellulitis, R 4th digit now dusky with delayed CFT  - R foot MRI: early osteomyelitis within the second proximal phalangeal, periostitis to the proximal phalanx of 3rd digit without vinnie osteomyelitis.  - MICHEL/PVR- noncompressible vessels b/l, RTBI 0.07, LTBI 0.22, waveforms flat at digits  - 7/29 angio could not restore RLE flow, no further vascular intervention planned at this time  - Pod plan tentatively R foot partial 2nd and 3rd ray resection Monday 8/2 @ 3 pm  - discussed with attending

## 2021-08-01 NOTE — PROGRESS NOTE ADULT - SUBJECTIVE AND OBJECTIVE BOX
Patient is a 61y old  Male who presents with a chief complaint of right foot 2nd/3rd toe infection (31 Jul 2021 16:01)       INTERVAL HPI/OVERNIGHT EVENTS:  Patient seen and evaluated at bedside.  Pt is resting comfortable in NAD. Denies N/V/F/C.  Pain rated at X/10    Allergies    cefepime (Other)    Intolerances        Vital Signs Last 24 Hrs  T(C): 36.2 (01 Aug 2021 04:52), Max: 36.8 (31 Jul 2021 12:27)  T(F): 97.2 (01 Aug 2021 04:52), Max: 98.3 (31 Jul 2021 12:27)  HR: 72 (01 Aug 2021 04:52) (63 - 72)  BP: 135/75 (01 Aug 2021 04:52) (131/62 - 144/69)  BP(mean): --  RR: 18 (01 Aug 2021 04:52) (18 - 18)  SpO2: 99% (01 Aug 2021 04:52) (99% - 100%)    LABS:                        10.7   17.05 )-----------( 469      ( 01 Aug 2021 04:16 )             32.5     08-01    134<L>  |  93<L>  |  73<H>  ----------------------------<  115<H>  4.2   |  26  |  2.62<H>    Ca    9.8      01 Aug 2021 04:16  Phos  4.8     08-01  Mg     2.20     08-01    TPro  8.5<H>  /  Alb  3.7  /  TBili  0.4  /  DBili  x   /  AST  33  /  ALT  32  /  AlkPhos  266<H>  08-01        CAPILLARY BLOOD GLUCOSE      POCT Blood Glucose.: 134 mg/dL (01 Aug 2021 08:44)  POCT Blood Glucose.: 122 mg/dL (31 Jul 2021 22:05)  POCT Blood Glucose.: 171 mg/dL (31 Jul 2021 17:58)  POCT Blood Glucose.: 123 mg/dL (31 Jul 2021 12:15)      Lower Extremity Physical Exam:  Vascular: DP 1/4, B/L, PT non-palpable B/L, CFT <3 seconds B/L, Temperature gradient cool to warm on R, warm to cool on L    Neuro: Epicritic sensation intact to the level of ankle, B/L.  Musculoskeletal/Ortho: pain on palpation surrounding the 2nd digit   Skin: gangrenous right foot 2nd and 3rd digits, RF 3rd interspace wound probes to bone, consistent tracking proximally to 2nd and 3rd MPJ, less wet conversion of 2nd and 3rd digits, 1 cc of pus expressed, erythema consistent    7/17 s/p I&D of R 2nd interspace  - mummified residual 2nd digit stump and 3rd digits, fibrotic/liquifactive  tissue noted, mild malodor, no wet conversion, no purulence, no erythema/ cellulitis    RADIOLOGY & ADDITIONAL TESTS:   Patient is a 61y old  Male who presents with a chief complaint of right foot 2nd/3rd toe infection (31 Jul 2021 16:01)       INTERVAL HPI/OVERNIGHT EVENTS:  Patient seen and evaluated at bedside.  Pt is resting comfortable in NAD. Denies N/V/F/C.  Pain rated at X/10    Allergies    cefepime (Other)    Intolerances        Vital Signs Last 24 Hrs  T(C): 36.2 (01 Aug 2021 04:52), Max: 36.8 (31 Jul 2021 12:27)  T(F): 97.2 (01 Aug 2021 04:52), Max: 98.3 (31 Jul 2021 12:27)  HR: 72 (01 Aug 2021 04:52) (63 - 72)  BP: 135/75 (01 Aug 2021 04:52) (131/62 - 144/69)  BP(mean): --  RR: 18 (01 Aug 2021 04:52) (18 - 18)  SpO2: 99% (01 Aug 2021 04:52) (99% - 100%)    LABS:                        10.7   17.05 )-----------( 469      ( 01 Aug 2021 04:16 )             32.5     08-01    134<L>  |  93<L>  |  73<H>  ----------------------------<  115<H>  4.2   |  26  |  2.62<H>    Ca    9.8      01 Aug 2021 04:16  Phos  4.8     08-01  Mg     2.20     08-01    TPro  8.5<H>  /  Alb  3.7  /  TBili  0.4  /  DBili  x   /  AST  33  /  ALT  32  /  AlkPhos  266<H>  08-01        CAPILLARY BLOOD GLUCOSE      POCT Blood Glucose.: 134 mg/dL (01 Aug 2021 08:44)  POCT Blood Glucose.: 122 mg/dL (31 Jul 2021 22:05)  POCT Blood Glucose.: 171 mg/dL (31 Jul 2021 17:58)  POCT Blood Glucose.: 123 mg/dL (31 Jul 2021 12:15)      Lower Extremity Physical Exam:  Vascular: DP 1/4, B/L, PT non-palpable B/L, CFT delayed on right and less than 3s on L, Temperature gradient cool to warm on R, warm to cool on L    Neuro: Epicritic sensation intact to the level of ankle, B/L.  Musculoskeletal/Ortho: pain on palpation surrounding the 2nd digit   Skin: gangrenous right foot 2nd and 3rd digits, RF 3rd interspace wound probes to bone, consistent tracking proximally to 2nd and 3rd MPJ, less wet conversion of 2nd and 3rd digits, 1 cc of pus expressed, erythema consistent. Further ischemic changes to the 4th digit, 4th no dusky in appearance with delayed CFT.     7/17 s/p I&D of R 2nd interspace  - mummified residual 2nd digit stump and 3rd digits, fibrotic/liquifactive  tissue noted, mild malodor, no wet conversion, no purulence, no erythema/ cellulitis    RADIOLOGY & ADDITIONAL TESTS:

## 2021-08-01 NOTE — PROGRESS NOTE ADULT - PROBLEM SELECTOR PLAN 3
acute on chronic systolic CHF, s/p diuresis, now euvolemic   -h/o recent admission in April-May for acute systolic heart failure, last TTE 4/26:  LVEF 52% , Mild segmental LV systolic dysfunction.  Hypokinesis of the basal to mid inferior wall.   -EKG reviewed: NSR no ischemic ST/T change  -s/p IV Bumex, now transitioned to Bumex 4mg PO BID  -c/w coreg 6.125 mg bid, hydralazine 50mg tid, isordil 20mg tid

## 2021-08-01 NOTE — PROGRESS NOTE ADULT - PROBLEM SELECTOR PLAN 2
ACOSTA on CKD3. likely prerenal. baseline Cr ~1.8  -Cr slowly up-trending   - neph following and appreciate assistance  - strict I+O  - bumex transitioned to PO as above  - dose meds per renal fxn, avoid nephrotoxins, avoid ACEi and ARBs, Maintain MAP >65, renal diet  - IVF before and after podiatry procedure on Mon as per renal

## 2021-08-01 NOTE — PROGRESS NOTE ADULT - SUBJECTIVE AND OBJECTIVE BOX
PROGRESS NOTE:     Patient is a 61y old  Male who presents with a chief complaint of right foot 2nd/3rd toe infection (01 Aug 2021 11:32)      SUBJECTIVE / OVERNIGHT EVENTS: No new complaints.     ADDITIONAL REVIEW OF SYSTEMS:    MEDICATIONS  (STANDING):  aspirin enteric coated 81 milliGRAM(s) Oral daily  atorvastatin 40 milliGRAM(s) Oral at bedtime  buMETAnide 4 milliGRAM(s) Oral every 12 hours  carvedilol 6.25 milliGRAM(s) Oral every 12 hours  dextrose 40% Gel 15 Gram(s) Oral once  dextrose 5%. 1000 milliLiter(s) (50 mL/Hr) IV Continuous <Continuous>  dextrose 5%. 1000 milliLiter(s) (100 mL/Hr) IV Continuous <Continuous>  dextrose 50% Injectable 25 Gram(s) IV Push once  dextrose 50% Injectable 12.5 Gram(s) IV Push once  dextrose 50% Injectable 25 Gram(s) IV Push once  ertapenem  IVPB 500 milliGRAM(s) IV Intermittent every 24 hours  glucagon  Injectable 1 milliGRAM(s) IntraMuscular once  heparin   Injectable 5000 Unit(s) SubCutaneous every 8 hours  hydrALAZINE 50 milliGRAM(s) Oral three times a day  insulin glargine Injectable (LANTUS) 40 Unit(s) SubCutaneous at bedtime  insulin lispro (ADMELOG) corrective regimen sliding scale   SubCutaneous three times a day before meals  insulin lispro Injectable (ADMELOG) 8 Unit(s) SubCutaneous three times a day before meals  isosorbide   dinitrate Tablet (ISORDIL) 20 milliGRAM(s) Oral three times a day  latanoprost 0.005% Ophthalmic Solution 1 Drop(s) Both EYES at bedtime  levoFLOXacin IVPB      levoFLOXacin IVPB 250 milliGRAM(s) IV Intermittent every 24 hours  melatonin 3 milliGRAM(s) Oral at bedtime  NIFEdipine XL 30 milliGRAM(s) Oral daily  polyethylene glycol 3350 17 Gram(s) Oral daily  senna 2 Tablet(s) Oral at bedtime  sodium chloride 0.9%. 1000 milliLiter(s) (75 mL/Hr) IV Continuous <Continuous>    MEDICATIONS  (PRN):  acetaminophen   Tablet .. 650 milliGRAM(s) Oral every 6 hours PRN Temp greater or equal to 38.5C (101.3F), Mild Pain (1 - 3)  traMADol 25 milliGRAM(s) Oral every 6 hours PRN Severe Pain (7 - 10)      CAPILLARY BLOOD GLUCOSE      POCT Blood Glucose.: 98 mg/dL (01 Aug 2021 12:23)  POCT Blood Glucose.: 134 mg/dL (01 Aug 2021 08:44)  POCT Blood Glucose.: 122 mg/dL (31 Jul 2021 22:05)  POCT Blood Glucose.: 171 mg/dL (31 Jul 2021 17:58)    I&O's Summary    31 Jul 2021 07:01  -  01 Aug 2021 07:00  --------------------------------------------------------  IN: 240 mL / OUT: 2000 mL / NET: -1760 mL    01 Aug 2021 07:01  -  01 Aug 2021 13:25  --------------------------------------------------------  IN: 0 mL / OUT: 700 mL / NET: -700 mL        PHYSICAL EXAM:  Vital Signs Last 24 Hrs  T(C): 36.2 (01 Aug 2021 04:52), Max: 36.8 (31 Jul 2021 22:06)  T(F): 97.2 (01 Aug 2021 04:52), Max: 98.3 (31 Jul 2021 22:06)  HR: 72 (01 Aug 2021 04:52) (65 - 72)  BP: 135/75 (01 Aug 2021 04:52) (131/62 - 144/69)  BP(mean): --  RR: 18 (01 Aug 2021 04:52) (18 - 18)  SpO2: 99% (01 Aug 2021 04:52) (99% - 100%)    CONSTITUTIONAL: NAD,  EYES: PERRLA; conjunctiva and sclera clear  ENMT: Moist oral mucosa, no pharyngeal injection or exudates;   NECK: Supple, no palpable masses;  RESPIRATORY: Normal respiratory effort; lungs are clear to auscultation bilaterally  CARDIOVASCULAR: Regular rate and rhythm, normal S1 and S2, no murmur/rub/gallop; No lower extremity edema; Peripheral pulses are 2+ bilaterally  ABDOMEN: Nontender to palpation, normoactive bowel sounds, no rebound/guarding;   MUSCLOSKELETAL:   no clubbing or cyanosis of digits; no joint swelling or tenderness to palpation. R foot dressing c/d/i  PSYCH: A+O to person, place, and time; affect appropriate  NEUROLOGY: CN 2-12 are intact and symmetric; no gross sensory deficits;   SKIN: No rashes      LABS:                        10.7   17.05 )-----------( 469      ( 01 Aug 2021 04:16 )             32.5     08-01    134<L>  |  93<L>  |  73<H>  ----------------------------<  115<H>  4.2   |  26  |  2.62<H>    Ca    9.8      01 Aug 2021 04:16  Phos  4.8     08-01  Mg     2.20     08-01    TPro  8.5<H>  /  Alb  3.7  /  TBili  0.4  /  DBili  x   /  AST  33  /  ALT  32  /  AlkPhos  266<H>  08-01                RADIOLOGY & ADDITIONAL TESTS:  Results Reviewed:   Imaging Personally Reviewed:  Electrocardiogram Personally Reviewed:    COORDINATION OF CARE:  Care Discussed with Consultants/Other Providers [Y/N]:  Prior or Outpatient Records Reviewed [Y/N]:

## 2021-08-01 NOTE — PROGRESS NOTE ADULT - PROBLEM SELECTOR PLAN 5
- A1c 8.5. -200  -at home takes lantus 60u hs and premeal insulin 28-30u   -c/w Lantus 40 units hs and Admelog 8 units premeal   -ISS  -monitor FS

## 2021-08-01 NOTE — PROGRESS NOTE ADULT - PROBLEM SELECTOR PLAN 4
-pt with h/o right foot 2nd toe partial amputation in April at [facility], h/o severe RLE PVD with RLE angioplasty in past, no stent, now p/w 2nd residual toe/stump and 3rd toe infection, xray concerning for osteomyelitis, elevated inflammatory markers ESR 96, CRP 80.6 c/w acute OM. rec'd IV vanc 1 g bid (7/17 pm - 7/20), zosyn (7/18-7/20)  -7/18 blood cultures x2  -ID c/s start erta and levaquin (7/20 - )  -MRI right foot: osteomyelitis in 2nd toe, periostitis w/o osteo on 3rd toe  -podiatry consulted, s/p I&D 7/17. plan for R foot partial 2nd and 3rd ray resection on Mon.  pt is medically optimized for the procedure  - vascular consult: s/p angiogram 7/29 - no sig stenosis

## 2021-08-01 NOTE — PROGRESS NOTE ADULT - ASSESSMENT
60 y/o male with h/o HTN, insulin dependent DM-2, hld, glaucoma, CKD3 with recent interstitial nephritis treated with steroid, HFrEF, recent NSTEMI, PAD s/p RLE angioplasty, s/p right 2nd toe partial amputation at [facility] in April 2021, who presents with 2 week history of worsening right foot pain, associated with right 2nd residual toe stump/3rd toe infection/discoloration to blue/black, xray c/f osteomyelitis, plan for surgical intervention this admission per podiatry. Hospital course has been complicated by flash pulmonary edema 2/2 to ACOSTA on CKD. Now he is improving with diuresis

## 2021-08-02 LAB
ALBUMIN SERPL ELPH-MCNC: 3.5 G/DL — SIGNIFICANT CHANGE UP (ref 3.3–5)
ALP SERPL-CCNC: 256 U/L — HIGH (ref 40–120)
ALT FLD-CCNC: 28 U/L — SIGNIFICANT CHANGE UP (ref 4–41)
ANION GAP SERPL CALC-SCNC: 19 MMOL/L — HIGH (ref 7–14)
APTT BLD: 28.5 SEC — SIGNIFICANT CHANGE UP (ref 27–36.3)
AST SERPL-CCNC: 25 U/L — SIGNIFICANT CHANGE UP (ref 4–40)
BILIRUB SERPL-MCNC: 0.4 MG/DL — SIGNIFICANT CHANGE UP (ref 0.2–1.2)
BLD GP AB SCN SERPL QL: NEGATIVE — SIGNIFICANT CHANGE UP
BUN SERPL-MCNC: 77 MG/DL — HIGH (ref 7–23)
CALCIUM SERPL-MCNC: 9.8 MG/DL — SIGNIFICANT CHANGE UP (ref 8.4–10.5)
CHLORIDE SERPL-SCNC: 91 MMOL/L — LOW (ref 98–107)
CO2 SERPL-SCNC: 24 MMOL/L — SIGNIFICANT CHANGE UP (ref 22–31)
CREAT SERPL-MCNC: 2.4 MG/DL — HIGH (ref 0.5–1.3)
GLUCOSE SERPL-MCNC: 135 MG/DL — HIGH (ref 70–99)
HCT VFR BLD CALC: 32.3 % — LOW (ref 39–50)
HGB BLD-MCNC: 10.5 G/DL — LOW (ref 13–17)
INR BLD: 1.21 RATIO — HIGH (ref 0.88–1.16)
MAGNESIUM SERPL-MCNC: 2.3 MG/DL — SIGNIFICANT CHANGE UP (ref 1.6–2.6)
MCHC RBC-ENTMCNC: 26.4 PG — LOW (ref 27–34)
MCHC RBC-ENTMCNC: 32.5 GM/DL — SIGNIFICANT CHANGE UP (ref 32–36)
MCV RBC AUTO: 81.4 FL — SIGNIFICANT CHANGE UP (ref 80–100)
NRBC # BLD: 0 /100 WBCS — SIGNIFICANT CHANGE UP
NRBC # FLD: 0 K/UL — SIGNIFICANT CHANGE UP
PHOSPHATE SERPL-MCNC: 4.9 MG/DL — HIGH (ref 2.5–4.5)
PLATELET # BLD AUTO: 423 K/UL — HIGH (ref 150–400)
POTASSIUM SERPL-MCNC: 3.5 MMOL/L — SIGNIFICANT CHANGE UP (ref 3.5–5.3)
POTASSIUM SERPL-SCNC: 3.5 MMOL/L — SIGNIFICANT CHANGE UP (ref 3.5–5.3)
PROT SERPL-MCNC: 8.1 G/DL — SIGNIFICANT CHANGE UP (ref 6–8.3)
PROTHROM AB SERPL-ACNC: 13.7 SEC — HIGH (ref 10.6–13.6)
RBC # BLD: 3.97 M/UL — LOW (ref 4.2–5.8)
RBC # FLD: 12.6 % — SIGNIFICANT CHANGE UP (ref 10.3–14.5)
RH IG SCN BLD-IMP: POSITIVE — SIGNIFICANT CHANGE UP
SODIUM SERPL-SCNC: 134 MMOL/L — LOW (ref 135–145)
WBC # BLD: 14.75 K/UL — HIGH (ref 3.8–10.5)
WBC # FLD AUTO: 14.75 K/UL — HIGH (ref 3.8–10.5)

## 2021-08-02 PROCEDURE — 99232 SBSQ HOSP IP/OBS MODERATE 35: CPT

## 2021-08-02 RX ORDER — HEPARIN SODIUM 5000 [USP'U]/ML
5000 INJECTION INTRAVENOUS; SUBCUTANEOUS EVERY 8 HOURS
Refills: 0 | Status: DISCONTINUED | OUTPATIENT
Start: 2021-08-02 | End: 2021-08-04

## 2021-08-02 RX ADMIN — HEPARIN SODIUM 5000 UNIT(S): 5000 INJECTION INTRAVENOUS; SUBCUTANEOUS at 15:07

## 2021-08-02 RX ADMIN — ISOSORBIDE DINITRATE 20 MILLIGRAM(S): 5 TABLET ORAL at 23:06

## 2021-08-02 RX ADMIN — CARVEDILOL PHOSPHATE 6.25 MILLIGRAM(S): 80 CAPSULE, EXTENDED RELEASE ORAL at 17:09

## 2021-08-02 RX ADMIN — Medication 30 MILLIGRAM(S): at 06:46

## 2021-08-02 RX ADMIN — Medication 81 MILLIGRAM(S): at 14:56

## 2021-08-02 RX ADMIN — Medication 50 MILLIGRAM(S): at 23:06

## 2021-08-02 RX ADMIN — HEPARIN SODIUM 5000 UNIT(S): 5000 INJECTION INTRAVENOUS; SUBCUTANEOUS at 23:06

## 2021-08-02 RX ADMIN — Medication 3 MILLIGRAM(S): at 23:06

## 2021-08-02 RX ADMIN — Medication 50 MILLIGRAM(S): at 06:46

## 2021-08-02 RX ADMIN — INSULIN GLARGINE 40 UNIT(S): 100 INJECTION, SOLUTION SUBCUTANEOUS at 22:38

## 2021-08-02 RX ADMIN — ISOSORBIDE DINITRATE 20 MILLIGRAM(S): 5 TABLET ORAL at 15:08

## 2021-08-02 RX ADMIN — SODIUM CHLORIDE 70 MILLILITER(S): 9 INJECTION INTRAMUSCULAR; INTRAVENOUS; SUBCUTANEOUS at 09:20

## 2021-08-02 RX ADMIN — Medication 1: at 13:24

## 2021-08-02 RX ADMIN — BUMETANIDE 4 MILLIGRAM(S): 0.25 INJECTION INTRAMUSCULAR; INTRAVENOUS at 06:46

## 2021-08-02 RX ADMIN — BUMETANIDE 4 MILLIGRAM(S): 0.25 INJECTION INTRAMUSCULAR; INTRAVENOUS at 17:10

## 2021-08-02 RX ADMIN — ERTAPENEM SODIUM 100 MILLIGRAM(S): 1 INJECTION, POWDER, LYOPHILIZED, FOR SOLUTION INTRAMUSCULAR; INTRAVENOUS at 17:03

## 2021-08-02 RX ADMIN — ISOSORBIDE DINITRATE 20 MILLIGRAM(S): 5 TABLET ORAL at 06:46

## 2021-08-02 RX ADMIN — CARVEDILOL PHOSPHATE 6.25 MILLIGRAM(S): 80 CAPSULE, EXTENDED RELEASE ORAL at 06:46

## 2021-08-02 RX ADMIN — LATANOPROST 1 DROP(S): 0.05 SOLUTION/ DROPS OPHTHALMIC; TOPICAL at 23:07

## 2021-08-02 RX ADMIN — Medication 50 MILLIGRAM(S): at 15:09

## 2021-08-02 RX ADMIN — Medication 8 UNIT(S): at 17:39

## 2021-08-02 RX ADMIN — ATORVASTATIN CALCIUM 40 MILLIGRAM(S): 80 TABLET, FILM COATED ORAL at 23:06

## 2021-08-02 NOTE — PROGRESS NOTE ADULT - ASSESSMENT
61M with diabetes, admitted at [facility] in April for an infected right second toe s/p partial amputation, grew GBS.   Admitted to [facility] later that month with NSTEMI.   His toe was already had dry gangrene at that point without evidence of acute infection but he wanted to complete the 6-week course of antibiotics as planned from [facility] - changed from Cefepime to Daptomycin due to AIN, completed 5/24.   Admitted 7/18/21 with worsening ischemia to more toes.   Wound with Enterobacter, Stenotrophomonas, GBS.   Not systemically ill.   Planned for resection today but concern for vascular supply, failed angioplasty, OR postponed to Wed 8/4.     Suggest  -continue Ertapenem and Levaquin, renally dosed  -intervention per podiatry     Manjit Drake MD   Infectious Disease   Pager [phone]   After 5PM and on weekends please page fellow on call or call [phone]

## 2021-08-02 NOTE — PROGRESS NOTE ADULT - SUBJECTIVE AND OBJECTIVE BOX
[facility] Division of Kidney Diseases & Hypertension  FOLLOW UP NOTE  --------------------------------------------------------------------------------  61M with history of HTN, T2DM, HLD, glaucoma, CKD with recent AIN (follows with Dr. Zambrano) treated with steroid, HFrEF, recent NSTEMI, PAD s/p RLE angioplasty, s/p right 2nd toe patient amputation at [facility] presents to [facility] with worsening foot pain and associated right 203 toe infection. Recent RLE angiogram revealed severe atherosclerosis. Nephrology consulted for ACOSTA amidst rising creatinine levels and continued malaise. Patient has been mentating well and has no complaints. Has been tolerating antibiotics well.       Pt. seen and examined at bedside he reports feeling ok.  Just anxious about his foot.  He reports that his breathing is markedly improved since we saw him last.  No chest pain no SOB no NVD.          PAST HISTORY  --------------------------------------------------------------------------------  No significant changes to PMH, PSH, FHx, SHx, unless otherwise noted    ALLERGIES & MEDICATIONS  --------------------------------------------------------------------------------  Allergies    cefepime (Other)    Intolerances      Standing Inpatient Medications  aspirin enteric coated 81 milliGRAM(s) Oral daily  atorvastatin 40 milliGRAM(s) Oral at bedtime  buMETAnide 4 milliGRAM(s) Oral every 12 hours  carvedilol 6.25 milliGRAM(s) Oral every 12 hours  dextrose 40% Gel 15 Gram(s) Oral once  dextrose 5%. 1000 milliLiter(s) IV Continuous <Continuous>  dextrose 5%. 1000 milliLiter(s) IV Continuous <Continuous>  dextrose 50% Injectable 25 Gram(s) IV Push once  dextrose 50% Injectable 12.5 Gram(s) IV Push once  dextrose 50% Injectable 25 Gram(s) IV Push once  ertapenem  IVPB 500 milliGRAM(s) IV Intermittent every 24 hours  glucagon  Injectable 1 milliGRAM(s) IntraMuscular once  hydrALAZINE 50 milliGRAM(s) Oral three times a day  insulin glargine Injectable (LANTUS) 40 Unit(s) SubCutaneous at bedtime  insulin lispro (ADMELOG) corrective regimen sliding scale   SubCutaneous three times a day before meals  insulin lispro Injectable (ADMELOG) 8 Unit(s) SubCutaneous three times a day before meals  isosorbide   dinitrate Tablet (ISORDIL) 20 milliGRAM(s) Oral three times a day  latanoprost 0.005% Ophthalmic Solution 1 Drop(s) Both EYES at bedtime  levoFLOXacin IVPB      levoFLOXacin IVPB 250 milliGRAM(s) IV Intermittent every 24 hours  melatonin 3 milliGRAM(s) Oral at bedtime  NIFEdipine XL 30 milliGRAM(s) Oral daily  polyethylene glycol 3350 17 Gram(s) Oral daily  senna 2 Tablet(s) Oral at bedtime  sodium chloride 0.9%. 1000 milliLiter(s) IV Continuous <Continuous>  sodium chloride 0.9%. 1000 milliLiter(s) IV Continuous <Continuous>    PRN Inpatient Medications  acetaminophen   Tablet .. 650 milliGRAM(s) Oral every 6 hours PRN      REVIEW OF SYSTEMS  --------------------------------------------------------------------------------  Gen: no fever  Respiratory: no sob  CV: no cp   GI: no ab pain  : no urinary complaints  MSK: pain is controlled.    VITALS/PHYSICAL EXAM  --------------------------------------------------------------------------------  T(C): 36.7 (08-02-21 @ 06:44), Max: 36.7 (08-01-21 @ 12:26)  HR: 71 (08-02-21 @ 06:44) (68 - 78)  BP: 137/63 (08-02-21 @ 06:44) (128/65 - 138/78)  ABP: --  ABP(mean): --  RR: 17 (08-02-21 @ 06:44) (16 - 18)  SpO2: 96% (08-02-21 @ 06:44) (96% - 99%)  CVP(mm Hg): --        08-01-21 @ 07:01  -  08-02-21 @ 07:00  --------------------------------------------------------  IN: 350 mL / OUT: 1600 mL / NET: -1250 mL    08-02-21 @ 07:01  -  08-02-21 @ 10:34  --------------------------------------------------------  IN: 0 mL / OUT: 250 mL / NET: -250 mL      Physical Exam:  	Gen: NAD, appears calm  	HEENT: MMM, anicteric  	Pulm: clear lungs  	CV: S1S2+ no JVD  	Abd: Soft, +BS   	Ext: R foot dressing seen+ no edema  	Neuro: Awake  	Skin: Warm and dry  	Vascular access: peripheral IV canula    LABS/STUDIES  --------------------------------------------------------------------------------              10.5   14.75 >-----------<  423      [08-02-21 @ 07:08]              32.3     134  |  91  |  77  ----------------------------<  135      [08-02-21 @ 07:08]  3.5   |  24  |  2.40        Ca     9.8     [08-02-21 @ 07:08]      Mg     2.30     [08-02-21 @ 07:08]      Phos  4.9     [08-02-21 @ 07:08]    TPro  8.1  /  Alb  3.5  /  TBili  0.4  /  DBili  x   /  AST  25  /  ALT  28  /  AlkPhos  256  [08-02-21 @ 07:08]    PT/INR: PT 13.7 , INR 1.21       [08-02-21 @ 07:08]  PTT: 28.5       [08-02-21 @ 07:08]      Creatinine Trend:  SCr 2.40 [08-02 @ 07:08]  SCr 2.62 [08-01 @ 04:16]  SCr 2.36 [07-31 @ 06:45]  SCr 2.20 [07-30 @ 06:58]  SCr 2.43 [07-29 @ 05:12]

## 2021-08-02 NOTE — PROGRESS NOTE ADULT - SUBJECTIVE AND OBJECTIVE BOX
Follow Up:  foot infection    Interval History/ROS: Afebrile. Pain is controlled. Planned for surgery today.     Allergies  cefepime (Other)        ANTIMICROBIALS:  ertapenem  IVPB 500 every 24 hours  levoFLOXacin IVPB 250 every 24 hours  levoFLOXacin IVPB        OTHER MEDS:  acetaminophen   Tablet .. 650 milliGRAM(s) Oral every 6 hours PRN  aspirin enteric coated 81 milliGRAM(s) Oral daily  atorvastatin 40 milliGRAM(s) Oral at bedtime  buMETAnide 4 milliGRAM(s) Oral every 12 hours  carvedilol 6.25 milliGRAM(s) Oral every 12 hours  dextrose 40% Gel 15 Gram(s) Oral once  dextrose 5%. 1000 milliLiter(s) IV Continuous <Continuous>  dextrose 5%. 1000 milliLiter(s) IV Continuous <Continuous>  dextrose 50% Injectable 25 Gram(s) IV Push once  dextrose 50% Injectable 12.5 Gram(s) IV Push once  dextrose 50% Injectable 25 Gram(s) IV Push once  glucagon  Injectable 1 milliGRAM(s) IntraMuscular once  heparin   Injectable 5000 Unit(s) SubCutaneous every 8 hours  hydrALAZINE 50 milliGRAM(s) Oral three times a day  insulin glargine Injectable (LANTUS) 40 Unit(s) SubCutaneous at bedtime  insulin lispro (ADMELOG) corrective regimen sliding scale   SubCutaneous three times a day before meals  insulin lispro Injectable (ADMELOG) 8 Unit(s) SubCutaneous three times a day before meals  isosorbide   dinitrate Tablet (ISORDIL) 20 milliGRAM(s) Oral three times a day  latanoprost 0.005% Ophthalmic Solution 1 Drop(s) Both EYES at bedtime  melatonin 3 milliGRAM(s) Oral at bedtime  NIFEdipine XL 30 milliGRAM(s) Oral daily  polyethylene glycol 3350 17 Gram(s) Oral daily  senna 2 Tablet(s) Oral at bedtime  sodium chloride 0.9%. 1000 milliLiter(s) IV Continuous <Continuous>  sodium chloride 0.9%. 1000 milliLiter(s) IV Continuous <Continuous>      Vital Signs Last 24 Hrs  T(C): 36.6 (02 Aug 2021 16:58), Max: 36.7 (02 Aug 2021 06:44)  T(F): 97.8 (02 Aug 2021 16:58), Max: 98.1 (02 Aug 2021 06:44)  HR: 69 (02 Aug 2021 16:58) (69 - 71)  BP: 116/63 (02 Aug 2021 16:58) (116/63 - 143/71)  BP(mean): --  RR: 18 (02 Aug 2021 16:58) (16 - 18)  SpO2: 100% (02 Aug 2021 16:58) (96% - 100%)    Physical Exam:  General: awake, alert, non toxic  Head: atraumatic, normocephalic  Eye: normal sclera and conjunctiva  Cardio: regular rate   Respiratory: nonlabored on room air  abd: soft, nondistended, nontender   Musculoskeletal: right foot dressed   Neurologic: no focal deficit  psych: normal affect                          10.5   14.75 )-----------( 423      ( 02 Aug 2021 07:08 )             32.3       08-02    134<L>  |  91<L>  |  77<H>  ----------------------------<  135<H>  3.5   |  24  |  2.40<H>    Ca    9.8      02 Aug 2021 07:08  Phos  4.9     08-02  Mg     2.30     08-02    TPro  8.1  /  Alb  3.5  /  TBili  0.4  /  DBili  x   /  AST  25  /  ALT  28  /  AlkPhos  256<H>  08-02          MICROBIOLOGY:          RADIOLOGY:  Images below reviewed personally  MR Foot w/ IV Cont, Right (07.19.21 @ 18:14)   Status post second toe partial amputation. Findings suggestive of early osteomyelitis within the second proximal phalangeal remnant.  Findings consistent with periostitis aboutthe proximal phalanx of the third toe without vinnie osteomyelitis.  Nonspecific patchy increased STIR signal within the proximal, middle, and distal phalanges of the fourth and fifth toes and within the proximal and distal phalanges of the first toe with grossly preserved T1 marrow signal. This may be reflective of vascular insufficiency. Correlate for overlying wound at these sites to exclude the possibility of developing osteolysis.  Cellulitis throughout the forefoot.

## 2021-08-02 NOTE — PROGRESS NOTE ADULT - PROBLEM SELECTOR PLAN 2
ACOSTA on CKD3. likely prerenal. baseline Cr ~1.8  -Cr slowly up-trending   - neph following and appreciate assistance  - strict I+O  - bumex transitioned to PO as above  - dose meds per renal fxn, avoid nephrotoxins, avoid ACEi and ARBs, Maintain MAP >65, renal diet

## 2021-08-02 NOTE — PROGRESS NOTE ADULT - PROBLEM SELECTOR PLAN 7
h/o severe RLE PVD, s/p RLE angioplasty in past  - c/w ASA/statin  - vascular angiogram 7/29 a/p angioplasty. no further intervention indicated

## 2021-08-02 NOTE — PROGRESS NOTE ADULT - PROBLEM SELECTOR PLAN 1
Pt. with ACOSTA on CKD in the setting of venous congestion. pt. hx of AIN treated with steroids.   Upon admission Scr was 1.77.  Received contrast 7/29 and creatinine trended up to 2.62 but now 2.4 today 8/2.  Volume status has improved substantially overall and this creatinine likely represents patients new baseline.  Would hold on any further fluids for now.  If further contrast required please notify nephrology.  Continue present dose of bumex 4 mg po bid.  Patient will need follow up with me in the office .

## 2021-08-02 NOTE — PROGRESS NOTE ADULT - ASSESSMENT
62 y/o male with h/o HTN, insulin dependent DM-2, hld, glaucoma, CKD3 with recent interstitial nephritis treated with steroid, HFrEF, recent NSTEMI, PAD s/p RLE angioplasty, s/p right 2nd toe partial amputation at [facility] in April 2021, who presents with 2 week history of worsening right foot pain, associated with right 2nd residual toe stump/3rd toe infection/discoloration to blue/black, xray c/f osteomyelitis, plan for surgical intervention this admission per podiatry. Hospital course has been complicated by flash pulmonary edema 2/2 to ACOSTA on CKD. Now he is improving with diuresis

## 2021-08-02 NOTE — PROGRESS NOTE ADULT - PROBLEM SELECTOR PLAN 4
-pt with h/o right foot 2nd toe partial amputation in April at [facility], h/o severe RLE PVD with RLE angioplasty in past, no stent, now p/w 2nd residual toe/stump and 3rd toe infection, xray concerning for osteomyelitis, elevated inflammatory markers ESR 96, CRP 80.6 c/w acute OM. rec'd IV vanc 1 g bid (7/17 pm - 7/20), zosyn (7/18-7/20)  -7/18 blood cultures x2  -ID c/s start erta and levaquin (7/20 - )  -MRI right foot: osteomyelitis in 2nd toe, periostitis w/o osteo on 3rd toe  -podiatry consulted, s/p I&D 7/17. initial plan for R foot partial 2nd and 3rd ray resection on today, now rescheduled to 8/4 d/t worsening ischemia.  pt is medically optimized for the procedure  - vascular consult: s/p angiogram 7/29 - opitimized from vascular standpoint for podiatry procedure

## 2021-08-02 NOTE — PROGRESS NOTE ADULT - ASSESSMENT
62 y/o M s/p R foot 2nd interspace I&D, RF 2,3,4 dry gangrene with plantar forefoot ischemic changes  - Patient seen and evaluated   - Afebrile WBC 17  - mummified residual 2nd digit stump and 3rd digits, worsening ischemic changes now extending to 4th digit and plantar forefoot, macerated   tissue noted, no malodor, no wet conversion, no purulence, no erythema/ cellulitis  - R foot MRI: early osteomyelitis within the second proximal phalangeal, periostitis to the proximal phalanx of 3rd digit without vinnie osteomyelitis.  - MICHEL/PVR- noncompressible vessels b/l, RTBI 0.07, LTBI 0.22, waveforms flat at digits  - 7/29 angio could not restore RLE flow, no further vascular intervention planned at this time  -cancelled R foot partial 2nd, 3rd ray resection due to worsening of ischemic changes, pending further demarcation  -prognosis is guarded 2/2 very poor circulation and failed RLE angioplasty by vasc team  -rebooked R foot partial 2nd and 3rd ray resection Wednesday 8/4 @ 4 pm pending demarcation   - seen with attending

## 2021-08-02 NOTE — PROGRESS NOTE ADULT - SUBJECTIVE AND OBJECTIVE BOX
[facility] Division of Hospital Medicine  Eder Sheridan MD  Pager [phone]      Patient is a 61y old  Male who presents with a chief complaint of right foot 2nd/3rd toe infection (02 Aug 2021 13:15)      SUBJECTIVE / OVERNIGHT EVENTS:    No acute event o/n. Pt offers no complaint. Podiatry procedure today cancelled d/t worsening ischemia of the toes    ADDITIONAL REVIEW OF SYSTEMS:    RESPIRATORY: No cough, wheezing, chills or hemoptysis; No shortness of breath  CARDIOVASCULAR: No chest pain, palpitations, dizziness, or leg swelling  GASTROINTESTINAL: No abdominal or epigastric pain. No nausea, vomiting, or hematemesis; No diarrhea or constipation. No melena or hematochezia.      MEDICATIONS  (STANDING):  aspirin enteric coated 81 milliGRAM(s) Oral daily  atorvastatin 40 milliGRAM(s) Oral at bedtime  buMETAnide 4 milliGRAM(s) Oral every 12 hours  carvedilol 6.25 milliGRAM(s) Oral every 12 hours  dextrose 40% Gel 15 Gram(s) Oral once  dextrose 5%. 1000 milliLiter(s) (50 mL/Hr) IV Continuous <Continuous>  dextrose 5%. 1000 milliLiter(s) (100 mL/Hr) IV Continuous <Continuous>  dextrose 50% Injectable 25 Gram(s) IV Push once  dextrose 50% Injectable 12.5 Gram(s) IV Push once  dextrose 50% Injectable 25 Gram(s) IV Push once  ertapenem  IVPB 500 milliGRAM(s) IV Intermittent every 24 hours  glucagon  Injectable 1 milliGRAM(s) IntraMuscular once  heparin   Injectable 5000 Unit(s) SubCutaneous every 8 hours  hydrALAZINE 50 milliGRAM(s) Oral three times a day  insulin glargine Injectable (LANTUS) 40 Unit(s) SubCutaneous at bedtime  insulin lispro (ADMELOG) corrective regimen sliding scale   SubCutaneous three times a day before meals  insulin lispro Injectable (ADMELOG) 8 Unit(s) SubCutaneous three times a day before meals  isosorbide   dinitrate Tablet (ISORDIL) 20 milliGRAM(s) Oral three times a day  latanoprost 0.005% Ophthalmic Solution 1 Drop(s) Both EYES at bedtime  levoFLOXacin IVPB      levoFLOXacin IVPB 250 milliGRAM(s) IV Intermittent every 24 hours  melatonin 3 milliGRAM(s) Oral at bedtime  NIFEdipine XL 30 milliGRAM(s) Oral daily  polyethylene glycol 3350 17 Gram(s) Oral daily  senna 2 Tablet(s) Oral at bedtime  sodium chloride 0.9%. 1000 milliLiter(s) (75 mL/Hr) IV Continuous <Continuous>  sodium chloride 0.9%. 1000 milliLiter(s) (70 mL/Hr) IV Continuous <Continuous>    MEDICATIONS  (PRN):  acetaminophen   Tablet .. 650 milliGRAM(s) Oral every 6 hours PRN Temp greater or equal to 38.5C (101.3F), Mild Pain (1 - 3)      CAPILLARY BLOOD GLUCOSE      POCT Blood Glucose.: 161 mg/dL (02 Aug 2021 12:29)  POCT Blood Glucose.: 139 mg/dL (02 Aug 2021 08:47)  POCT Blood Glucose.: 155 mg/dL (02 Aug 2021 07:49)  POCT Blood Glucose.: 113 mg/dL (01 Aug 2021 22:14)  POCT Blood Glucose.: 113 mg/dL (01 Aug 2021 17:38)    I&O's Summary    01 Aug 2021 07:01  -  02 Aug 2021 07:00  --------------------------------------------------------  IN: 350 mL / OUT: 1600 mL / NET: -1250 mL    02 Aug 2021 07:01  -  02 Aug 2021 14:49  --------------------------------------------------------  IN: 0 mL / OUT: 250 mL / NET: -250 mL        PHYSICAL EXAM:  Vital Signs Last 24 Hrs  T(C): 36.7 (02 Aug 2021 06:44), Max: 36.7 (02 Aug 2021 06:44)  T(F): 98.1 (02 Aug 2021 06:44), Max: 98.1 (02 Aug 2021 06:44)  HR: 71 (02 Aug 2021 06:44) (68 - 71)  BP: 137/63 (02 Aug 2021 06:44) (133/56 - 138/78)  BP(mean): --  RR: 16 (02 Aug 2021 10:44) (16 - 17)  SpO2: 98% (02 Aug 2021 10:44) (96% - 98%)    CONSTITUTIONAL: NAD,  EYES: PERRLA; conjunctiva and sclera clear  ENMT: Moist oral mucosa, no pharyngeal injection or exudates;   NECK: Supple, no palpable masses;  RESPIRATORY: Normal respiratory effort; lungs are clear to auscultation bilaterally  CARDIOVASCULAR: Regular rate and rhythm, normal S1 and S2, no murmur/rub/gallop; No lower extremity edema; Peripheral pulses are 2+ bilaterally  ABDOMEN: Nontender to palpation, normoactive bowel sounds, no rebound/guarding;   MUSCLOSKELETAL:   no clubbing or cyanosis of digits; no joint swelling or tenderness to palpation. R foot dressing c/d/i  PSYCH: A+O to person, place, and time; affect appropriate  NEUROLOGY: CN 2-12 are intact and symmetric; no gross sensory deficits;   SKIN: No rashes;     LABS:                        10.5   14.75 )-----------( 423      ( 02 Aug 2021 07:08 )             32.3     08-02    134<L>  |  91<L>  |  77<H>  ----------------------------<  135<H>  3.5   |  24  |  2.40<H>    Ca    9.8      02 Aug 2021 07:08  Phos  4.9     08-02  Mg     2.30     08-02    TPro  8.1  /  Alb  3.5  /  TBili  0.4  /  DBili  x   /  AST  25  /  ALT  28  /  AlkPhos  256<H>  08-02    PT/INR - ( 02 Aug 2021 07:08 )   PT: 13.7 sec;   INR: 1.21 ratio         PTT - ( 02 Aug 2021 07:08 )  PTT:28.5 sec            RADIOLOGY & ADDITIONAL TESTS:  Results Reviewed:   Imaging Personally Reviewed:  Electrocardiogram Personally Reviewed:    COORDINATION OF CARE:  Care Discussed with Consultants/Other Providers [Y/N]:  Prior or Outpatient Records Reviewed [Y/N]:

## 2021-08-02 NOTE — PROGRESS NOTE ADULT - SUBJECTIVE AND OBJECTIVE BOX
Podiatry pager #: [phone] ([place])/ [phone] ([facility]    Patient is a 61y old  Male who presents with a chief complaint of right foot 2nd/3rd toe infection (02 Aug 2021 10:34)       INTERVAL HPI/OVERNIGHT EVENTS:  Patient seen and evaluated at bedside.  Pt is resting comfortable in NAD. Denies N/V/F/C.     Allergies    cefepime (Other)    Intolerances        Vital Signs Last 24 Hrs  T(C): 36.7 (02 Aug 2021 06:44), Max: 36.7 (02 Aug 2021 06:44)  T(F): 98.1 (02 Aug 2021 06:44), Max: 98.1 (02 Aug 2021 06:44)  HR: 71 (02 Aug 2021 06:44) (68 - 71)  BP: 137/63 (02 Aug 2021 06:44) (133/56 - 138/78)  BP(mean): --  RR: 16 (02 Aug 2021 10:44) (16 - 17)  SpO2: 98% (02 Aug 2021 10:44) (96% - 98%)    LABS:                        10.5   14.75 )-----------( 423      ( 02 Aug 2021 07:08 )             32.3     08-02    134<L>  |  91<L>  |  77<H>  ----------------------------<  135<H>  3.5   |  24  |  2.40<H>    Ca    9.8      02 Aug 2021 07:08  Phos  4.9     08-02  Mg     2.30     08-02    TPro  8.1  /  Alb  3.5  /  TBili  0.4  /  DBili  x   /  AST  25  /  ALT  28  /  AlkPhos  256<H>  08-02    PT/INR - ( 02 Aug 2021 07:08 )   PT: 13.7 sec;   INR: 1.21 ratio         PTT - ( 02 Aug 2021 07:08 )  PTT:28.5 sec    CAPILLARY BLOOD GLUCOSE      POCT Blood Glucose.: 161 mg/dL (02 Aug 2021 12:29)  POCT Blood Glucose.: 139 mg/dL (02 Aug 2021 08:47)  POCT Blood Glucose.: 155 mg/dL (02 Aug 2021 07:49)  POCT Blood Glucose.: 113 mg/dL (01 Aug 2021 22:14)  POCT Blood Glucose.: 113 mg/dL (01 Aug 2021 17:38)      Lower Extremity Physical Exam:  Vascular: DP 1/4, B/L, PT non-palpable B/L, CFT delayed on right and less than 3s on L, Temperature gradient cool to warm on R, warm to cool on L    Neuro: Epicritic sensation intact to the level of ankle, B/L.  Musculoskeletal/Ortho: pain on palpation surrounding the 2nd digit   Skin: gangrenous right foot 2nd and 3rd digits, RF 3rd interspace wound probes to bone, consistent tracking proximally to 2nd and 3rd MPJ, less wet conversion of 2nd and 3rd digits, 1 cc of pus expressed, erythema consistent. Further ischemic changes to the 4th digit, 4th no dusky in appearance with delayed CFT.     7/17 s/p I&D of R 2nd interspace  - mummified residual 2nd digit stump and 3rd digits, worsening ischemic changes now extending to 4th digit and plantar forefoot, macerated tissue noted, no malodor, no wet conversion, no purulence, no erythema/ cellulitis    RADIOLOGY & ADDITIONAL TESTS:

## 2021-08-03 LAB
ANION GAP SERPL CALC-SCNC: 18 MMOL/L — HIGH (ref 7–14)
BUN SERPL-MCNC: 74 MG/DL — HIGH (ref 7–23)
CALCIUM SERPL-MCNC: 9.8 MG/DL — SIGNIFICANT CHANGE UP (ref 8.4–10.5)
CHLORIDE SERPL-SCNC: 92 MMOL/L — LOW (ref 98–107)
CO2 SERPL-SCNC: 24 MMOL/L — SIGNIFICANT CHANGE UP (ref 22–31)
CREAT SERPL-MCNC: 2.23 MG/DL — HIGH (ref 0.5–1.3)
GLUCOSE SERPL-MCNC: 112 MG/DL — HIGH (ref 70–99)
HCT VFR BLD CALC: 32.3 % — LOW (ref 39–50)
HGB BLD-MCNC: 10.7 G/DL — LOW (ref 13–17)
MAGNESIUM SERPL-MCNC: 2.4 MG/DL — SIGNIFICANT CHANGE UP (ref 1.6–2.6)
MCHC RBC-ENTMCNC: 26.6 PG — LOW (ref 27–34)
MCHC RBC-ENTMCNC: 33.1 GM/DL — SIGNIFICANT CHANGE UP (ref 32–36)
MCV RBC AUTO: 80.1 FL — SIGNIFICANT CHANGE UP (ref 80–100)
NRBC # BLD: 0 /100 WBCS — SIGNIFICANT CHANGE UP
NRBC # FLD: 0 K/UL — SIGNIFICANT CHANGE UP
PHOSPHATE SERPL-MCNC: 5.3 MG/DL — HIGH (ref 2.5–4.5)
PLATELET # BLD AUTO: 460 K/UL — HIGH (ref 150–400)
POTASSIUM SERPL-MCNC: 3.2 MMOL/L — LOW (ref 3.5–5.3)
POTASSIUM SERPL-SCNC: 3.2 MMOL/L — LOW (ref 3.5–5.3)
RBC # BLD: 4.03 M/UL — LOW (ref 4.2–5.8)
RBC # FLD: 12.7 % — SIGNIFICANT CHANGE UP (ref 10.3–14.5)
SODIUM SERPL-SCNC: 134 MMOL/L — LOW (ref 135–145)
WBC # BLD: 15.78 K/UL — HIGH (ref 3.8–10.5)
WBC # FLD AUTO: 15.78 K/UL — HIGH (ref 3.8–10.5)

## 2021-08-03 PROCEDURE — 99232 SBSQ HOSP IP/OBS MODERATE 35: CPT

## 2021-08-03 RX ORDER — POTASSIUM CHLORIDE 20 MEQ
40 PACKET (EA) ORAL ONCE
Refills: 0 | Status: COMPLETED | OUTPATIENT
Start: 2021-08-03 | End: 2021-08-03

## 2021-08-03 RX ADMIN — ATORVASTATIN CALCIUM 40 MILLIGRAM(S): 80 TABLET, FILM COATED ORAL at 22:35

## 2021-08-03 RX ADMIN — Medication 8 UNIT(S): at 12:41

## 2021-08-03 RX ADMIN — POLYETHYLENE GLYCOL 3350 17 GRAM(S): 17 POWDER, FOR SOLUTION ORAL at 12:40

## 2021-08-03 RX ADMIN — HEPARIN SODIUM 5000 UNIT(S): 5000 INJECTION INTRAVENOUS; SUBCUTANEOUS at 13:01

## 2021-08-03 RX ADMIN — INSULIN GLARGINE 40 UNIT(S): 100 INJECTION, SOLUTION SUBCUTANEOUS at 22:35

## 2021-08-03 RX ADMIN — ISOSORBIDE DINITRATE 20 MILLIGRAM(S): 5 TABLET ORAL at 07:08

## 2021-08-03 RX ADMIN — BUMETANIDE 4 MILLIGRAM(S): 0.25 INJECTION INTRAMUSCULAR; INTRAVENOUS at 22:41

## 2021-08-03 RX ADMIN — Medication 3 MILLIGRAM(S): at 22:35

## 2021-08-03 RX ADMIN — ISOSORBIDE DINITRATE 20 MILLIGRAM(S): 5 TABLET ORAL at 22:36

## 2021-08-03 RX ADMIN — HEPARIN SODIUM 5000 UNIT(S): 5000 INJECTION INTRAVENOUS; SUBCUTANEOUS at 22:38

## 2021-08-03 RX ADMIN — CARVEDILOL PHOSPHATE 6.25 MILLIGRAM(S): 80 CAPSULE, EXTENDED RELEASE ORAL at 18:11

## 2021-08-03 RX ADMIN — Medication 1: at 12:41

## 2021-08-03 RX ADMIN — Medication 50 MILLIGRAM(S): at 13:01

## 2021-08-03 RX ADMIN — LATANOPROST 1 DROP(S): 0.05 SOLUTION/ DROPS OPHTHALMIC; TOPICAL at 22:36

## 2021-08-03 RX ADMIN — SENNA PLUS 2 TABLET(S): 8.6 TABLET ORAL at 22:35

## 2021-08-03 RX ADMIN — BUMETANIDE 4 MILLIGRAM(S): 0.25 INJECTION INTRAMUSCULAR; INTRAVENOUS at 09:00

## 2021-08-03 RX ADMIN — Medication 8 UNIT(S): at 17:16

## 2021-08-03 RX ADMIN — Medication 50 MILLIGRAM(S): at 22:35

## 2021-08-03 RX ADMIN — CARVEDILOL PHOSPHATE 6.25 MILLIGRAM(S): 80 CAPSULE, EXTENDED RELEASE ORAL at 07:08

## 2021-08-03 RX ADMIN — Medication 40 MILLIEQUIVALENT(S): at 12:40

## 2021-08-03 RX ADMIN — ISOSORBIDE DINITRATE 20 MILLIGRAM(S): 5 TABLET ORAL at 13:01

## 2021-08-03 RX ADMIN — ERTAPENEM SODIUM 100 MILLIGRAM(S): 1 INJECTION, POWDER, LYOPHILIZED, FOR SOLUTION INTRAMUSCULAR; INTRAVENOUS at 17:42

## 2021-08-03 RX ADMIN — HEPARIN SODIUM 5000 UNIT(S): 5000 INJECTION INTRAVENOUS; SUBCUTANEOUS at 07:07

## 2021-08-03 RX ADMIN — Medication 2: at 17:16

## 2021-08-03 RX ADMIN — Medication 81 MILLIGRAM(S): at 12:41

## 2021-08-03 RX ADMIN — Medication 8 UNIT(S): at 08:59

## 2021-08-03 RX ADMIN — Medication 30 MILLIGRAM(S): at 07:08

## 2021-08-03 RX ADMIN — Medication 50 MILLIGRAM(S): at 07:08

## 2021-08-03 NOTE — PROGRESS NOTE ADULT - SUBJECTIVE AND OBJECTIVE BOX
[facility] Division of Hospital Medicine  Eder Sheridan MD  Pager [phone]      Patient is a 61y old  Male who presents with a chief complaint of right foot 2nd/3rd toe infection (03 Aug 2021 10:29)      SUBJECTIVE / OVERNIGHT EVENTS:    No fever o/n. Pt denies chest pain, sob. Podiatry procedure cancelled given e/o toe ischemia.     ADDITIONAL REVIEW OF SYSTEMS:    RESPIRATORY: No cough, wheezing, chills or hemoptysis; No shortness of breath  CARDIOVASCULAR: No chest pain, palpitations, dizziness, or leg swelling  GASTROINTESTINAL: No abdominal or epigastric pain. No nausea, vomiting, or hematemesis; No diarrhea or constipation. No melena or hematochezia.      MEDICATIONS  (STANDING):  aspirin enteric coated 81 milliGRAM(s) Oral daily  atorvastatin 40 milliGRAM(s) Oral at bedtime  buMETAnide 4 milliGRAM(s) Oral every 12 hours  carvedilol 6.25 milliGRAM(s) Oral every 12 hours  dextrose 40% Gel 15 Gram(s) Oral once  dextrose 5%. 1000 milliLiter(s) (50 mL/Hr) IV Continuous <Continuous>  dextrose 5%. 1000 milliLiter(s) (100 mL/Hr) IV Continuous <Continuous>  dextrose 50% Injectable 25 Gram(s) IV Push once  dextrose 50% Injectable 12.5 Gram(s) IV Push once  dextrose 50% Injectable 25 Gram(s) IV Push once  ertapenem  IVPB 500 milliGRAM(s) IV Intermittent every 24 hours  glucagon  Injectable 1 milliGRAM(s) IntraMuscular once  heparin   Injectable 5000 Unit(s) SubCutaneous every 8 hours  hydrALAZINE 50 milliGRAM(s) Oral three times a day  insulin glargine Injectable (LANTUS) 40 Unit(s) SubCutaneous at bedtime  insulin lispro (ADMELOG) corrective regimen sliding scale   SubCutaneous three times a day before meals  insulin lispro Injectable (ADMELOG) 8 Unit(s) SubCutaneous three times a day before meals  isosorbide   dinitrate Tablet (ISORDIL) 20 milliGRAM(s) Oral three times a day  latanoprost 0.005% Ophthalmic Solution 1 Drop(s) Both EYES at bedtime  levoFLOXacin IVPB      levoFLOXacin IVPB 250 milliGRAM(s) IV Intermittent every 24 hours  melatonin 3 milliGRAM(s) Oral at bedtime  NIFEdipine XL 30 milliGRAM(s) Oral daily  polyethylene glycol 3350 17 Gram(s) Oral daily  senna 2 Tablet(s) Oral at bedtime  sodium chloride 0.9%. 1000 milliLiter(s) (75 mL/Hr) IV Continuous <Continuous>  sodium chloride 0.9%. 1000 milliLiter(s) (70 mL/Hr) IV Continuous <Continuous>    MEDICATIONS  (PRN):  acetaminophen   Tablet .. 650 milliGRAM(s) Oral every 6 hours PRN Temp greater or equal to 38.5C (101.3F), Mild Pain (1 - 3)      CAPILLARY BLOOD GLUCOSE      POCT Blood Glucose.: 172 mg/dL (03 Aug 2021 12:20)  POCT Blood Glucose.: 133 mg/dL (03 Aug 2021 08:20)  POCT Blood Glucose.: 221 mg/dL (02 Aug 2021 21:44)  POCT Blood Glucose.: 132 mg/dL (02 Aug 2021 17:35)    I&O's Summary    02 Aug 2021 07:01  -  03 Aug 2021 07:00  --------------------------------------------------------  IN: 970 mL / OUT: 2200 mL / NET: -1230 mL    03 Aug 2021 07:01  -  03 Aug 2021 15:51  --------------------------------------------------------  IN: 318 mL / OUT: 580 mL / NET: -262 mL        PHYSICAL EXAM:  Vital Signs Last 24 Hrs  T(C): 36.5 (03 Aug 2021 13:00), Max: 36.8 (03 Aug 2021 07:03)  T(F): 97.7 (03 Aug 2021 13:00), Max: 98.2 (03 Aug 2021 07:03)  HR: 67 (03 Aug 2021 13:00) (64 - 72)  BP: 141/72 (03 Aug 2021 13:00) (116/63 - 143/69)  BP(mean): --  RR: 17 (03 Aug 2021 13:00) (16 - 18)  SpO2: 100% (03 Aug 2021 13:00) (98% - 100%)    CONSTITUTIONAL: NAD,  EYES: PERRLA; conjunctiva and sclera clear  ENMT: Moist oral mucosa, no pharyngeal injection or exudates;   NECK: Supple, no palpable masses;  RESPIRATORY: Normal respiratory effort; lungs are clear to auscultation bilaterally  CARDIOVASCULAR: Regular rate and rhythm, normal S1 and S2, no murmur/rub/gallop; No lower extremity edema; Peripheral pulses are 2+ bilaterally  ABDOMEN: Nontender to palpation, normoactive bowel sounds, no rebound/guarding;   MUSCLOSKELETAL:   no clubbing or cyanosis of digits; no joint swelling or tenderness to palpation. R foot dressing c/d/i  PSYCH: A+O to person, place, and time; affect appropriate  NEUROLOGY: CN 2-12 are intact and symmetric; no gross sensory deficits;   SKIN: No rashes;     LABS:                        10.7   15.78 )-----------( 460      ( 03 Aug 2021 07:44 )             32.3     08-03    134<L>  |  92<L>  |  74<H>  ----------------------------<  112<H>  3.2<L>   |  24  |  2.23<H>    Ca    9.8      03 Aug 2021 07:44  Phos  5.3     08-03  Mg     2.40     08-03    TPro  8.1  /  Alb  3.5  /  TBili  0.4  /  DBili  x   /  AST  25  /  ALT  28  /  AlkPhos  256<H>  08-02    PT/INR - ( 02 Aug 2021 07:08 )   PT: 13.7 sec;   INR: 1.21 ratio         PTT - ( 02 Aug 2021 07:08 )  PTT:28.5 sec            RADIOLOGY & ADDITIONAL TESTS:  Results Reviewed:   Imaging Personally Reviewed:  Electrocardiogram Personally Reviewed:    COORDINATION OF CARE:  Care Discussed with Consultants/Other Providers [Y/N]:  Prior or Outpatient Records Reviewed [Y/N]:

## 2021-08-03 NOTE — PROGRESS NOTE ADULT - ASSESSMENT
60 y/o male with h/o HTN, insulin dependent DM-2, hld, glaucoma, CKD3 with recent interstitial nephritis treated with steroid, HFrEF, recent NSTEMI, PAD s/p RLE angioplasty, s/p right 2nd toe partial amputation at [facility] in April 2021, who presents with 2 week history of worsening right foot pain, associated with right 2nd residual toe stump/3rd toe infection/discoloration to blue/black, xray c/f osteomyelitis, plan for surgical intervention this admission per podiatry. Hospital course has been complicated by flash pulmonary edema 2/2 to ACOSTA on CKD. Now he is improving with diuresis

## 2021-08-03 NOTE — PROGRESS NOTE ADULT - SUBJECTIVE AND OBJECTIVE BOX
[facility] Division of Kidney Diseases & Hypertension  FOLLOW UP NOTE  --------------------------------------------------------------------------------  61M with history of HTN, T2DM, HLD, glaucoma, CKD with recent AIN (follows with Dr. Zambrano) treated with steroid, HFrEF, recent NSTEMI, PAD s/p RLE angioplasty, s/p right 2nd toe patient amputation at [facility] presents to [facility] with worsening foot pain and associated right 203 toe infection. Recent RLE angiogram revealed severe atherosclerosis. Nephrology consulted for ACOSTA amidst rising creatinine levels and continued malaise. Patient has been mentating well and has no complaints. Has been tolerating antibiotics well.       Pt. seen and examined at bedside he reports feeling ok.  Just anxious about his foot.  He reports that his breathing is markedly improved since we saw him last.  No chest pain no SOB no NVD.      PAST HISTORY  --------------------------------------------------------------------------------  No significant changes to PMH, PSH, FHx, SHx, unless otherwise noted    ALLERGIES & MEDICATIONS  --------------------------------------------------------------------------------  Allergies    cefepime (Other)    Intolerances      Standing Inpatient Medications  aspirin enteric coated 81 milliGRAM(s) Oral daily  atorvastatin 40 milliGRAM(s) Oral at bedtime  buMETAnide 4 milliGRAM(s) Oral every 12 hours  carvedilol 6.25 milliGRAM(s) Oral every 12 hours  dextrose 40% Gel 15 Gram(s) Oral once  dextrose 5%. 1000 milliLiter(s) IV Continuous <Continuous>  dextrose 5%. 1000 milliLiter(s) IV Continuous <Continuous>  dextrose 50% Injectable 25 Gram(s) IV Push once  dextrose 50% Injectable 12.5 Gram(s) IV Push once  dextrose 50% Injectable 25 Gram(s) IV Push once  ertapenem  IVPB 500 milliGRAM(s) IV Intermittent every 24 hours  glucagon  Injectable 1 milliGRAM(s) IntraMuscular once  heparin   Injectable 5000 Unit(s) SubCutaneous every 8 hours  hydrALAZINE 50 milliGRAM(s) Oral three times a day  insulin glargine Injectable (LANTUS) 40 Unit(s) SubCutaneous at bedtime  insulin lispro (ADMELOG) corrective regimen sliding scale   SubCutaneous three times a day before meals  insulin lispro Injectable (ADMELOG) 8 Unit(s) SubCutaneous three times a day before meals  isosorbide   dinitrate Tablet (ISORDIL) 20 milliGRAM(s) Oral three times a day  latanoprost 0.005% Ophthalmic Solution 1 Drop(s) Both EYES at bedtime  levoFLOXacin IVPB      levoFLOXacin IVPB 250 milliGRAM(s) IV Intermittent every 24 hours  melatonin 3 milliGRAM(s) Oral at bedtime  NIFEdipine XL 30 milliGRAM(s) Oral daily  polyethylene glycol 3350 17 Gram(s) Oral daily  senna 2 Tablet(s) Oral at bedtime  sodium chloride 0.9%. 1000 milliLiter(s) IV Continuous <Continuous>  sodium chloride 0.9%. 1000 milliLiter(s) IV Continuous <Continuous>    PRN Inpatient Medications  acetaminophen   Tablet .. 650 milliGRAM(s) Oral every 6 hours PRN      REVIEW OF SYSTEMS  --------------------------------------------------------------------------------  Gen: no fever  Respiratory: improved dyspnea  CV: no cp  GI: no ab pain  : no difficulty urinating  MSK: some foot pain    VITALS/PHYSICAL EXAM  --------------------------------------------------------------------------------  T(C): 36.1 (08-03-21 @ 08:39), Max: 36.8 (08-03-21 @ 07:03)  HR: 69 (08-03-21 @ 08:39) (64 - 72)  BP: 141/60 (08-03-21 @ 08:39) (116/63 - 143/71)  ABP: --  ABP(mean): --  RR: 16 (08-03-21 @ 08:39) (16 - 18)  SpO2: 100% (08-03-21 @ 08:39) (98% - 100%)  CVP(mm Hg): --        08-02-21 @ 07:01  -  08-03-21 @ 07:00  --------------------------------------------------------  IN: 970 mL / OUT: 2200 mL / NET: -1230 mL    08-03-21 @ 07:01  -  08-03-21 @ 10:29  --------------------------------------------------------  IN: 118 mL / OUT: 280 mL / NET: -162 mL      Physical Exam:  	Gen: NAD, appears calm  	HEENT: MMM, anicteric  	Pulm: clear lungs  	CV: S1S2+ no JVD  	Abd: Soft, +BS   	Ext: R foot dressing seen+ no edema  	Neuro: Awake  	Skin: Warm and dry  	Vascular access: peripheral IV canula        LABS/STUDIES  --------------------------------------------------------------------------------              10.7   15.78 >-----------<  460      [08-03-21 @ 07:44]              32.3     134  |  92  |  74  ----------------------------<  112      [08-03-21 @ 07:44]  3.2   |  24  |  2.23        Ca     9.8     [08-03-21 @ 07:44]      Mg     2.40     [08-03-21 @ 07:44]      Phos  5.3     [08-03-21 @ 07:44]    TPro  8.1  /  Alb  3.5  /  TBili  0.4  /  DBili  x   /  AST  25  /  ALT  28  /  AlkPhos  256  [08-02-21 @ 07:08]    PT/INR: PT 13.7 , INR 1.21       [08-02-21 @ 07:08]  PTT: 28.5       [08-02-21 @ 07:08]      Creatinine Trend:  SCr 2.23 [08-03 @ 07:44]  SCr 2.40 [08-02 @ 07:08]  SCr 2.62 [08-01 @ 04:16]  SCr 2.36 [07-31 @ 06:45]  SCr 2.20 [07-30 @ 06:58]

## 2021-08-03 NOTE — PROGRESS NOTE ADULT - PROBLEM SELECTOR PLAN 4
-pt with h/o right foot 2nd toe partial amputation in April at [facility], h/o severe RLE PVD with RLE angioplasty in past, no stent, now p/w 2nd residual toe/stump and 3rd toe infection, xray concerning for osteomyelitis, elevated inflammatory markers ESR 96, CRP 80.6 c/w acute OM. rec'd IV vanc 1 g bid (7/17 pm - 7/20), zosyn (7/18-7/20)  -7/18 blood cultures x2  -ID c/s start erta and levaquin (7/20 - )  -MRI right foot: osteomyelitis in 2nd toe, periostitis w/o osteo on 3rd toe  -podiatry consulted, s/p I&D 7/17. initial plan for R foot partial 2nd and 3rd ray resection on today, now rescheduled to 8/4 d/t worsening ischemia.  pt is medically optimized for the procedure  - vascular consult: s/p angiogram 7/29 - optimized from vascular standpoint for podiatry procedure. Now given e/o toe ischemia will ask to re-eval

## 2021-08-03 NOTE — PROGRESS NOTE ADULT - SUBJECTIVE AND OBJECTIVE BOX
Patient is a 61y old  Male who presents with a chief complaint of right foot 2nd/3rd toe infection (02 Aug 2021 20:44)       INTERVAL HPI/OVERNIGHT EVENTS:  Patient seen and evaluated at bedside.  Pt is resting comfortable in NAD. Denies N/V/F/C.  Pain rated at X/10    Allergies    cefepime (Other)    Intolerances        Vital Signs Last 24 Hrs  T(C): 36.1 (03 Aug 2021 08:39), Max: 36.8 (03 Aug 2021 07:03)  T(F): 97 (03 Aug 2021 08:39), Max: 98.2 (03 Aug 2021 07:03)  HR: 69 (03 Aug 2021 08:39) (64 - 72)  BP: 141/60 (03 Aug 2021 08:39) (116/63 - 143/71)  BP(mean): --  RR: 16 (03 Aug 2021 08:39) (16 - 18)  SpO2: 100% (03 Aug 2021 08:39) (98% - 100%)    LABS:                        10.7   15.78 )-----------( 460      ( 03 Aug 2021 07:44 )             32.3     08-03    134<L>  |  92<L>  |  74<H>  ----------------------------<  112<H>  3.2<L>   |  24  |  2.23<H>    Ca    9.8      03 Aug 2021 07:44  Phos  5.3     08-03  Mg     2.40     08-03    TPro  8.1  /  Alb  3.5  /  TBili  0.4  /  DBili  x   /  AST  25  /  ALT  28  /  AlkPhos  256<H>  08-02    PT/INR - ( 02 Aug 2021 07:08 )   PT: 13.7 sec;   INR: 1.21 ratio         PTT - ( 02 Aug 2021 07:08 )  PTT:28.5 sec    CAPILLARY BLOOD GLUCOSE      POCT Blood Glucose.: 133 mg/dL (03 Aug 2021 08:20)  POCT Blood Glucose.: 221 mg/dL (02 Aug 2021 21:44)  POCT Blood Glucose.: 132 mg/dL (02 Aug 2021 17:35)  POCT Blood Glucose.: 161 mg/dL (02 Aug 2021 12:29)      Lower Extremity Physical Exam:  Vascular: DP 1/4, B/L, PT non-palpable B/L, CFT delayed on right and less than 3s on L, Temperature gradient cool to warm on R, warm to cool on L    Neuro: Epicritic sensation intact to the level of ankle, B/L.  Musculoskeletal/Ortho: pain on palpation surrounding the 2nd and 3rd digit  Skin: gangrenous right foot 2nd, 3rd, 4th digits, RF 3rd interspace wound probes to bone, consistent tracking proximally to 2nd and 3rd MPJ, less wet conversion of 2nd and 3rd digits, no purulence expressed,  erythema consistent.     7/17 s/p I&D of R 2nd interspace  - mummified residual 2nd digit stump and 3rd digits, worsening ischemic changes now extending to 4th digit and plantar forefoot, macerated tissue noted, no malodor, no wet conversion, no purulence, no erythema/ cellulitis    RADIOLOGY & ADDITIONAL TESTS:   Patient is a 61y old  Male who presents with a chief complaint of right foot 2nd/3rd toe infection (02 Aug 2021 20:44)       INTERVAL HPI/OVERNIGHT EVENTS:  Patient seen and evaluated at bedside.  Pt is resting comfortable in NAD. Denies N/V/F/C.  Pain rated at X/10    Allergies    cefepime (Other)    Intolerances        Vital Signs Last 24 Hrs  T(C): 36.1 (03 Aug 2021 08:39), Max: 36.8 (03 Aug 2021 07:03)  T(F): 97 (03 Aug 2021 08:39), Max: 98.2 (03 Aug 2021 07:03)  HR: 69 (03 Aug 2021 08:39) (64 - 72)  BP: 141/60 (03 Aug 2021 08:39) (116/63 - 143/71)  BP(mean): --  RR: 16 (03 Aug 2021 08:39) (16 - 18)  SpO2: 100% (03 Aug 2021 08:39) (98% - 100%)    LABS:                        10.7   15.78 )-----------( 460      ( 03 Aug 2021 07:44 )             32.3     08-03    134<L>  |  92<L>  |  74<H>  ----------------------------<  112<H>  3.2<L>   |  24  |  2.23<H>    Ca    9.8      03 Aug 2021 07:44  Phos  5.3     08-03  Mg     2.40     08-03    TPro  8.1  /  Alb  3.5  /  TBili  0.4  /  DBili  x   /  AST  25  /  ALT  28  /  AlkPhos  256<H>  08-02    PT/INR - ( 02 Aug 2021 07:08 )   PT: 13.7 sec;   INR: 1.21 ratio         PTT - ( 02 Aug 2021 07:08 )  PTT:28.5 sec    CAPILLARY BLOOD GLUCOSE      POCT Blood Glucose.: 133 mg/dL (03 Aug 2021 08:20)  POCT Blood Glucose.: 221 mg/dL (02 Aug 2021 21:44)  POCT Blood Glucose.: 132 mg/dL (02 Aug 2021 17:35)  POCT Blood Glucose.: 161 mg/dL (02 Aug 2021 12:29)      Lower Extremity Physical Exam:  Vascular: DP 1/4, B/L, PT non-palpable B/L, CFT delayed on right and less than 3s on L, Temperature gradient cool to warm on R, warm to cool on L    Neuro: Epicritic sensation intact to the level of ankle, B/L.  Musculoskeletal/Ortho: pain on palpation surrounding the 2nd and 3rd digit  Skin: 7/17 s/p I&D of R 2nd interspace, gangrenous right foot 2nd, 3rd, 4th digits, RF 3rd interspace wound probes to bone, consistent tracking proximally to 2nd and 3rd MPJ, less wet conversion of 2nd and 3rd digits, no purulence expressed,  erythema consistent.     7/17 s/p I&D of R 2nd interspace    RADIOLOGY & ADDITIONAL TESTS:

## 2021-08-03 NOTE — PROGRESS NOTE ADULT - PROBLEM SELECTOR PLAN 1
Pt. with ACOSTA on CKD in the setting of venous congestion. pt. hx of AIN treated with steroids.   Upon admission Scr was 1.77.  Received contrast 7/29 and creatinine trended up to 2.62 but now 2.2 today 8/3.  Volume status has improved substantially overall and this creatinine likely represents patients new baseline.  Would hold on any further fluids for now.  BP symptoms and physical exam suggest that he continues to tolerate bumex well.  If further contrast required please notify nephrology.  Patient will need follow up with me in the office .

## 2021-08-03 NOTE — PROGRESS NOTE ADULT - ASSESSMENT
62 y/o M s/p R foot 2nd interspace I&D, RF 2,3,4 dry gangrene with plantar forefoot ischemic changes  - Patient seen and evaluated   - Afebrile WBC 15.78  - gangrenous right foot 2nd, 3rd, 4th digits, RF 3rd interspace wound probes to bone, consistent tracking proximally to 2nd and 3rd MPJ, less wet conversion of 2nd and 3rd digits, no purulence expressed,  erythema consistent.   - R foot MRI: early osteomyelitis within the second proximal phalangeal, periostitis to the proximal phalanx of 3rd digit without vinnie osteomyelitis.  - MICHEL/PVR- noncompressible vessels b/l, RTBI 0.07, LTBI 0.22, waveforms flat at digits  - 7/29 angio could not restore RLE flow, no further vascular intervention planned at this time  - Cancelled R foot partial 2nd and 3rd ray resection Wednesday 8/4 @ 4 pm, pod plan is R foot TMA pending demarcation   - prognosis is guarded 2/2 very poor circulation and failed RLE angioplasty by vasc team  - Discussed with attending

## 2021-08-03 NOTE — PROGRESS NOTE ADULT - PROBLEM SELECTOR PLAN 7
h/o severe RLE PVD, s/p RLE angioplasty in past  - c/w ASA/statin  - vascular angiogram 7/29 a/p angioplasty. no further intervention indicated. f/u further recs

## 2021-08-04 ENCOUNTER — TRANSCRIPTION ENCOUNTER (OUTPATIENT)
Age: 61
End: 2021-08-04

## 2021-08-04 VITALS
OXYGEN SATURATION: 99 % | TEMPERATURE: 98 F | SYSTOLIC BLOOD PRESSURE: 139 MMHG | HEART RATE: 74 BPM | DIASTOLIC BLOOD PRESSURE: 68 MMHG | RESPIRATION RATE: 18 BRPM

## 2021-08-04 LAB
ALBUMIN SERPL ELPH-MCNC: 3.5 G/DL — SIGNIFICANT CHANGE UP (ref 3.3–5)
ALP SERPL-CCNC: 250 U/L — HIGH (ref 40–120)
ALT FLD-CCNC: 26 U/L — SIGNIFICANT CHANGE UP (ref 4–41)
ANION GAP SERPL CALC-SCNC: 23 MMOL/L — HIGH (ref 7–14)
AST SERPL-CCNC: 25 U/L — SIGNIFICANT CHANGE UP (ref 4–40)
BASOPHILS # BLD AUTO: 0.08 K/UL — SIGNIFICANT CHANGE UP (ref 0–0.2)
BASOPHILS NFR BLD AUTO: 0.5 % — SIGNIFICANT CHANGE UP (ref 0–2)
BILIRUB SERPL-MCNC: 0.4 MG/DL — SIGNIFICANT CHANGE UP (ref 0.2–1.2)
BUN SERPL-MCNC: 68 MG/DL — HIGH (ref 7–23)
CALCIUM SERPL-MCNC: 9.5 MG/DL — SIGNIFICANT CHANGE UP (ref 8.4–10.5)
CHLORIDE SERPL-SCNC: 88 MMOL/L — LOW (ref 98–107)
CO2 SERPL-SCNC: 25 MMOL/L — SIGNIFICANT CHANGE UP (ref 22–31)
CREAT SERPL-MCNC: 2.25 MG/DL — HIGH (ref 0.5–1.3)
EOSINOPHIL # BLD AUTO: 0.44 K/UL — SIGNIFICANT CHANGE UP (ref 0–0.5)
EOSINOPHIL NFR BLD AUTO: 2.9 % — SIGNIFICANT CHANGE UP (ref 0–6)
GLUCOSE SERPL-MCNC: 210 MG/DL — HIGH (ref 70–99)
HCT VFR BLD CALC: 32.7 % — LOW (ref 39–50)
HGB BLD-MCNC: 10.6 G/DL — LOW (ref 13–17)
IANC: 11.64 K/UL — HIGH (ref 1.5–8.5)
IMM GRANULOCYTES NFR BLD AUTO: 0.5 % — SIGNIFICANT CHANGE UP (ref 0–1.5)
LYMPHOCYTES # BLD AUTO: 1.79 K/UL — SIGNIFICANT CHANGE UP (ref 1–3.3)
LYMPHOCYTES # BLD AUTO: 12 % — LOW (ref 13–44)
MAGNESIUM SERPL-MCNC: 2.3 MG/DL — SIGNIFICANT CHANGE UP (ref 1.6–2.6)
MCHC RBC-ENTMCNC: 26.4 PG — LOW (ref 27–34)
MCHC RBC-ENTMCNC: 32.4 GM/DL — SIGNIFICANT CHANGE UP (ref 32–36)
MCV RBC AUTO: 81.5 FL — SIGNIFICANT CHANGE UP (ref 80–100)
MONOCYTES # BLD AUTO: 0.93 K/UL — HIGH (ref 0–0.9)
MONOCYTES NFR BLD AUTO: 6.2 % — SIGNIFICANT CHANGE UP (ref 2–14)
NEUTROPHILS # BLD AUTO: 11.64 K/UL — HIGH (ref 1.8–7.4)
NEUTROPHILS NFR BLD AUTO: 77.9 % — HIGH (ref 43–77)
NRBC # BLD: 0 /100 WBCS — SIGNIFICANT CHANGE UP
NRBC # FLD: 0 K/UL — SIGNIFICANT CHANGE UP
PHOSPHATE SERPL-MCNC: 4.1 MG/DL — SIGNIFICANT CHANGE UP (ref 2.5–4.5)
PLATELET # BLD AUTO: 457 K/UL — HIGH (ref 150–400)
POTASSIUM SERPL-MCNC: 3.8 MMOL/L — SIGNIFICANT CHANGE UP (ref 3.5–5.3)
POTASSIUM SERPL-SCNC: 3.8 MMOL/L — SIGNIFICANT CHANGE UP (ref 3.5–5.3)
PROT SERPL-MCNC: 7.7 G/DL — SIGNIFICANT CHANGE UP (ref 6–8.3)
RBC # BLD: 4.01 M/UL — LOW (ref 4.2–5.8)
RBC # FLD: 12.7 % — SIGNIFICANT CHANGE UP (ref 10.3–14.5)
SODIUM SERPL-SCNC: 136 MMOL/L — SIGNIFICANT CHANGE UP (ref 135–145)
WBC # BLD: 14.95 K/UL — HIGH (ref 3.8–10.5)
WBC # FLD AUTO: 14.95 K/UL — HIGH (ref 3.8–10.5)

## 2021-08-04 PROCEDURE — 99239 HOSP IP/OBS DSCHRG MGMT >30: CPT

## 2021-08-04 RX ORDER — CARVEDILOL PHOSPHATE 80 MG/1
1 CAPSULE, EXTENDED RELEASE ORAL
Qty: 60 | Refills: 0
Start: 2021-08-04 | End: 2021-09-02

## 2021-08-04 RX ORDER — ISOSORBIDE DINITRATE 5 MG/1
1 TABLET ORAL
Qty: 90 | Refills: 0
Start: 2021-08-04 | End: 2021-09-02

## 2021-08-04 RX ORDER — HYDRALAZINE HCL 50 MG
1 TABLET ORAL
Qty: 90 | Refills: 0
Start: 2021-08-04 | End: 2021-09-02

## 2021-08-04 RX ORDER — CARVEDILOL PHOSPHATE 80 MG/1
1 CAPSULE, EXTENDED RELEASE ORAL
Qty: 0 | Refills: 0 | DISCHARGE
Start: 2021-08-04

## 2021-08-04 RX ORDER — AZTREONAM 2 G
1 VIAL (EA) INJECTION
Qty: 28 | Refills: 0
Start: 2021-08-04 | End: 2021-08-17

## 2021-08-04 RX ORDER — ISOSORBIDE DINITRATE 5 MG/1
1 TABLET ORAL
Qty: 0 | Refills: 0 | DISCHARGE
Start: 2021-08-04

## 2021-08-04 RX ORDER — BUMETANIDE 0.25 MG/ML
2 INJECTION INTRAMUSCULAR; INTRAVENOUS
Qty: 120 | Refills: 0
Start: 2021-08-04 | End: 2021-09-02

## 2021-08-04 RX ADMIN — Medication 50 MILLIGRAM(S): at 13:34

## 2021-08-04 RX ADMIN — Medication 1: at 09:15

## 2021-08-04 RX ADMIN — ISOSORBIDE DINITRATE 20 MILLIGRAM(S): 5 TABLET ORAL at 06:35

## 2021-08-04 RX ADMIN — Medication 650 MILLIGRAM(S): at 10:17

## 2021-08-04 RX ADMIN — Medication 8 UNIT(S): at 09:15

## 2021-08-04 RX ADMIN — Medication 650 MILLIGRAM(S): at 09:17

## 2021-08-04 RX ADMIN — CARVEDILOL PHOSPHATE 6.25 MILLIGRAM(S): 80 CAPSULE, EXTENDED RELEASE ORAL at 06:35

## 2021-08-04 RX ADMIN — Medication 30 MILLIGRAM(S): at 06:36

## 2021-08-04 RX ADMIN — Medication 81 MILLIGRAM(S): at 13:34

## 2021-08-04 RX ADMIN — ISOSORBIDE DINITRATE 20 MILLIGRAM(S): 5 TABLET ORAL at 13:34

## 2021-08-04 RX ADMIN — Medication 50 MILLIGRAM(S): at 06:36

## 2021-08-04 RX ADMIN — BUMETANIDE 4 MILLIGRAM(S): 0.25 INJECTION INTRAMUSCULAR; INTRAVENOUS at 09:22

## 2021-08-04 RX ADMIN — HEPARIN SODIUM 5000 UNIT(S): 5000 INJECTION INTRAVENOUS; SUBCUTANEOUS at 06:34

## 2021-08-04 NOTE — PROGRESS NOTE ADULT - NSICDXPILOT_GEN_ALL_CORE
[place]
[employer]
[place]

## 2021-08-04 NOTE — PROGRESS NOTE ADULT - SUBJECTIVE AND OBJECTIVE BOX
Patient is a 61y old  Male who presents with a chief complaint of right foot 2nd/3rd toe infection (03 Aug 2021 15:51)       INTERVAL HPI/OVERNIGHT EVENTS:  Patient seen and evaluated at bedside.  Pt is resting comfortable in NAD. Denies N/V/F/C.    Allergies    cefepime (Other)    Intolerances        Vital Signs Last 24 Hrs  T(C): 36.6 (04 Aug 2021 09:21), Max: 36.8 (04 Aug 2021 06:33)  T(F): 97.9 (04 Aug 2021 09:21), Max: 98.3 (04 Aug 2021 06:33)  HR: 70 (04 Aug 2021 09:21) (67 - 77)  BP: 140/78 (04 Aug 2021 09:21) (140/78 - 148/73)  BP(mean): --  RR: 17 (04 Aug 2021 09:21) (17 - 18)  SpO2: 99% (04 Aug 2021 09:21) (97% - 100%)    LABS:                        10.6   14.95 )-----------( 457      ( 04 Aug 2021 07:49 )             32.7     08-04    136  |  88<L>  |  68<H>  ----------------------------<  210<H>  3.8   |  25  |  2.25<H>    Ca    9.5      04 Aug 2021 07:49  Phos  4.1     08-04  Mg     2.30     08-04    TPro  7.7  /  Alb  3.5  /  TBili  0.4  /  DBili  x   /  AST  25  /  ALT  26  /  AlkPhos  250<H>  08-04        CAPILLARY BLOOD GLUCOSE      POCT Blood Glucose.: 201 mg/dL (04 Aug 2021 12:22)  POCT Blood Glucose.: 197 mg/dL (04 Aug 2021 08:27)  POCT Blood Glucose.: 290 mg/dL (03 Aug 2021 21:47)  POCT Blood Glucose.: 201 mg/dL (03 Aug 2021 17:10)      Lower Extremity Physical Exam:  Vascular: DP 1/4, B/L, PT non-palpable B/L, CFT delayed on right and less than 3s on L, Temperature gradient cool to warm on R, warm to cool on L    Neuro: Epicritic sensation intact to the level of ankle, B/L.  Musculoskeletal/Ortho: pain on palpation surrounding the 2nd and 3rd digit  Skin: 7/17 s/p I&D of R 2nd interspace, gangrenous right foot 2nd, 3rd, 4th digits, RF 3rd interspace wound probes to bone, consistent tracking proximally to 2nd and 3rd MPJ, less wet conversion of 2nd and 3rd digits, no purulence expressed,  erythema consistent.

## 2021-08-04 NOTE — PROGRESS NOTE ADULT - REASON FOR ADMISSION
right foot 2nd/3rd toe infection

## 2021-08-04 NOTE — CHART NOTE - NSCHARTNOTESELECT_GEN_ALL_CORE
Event Note
Post Op Check
Event Note
Event Note
Follow-Up/Nutrition Services
PRE OP NOTE
SOB/Event Note
Event Note

## 2021-08-04 NOTE — DISCHARGE NOTE NURSING/CASE MANAGEMENT/SOCIAL WORK - PATIENT PORTAL LINK FT
Problem: Patient Care Overview (Adult)  Goal: Plan of Care Review  Outcome: Outcome(s) achieved Date Met:  03/06/17  Goal: Adult Individualization and Mutuality  Outcome: Outcome(s) achieved Date Met:  03/06/17  Goal: Discharge Needs Assessment  Outcome: Outcome(s) achieved Date Met:  03/06/17    Problem: Nausea/Vomiting in Pregnancy, Includes Hyperemesis (Adult,Obstetrics,Pediatric)  Goal: Signs and Symptoms of Listed Potential Problems Will be Absent or Manageable (Nausea/Vomiting in Pregnancy, Includes Hyperemesis)  Outcome: Outcome(s) achieved Date Met:  03/06/17    Problem: Diarrhea (Adult)  Goal: Identify Related Risk Factors and Signs and Symptoms  Outcome: Outcome(s) achieved Date Met:  03/06/17  Goal: Improved/Reduced Symptoms  Outcome: Unable to achieve outcome(s) by discharge Date Met:  03/06/17       You can access the FollowMyHealth Patient Portal offered by [facility] by registering at the following website: [email or web address]. By joining [facility]’s FollowMyHealth portal, you will also be able to view your health information using other applications (apps) compatible with our system.

## 2021-08-04 NOTE — PROGRESS NOTE ADULT - ASSESSMENT
60 y/o M s/p R foot 2nd interspace I&D, RF 2,3,4 dry gangrene with plantar forefoot ischemic changes  - Patient seen and evaluated   - Afebrile WBC 14.95  - gangrenous right foot 2nd, 3rd, 4th digits, RF 3rd interspace wound probes to bone, consistent tracking proximally to 2nd and 3rd MPJ, less wet conversion of 2nd and 3rd digits, no purulence expressed,  erythema consistent.   - R foot MRI: early osteomyelitis within the second proximal phalangeal, periostitis to the proximal phalanx of 3rd digit without vinnie osteomyelitis.  - MICHEL/PVR- noncompressible vessels b/l, RTBI 0.07, LTBI 0.22, waveforms flat at digits  - 7/29 angio could not restore RLE flow, no further vascular intervention planned at this time  - prognosis is guarded 2/2 very poor circulation and failed RLE angioplasty by vasc team  - spoke in detail with patient regarding decision on no surgery due to no vascular revascularization, patient frustrated but understands  - at this time patient would like to leave for a different hospital, patient asked to stay until ID can make recommendation for antibiotics.   - patient is stable for discharge otherwise  - Discussed with attending

## 2021-12-14 NOTE — PROGRESS NOTE ADULT - PROBLEM SELECTOR PROBLEM 4
Slurred speech
Detail Level: Zone
Slurred speech

## 2022-03-15 NOTE — PROGRESS NOTE ADULT - PROBLEM SELECTOR PLAN 1
Pt. with ACOSTA on CKD in the setting of venous congestion. pt. hx of AIN treated with steroids. Scr on admission, was 1.77. It increases to 3.93 on 7/21/21. Renal sonogram done on 7/21/21 showed no HDN. Urine electrolytes collected on 7/21 showed Lupillo < 20 with urine osmol of 304. Pt clinically in volume overload, BNP done on 7/21/21 elevated to [address]. Received Diuretics (lasix on 7/18-7/19) and then bumex on 7/21/21. Scr has decreased to 2.39 today. Pt. is non-oliguric, non uremic. Recommend continuing Bumex 4 mg BID. Monitor labs and urine output. Avoid any potential nephrotoxins. Dose medications as per eGFR. Also recommend to get stool o+p due to persistent eosinophilia. Pt. with ACOSTA on CKD in the setting of venous congestion. pt. hx of AIN treated with steroids.   - Scr on admission 1.77.   - Scr increases to 3.93 on 7/21/21.   - Renal sonogram done on 7/21/21 showed no HDN.   - Lupillo < 20, Urine osmol 304  - BNP: 91819  - Currently on Bumex 4mg BID  - Monitor I/O   - Dose medications per eGFR Pt. with ACOSTA on CKD in the setting of venous congestion. pt. hx of AIN treated with steroids.   - Scr on admission 1.77.   - Scr increases to 3.93 on 7/21/21.   - Renal sonogram done on 7/21/21 showed no HDN.   - Lupillo < 20, Urine osmol 304  - BNP: 66283  - Currently on Bumex 4mg BID  - In preparation for angio with contrast start Isotonic IV fluids 70 cc/hr 6 hours prior and continue to 6 hours post procedure.   - Monitor I/O   - Dose medications per eGFR Orbicularis Oris Muscle Flap Text: The defect edges were debeveled with a #15 scalpel blade.  Given that the defect affected the competency of the oral sphincter an obicularis oris muscle flap was deemed most appropriate to restore this competency and normal muscle function.  Using a sterile surgical marker, an appropriate flap was drawn incorporating the defect. The area thus outlined was incised with a #15 scalpel blade.

## 2022-06-01 NOTE — PROGRESS NOTE ADULT - PROBLEM/PLAN-8
DISPLAY PLAN FREE TEXT
Patient
DISPLAY PLAN FREE TEXT

## 2023-05-11 NOTE — PROGRESS NOTE ADULT - REASON FOR ADMISSION
slurred speech
Orbital.../Triceps.../Buccal...
slurred speech

## 2023-06-19 NOTE — ASU DISCHARGE PLAN (ADULT/PEDIATRIC) - C. MAKE IMPORTANT PERSONAL OR BUSINESS DECISIONS
Statement Selected [Symptom and Test Evaluation] : symptom and test evaluation [CV Risk Factors and Non-Cardiac Disease] : CV risk factors and non-cardiac disease

## 2023-10-11 NOTE — CONSULT NOTE ADULT - REASON FOR ADMISSION
right foot 2nd/3rd toe infection
right foot 2nd/3rd toe infection
None
right foot 2nd/3rd toe infection

## 2024-12-12 NOTE — PROGRESS NOTE ADULT - ASSESSMENT
12/12/24    Patient is at [facility] and the physician was notified.   61 year old with h/o PVD and DM , with prior toe amputation presents with increased discoloration to his right second and third toe.   Appearance of dry gangrene and possible associated abscess.    1) Right foot toe ulcers with imaging suggestive of OM    Cx with enterobacter, s. maltophilia, group b strep    Continue ertapenem/ levaquin    2) PAD  Vascular surgery evaluation  Podiatry planning for possible surgery based on vascular eval     Pt had diagnositic angiogram, discussed with vascular surgery attending- pt may benefit from revascularization    2) Leukocytosis  Blood cultures without growth  Likely related to gangrene and associated abscess    3) DM  Close monitoring and control of glucose